# Patient Record
Sex: MALE | Race: WHITE | NOT HISPANIC OR LATINO | URBAN - METROPOLITAN AREA
[De-identification: names, ages, dates, MRNs, and addresses within clinical notes are randomized per-mention and may not be internally consistent; named-entity substitution may affect disease eponyms.]

---

## 2017-07-05 ENCOUNTER — INPATIENT (INPATIENT)
Facility: HOSPITAL | Age: 82
LOS: 3 days | Discharge: HOME | End: 2017-07-09
Attending: INTERNAL MEDICINE

## 2017-07-05 DIAGNOSIS — R42 DIZZINESS AND GIDDINESS: ICD-10-CM

## 2017-07-11 DIAGNOSIS — E87.6 HYPOKALEMIA: ICD-10-CM

## 2017-07-11 DIAGNOSIS — I10 ESSENTIAL (PRIMARY) HYPERTENSION: ICD-10-CM

## 2017-07-11 DIAGNOSIS — Z95.5 PRESENCE OF CORONARY ANGIOPLASTY IMPLANT AND GRAFT: ICD-10-CM

## 2017-07-11 DIAGNOSIS — R42 DIZZINESS AND GIDDINESS: ICD-10-CM

## 2017-07-11 DIAGNOSIS — I48.91 UNSPECIFIED ATRIAL FIBRILLATION: ICD-10-CM

## 2017-07-11 DIAGNOSIS — I25.10 ATHEROSCLEROTIC HEART DISEASE OF NATIVE CORONARY ARTERY WITHOUT ANGINA PECTORIS: ICD-10-CM

## 2017-07-11 DIAGNOSIS — E78.00 PURE HYPERCHOLESTEROLEMIA, UNSPECIFIED: ICD-10-CM

## 2017-07-11 DIAGNOSIS — I63.9 CEREBRAL INFARCTION, UNSPECIFIED: ICD-10-CM

## 2017-07-11 DIAGNOSIS — Z95.1 PRESENCE OF AORTOCORONARY BYPASS GRAFT: ICD-10-CM

## 2017-07-11 DIAGNOSIS — I95.9 HYPOTENSION, UNSPECIFIED: ICD-10-CM

## 2017-07-19 DIAGNOSIS — E87.1 HYPO-OSMOLALITY AND HYPONATREMIA: ICD-10-CM

## 2017-07-25 ENCOUNTER — OUTPATIENT (OUTPATIENT)
Dept: OUTPATIENT SERVICES | Facility: HOSPITAL | Age: 82
LOS: 1 days | Discharge: HOME | End: 2017-07-25

## 2017-07-25 DIAGNOSIS — I67.89 OTHER CEREBROVASCULAR DISEASE: ICD-10-CM

## 2017-07-25 DIAGNOSIS — Z79.01 LONG TERM (CURRENT) USE OF ANTICOAGULANTS: ICD-10-CM

## 2017-07-25 DIAGNOSIS — R42 DIZZINESS AND GIDDINESS: ICD-10-CM

## 2017-08-01 ENCOUNTER — OUTPATIENT (OUTPATIENT)
Dept: OUTPATIENT SERVICES | Facility: HOSPITAL | Age: 82
LOS: 1 days | Discharge: HOME | End: 2017-08-01

## 2017-08-01 DIAGNOSIS — R42 DIZZINESS AND GIDDINESS: ICD-10-CM

## 2017-08-01 DIAGNOSIS — I67.89 OTHER CEREBROVASCULAR DISEASE: ICD-10-CM

## 2017-08-01 DIAGNOSIS — Z79.01 LONG TERM (CURRENT) USE OF ANTICOAGULANTS: ICD-10-CM

## 2017-08-15 ENCOUNTER — OUTPATIENT (OUTPATIENT)
Dept: OUTPATIENT SERVICES | Facility: HOSPITAL | Age: 82
LOS: 1 days | Discharge: HOME | End: 2017-08-15

## 2017-08-15 DIAGNOSIS — Z79.01 LONG TERM (CURRENT) USE OF ANTICOAGULANTS: ICD-10-CM

## 2017-08-15 DIAGNOSIS — R42 DIZZINESS AND GIDDINESS: ICD-10-CM

## 2017-08-15 DIAGNOSIS — I67.89 OTHER CEREBROVASCULAR DISEASE: ICD-10-CM

## 2017-09-05 ENCOUNTER — OUTPATIENT (OUTPATIENT)
Dept: OUTPATIENT SERVICES | Facility: HOSPITAL | Age: 82
LOS: 1 days | Discharge: HOME | End: 2017-09-05

## 2017-09-05 DIAGNOSIS — R42 DIZZINESS AND GIDDINESS: ICD-10-CM

## 2017-09-05 DIAGNOSIS — Z79.01 LONG TERM (CURRENT) USE OF ANTICOAGULANTS: ICD-10-CM

## 2017-09-05 DIAGNOSIS — I67.89 OTHER CEREBROVASCULAR DISEASE: ICD-10-CM

## 2017-09-19 ENCOUNTER — OUTPATIENT (OUTPATIENT)
Dept: OUTPATIENT SERVICES | Facility: HOSPITAL | Age: 82
LOS: 1 days | Discharge: HOME | End: 2017-09-19

## 2017-09-19 DIAGNOSIS — Z79.01 LONG TERM (CURRENT) USE OF ANTICOAGULANTS: ICD-10-CM

## 2017-09-19 DIAGNOSIS — R42 DIZZINESS AND GIDDINESS: ICD-10-CM

## 2017-09-19 DIAGNOSIS — I67.89 OTHER CEREBROVASCULAR DISEASE: ICD-10-CM

## 2017-10-17 ENCOUNTER — OUTPATIENT (OUTPATIENT)
Dept: OUTPATIENT SERVICES | Facility: HOSPITAL | Age: 82
LOS: 1 days | Discharge: HOME | End: 2017-10-17

## 2017-10-17 DIAGNOSIS — R42 DIZZINESS AND GIDDINESS: ICD-10-CM

## 2017-10-17 DIAGNOSIS — Z79.01 LONG TERM (CURRENT) USE OF ANTICOAGULANTS: ICD-10-CM

## 2017-10-17 DIAGNOSIS — I67.89 OTHER CEREBROVASCULAR DISEASE: ICD-10-CM

## 2017-11-21 ENCOUNTER — OUTPATIENT (OUTPATIENT)
Dept: OUTPATIENT SERVICES | Facility: HOSPITAL | Age: 82
LOS: 1 days | Discharge: HOME | End: 2017-11-21

## 2017-11-21 DIAGNOSIS — R42 DIZZINESS AND GIDDINESS: ICD-10-CM

## 2017-11-21 DIAGNOSIS — I67.89 OTHER CEREBROVASCULAR DISEASE: ICD-10-CM

## 2017-11-21 DIAGNOSIS — Z79.01 LONG TERM (CURRENT) USE OF ANTICOAGULANTS: ICD-10-CM

## 2017-12-04 ENCOUNTER — OUTPATIENT (OUTPATIENT)
Dept: OUTPATIENT SERVICES | Facility: HOSPITAL | Age: 82
LOS: 1 days | Discharge: HOME | End: 2017-12-04

## 2017-12-04 DIAGNOSIS — I67.89 OTHER CEREBROVASCULAR DISEASE: ICD-10-CM

## 2017-12-04 DIAGNOSIS — R42 DIZZINESS AND GIDDINESS: ICD-10-CM

## 2017-12-04 DIAGNOSIS — Z79.01 LONG TERM (CURRENT) USE OF ANTICOAGULANTS: ICD-10-CM

## 2017-12-08 ENCOUNTER — OUTPATIENT (OUTPATIENT)
Dept: OUTPATIENT SERVICES | Facility: HOSPITAL | Age: 82
LOS: 1 days | Discharge: HOME | End: 2017-12-08

## 2017-12-08 DIAGNOSIS — R42 DIZZINESS AND GIDDINESS: ICD-10-CM

## 2017-12-08 DIAGNOSIS — Z79.01 LONG TERM (CURRENT) USE OF ANTICOAGULANTS: ICD-10-CM

## 2017-12-08 DIAGNOSIS — I67.89 OTHER CEREBROVASCULAR DISEASE: ICD-10-CM

## 2017-12-15 ENCOUNTER — OUTPATIENT (OUTPATIENT)
Dept: OUTPATIENT SERVICES | Facility: HOSPITAL | Age: 82
LOS: 1 days | Discharge: HOME | End: 2017-12-15

## 2017-12-15 DIAGNOSIS — R42 DIZZINESS AND GIDDINESS: ICD-10-CM

## 2017-12-15 DIAGNOSIS — I67.89 OTHER CEREBROVASCULAR DISEASE: ICD-10-CM

## 2017-12-15 DIAGNOSIS — Z79.01 LONG TERM (CURRENT) USE OF ANTICOAGULANTS: ICD-10-CM

## 2017-12-18 ENCOUNTER — OUTPATIENT (OUTPATIENT)
Dept: OUTPATIENT SERVICES | Facility: HOSPITAL | Age: 82
LOS: 1 days | Discharge: HOME | End: 2017-12-18

## 2017-12-18 DIAGNOSIS — Z79.01 LONG TERM (CURRENT) USE OF ANTICOAGULANTS: ICD-10-CM

## 2017-12-18 DIAGNOSIS — R42 DIZZINESS AND GIDDINESS: ICD-10-CM

## 2017-12-18 DIAGNOSIS — I67.89 OTHER CEREBROVASCULAR DISEASE: ICD-10-CM

## 2017-12-22 ENCOUNTER — OUTPATIENT (OUTPATIENT)
Dept: OUTPATIENT SERVICES | Facility: HOSPITAL | Age: 82
LOS: 1 days | Discharge: HOME | End: 2017-12-22

## 2017-12-22 DIAGNOSIS — I67.89 OTHER CEREBROVASCULAR DISEASE: ICD-10-CM

## 2017-12-22 DIAGNOSIS — R42 DIZZINESS AND GIDDINESS: ICD-10-CM

## 2017-12-22 DIAGNOSIS — Z79.01 LONG TERM (CURRENT) USE OF ANTICOAGULANTS: ICD-10-CM

## 2017-12-29 ENCOUNTER — OUTPATIENT (OUTPATIENT)
Dept: OUTPATIENT SERVICES | Facility: HOSPITAL | Age: 82
LOS: 1 days | Discharge: HOME | End: 2017-12-29

## 2017-12-29 DIAGNOSIS — Z79.01 LONG TERM (CURRENT) USE OF ANTICOAGULANTS: ICD-10-CM

## 2017-12-29 DIAGNOSIS — R42 DIZZINESS AND GIDDINESS: ICD-10-CM

## 2017-12-29 DIAGNOSIS — I67.89 OTHER CEREBROVASCULAR DISEASE: ICD-10-CM

## 2018-01-03 ENCOUNTER — OUTPATIENT (OUTPATIENT)
Dept: OUTPATIENT SERVICES | Facility: HOSPITAL | Age: 83
LOS: 1 days | Discharge: HOME | End: 2018-01-03

## 2018-01-03 DIAGNOSIS — R42 DIZZINESS AND GIDDINESS: ICD-10-CM

## 2018-01-03 DIAGNOSIS — I67.89 OTHER CEREBROVASCULAR DISEASE: ICD-10-CM

## 2018-01-03 DIAGNOSIS — Z79.01 LONG TERM (CURRENT) USE OF ANTICOAGULANTS: ICD-10-CM

## 2018-01-12 ENCOUNTER — OUTPATIENT (OUTPATIENT)
Dept: OUTPATIENT SERVICES | Facility: HOSPITAL | Age: 83
LOS: 1 days | Discharge: HOME | End: 2018-01-12

## 2018-01-12 DIAGNOSIS — I67.89 OTHER CEREBROVASCULAR DISEASE: ICD-10-CM

## 2018-01-12 DIAGNOSIS — Z79.01 LONG TERM (CURRENT) USE OF ANTICOAGULANTS: ICD-10-CM

## 2018-01-12 DIAGNOSIS — R42 DIZZINESS AND GIDDINESS: ICD-10-CM

## 2018-01-24 ENCOUNTER — OUTPATIENT (OUTPATIENT)
Dept: OUTPATIENT SERVICES | Facility: HOSPITAL | Age: 83
LOS: 1 days | Discharge: HOME | End: 2018-01-24

## 2018-01-24 DIAGNOSIS — Z79.01 LONG TERM (CURRENT) USE OF ANTICOAGULANTS: ICD-10-CM

## 2018-01-24 DIAGNOSIS — I67.89 OTHER CEREBROVASCULAR DISEASE: ICD-10-CM

## 2018-02-02 ENCOUNTER — OUTPATIENT (OUTPATIENT)
Dept: OUTPATIENT SERVICES | Facility: HOSPITAL | Age: 83
LOS: 1 days | Discharge: HOME | End: 2018-02-02

## 2018-02-02 DIAGNOSIS — I67.89 OTHER CEREBROVASCULAR DISEASE: ICD-10-CM

## 2018-02-02 DIAGNOSIS — Z79.01 LONG TERM (CURRENT) USE OF ANTICOAGULANTS: ICD-10-CM

## 2018-02-04 DIAGNOSIS — R42 DIZZINESS AND GIDDINESS: ICD-10-CM

## 2018-02-14 ENCOUNTER — OUTPATIENT (OUTPATIENT)
Dept: OUTPATIENT SERVICES | Facility: HOSPITAL | Age: 83
LOS: 1 days | Discharge: HOME | End: 2018-02-14

## 2018-02-14 DIAGNOSIS — Z79.01 LONG TERM (CURRENT) USE OF ANTICOAGULANTS: ICD-10-CM

## 2018-02-14 DIAGNOSIS — I67.89 OTHER CEREBROVASCULAR DISEASE: ICD-10-CM

## 2018-03-02 ENCOUNTER — OUTPATIENT (OUTPATIENT)
Dept: OUTPATIENT SERVICES | Facility: HOSPITAL | Age: 83
LOS: 1 days | Discharge: HOME | End: 2018-03-02

## 2018-03-02 DIAGNOSIS — Z79.01 LONG TERM (CURRENT) USE OF ANTICOAGULANTS: ICD-10-CM

## 2018-03-02 DIAGNOSIS — I67.89 OTHER CEREBROVASCULAR DISEASE: ICD-10-CM

## 2018-03-26 ENCOUNTER — OUTPATIENT (OUTPATIENT)
Dept: OUTPATIENT SERVICES | Facility: HOSPITAL | Age: 83
LOS: 1 days | Discharge: HOME | End: 2018-03-26

## 2018-03-26 DIAGNOSIS — Z79.01 LONG TERM (CURRENT) USE OF ANTICOAGULANTS: ICD-10-CM

## 2018-03-26 DIAGNOSIS — I67.89 OTHER CEREBROVASCULAR DISEASE: ICD-10-CM

## 2018-04-23 ENCOUNTER — OUTPATIENT (OUTPATIENT)
Dept: OUTPATIENT SERVICES | Facility: HOSPITAL | Age: 83
LOS: 1 days | Discharge: HOME | End: 2018-04-23

## 2018-04-23 DIAGNOSIS — Z79.01 LONG TERM (CURRENT) USE OF ANTICOAGULANTS: ICD-10-CM

## 2018-04-23 DIAGNOSIS — I67.89 OTHER CEREBROVASCULAR DISEASE: ICD-10-CM

## 2018-05-11 ENCOUNTER — OUTPATIENT (OUTPATIENT)
Dept: OUTPATIENT SERVICES | Facility: HOSPITAL | Age: 83
LOS: 1 days | Discharge: HOME | End: 2018-05-11

## 2018-05-11 DIAGNOSIS — Z79.01 LONG TERM (CURRENT) USE OF ANTICOAGULANTS: ICD-10-CM

## 2018-05-11 DIAGNOSIS — I67.89 OTHER CEREBROVASCULAR DISEASE: ICD-10-CM

## 2018-06-08 ENCOUNTER — OUTPATIENT (OUTPATIENT)
Dept: OUTPATIENT SERVICES | Facility: HOSPITAL | Age: 83
LOS: 1 days | Discharge: HOME | End: 2018-06-08

## 2018-06-08 DIAGNOSIS — I67.89 OTHER CEREBROVASCULAR DISEASE: ICD-10-CM

## 2018-06-08 DIAGNOSIS — Z79.01 LONG TERM (CURRENT) USE OF ANTICOAGULANTS: ICD-10-CM

## 2018-07-09 ENCOUNTER — OUTPATIENT (OUTPATIENT)
Dept: OUTPATIENT SERVICES | Facility: HOSPITAL | Age: 83
LOS: 1 days | Discharge: HOME | End: 2018-07-09

## 2018-07-09 DIAGNOSIS — I67.89 OTHER CEREBROVASCULAR DISEASE: ICD-10-CM

## 2018-07-09 DIAGNOSIS — Z79.01 LONG TERM (CURRENT) USE OF ANTICOAGULANTS: ICD-10-CM

## 2018-07-18 ENCOUNTER — OUTPATIENT (OUTPATIENT)
Dept: OUTPATIENT SERVICES | Facility: HOSPITAL | Age: 83
LOS: 1 days | Discharge: HOME | End: 2018-07-18

## 2018-07-18 DIAGNOSIS — I67.89 OTHER CEREBROVASCULAR DISEASE: ICD-10-CM

## 2018-07-18 DIAGNOSIS — Z79.01 LONG TERM (CURRENT) USE OF ANTICOAGULANTS: ICD-10-CM

## 2018-07-26 ENCOUNTER — OUTPATIENT (OUTPATIENT)
Dept: OUTPATIENT SERVICES | Facility: HOSPITAL | Age: 83
LOS: 1 days | Discharge: HOME | End: 2018-07-26

## 2018-07-26 DIAGNOSIS — Z79.01 LONG TERM (CURRENT) USE OF ANTICOAGULANTS: ICD-10-CM

## 2018-07-26 DIAGNOSIS — I67.89 OTHER CEREBROVASCULAR DISEASE: ICD-10-CM

## 2018-08-09 ENCOUNTER — OUTPATIENT (OUTPATIENT)
Dept: OUTPATIENT SERVICES | Facility: HOSPITAL | Age: 83
LOS: 1 days | Discharge: HOME | End: 2018-08-09

## 2018-08-09 DIAGNOSIS — Z79.01 LONG TERM (CURRENT) USE OF ANTICOAGULANTS: ICD-10-CM

## 2018-08-09 DIAGNOSIS — I67.89 OTHER CEREBROVASCULAR DISEASE: ICD-10-CM

## 2018-08-28 ENCOUNTER — OUTPATIENT (OUTPATIENT)
Dept: OUTPATIENT SERVICES | Facility: HOSPITAL | Age: 83
LOS: 1 days | Discharge: HOME | End: 2018-08-28

## 2018-08-28 DIAGNOSIS — I67.89 OTHER CEREBROVASCULAR DISEASE: ICD-10-CM

## 2018-08-28 DIAGNOSIS — Z79.01 LONG TERM (CURRENT) USE OF ANTICOAGULANTS: ICD-10-CM

## 2018-08-28 LAB
POCT INR: 2.3 RATIO — HIGH (ref 0.9–1.2)
POCT PT: 27.3 SEC — HIGH (ref 10–13.4)

## 2018-09-19 ENCOUNTER — OUTPATIENT (OUTPATIENT)
Dept: OUTPATIENT SERVICES | Facility: HOSPITAL | Age: 83
LOS: 1 days | Discharge: HOME | End: 2018-09-19

## 2018-09-19 DIAGNOSIS — I67.89 OTHER CEREBROVASCULAR DISEASE: ICD-10-CM

## 2018-09-19 DIAGNOSIS — Z79.01 LONG TERM (CURRENT) USE OF ANTICOAGULANTS: ICD-10-CM

## 2018-09-19 LAB
POCT INR: 2.5 RATIO — HIGH (ref 0.9–1.2)
POCT PT: 30 SEC — HIGH (ref 10–13.4)

## 2018-10-10 ENCOUNTER — OUTPATIENT (OUTPATIENT)
Dept: OUTPATIENT SERVICES | Facility: HOSPITAL | Age: 83
LOS: 1 days | Discharge: HOME | End: 2018-10-10

## 2018-10-10 DIAGNOSIS — Z79.01 LONG TERM (CURRENT) USE OF ANTICOAGULANTS: ICD-10-CM

## 2018-10-10 DIAGNOSIS — I67.89 OTHER CEREBROVASCULAR DISEASE: ICD-10-CM

## 2018-10-10 LAB
POCT INR: 2.9 RATIO — HIGH (ref 0.9–1.2)
POCT PT: 34.7 SEC — HIGH (ref 10–13.4)

## 2018-11-09 ENCOUNTER — OUTPATIENT (OUTPATIENT)
Dept: OUTPATIENT SERVICES | Facility: HOSPITAL | Age: 83
LOS: 1 days | Discharge: HOME | End: 2018-11-09

## 2018-11-09 DIAGNOSIS — Z79.01 LONG TERM (CURRENT) USE OF ANTICOAGULANTS: ICD-10-CM

## 2018-11-09 DIAGNOSIS — I67.89 OTHER CEREBROVASCULAR DISEASE: ICD-10-CM

## 2018-11-09 LAB
POCT INR: 2.2 RATIO — HIGH (ref 0.9–1.2)
POCT PT: 26.4 SEC — HIGH (ref 10–13.4)

## 2018-12-06 ENCOUNTER — OUTPATIENT (OUTPATIENT)
Dept: OUTPATIENT SERVICES | Facility: HOSPITAL | Age: 83
LOS: 1 days | Discharge: HOME | End: 2018-12-06

## 2018-12-06 DIAGNOSIS — Z79.01 LONG TERM (CURRENT) USE OF ANTICOAGULANTS: ICD-10-CM

## 2018-12-06 DIAGNOSIS — I67.89 OTHER CEREBROVASCULAR DISEASE: ICD-10-CM

## 2018-12-06 LAB
POCT INR: 2.7 RATIO — HIGH (ref 0.9–1.2)
POCT PT: 32.2 SEC — HIGH (ref 10–13.4)

## 2019-01-03 ENCOUNTER — OUTPATIENT (OUTPATIENT)
Dept: OUTPATIENT SERVICES | Facility: HOSPITAL | Age: 84
LOS: 1 days | Discharge: HOME | End: 2019-01-03

## 2019-01-03 DIAGNOSIS — I67.89 OTHER CEREBROVASCULAR DISEASE: ICD-10-CM

## 2019-01-03 DIAGNOSIS — Z79.01 LONG TERM (CURRENT) USE OF ANTICOAGULANTS: ICD-10-CM

## 2019-01-03 LAB
POCT INR: 2.5 RATIO — HIGH (ref 0.9–1.2)
POCT PT: 30 SEC — HIGH (ref 10–13.4)

## 2019-02-07 ENCOUNTER — OUTPATIENT (OUTPATIENT)
Dept: OUTPATIENT SERVICES | Facility: HOSPITAL | Age: 84
LOS: 1 days | Discharge: HOME | End: 2019-02-07

## 2019-02-07 DIAGNOSIS — I67.89 OTHER CEREBROVASCULAR DISEASE: ICD-10-CM

## 2019-02-07 DIAGNOSIS — Z79.01 LONG TERM (CURRENT) USE OF ANTICOAGULANTS: ICD-10-CM

## 2019-02-07 LAB
POCT INR: 1.3 RATIO — HIGH (ref 0.9–1.2)
POCT PT: 15.7 SEC — HIGH (ref 10–13.4)

## 2019-02-14 ENCOUNTER — OUTPATIENT (OUTPATIENT)
Dept: OUTPATIENT SERVICES | Facility: HOSPITAL | Age: 84
LOS: 1 days | Discharge: HOME | End: 2019-02-14

## 2019-02-14 DIAGNOSIS — I67.89 OTHER CEREBROVASCULAR DISEASE: ICD-10-CM

## 2019-02-14 DIAGNOSIS — Z79.01 LONG TERM (CURRENT) USE OF ANTICOAGULANTS: ICD-10-CM

## 2019-02-14 LAB
POCT INR: 1.9 RATIO — HIGH (ref 0.9–1.2)
POCT PT: 23 SEC — HIGH (ref 10–13.4)

## 2019-02-22 ENCOUNTER — OUTPATIENT (OUTPATIENT)
Dept: OUTPATIENT SERVICES | Facility: HOSPITAL | Age: 84
LOS: 1 days | Discharge: HOME | End: 2019-02-22

## 2019-02-22 DIAGNOSIS — I67.89 OTHER CEREBROVASCULAR DISEASE: ICD-10-CM

## 2019-02-22 DIAGNOSIS — Z79.01 LONG TERM (CURRENT) USE OF ANTICOAGULANTS: ICD-10-CM

## 2019-02-22 LAB
POCT INR: 2.5 RATIO — HIGH (ref 0.9–1.2)
POCT PT: 30.1 SEC — HIGH (ref 10–13.4)

## 2019-03-18 ENCOUNTER — OUTPATIENT (OUTPATIENT)
Dept: OUTPATIENT SERVICES | Facility: HOSPITAL | Age: 84
LOS: 1 days | Discharge: HOME | End: 2019-03-18

## 2019-03-18 DIAGNOSIS — Z79.01 LONG TERM (CURRENT) USE OF ANTICOAGULANTS: ICD-10-CM

## 2019-03-18 DIAGNOSIS — I67.89 OTHER CEREBROVASCULAR DISEASE: ICD-10-CM

## 2019-03-18 LAB
POCT INR: 3.1 RATIO — HIGH (ref 0.9–1.2)
POCT PT: 36.8 SEC — HIGH (ref 10–13.4)

## 2019-03-23 ENCOUNTER — OUTPATIENT (OUTPATIENT)
Dept: OUTPATIENT SERVICES | Facility: HOSPITAL | Age: 84
LOS: 1 days | Discharge: HOME | End: 2019-03-23

## 2019-03-23 DIAGNOSIS — J32.9 CHRONIC SINUSITIS, UNSPECIFIED: ICD-10-CM

## 2019-03-23 DIAGNOSIS — M54.2 CERVICALGIA: ICD-10-CM

## 2019-03-23 DIAGNOSIS — R42 DIZZINESS AND GIDDINESS: ICD-10-CM

## 2019-04-02 ENCOUNTER — OUTPATIENT (OUTPATIENT)
Dept: OUTPATIENT SERVICES | Facility: HOSPITAL | Age: 84
LOS: 1 days | Discharge: HOME | End: 2019-04-02

## 2019-04-02 DIAGNOSIS — Z79.01 LONG TERM (CURRENT) USE OF ANTICOAGULANTS: ICD-10-CM

## 2019-04-02 DIAGNOSIS — I67.89 OTHER CEREBROVASCULAR DISEASE: ICD-10-CM

## 2019-04-02 LAB
POCT INR: 3.4 RATIO — HIGH (ref 0.9–1.2)
POCT PT: 41.2 SEC — HIGH (ref 10–13.4)

## 2019-04-08 ENCOUNTER — OUTPATIENT (OUTPATIENT)
Dept: OUTPATIENT SERVICES | Facility: HOSPITAL | Age: 84
LOS: 1 days | Discharge: HOME | End: 2019-04-08

## 2019-04-08 DIAGNOSIS — I67.89 OTHER CEREBROVASCULAR DISEASE: ICD-10-CM

## 2019-04-08 DIAGNOSIS — Z79.01 LONG TERM (CURRENT) USE OF ANTICOAGULANTS: ICD-10-CM

## 2019-04-08 LAB
POCT INR: 2.3 RATIO — HIGH (ref 0.9–1.2)
POCT PT: 27.8 SEC — HIGH (ref 10–13.4)

## 2019-04-18 ENCOUNTER — OUTPATIENT (OUTPATIENT)
Dept: OUTPATIENT SERVICES | Facility: HOSPITAL | Age: 84
LOS: 1 days | Discharge: HOME | End: 2019-04-18

## 2019-04-18 DIAGNOSIS — I67.89 OTHER CEREBROVASCULAR DISEASE: ICD-10-CM

## 2019-04-18 DIAGNOSIS — Z79.01 LONG TERM (CURRENT) USE OF ANTICOAGULANTS: ICD-10-CM

## 2019-04-18 LAB
POCT INR: 2.3 RATIO — HIGH (ref 0.9–1.2)
POCT PT: 27.5 SEC — HIGH (ref 10–13.4)

## 2019-05-09 ENCOUNTER — OUTPATIENT (OUTPATIENT)
Dept: OUTPATIENT SERVICES | Facility: HOSPITAL | Age: 84
LOS: 1 days | Discharge: HOME | End: 2019-05-09

## 2019-05-09 DIAGNOSIS — I67.89 OTHER CEREBROVASCULAR DISEASE: ICD-10-CM

## 2019-05-09 DIAGNOSIS — Z79.01 LONG TERM (CURRENT) USE OF ANTICOAGULANTS: ICD-10-CM

## 2019-05-09 LAB
POCT INR: 2.4 RATIO — HIGH (ref 0.9–1.2)
POCT PT: 29.1 SEC — HIGH (ref 10–13.4)

## 2019-06-06 ENCOUNTER — OUTPATIENT (OUTPATIENT)
Dept: OUTPATIENT SERVICES | Facility: HOSPITAL | Age: 84
LOS: 1 days | Discharge: HOME | End: 2019-06-06

## 2019-06-06 DIAGNOSIS — Z79.01 LONG TERM (CURRENT) USE OF ANTICOAGULANTS: ICD-10-CM

## 2019-06-06 DIAGNOSIS — I67.89 OTHER CEREBROVASCULAR DISEASE: ICD-10-CM

## 2019-06-06 LAB
POCT INR: 3.2 RATIO — HIGH (ref 0.9–1.2)
POCT PT: 38.2 SEC — HIGH (ref 10–13.4)

## 2019-06-17 ENCOUNTER — OUTPATIENT (OUTPATIENT)
Dept: OUTPATIENT SERVICES | Facility: HOSPITAL | Age: 84
LOS: 1 days | Discharge: HOME | End: 2019-06-17

## 2019-06-17 DIAGNOSIS — Z79.01 LONG TERM (CURRENT) USE OF ANTICOAGULANTS: ICD-10-CM

## 2019-06-17 DIAGNOSIS — I67.89 OTHER CEREBROVASCULAR DISEASE: ICD-10-CM

## 2019-06-17 LAB
POCT INR: 4.4 RATIO — HIGH (ref 0.9–1.2)
POCT PT: 53.1 SEC — HIGH (ref 10–13.4)

## 2019-06-24 ENCOUNTER — OUTPATIENT (OUTPATIENT)
Dept: OUTPATIENT SERVICES | Facility: HOSPITAL | Age: 84
LOS: 1 days | Discharge: HOME | End: 2019-06-24

## 2019-06-24 DIAGNOSIS — I67.89 OTHER CEREBROVASCULAR DISEASE: ICD-10-CM

## 2019-06-24 DIAGNOSIS — Z79.01 LONG TERM (CURRENT) USE OF ANTICOAGULANTS: ICD-10-CM

## 2019-06-24 LAB
POCT INR: 2.6 RATIO — HIGH (ref 0.9–1.2)
POCT PT: 31 SEC — HIGH (ref 10–13.4)

## 2019-07-05 ENCOUNTER — OUTPATIENT (OUTPATIENT)
Dept: OUTPATIENT SERVICES | Facility: HOSPITAL | Age: 84
LOS: 1 days | Discharge: HOME | End: 2019-07-05

## 2019-07-05 DIAGNOSIS — I67.89 OTHER CEREBROVASCULAR DISEASE: ICD-10-CM

## 2019-07-05 DIAGNOSIS — Z79.01 LONG TERM (CURRENT) USE OF ANTICOAGULANTS: ICD-10-CM

## 2019-07-05 LAB
POCT INR: 2.5 RATIO — HIGH (ref 0.9–1.2)
POCT PT: 29.8 SEC — HIGH (ref 10–13.4)

## 2019-07-24 ENCOUNTER — OUTPATIENT (OUTPATIENT)
Dept: OUTPATIENT SERVICES | Facility: HOSPITAL | Age: 84
LOS: 1 days | Discharge: HOME | End: 2019-07-24

## 2019-07-24 DIAGNOSIS — I67.89 OTHER CEREBROVASCULAR DISEASE: ICD-10-CM

## 2019-07-24 DIAGNOSIS — Z79.01 LONG TERM (CURRENT) USE OF ANTICOAGULANTS: ICD-10-CM

## 2019-07-24 LAB
POCT INR: 2.9 RATIO — HIGH (ref 0.9–1.2)
POCT PT: 35.4 SEC — HIGH (ref 10–13.4)

## 2019-08-14 ENCOUNTER — OUTPATIENT (OUTPATIENT)
Dept: OUTPATIENT SERVICES | Facility: HOSPITAL | Age: 84
LOS: 1 days | Discharge: HOME | End: 2019-08-14

## 2019-08-14 DIAGNOSIS — Z79.01 LONG TERM (CURRENT) USE OF ANTICOAGULANTS: ICD-10-CM

## 2019-08-14 DIAGNOSIS — I67.89 OTHER CEREBROVASCULAR DISEASE: ICD-10-CM

## 2019-08-14 LAB
POCT INR: 2.9 RATIO — HIGH (ref 0.9–1.2)
POCT PT: 35 SEC — HIGH (ref 10–13.4)

## 2019-09-11 ENCOUNTER — OUTPATIENT (OUTPATIENT)
Dept: OUTPATIENT SERVICES | Facility: HOSPITAL | Age: 84
LOS: 1 days | Discharge: HOME | End: 2019-09-11

## 2019-09-11 DIAGNOSIS — Z79.01 LONG TERM (CURRENT) USE OF ANTICOAGULANTS: ICD-10-CM

## 2019-09-11 DIAGNOSIS — I67.89 OTHER CEREBROVASCULAR DISEASE: ICD-10-CM

## 2019-09-11 LAB
POCT INR: 2.8 RATIO — HIGH (ref 0.9–1.2)
POCT PT: 33.3 SEC — HIGH (ref 10–13.4)

## 2019-10-09 ENCOUNTER — OUTPATIENT (OUTPATIENT)
Dept: OUTPATIENT SERVICES | Facility: HOSPITAL | Age: 84
LOS: 1 days | Discharge: HOME | End: 2019-10-09

## 2019-10-09 DIAGNOSIS — I67.89 OTHER CEREBROVASCULAR DISEASE: ICD-10-CM

## 2019-10-09 DIAGNOSIS — Z79.01 LONG TERM (CURRENT) USE OF ANTICOAGULANTS: ICD-10-CM

## 2019-10-09 LAB
POCT INR: 3.4 RATIO — HIGH (ref 0.9–1.2)
POCT PT: 40.3 SEC — HIGH (ref 10–13.4)

## 2019-10-16 ENCOUNTER — OUTPATIENT (OUTPATIENT)
Dept: OUTPATIENT SERVICES | Facility: HOSPITAL | Age: 84
LOS: 1 days | Discharge: HOME | End: 2019-10-16

## 2019-10-16 DIAGNOSIS — Z79.01 LONG TERM (CURRENT) USE OF ANTICOAGULANTS: ICD-10-CM

## 2019-10-16 DIAGNOSIS — I67.89 OTHER CEREBROVASCULAR DISEASE: ICD-10-CM

## 2019-10-16 LAB
POCT INR: 3.1 RATIO — HIGH (ref 0.9–1.2)
POCT PT: 37.2 SEC — HIGH (ref 10–13.4)

## 2019-10-22 PROBLEM — Z00.00 ENCOUNTER FOR PREVENTIVE HEALTH EXAMINATION: Status: ACTIVE | Noted: 2019-10-22

## 2019-10-30 ENCOUNTER — OUTPATIENT (OUTPATIENT)
Dept: OUTPATIENT SERVICES | Facility: HOSPITAL | Age: 84
LOS: 1 days | Discharge: HOME | End: 2019-10-30

## 2019-10-30 DIAGNOSIS — Z79.01 LONG TERM (CURRENT) USE OF ANTICOAGULANTS: ICD-10-CM

## 2019-10-30 DIAGNOSIS — I67.89 OTHER CEREBROVASCULAR DISEASE: ICD-10-CM

## 2019-10-30 LAB
POCT INR: 2.9 RATIO — HIGH (ref 0.9–1.2)
POCT PT: 34.6 SEC — HIGH (ref 10–13.4)

## 2019-11-13 ENCOUNTER — OUTPATIENT (OUTPATIENT)
Dept: OUTPATIENT SERVICES | Facility: HOSPITAL | Age: 84
LOS: 1 days | Discharge: HOME | End: 2019-11-13

## 2019-11-13 DIAGNOSIS — I67.89 OTHER CEREBROVASCULAR DISEASE: ICD-10-CM

## 2019-11-13 DIAGNOSIS — Z79.01 LONG TERM (CURRENT) USE OF ANTICOAGULANTS: ICD-10-CM

## 2019-11-13 LAB
POCT INR: 2.9 RATIO — HIGH (ref 0.9–1.2)
POCT PT: 35.2 SEC — HIGH (ref 10–13.4)

## 2019-12-02 ENCOUNTER — OUTPATIENT (OUTPATIENT)
Dept: OUTPATIENT SERVICES | Facility: HOSPITAL | Age: 84
LOS: 1 days | Discharge: HOME | End: 2019-12-02

## 2019-12-02 DIAGNOSIS — Z79.01 LONG TERM (CURRENT) USE OF ANTICOAGULANTS: ICD-10-CM

## 2019-12-02 DIAGNOSIS — I67.89 OTHER CEREBROVASCULAR DISEASE: ICD-10-CM

## 2019-12-02 LAB
POCT INR: 2.1 RATIO — HIGH (ref 0.9–1.2)
POCT PT: 25.2 SEC — HIGH (ref 10–13.4)

## 2019-12-30 ENCOUNTER — OUTPATIENT (OUTPATIENT)
Dept: OUTPATIENT SERVICES | Facility: HOSPITAL | Age: 84
LOS: 1 days | Discharge: HOME | End: 2019-12-30

## 2019-12-30 DIAGNOSIS — Z79.01 LONG TERM (CURRENT) USE OF ANTICOAGULANTS: ICD-10-CM

## 2019-12-30 DIAGNOSIS — I67.89 OTHER CEREBROVASCULAR DISEASE: ICD-10-CM

## 2019-12-30 LAB
POCT INR: 1.8 RATIO — HIGH (ref 0.9–1.2)
POCT PT: 21.8 SEC — HIGH (ref 10–13.4)

## 2020-01-07 ENCOUNTER — OUTPATIENT (OUTPATIENT)
Dept: OUTPATIENT SERVICES | Facility: HOSPITAL | Age: 85
LOS: 1 days | Discharge: HOME | End: 2020-01-07

## 2020-01-07 DIAGNOSIS — I67.89 OTHER CEREBROVASCULAR DISEASE: ICD-10-CM

## 2020-01-07 DIAGNOSIS — Z79.01 LONG TERM (CURRENT) USE OF ANTICOAGULANTS: ICD-10-CM

## 2020-01-07 LAB
INR PPP: 2.5 RATIO
POCT INR: 2.5 RATIO — HIGH (ref 0.9–1.2)
POCT PT: 29.7 SEC — HIGH (ref 10–13.4)
POCT-PROTHROMBIN TIME: 29.7 SECS

## 2020-01-21 ENCOUNTER — OUTPATIENT (OUTPATIENT)
Dept: OUTPATIENT SERVICES | Facility: HOSPITAL | Age: 85
LOS: 1 days | Discharge: HOME | End: 2020-01-21

## 2020-01-21 DIAGNOSIS — Z79.01 LONG TERM (CURRENT) USE OF ANTICOAGULANTS: ICD-10-CM

## 2020-01-21 DIAGNOSIS — I67.89 OTHER CEREBROVASCULAR DISEASE: ICD-10-CM

## 2020-01-21 LAB
INR PPP: 2.7 RATIO
POCT INR: 2.7 RATIO — HIGH (ref 0.9–1.2)
POCT PT: 32.1 SEC — HIGH (ref 10–13.4)
POCT-PROTHROMBIN TIME: 32.1 SECS

## 2020-02-11 ENCOUNTER — OUTPATIENT (OUTPATIENT)
Dept: OUTPATIENT SERVICES | Facility: HOSPITAL | Age: 85
LOS: 1 days | Discharge: HOME | End: 2020-02-11

## 2020-02-11 DIAGNOSIS — I67.89 OTHER CEREBROVASCULAR DISEASE: ICD-10-CM

## 2020-02-11 DIAGNOSIS — Z79.01 LONG TERM (CURRENT) USE OF ANTICOAGULANTS: ICD-10-CM

## 2020-02-11 LAB
INR PPP: 2.3 RATIO
POCT INR: 2.3 RATIO — HIGH (ref 0.9–1.2)
POCT PT: 27.6 SEC — HIGH (ref 10–13.4)
POCT-PROTHROMBIN TIME: 27.6 SECS

## 2020-03-10 ENCOUNTER — OUTPATIENT (OUTPATIENT)
Dept: OUTPATIENT SERVICES | Facility: HOSPITAL | Age: 85
LOS: 1 days | Discharge: HOME | End: 2020-03-10

## 2020-03-10 DIAGNOSIS — I67.89 OTHER CEREBROVASCULAR DISEASE: ICD-10-CM

## 2020-03-10 DIAGNOSIS — Z79.01 LONG TERM (CURRENT) USE OF ANTICOAGULANTS: ICD-10-CM

## 2020-03-10 LAB
INR PPP: 2.3 RATIO
POCT INR: 2.3 RATIO — HIGH (ref 0.9–1.2)
POCT PT: 27.5 SEC — HIGH (ref 10–13.4)
POCT-PROTHROMBIN TIME: 27.5 SECS

## 2020-04-07 ENCOUNTER — OUTPATIENT (OUTPATIENT)
Dept: OUTPATIENT SERVICES | Facility: HOSPITAL | Age: 85
LOS: 1 days | Discharge: HOME | End: 2020-04-07

## 2020-04-07 DIAGNOSIS — Z79.01 LONG TERM (CURRENT) USE OF ANTICOAGULANTS: ICD-10-CM

## 2020-04-07 DIAGNOSIS — I67.89 OTHER CEREBROVASCULAR DISEASE: ICD-10-CM

## 2020-04-07 LAB
INR PPP: 2.6 RATIO
POCT INR: 2.6 RATIO — HIGH (ref 0.9–1.2)
POCT PT: 30.7 SEC — HIGH (ref 10–13.4)
POCT-PROTHROMBIN TIME: 30.7 SECS
QUALITY CONTROL: YES

## 2020-05-12 ENCOUNTER — OUTPATIENT (OUTPATIENT)
Dept: OUTPATIENT SERVICES | Facility: HOSPITAL | Age: 85
LOS: 1 days | Discharge: HOME | End: 2020-05-12

## 2020-05-12 DIAGNOSIS — Z79.01 LONG TERM (CURRENT) USE OF ANTICOAGULANTS: ICD-10-CM

## 2020-05-12 DIAGNOSIS — I67.89 OTHER CEREBROVASCULAR DISEASE: ICD-10-CM

## 2020-05-12 LAB
INR PPP: 2.6 RATIO
POCT INR: 2.6 RATIO — HIGH (ref 0.9–1.2)
POCT PT: 31.1 SEC — HIGH (ref 10–13.4)
POCT-PROTHROMBIN TIME: 31.1 SECS
QUALITY CONTROL: YES

## 2020-07-07 ENCOUNTER — OUTPATIENT (OUTPATIENT)
Dept: OUTPATIENT SERVICES | Facility: HOSPITAL | Age: 85
LOS: 1 days | Discharge: HOME | End: 2020-07-07

## 2020-07-07 DIAGNOSIS — I67.89 OTHER CEREBROVASCULAR DISEASE: ICD-10-CM

## 2020-07-07 DIAGNOSIS — Z79.01 LONG TERM (CURRENT) USE OF ANTICOAGULANTS: ICD-10-CM

## 2020-07-07 LAB
INR PPP: 3.5 RATIO
POCT INR: 3.5 RATIO — HIGH (ref 0.9–1.2)
POCT PT: 41.8 SEC — HIGH (ref 10–13.4)
POCT-PROTHROMBIN TIME: 41.8 SECS
QUALITY CONTROL: YES

## 2020-07-17 ENCOUNTER — OUTPATIENT (OUTPATIENT)
Dept: OUTPATIENT SERVICES | Facility: HOSPITAL | Age: 85
LOS: 1 days | Discharge: HOME | End: 2020-07-17

## 2020-07-17 DIAGNOSIS — I67.89 OTHER CEREBROVASCULAR DISEASE: ICD-10-CM

## 2020-07-17 DIAGNOSIS — Z79.01 LONG TERM (CURRENT) USE OF ANTICOAGULANTS: ICD-10-CM

## 2020-07-17 LAB
INR PPP: 2.6 RATIO
POCT INR: 2.6 RATIO — HIGH (ref 0.9–1.2)
POCT PT: 31.2 SEC — HIGH (ref 10–13.4)
POCT-PROTHROMBIN TIME: 31.2 SECS
QUALITY CONTROL: YES

## 2020-08-08 ENCOUNTER — RECORD ABSTRACTING (OUTPATIENT)
Age: 85
End: 2020-08-08

## 2020-08-12 ENCOUNTER — OUTPATIENT (OUTPATIENT)
Dept: OUTPATIENT SERVICES | Facility: HOSPITAL | Age: 85
LOS: 1 days | Discharge: HOME | End: 2020-08-12

## 2020-08-12 ENCOUNTER — APPOINTMENT (OUTPATIENT)
Dept: MEDICATION MANAGEMENT | Facility: CLINIC | Age: 85
End: 2020-08-12

## 2020-08-12 VITALS — RESPIRATION RATE: 16 BRPM | HEART RATE: 72 BPM | OXYGEN SATURATION: 100 %

## 2020-08-12 DIAGNOSIS — I67.89 OTHER CEREBROVASCULAR DISEASE: ICD-10-CM

## 2020-08-12 DIAGNOSIS — Z79.01 LONG TERM (CURRENT) USE OF ANTICOAGULANTS: ICD-10-CM

## 2020-08-12 LAB
INR PPP: 2.2 RATIO
POCT-PROTHROMBIN TIME: 26.7 SECS
QUALITY CONTROL: YES

## 2020-09-09 ENCOUNTER — APPOINTMENT (OUTPATIENT)
Dept: MEDICATION MANAGEMENT | Facility: CLINIC | Age: 85
End: 2020-09-09

## 2020-09-09 ENCOUNTER — OUTPATIENT (OUTPATIENT)
Dept: OUTPATIENT SERVICES | Facility: HOSPITAL | Age: 85
LOS: 1 days | Discharge: HOME | End: 2020-09-09

## 2020-09-09 ENCOUNTER — OUTPATIENT (OUTPATIENT)
Dept: OUTPATIENT SERVICES | Facility: HOSPITAL | Age: 85
LOS: 1 days | Discharge: HOME | End: 2020-09-09
Payer: MEDICARE

## 2020-09-09 VITALS — RESPIRATION RATE: 16 BRPM | HEART RATE: 84 BPM | OXYGEN SATURATION: 96 %

## 2020-09-09 DIAGNOSIS — I67.89 OTHER CEREBROVASCULAR DISEASE: ICD-10-CM

## 2020-09-09 DIAGNOSIS — R07.9 CHEST PAIN, UNSPECIFIED: ICD-10-CM

## 2020-09-09 DIAGNOSIS — Z79.01 LONG TERM (CURRENT) USE OF ANTICOAGULANTS: ICD-10-CM

## 2020-09-09 PROCEDURE — 71100 X-RAY EXAM RIBS UNI 2 VIEWS: CPT | Mod: 26,RT

## 2020-09-09 PROCEDURE — 71046 X-RAY EXAM CHEST 2 VIEWS: CPT | Mod: 26

## 2020-09-10 LAB
INR PPP: 3.2 RATIO
POCT-PROTHROMBIN TIME: 38.4 SECS
QUALITY CONTROL: YES

## 2020-09-11 DIAGNOSIS — Z02.9 ENCOUNTER FOR ADMINISTRATIVE EXAMINATIONS, UNSPECIFIED: ICD-10-CM

## 2020-09-11 DIAGNOSIS — R07.9 CHEST PAIN, UNSPECIFIED: ICD-10-CM

## 2020-09-15 ENCOUNTER — APPOINTMENT (OUTPATIENT)
Dept: MEDICATION MANAGEMENT | Facility: CLINIC | Age: 85
End: 2020-09-15

## 2020-09-15 ENCOUNTER — OUTPATIENT (OUTPATIENT)
Dept: OUTPATIENT SERVICES | Facility: HOSPITAL | Age: 85
LOS: 1 days | Discharge: HOME | End: 2020-09-15

## 2020-09-15 VITALS — RESPIRATION RATE: 16 BRPM | HEART RATE: 71 BPM | OXYGEN SATURATION: 100 %

## 2020-09-15 DIAGNOSIS — Z79.01 LONG TERM (CURRENT) USE OF ANTICOAGULANTS: ICD-10-CM

## 2020-09-15 DIAGNOSIS — I67.89 OTHER CEREBROVASCULAR DISEASE: ICD-10-CM

## 2020-09-15 LAB
INR PPP: 3 RATIO
POCT-PROTHROMBIN TIME: 35.4 SECS
QUALITY CONTROL: YES

## 2020-09-30 ENCOUNTER — APPOINTMENT (OUTPATIENT)
Dept: MEDICATION MANAGEMENT | Facility: CLINIC | Age: 85
End: 2020-09-30

## 2021-04-07 ENCOUNTER — INPATIENT (INPATIENT)
Facility: HOSPITAL | Age: 86
LOS: 1 days | Discharge: HOME | End: 2021-04-09
Attending: INTERNAL MEDICINE | Admitting: INTERNAL MEDICINE
Payer: COMMERCIAL

## 2021-04-07 VITALS
OXYGEN SATURATION: 99 % | SYSTOLIC BLOOD PRESSURE: 137 MMHG | WEIGHT: 184.97 LBS | HEART RATE: 99 BPM | TEMPERATURE: 99 F | DIASTOLIC BLOOD PRESSURE: 87 MMHG | RESPIRATION RATE: 20 BRPM

## 2021-04-07 DIAGNOSIS — Z95.1 PRESENCE OF AORTOCORONARY BYPASS GRAFT: Chronic | ICD-10-CM

## 2021-04-07 LAB
ALBUMIN SERPL ELPH-MCNC: 4.2 G/DL — SIGNIFICANT CHANGE UP (ref 3.5–5.2)
ALP SERPL-CCNC: 49 U/L — SIGNIFICANT CHANGE UP (ref 30–115)
ALT FLD-CCNC: 27 U/L — SIGNIFICANT CHANGE UP (ref 0–41)
ANION GAP SERPL CALC-SCNC: 11 MMOL/L — SIGNIFICANT CHANGE UP (ref 7–14)
AST SERPL-CCNC: 46 U/L — HIGH (ref 0–41)
BASOPHILS # BLD AUTO: 0.01 K/UL — SIGNIFICANT CHANGE UP (ref 0–0.2)
BASOPHILS NFR BLD AUTO: 0.2 % — SIGNIFICANT CHANGE UP (ref 0–1)
BILIRUB SERPL-MCNC: 0.5 MG/DL — SIGNIFICANT CHANGE UP (ref 0.2–1.2)
BUN SERPL-MCNC: 18 MG/DL — SIGNIFICANT CHANGE UP (ref 10–20)
CALCIUM SERPL-MCNC: 9.4 MG/DL — SIGNIFICANT CHANGE UP (ref 8.5–10.1)
CHLORIDE SERPL-SCNC: 102 MMOL/L — SIGNIFICANT CHANGE UP (ref 98–110)
CO2 SERPL-SCNC: 26 MMOL/L — SIGNIFICANT CHANGE UP (ref 17–32)
CREAT SERPL-MCNC: 0.9 MG/DL — SIGNIFICANT CHANGE UP (ref 0.7–1.5)
EOSINOPHIL # BLD AUTO: 0.03 K/UL — SIGNIFICANT CHANGE UP (ref 0–0.7)
EOSINOPHIL NFR BLD AUTO: 0.5 % — SIGNIFICANT CHANGE UP (ref 0–8)
ERYTHROCYTE [SEDIMENTATION RATE] IN BLOOD: 50 MM/HR — HIGH (ref 0–10)
GLUCOSE SERPL-MCNC: 83 MG/DL — SIGNIFICANT CHANGE UP (ref 70–99)
HCT VFR BLD CALC: 35.2 % — LOW (ref 42–52)
HGB BLD-MCNC: 11.2 G/DL — LOW (ref 14–18)
IMM GRANULOCYTES NFR BLD AUTO: 0.5 % — HIGH (ref 0.1–0.3)
LYMPHOCYTES # BLD AUTO: 1.59 K/UL — SIGNIFICANT CHANGE UP (ref 1.2–3.4)
LYMPHOCYTES # BLD AUTO: 29 % — SIGNIFICANT CHANGE UP (ref 20.5–51.1)
MAGNESIUM SERPL-MCNC: 1.6 MG/DL — LOW (ref 1.8–2.4)
MCHC RBC-ENTMCNC: 26.9 PG — LOW (ref 27–31)
MCHC RBC-ENTMCNC: 31.8 G/DL — LOW (ref 32–37)
MCV RBC AUTO: 84.4 FL — SIGNIFICANT CHANGE UP (ref 80–94)
MONOCYTES # BLD AUTO: 0.58 K/UL — SIGNIFICANT CHANGE UP (ref 0.1–0.6)
MONOCYTES NFR BLD AUTO: 10.6 % — HIGH (ref 1.7–9.3)
NEUTROPHILS # BLD AUTO: 3.24 K/UL — SIGNIFICANT CHANGE UP (ref 1.4–6.5)
NEUTROPHILS NFR BLD AUTO: 59.2 % — SIGNIFICANT CHANGE UP (ref 42.2–75.2)
NRBC # BLD: 0 /100 WBCS — SIGNIFICANT CHANGE UP (ref 0–0)
NT-PROBNP SERPL-SCNC: 808 PG/ML — HIGH (ref 0–300)
PLATELET # BLD AUTO: 145 K/UL — SIGNIFICANT CHANGE UP (ref 130–400)
POTASSIUM SERPL-MCNC: 3.7 MMOL/L — SIGNIFICANT CHANGE UP (ref 3.5–5)
POTASSIUM SERPL-SCNC: 3.7 MMOL/L — SIGNIFICANT CHANGE UP (ref 3.5–5)
PROT SERPL-MCNC: 6.8 G/DL — SIGNIFICANT CHANGE UP (ref 6–8)
RAPID RVP RESULT: DETECTED
RBC # BLD: 4.17 M/UL — LOW (ref 4.7–6.1)
RBC # FLD: 16.7 % — HIGH (ref 11.5–14.5)
SARS-COV-2 RNA SPEC QL NAA+PROBE: DETECTED
SODIUM SERPL-SCNC: 139 MMOL/L — SIGNIFICANT CHANGE UP (ref 135–146)
TROPONIN T SERPL-MCNC: <0.01 NG/ML — SIGNIFICANT CHANGE UP
WBC # BLD: 5.48 K/UL — SIGNIFICANT CHANGE UP (ref 4.8–10.8)
WBC # FLD AUTO: 5.48 K/UL — SIGNIFICANT CHANGE UP (ref 4.8–10.8)

## 2021-04-07 PROCEDURE — 71045 X-RAY EXAM CHEST 1 VIEW: CPT | Mod: 26

## 2021-04-07 PROCEDURE — 70498 CT ANGIOGRAPHY NECK: CPT | Mod: 26

## 2021-04-07 PROCEDURE — 70496 CT ANGIOGRAPHY HEAD: CPT | Mod: 26

## 2021-04-07 PROCEDURE — 99223 1ST HOSP IP/OBS HIGH 75: CPT

## 2021-04-07 PROCEDURE — 99285 EMERGENCY DEPT VISIT HI MDM: CPT

## 2021-04-07 RX ORDER — ATORVASTATIN CALCIUM 80 MG/1
80 TABLET, FILM COATED ORAL AT BEDTIME
Refills: 0 | Status: DISCONTINUED | OUTPATIENT
Start: 2021-04-07 | End: 2021-04-09

## 2021-04-07 RX ORDER — PANTOPRAZOLE SODIUM 20 MG/1
40 TABLET, DELAYED RELEASE ORAL
Refills: 0 | Status: DISCONTINUED | OUTPATIENT
Start: 2021-04-07 | End: 2021-04-09

## 2021-04-07 RX ORDER — LISINOPRIL 2.5 MG/1
20 TABLET ORAL DAILY
Refills: 0 | Status: DISCONTINUED | OUTPATIENT
Start: 2021-04-07 | End: 2021-04-09

## 2021-04-07 RX ORDER — BRIMONIDINE TARTRATE 2 MG/MG
1 SOLUTION/ DROPS OPHTHALMIC EVERY 8 HOURS
Refills: 0 | Status: DISCONTINUED | OUTPATIENT
Start: 2021-04-07 | End: 2021-04-09

## 2021-04-07 RX ORDER — ACETAMINOPHEN 500 MG
650 TABLET ORAL ONCE
Refills: 0 | Status: COMPLETED | OUTPATIENT
Start: 2021-04-07 | End: 2021-04-07

## 2021-04-07 RX ORDER — TRIAMTERENE/HYDROCHLOROTHIAZID 75 MG-50MG
1 TABLET ORAL DAILY
Refills: 0 | Status: DISCONTINUED | OUTPATIENT
Start: 2021-04-07 | End: 2021-04-09

## 2021-04-07 RX ORDER — ASPIRIN/CALCIUM CARB/MAGNESIUM 324 MG
81 TABLET ORAL DAILY
Refills: 0 | Status: DISCONTINUED | OUTPATIENT
Start: 2021-04-07 | End: 2021-04-09

## 2021-04-07 RX ORDER — WARFARIN SODIUM 2.5 MG/1
5 TABLET ORAL DAILY
Refills: 0 | Status: COMPLETED | OUTPATIENT
Start: 2021-04-07 | End: 2021-04-08

## 2021-04-07 RX ORDER — DORZOLAMIDE HYDROCHLORIDE TIMOLOL MALEATE 20; 5 MG/ML; MG/ML
1 SOLUTION/ DROPS OPHTHALMIC
Refills: 0 | Status: DISCONTINUED | OUTPATIENT
Start: 2021-04-07 | End: 2021-04-08

## 2021-04-07 RX ORDER — METOPROLOL TARTRATE 50 MG
50 TABLET ORAL
Refills: 0 | Status: DISCONTINUED | OUTPATIENT
Start: 2021-04-07 | End: 2021-04-09

## 2021-04-07 RX ORDER — FOSINOPRIL SODIUM 10 MG/1
1 TABLET ORAL
Qty: 0 | Refills: 0 | DISCHARGE

## 2021-04-07 RX ORDER — MAGNESIUM SULFATE 500 MG/ML
2 VIAL (ML) INJECTION ONCE
Refills: 0 | Status: COMPLETED | OUTPATIENT
Start: 2021-04-07 | End: 2021-04-07

## 2021-04-07 RX ADMIN — Medication 50 GRAM(S): at 19:31

## 2021-04-07 RX ADMIN — Medication 650 MILLIGRAM(S): at 20:59

## 2021-04-07 NOTE — H&P ADULT - ASSESSMENT
Pt is an 84 YO M who will be admitted for the workup and treatment of blurry vision, r/o stroke, Pt's ESR is elevated, however likely reactive to COVID, unlikely giant cell arteritis, awaiting recommendations from neurology at this time, due to hx of glaucoma will consult ophthalmology as well, f/u recommendations, add asprin, continue statin. Pt is COVID positive with fever, pt is hemodynamically stable, oxygen sat on RA is 96%, ID consult pending. Afib on coumadin: f/u INR, dose coumadin, continue metoprolol home dosage, HLD: cont statin. GERD: asymptomatic, cont PPI.         DVT ppx: on coumadin  GI ppx: PPI Pt is an 84 YO M who will be admitted for the workup and treatment of blurry vision, r/o stroke, Pt's ESR is elevated, however likely reactive to COVID, unlikely giant cell arteritis, per neuro, solumedrol 60mg x1 ordered stat to be given. Due to hx of glaucoma will consult ophthalmology as well, f/u recommendations for ophthal eval, add asprin, continue statin. Pt is COVID positive with fever, pt is hemodynamically stable, oxygen sat on RA is 96%, ID consult pending. Afib on coumadin: f/u INR, dose coumadin accordingly, continue metoprolol home dosage, HLD: cont statin. GERD: asymptomatic, cont PPI.     Pt is uncertain of certain home medications, please update and verify med rec in the AM.       DVT ppx: on coumadin  GI ppx: PPI Pt is an 86 YO M who will be admitted for the workup and treatment of blurry vision, r/o stroke, Pt's ESR is elevated, however likely reactive to COVID, unlikely giant cell arteritis, per neuro, solumedrol 60mg x1 ordered stat to be given. Due to hx of glaucoma will consult ophthalmology as well, f/u recommendations for ophthal eval, add asprin, continue statin. Pt is COVID positive with fever, pt is hemodynamically stable, oxygen sat on RA is 96%, ID consult pending. Afib on coumadin: f/u INR, dose coumadin accordingly, continue metoprolol home dosage, HLD: cont statin. GERD: asymptomatic, cont PPI.     Pt is uncertain of certain home medications, please update and verify med rec in the AM.     Pt is to be transferred to [facility] due to covid positive status. Signed out to MAR who will continue following.     DVT ppx: on coumadin  GI ppx: PPI Pt is an 86 YO M who will be admitted for the workup and treatment of blurry vision, r/o stroke, Pt's ESR is elevated, however likely reactive to COVID, unlikely giant cell arteritis, per neuro, solumedrol 60mg x1 ordered stat to be given. Due to hx of glaucoma will consult ophthalmology as well for ophthal eval, Neuro checks, add asprin, continue statin. Pt is COVID positive with fever, pt is hemodynamically stable, oxygen sat on RA is 96%, ID consult pending. Afib on coumadin: f/u INR, dose coumadin accordingly, continue metoprolol home dosage, HLD: cont statin. GERD: asymptomatic, cont PPI.     Pt is uncertain of certain home medications, please update and verify med rec in the AM.     Pt is to be transferred to [facility] due to covid positive status. Signed out to MAR who will continue following.     DVT ppx: on coumadin  GI ppx: PPI

## 2021-04-07 NOTE — H&P ADULT - HISTORY OF PRESENT ILLNESS
Pt is an 86 YO M with a pmh of CAD s/p CABG in 2020, Afib on coumadin, HTN, HLD, glaucoma. 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 Pt was unable to recognize the numbers on the computer screen for 2 hours until he called his PCP who referred him to the ER. In the ER, CT Head, CTA head and neck with contrast showed:    MPRESSION:    CT HEAD:  No acute intracranial pathology. No evidence of midline shift, mass effect or intracranial hemorrhage.    Right orbital glaucoma drainage device is noted in place, unchanged.    CTA HEAD:  Mild stenosis of the proximal V4 segment of the left vertebral artery due to atherosclerotic calcification.    No evidence of large vessel occlusion or aneurysm. Bilateral ophthalmic arteries are patent.    CTA NECK:  Moderate stenosis of the proximal left internal carotid artery due to atherosclerotic calcification.    Mild stenosis of the right proximal internal carotid artery due to atherosclerotic calcification.    Neurology was consulted from the ED and recommended ESR and possible admission for MRI head. Discussed case with Neurology NP Danny and updated on ESR result which elevated at 50, awaiting call back. Pt is covid Positive, however he had his 2nd COVID vaccine booster administered 4/1/21, Pt was seen and examined at bedside, pt denies any CP, SOB, palpitations, NVD, abdominal pain and cough. Pt does endorse fever and chills. Pt does c/o bilateral blurry vision, however he denies any eye pain or pressure.      Pt is an 84 YO M with a pmh of CAD s/p CABG in 2020, Afib on coumadin, HTN, HLD, glaucoma. 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 Pt was unable to recognize the numbers on the computer screen for 2 hours until he called his PCP who referred him to the ER. In the ER, CT Head, CTA head and neck with contrast showed:    MPRESSION:    CT HEAD:  No acute intracranial pathology. No evidence of midline shift, mass effect or intracranial hemorrhage.    Right orbital glaucoma drainage device is noted in place, unchanged.    CTA HEAD:  Mild stenosis of the proximal V4 segment of the left vertebral artery due to atherosclerotic calcification.    No evidence of large vessel occlusion or aneurysm. Bilateral ophthalmic arteries are patent.    CTA NECK:  Moderate stenosis of the proximal left internal carotid artery due to atherosclerotic calcification.    Mild stenosis of the right proximal internal carotid artery due to atherosclerotic calcification.    Neurology was consulted from the ED and recommended ESR and possible admission for MRI head. Discussed case with Neurology NP Danny and updated on ESR result which elevated at 50, neuro recommended to start steroids. Pt is covid Positive, however he had his 2nd COVID vaccine booster administered 4/1/21, Pt was seen and examined at bedside, pt denies any CP, SOB, palpitations, NVD, abdominal pain and cough. Pt does endorse fever and chills. Pt does c/o bilateral blurry vision, however he denies any eye pain or pressure.

## 2021-04-07 NOTE — ED PROVIDER NOTE - OBJECTIVE STATEMENT
Pt is a 84y/o male with a pmhx of CAD/CABG 2000 followed by 1 stent 2001 on aspirin, Afib on coumadin presents today for eval of difficulty concentrating on computer screen that began around 7am this morning and has been constant since. Pt sts symptoms persisted which is what prompted visit. Pt denies fever, chills, weakeness, numbness, CP, SOB.

## 2021-04-07 NOTE — ED PROVIDER NOTE - CLINICAL SUMMARY MEDICAL DECISION MAKING FREE TEXT BOX
patient presents for evaluation of blurry vision onset 9 hours prior to arrival we have obtained ct head, cta head and neck and have discussed case with neurology who advises admissino for further workup in addition patient has test + for covid I will admit and transfer patient to [facility] for further evaluation

## 2021-04-07 NOTE — ED PROVIDER NOTE - FAMILY HISTORY
Father  Still living? Unknown  FHx: hypertension, Age at diagnosis: Age Unknown     Mother  Still living? Unknown  FHx: hypertension, Age at diagnosis: Age Unknown

## 2021-04-07 NOTE — H&P ADULT - NSICDXPASTMEDICALHX_GEN_ALL_CORE_FT
PAST MEDICAL HISTORY:  Afib     Hyperlipemia     Hypertension     TIA (transient ischemic attack)

## 2021-04-07 NOTE — ED PROVIDER NOTE - PHYSICAL EXAMINATION
VITAL SIGNS: I have reviewed nursing notes and confirm.  CONSTITUTIONAL: Well-developed; well-nourished; in no acute distress.   SKIN: skin exam is warm and dry, no acute rash.    HEAD: Normocephalic; atraumatic.  EYES: PERRL, EOM intact; conjunctiva and sclera clear.  ENT: No nasal discharge; airway clear.  NECK: Supple; non tender.  CARD: S1, S2 normal; no murmurs, gallops, or rubs. Regular rate and rhythm.   RESP: No wheezes, rales or rhonchi.  ABD: Normal bowel sounds; soft; non-distended; non-tender;  EXT: Normal ROM.  No clubbing, cyanosis or edema.   LYMPH: No acute cervical adenopathy.  NEURO: muscle strength 5/5 throughout both flexor/extensor mechanism, sensation intact Alert, oriented, grossly unremarkable  PSYCH: Cooperative, appropriate.

## 2021-04-07 NOTE — ED PROVIDER NOTE - ATTENDING CONTRIBUTION TO CARE
I was present for and supervised the key and critical aspects of the procedures performed during the care of the patient. ATTENDING NOTE: I personally evaluated the patient. I reviewed the Physician Assistant’s note (as assigned above), and agree with the findings and plan except as documented in my note. 84 y/o M PMH CAD CABG in 2000 and Afib on Coumadin, presents to the ED c/o blurry vision. Pt states that this morning at 0700 (disagreeing with the triage note) he was on his computer and was suddenly unable to focus on the screen. Notes that he has had no similar episodes in the past and has no other symptoms. Pt has no lightheadedness, dizziness, photophobia, n/v/d, fevers, chills, cough, CP or SOB. Pt has no numbness, tingling or weakness in the extremities. On exam: NCAT. PERRLA, EOMI, Fundoscopic exam WNL, (-) hemorrhage, (-) papilledema. OP clear. Lungs CTAB. RRR, S1S2 noted. Radial pulses 2+ and equal, pedal pulses 2+ and equal. Abdomen soft, NT/ND, no rebound or guarding. FROM x4 extremities. A&Ox3, CN 2-11 intact, moving all extremities, 5/5 strength, equal sensation bilaterally, normal finger to nose exam. normal steady gait. Plan: Labs including troponin, CTA head and neck and reassess.

## 2021-04-07 NOTE — ED PROVIDER NOTE - NS ED ROS FT
Eyes:  + visual changes, No eye pain or discharge.  ENMT:  No hearing changes, pain, discharge or infections. No neck pain or stiffness.  Cardiac:  No chest pain, SOB or edema. No chest pain with exertion.  Respiratory:  No cough or respiratory distress. No hemoptysis. No history of asthma or RAD.  GI:  No nausea, vomiting, diarrhea or abdominal pain.  :  No dysuria, frequency or burning.  MS:  No myalgia, muscle weakness, joint pain or back pain.  Neuro:  No headache or weakness.  No LOC.  Skin:  No skin rash.   Endocrine: No history of thyroid disease or diabetes.  Except as documented in the HPI,  all other systems are negative.

## 2021-04-07 NOTE — H&P ADULT - NSHPLABSRESULTS_GEN_ALL_CORE
11.2   5.48  )-----------( 145      ( 07 Apr 2021 18:16 )             35.2       04-07    139  |  102  |  18  ----------------------------<  83  3.7   |  26  |  0.9    Ca    9.4      07 Apr 2021 18:16  Mg     1.6     04-07    TPro  6.8  /  Alb  4.2  /  TBili  0.5  /  DBili  x   /  AST  46<H>  /  ALT  27  /  AlkPhos  49  04-07        Lactate Trend    CARDIAC MARKERS ( 07 Apr 2021 18:16 )  x     / <0.01 ng/mL / x     / x     / x            CAPILLARY BLOOD GLUCOSE 11.2   5.48  )-----------( 145      ( 07 Apr 2021 18:16 )             35.2       04-07    139  |  102  |  18  ----------------------------<  83  3.7   |  26  |  0.9    Ca    9.4      07 Apr 2021 18:16  Mg     1.6     04-07    TPro  6.8  /  Alb  4.2  /  TBili  0.5  /  DBili  x   /  AST  46<H>  /  ALT  27  /  AlkPhos  49  04-07        Lactate Trend    CARDIAC MARKERS ( 07 Apr 2021 18:16 )  x     / <0.01 ng/mL / x     / x     / x            CAPILLARY BLOOD GLUCOSE    EKG: Afib @105BPM, non specific ST & T wave changes, no acute changes.

## 2021-04-07 NOTE — ED ADULT NURSE NOTE - NSIMPLEMENTINTERV_GEN_ALL_ED
Implemented All Fall Risk Interventions:  [place] to call system. Call bell, personal items and telephone within reach. Instruct patient to call for assistance. Room bathroom lighting operational. Non-slip footwear when patient is off stretcher. Physically safe environment: no spills, clutter or unnecessary equipment. Stretcher in lowest position, wheels locked, appropriate side rails in place. Provide visual cue, wrist band, yellow gown, etc. Monitor gait and stability. Monitor for mental status changes and reorient to person, place, and time. Review medications for side effects contributing to fall risk. Reinforce activity limits and safety measures with patient and family.

## 2021-04-07 NOTE — ED ADULT TRIAGE NOTE - CHIEF COMPLAINT QUOTE
Patient states "I was at computer 10am at work and could not focus both of my eyes on the screen to read"

## 2021-04-07 NOTE — ED PROVIDER NOTE - PROGRESS NOTE DETAILS
ATTENDING NOTE: I personally evaluated the patient. I reviewed the Physician Assistant’s note (as assigned above), and agree with the findings and plan except as documented in my note. 84 y/o M PMH CAD CABG in 2000 and Afib on Coumadin, presents to the ED c/o blurry vision. Pt states that this morning at 0700 (disagreeing with the triage note) he was on his computer and was suddenly unable to focus on the screen. Notes that he has had no similar episodes in the past and has no other symptoms. Pt has no lightheadedness, dizziness, photophobia, n/v/d, fevers, chills, cough, CP or SOB. Pt has no numbness, tingling or weakness in the extremities. On exam: NCAT. PERRLA, EOMI, Fundoscopic exam WNL, (-) hemorrhage, (-) papilledema. OP clear. Lungs CTAB. RRR, S1S2 noted. Radial pulses 2+ and equal, pedal pulses 2+ and equal. Abdomen soft, NT/ND, no rebound or guarding. FROM x4 extremities. A&Ox3, CN 2-11 intact, moving all extremities, 5/5 strength, equal sensation bilaterally, normal finger to nose exam. normal steady gait. Plan: Labs including troponin, CTA head and neck and reassess. discussed case with Neuro Dr. Marsh who recommends CRP/ESR with plan for admission for possible MRI

## 2021-04-08 LAB
ALBUMIN SERPL ELPH-MCNC: 4 G/DL — SIGNIFICANT CHANGE UP (ref 3.5–5.2)
ALP SERPL-CCNC: 43 U/L — SIGNIFICANT CHANGE UP (ref 30–115)
ALT FLD-CCNC: 27 U/L — SIGNIFICANT CHANGE UP (ref 0–41)
ANION GAP SERPL CALC-SCNC: 10 MMOL/L — SIGNIFICANT CHANGE UP (ref 7–14)
ANION GAP SERPL CALC-SCNC: 15 MMOL/L — HIGH (ref 7–14)
AST SERPL-CCNC: 52 U/L — HIGH (ref 0–41)
BASOPHILS # BLD AUTO: 0 K/UL — SIGNIFICANT CHANGE UP (ref 0–0.2)
BASOPHILS NFR BLD AUTO: 0 % — SIGNIFICANT CHANGE UP (ref 0–1)
BILIRUB SERPL-MCNC: 0.6 MG/DL — SIGNIFICANT CHANGE UP (ref 0.2–1.2)
BUN SERPL-MCNC: 14 MG/DL — SIGNIFICANT CHANGE UP (ref 10–20)
BUN SERPL-MCNC: 17 MG/DL — SIGNIFICANT CHANGE UP (ref 10–20)
CALCIUM SERPL-MCNC: 8.8 MG/DL — SIGNIFICANT CHANGE UP (ref 8.5–10.1)
CALCIUM SERPL-MCNC: 9.3 MG/DL — SIGNIFICANT CHANGE UP (ref 8.5–10.1)
CHLORIDE SERPL-SCNC: 101 MMOL/L — SIGNIFICANT CHANGE UP (ref 98–110)
CHLORIDE SERPL-SCNC: 105 MMOL/L — SIGNIFICANT CHANGE UP (ref 98–110)
CO2 SERPL-SCNC: 22 MMOL/L — SIGNIFICANT CHANGE UP (ref 17–32)
CO2 SERPL-SCNC: 25 MMOL/L — SIGNIFICANT CHANGE UP (ref 17–32)
CREAT SERPL-MCNC: 0.9 MG/DL — SIGNIFICANT CHANGE UP (ref 0.7–1.5)
CREAT SERPL-MCNC: 0.9 MG/DL — SIGNIFICANT CHANGE UP (ref 0.7–1.5)
CRP SERPL-MCNC: <3 MG/L — SIGNIFICANT CHANGE UP
D DIMER BLD IA.RAPID-MCNC: 94 NG/ML DDU — SIGNIFICANT CHANGE UP (ref 0–230)
EOSINOPHIL # BLD AUTO: 0 K/UL — SIGNIFICANT CHANGE UP (ref 0–0.7)
EOSINOPHIL NFR BLD AUTO: 0 % — SIGNIFICANT CHANGE UP (ref 0–8)
GLUCOSE SERPL-MCNC: 111 MG/DL — HIGH (ref 70–99)
GLUCOSE SERPL-MCNC: 177 MG/DL — HIGH (ref 70–99)
HCT VFR BLD CALC: 35.4 % — LOW (ref 42–52)
HGB BLD-MCNC: 11.4 G/DL — LOW (ref 14–18)
IMM GRANULOCYTES NFR BLD AUTO: 0.2 % — SIGNIFICANT CHANGE UP (ref 0.1–0.3)
INR BLD: 1.67 RATIO — HIGH (ref 0.65–1.3)
LYMPHOCYTES # BLD AUTO: 1.07 K/UL — LOW (ref 1.2–3.4)
LYMPHOCYTES # BLD AUTO: 25.2 % — SIGNIFICANT CHANGE UP (ref 20.5–51.1)
MAGNESIUM SERPL-MCNC: 2 MG/DL — SIGNIFICANT CHANGE UP (ref 1.8–2.4)
MCHC RBC-ENTMCNC: 26.6 PG — LOW (ref 27–31)
MCHC RBC-ENTMCNC: 32.2 G/DL — SIGNIFICANT CHANGE UP (ref 32–37)
MCV RBC AUTO: 82.7 FL — SIGNIFICANT CHANGE UP (ref 80–94)
MONOCYTES # BLD AUTO: 0.17 K/UL — SIGNIFICANT CHANGE UP (ref 0.1–0.6)
MONOCYTES NFR BLD AUTO: 4 % — SIGNIFICANT CHANGE UP (ref 1.7–9.3)
NEUTROPHILS # BLD AUTO: 2.99 K/UL — SIGNIFICANT CHANGE UP (ref 1.4–6.5)
NEUTROPHILS NFR BLD AUTO: 70.6 % — SIGNIFICANT CHANGE UP (ref 42.2–75.2)
NRBC # BLD: 0 /100 WBCS — SIGNIFICANT CHANGE UP (ref 0–0)
PLATELET # BLD AUTO: 137 K/UL — SIGNIFICANT CHANGE UP (ref 130–400)
POTASSIUM SERPL-MCNC: 3.8 MMOL/L — SIGNIFICANT CHANGE UP (ref 3.5–5)
POTASSIUM SERPL-MCNC: 3.8 MMOL/L — SIGNIFICANT CHANGE UP (ref 3.5–5)
POTASSIUM SERPL-SCNC: 3.8 MMOL/L — SIGNIFICANT CHANGE UP (ref 3.5–5)
POTASSIUM SERPL-SCNC: 3.8 MMOL/L — SIGNIFICANT CHANGE UP (ref 3.5–5)
PROT SERPL-MCNC: 6.6 G/DL — SIGNIFICANT CHANGE UP (ref 6–8)
PROTHROM AB SERPL-ACNC: 19.2 SEC — HIGH (ref 9.95–12.87)
RBC # BLD: 4.28 M/UL — LOW (ref 4.7–6.1)
RBC # FLD: 16.7 % — HIGH (ref 11.5–14.5)
SODIUM SERPL-SCNC: 138 MMOL/L — SIGNIFICANT CHANGE UP (ref 135–146)
SODIUM SERPL-SCNC: 140 MMOL/L — SIGNIFICANT CHANGE UP (ref 135–146)
WBC # BLD: 4.24 K/UL — LOW (ref 4.8–10.8)
WBC # FLD AUTO: 4.24 K/UL — LOW (ref 4.8–10.8)

## 2021-04-08 PROCEDURE — 99222 1ST HOSP IP/OBS MODERATE 55: CPT

## 2021-04-08 PROCEDURE — 99233 SBSQ HOSP IP/OBS HIGH 50: CPT

## 2021-04-08 RX ORDER — CHLORHEXIDINE GLUCONATE 213 G/1000ML
1 SOLUTION TOPICAL
Refills: 0 | Status: DISCONTINUED | OUTPATIENT
Start: 2021-04-08 | End: 2021-04-09

## 2021-04-08 RX ORDER — DORZOLAMIDE HYDROCHLORIDE 20 MG/ML
1 SOLUTION/ DROPS OPHTHALMIC
Refills: 0 | Status: DISCONTINUED | OUTPATIENT
Start: 2021-04-08 | End: 2021-04-09

## 2021-04-08 RX ORDER — TIMOLOL 0.5 %
1 DROPS OPHTHALMIC (EYE) EVERY 12 HOURS
Refills: 0 | Status: DISCONTINUED | OUTPATIENT
Start: 2021-04-08 | End: 2021-04-09

## 2021-04-08 RX ADMIN — Medication 1 DROP(S): at 17:06

## 2021-04-08 RX ADMIN — Medication 1 TABLET(S): at 06:29

## 2021-04-08 RX ADMIN — Medication 50 MILLIGRAM(S): at 17:05

## 2021-04-08 RX ADMIN — ATORVASTATIN CALCIUM 80 MILLIGRAM(S): 80 TABLET, FILM COATED ORAL at 21:23

## 2021-04-08 RX ADMIN — Medication 50 MILLIGRAM(S): at 06:29

## 2021-04-08 RX ADMIN — WARFARIN SODIUM 5 MILLIGRAM(S): 2.5 TABLET ORAL at 21:23

## 2021-04-08 RX ADMIN — DORZOLAMIDE HYDROCHLORIDE 1 DROP(S): 20 SOLUTION/ DROPS OPHTHALMIC at 17:05

## 2021-04-08 RX ADMIN — LISINOPRIL 20 MILLIGRAM(S): 2.5 TABLET ORAL at 06:29

## 2021-04-08 RX ADMIN — Medication 81 MILLIGRAM(S): at 12:05

## 2021-04-08 RX ADMIN — Medication 60 MILLIGRAM(S): at 20:51

## 2021-04-08 RX ADMIN — BRIMONIDINE TARTRATE 1 DROP(S): 2 SOLUTION/ DROPS OPHTHALMIC at 16:55

## 2021-04-08 RX ADMIN — PANTOPRAZOLE SODIUM 40 MILLIGRAM(S): 20 TABLET, DELAYED RELEASE ORAL at 06:29

## 2021-04-08 RX ADMIN — DORZOLAMIDE HYDROCHLORIDE 1 DROP(S): 20 SOLUTION/ DROPS OPHTHALMIC at 06:28

## 2021-04-08 RX ADMIN — Medication 60 MILLIGRAM(S): at 01:28

## 2021-04-08 RX ADMIN — BRIMONIDINE TARTRATE 1 DROP(S): 2 SOLUTION/ DROPS OPHTHALMIC at 06:28

## 2021-04-08 RX ADMIN — WARFARIN SODIUM 5 MILLIGRAM(S): 2.5 TABLET ORAL at 01:28

## 2021-04-08 RX ADMIN — BRIMONIDINE TARTRATE 1 DROP(S): 2 SOLUTION/ DROPS OPHTHALMIC at 21:23

## 2021-04-08 RX ADMIN — Medication 1 DROP(S): at 06:28

## 2021-04-08 NOTE — CHART NOTE - NSCHARTNOTEFT_GEN_A_CORE
I made rounds today with the treatment team including the hospitalist, residents,  nurses, and discussed the patient's current medical status and discharge  planning needs, and reviewed the chart.    T(C): 36 (04-08-21 @ 05:00), Max: 37.8 (04-07-21 @ 19:15)  HR: 85 (04-08-21 @ 05:00) (81 - 106)  BP: 116/60 (04-08-21 @ 05:00) (106/67 - 138/81)  RR: 18 (04-08-21 @ 05:00) (18 - 20)  SpO2: 98% (04-08-21 @ 05:00) (98% - 99%)          I reached out to the patient's health care proxy/ responsible family member-           [   x  ]  I reached    his son, Leonel                                 and discussed the patient's medical condition,                   family concerns, and discharge planning           [     ]  I left a message with family               [     ]  I personally participated in rounds with the medical team and my resident and discussed the case. My resident reached                   family member/ HCP                                under my direction and supervision  and we reviewed the conversation.          [     ]  My resident left a message with family under my direction and supervision           [     ]   My resident attended medical rounds and called the family                [  x    ]    The following was discussed:  The patient's medical status over the past 24 hrs was reviewed, as well as oxygen needs and medication changes and labs. Stroke ruled out. 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 Working up possible Temporal Arteritis, started on steroids.. Asymptomatic Covid.          [   x   ]   The following concerns were raised: none. Patient's wife tested positive.           [     ] I spent 5-10 minutes on the above discussing medical issues with team members and family and/ or my resident    [   x  ] I spent 11-20 minutes on the above discussing medical issues with team members and family and/ or my resident    [     ] I spent 21-30 minutes on the above discussing medical issues with team members and family and/ or my resident

## 2021-04-08 NOTE — CONSULT NOTE ADULT - ATTENDING COMMENTS
Patient testing positive for Covid c/o worsening left eye vision for reading small print.  He has very poor vision in right eye - made erros on number of fingers held up for confrontational testing on right but not left.  [facility] right eye near card 20/400. Left eye 20/50.    No temporal or scalp tenderness.  No jaw claudication, no malaise, no muscle aches.  Doubt GCA.  Elevated ESR may be combination of age and Covid positivity.  Can give an extra dose of prednisone today but would continue only if high suspicion for TA by ophthalmology.

## 2021-04-08 NOTE — CONSULT NOTE ADULT - SUBJECTIVE AND OBJECTIVE BOX
Neurology Consult    Patient is a 85y old  Male who presents with a chief complaint of Blurry vision (08 Apr 2021 09:16)      HPI:  Pt is an 86 YO M with a pmh of CAD s/p CABG in 2020, Afib on coumadin, HTN, HLD, glaucoma. 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 Pt was unable to recognize the numbers on the computer screen for 2 hours until he called his PCP who referred him to the ER. In the ER, CT Head, CTA head and neck with contrast showed:    MPRESSION:    CT HEAD:  No acute intracranial pathology. No evidence of midline shift, mass effect or intracranial hemorrhage.    Right orbital glaucoma drainage device is noted in place, unchanged.    CTA HEAD:  Mild stenosis of the proximal V4 segment of the left vertebral artery due to atherosclerotic calcification.    No evidence of large vessel occlusion or aneurysm. Bilateral ophthalmic arteries are patent.    CTA NECK:  Moderate stenosis of the proximal left internal carotid artery due to atherosclerotic calcification.    Mild stenosis of the right proximal internal carotid artery due to atherosclerotic calcification.    Neurology was consulted from the ED and recommended ESR and possible admission for MRI head. Discussed case with Neurology NP Danny and updated on ESR result which elevated at 50, neuro recommended to start steroids. Pt is covid Positive, however he had his 2nd COVID vaccine booster administered 4/1/21, Pt was seen and examined at bedside, pt denies any CP, SOB, palpitations, NVD, abdominal pain and cough. Pt does endorse fever and chills. Pt does c/o bilateral blurry vision, however he denies any eye pain or pressure.      (07 Apr 2021 22:34)      PAST MEDICAL & SURGICAL HISTORY:  Afib  Hyperlipemia  Hypertension  TIA (transient ischemic attack)  S/P CABG (coronary artery bypass graft)        FAMILY HISTORY:  FHx: hypertension (Father, Mother)        Social History: (-) x 3    Allergies    No Known Allergies    Intolerances        MEDICATIONS  (STANDING):  aspirin  chewable 81 milliGRAM(s) Oral daily  atorvastatin 80 milliGRAM(s) Oral at bedtime  brimonidine 0.2% Ophthalmic Solution 1 Drop(s) Both EYES every 8 hours  chlorhexidine 4% Liquid 1 Application(s) Topical <User Schedule>  dorzolamide 2% Ophthalmic Solution 1 Drop(s) Both EYES <User Schedule>  lisinopril 20 milliGRAM(s) Oral daily  metoprolol tartrate 50 milliGRAM(s) Oral two times a day  pantoprazole    Tablet 40 milliGRAM(s) Oral before breakfast  timolol 0.25% Solution 1 Drop(s) Both EYES every 12 hours  triamterene 37.5 mG/hydrochlorothiazide 25 mG Tablet 1 Tablet(s) Oral daily  warfarin 5 milliGRAM(s) Oral daily    MEDICATIONS  (PRN):      Review of systems:    Constitutional: as per HPI  Eyes: No eye pain or discharge  ENMT:  No difficulty hearing; No sinus or throat pain  Neck: No pain or stiffness  Respiratory: No cough, wheezing, chills or hemoptysis  Cardiovascular: No chest pain, palpitations, shortness of breath, dyspnea on exertion  Gastrointestinal: No abdominal pain, nausea, vomiting or hematemesis; No diarrhea or constipation.   Genitourinary: No dysuria, frequency, hematuria or incontinence  Neurological: As per HPI  Skin: No rashes or lesions   Endocrine: No heat or cold intolerance; No hair loss  Musculoskeletal: No joint pain or swelling  Psychiatric: No depression, anxiety, mood swings  Heme/Lymph: No easy bruising or bleeding gums    Vital Signs Last 24 Hrs  T(C): 36 (08 Apr 2021 05:00), Max: 37.8 (07 Apr 2021 19:15)  T(F): 96.8 (08 Apr 2021 05:00), Max: 100.1 (07 Apr 2021 19:15)  HR: 85 (08 Apr 2021 05:00) (81 - 106)  BP: 116/60 (08 Apr 2021 05:00) (106/67 - 138/81)  BP(mean): --  RR: 18 (08 Apr 2021 05:00) (18 - 20)  SpO2: 98% (08 Apr 2021 05:00) (98% - 99%)    Examination:  General:  Appearance is consistent with chronologic age.  No abnormal facies.  Gross skin survey within normal limits.    Cognitive/Language:  The patient is oriented to person, place, time and date.  Recent and remote memory intact.  Fund of knowledge is intact and normal.  Language with normal repetition, comprehension and naming.  Nondysarthric.    Eyes: intact VA, VFF.  EOMI w/o nystagmus, skew or reported double vision.  PERRL.  No ptosis/weakness of eyelid closure.    Face:  Facial sensation normal V1 - 3, no facial asymmetry.    Ears/Nose/Throat:  Hearing grossly intact b/l.  Palate elevates midline.  Tongue and uvula midline.   Motor examination:   Normal tone, bulk and range of motion.  No tenderness, twitching, tremors or involuntary movements.  Formal Muscle Strength Testing: (MRC grade R/L) 5/5 UE; 5/5 LE.  No observable drift.  Reflexes:   2+ b/l pectoralis, biceps, triceps, brachioradialis, patella and Achilles.  Plantar response downgoing b/l.  Jaw jerk, Jean, clonus absent.  Sensory examination:   Intact to light touch and pinprick, pain, temperature and proprioception and vibration in all extremities.  Cerebellum:   FTN/HKS intact with normal PRIYA in all limbs.  No dysmetria or dysdiadokinesia.  Gait narrow based and normal.    Respiratory:  no audible wheezing or inspiratory stridor.  no use of accessory muscles.   Cardiac: pulse palpable, no audible bruits  Abdomen: supple, no guarding, no TTP    Labs:   CBC Full  -  ( 08 Apr 2021 05:27 )  WBC Count : 4.24 K/uL  RBC Count : 4.28 M/uL  Hemoglobin : 11.4 g/dL  Hematocrit : 35.4 %  Platelet Count - Automated : 137 K/uL  Mean Cell Volume : 82.7 fL  Mean Cell Hemoglobin : 26.6 pg  Mean Cell Hemoglobin Concentration : 32.2 g/dL  Auto Neutrophil # : 2.99 K/uL  Auto Lymphocyte # : 1.07 K/uL  Auto Monocyte # : 0.17 K/uL  Auto Eosinophil # : 0.00 K/uL  Auto Basophil # : 0.00 K/uL  Auto Neutrophil % : 70.6 %  Auto Lymphocyte % : 25.2 %  Auto Monocyte % : 4.0 %  Auto Eosinophil % : 0.0 %  Auto Basophil % : 0.0 %    04-08    140  |  105  |  14  ----------------------------<  111<H>  3.8   |  25  |  0.9    Ca    9.3      08 Apr 2021 05:27  Mg     1.6     04-07    TPro  6.6  /  Alb  4.0  /  TBili  0.6  /  DBili  x   /  AST  52<H>  /  ALT  27  /  AlkPhos  43  04-08    LIVER FUNCTIONS - ( 08 Apr 2021 05:27 )  Alb: 4.0 g/dL / Pro: 6.6 g/dL / ALK PHOS: 43 U/L / ALT: 27 U/L / AST: 52 U/L / GGT: x           PT/INR - ( 08 Apr 2021 05:27 )   PT: 19.20 sec;   INR: 1.67 ratio          Neuroimaging:  NCHCT: CT Head No Cont:   EXAM:  CT ANGIO BRAIN (W)AW IC        EXAM:  CT ANGIO NECK (W)AW IC        EXAM:  CT BRAIN            PROCEDURE DATE:  04/07/2021        INTERPRETATION:  CLINICAL INDICATION: Visual disturbance. .    TECHNIQUE: Noncontrast head CT was performed. Sagittal and coronal reformatted images were obtained and reviewed.    CT angiography of the intracranial (head) and extracranial (neck) circulation was performed after contrast administration    Maximal intensity projection images were additionallyincluded and reviewed.    95 mls of Optiray 320 was administered intravenously without complication and 5 mls were discarded.    Using a separate workstation, 3-D shaded surface reformations were made of vasculature. 3-D reformations were reviewed and included in interpretation of the official report.    CAROTID STENOSIS REFERENCE: (NASCET = 100x1-(N/D)). N=greatest narrowing. D=normal distal diameter - MILD = <50% stenosis. - MODERATE = 50-69% stenosis. - SEVERE = 70-89% stenosis. - HAIRLINE/CRITICAL = 90-99% stenosis. - OCCLUDED = 100% stenosis.    COMPARISON: CT head dated 3/23/2019. CTA head and neck dated 7/7/2017.    FINDINGS:    CT BRAIN:  There is no evidence for acute intracranial hemorrhage, mass effect or midline shift. Similar punctate calcifications in the bilateral cerebellar hemispheres.  There is generalized cortical volume loss with commensurate ventricular dilation. There is no hydrocephalus.  There is no extra-axial collection.  A right orbital glaucoma drainagedevice is noted in place, unchanged. Left orbit is unremarkable.  The calvarium is intact, without depressed fracture.  The visualized paranasal sinuses and mastoid air cells are clear.      HEAD CTA:    The internal carotid arteries demonstrate normal enhancement to the intracranial circulation and [place]. There is atherosclerotic calcification of the bilateral cavernous, clinoid and supraclinoid internal carotid arteries without significant stenosis. The bilateral ophthalmic arteries are patent    Anterior and middle cerebral arteries demonstrate normal enhancement and symmetric arborization without intraluminal filling defect, stenosis, occlusion, aneurysm or vascular malformation.    There is atherosclerotic calcification ofthe proximal V4 segment of the left vertebral artery resulting in mild stenosis. Right vertebral artery is unremarkable. The basilar artery is unremarkable. Branch vasculature of the posterior circulation are within normal limits. The posterior cerebral arteries demonstrate symmetric enhancement and arborization without evidence for aneurysm, stenosis, occlusion or vascular malformation.    Visualized dural venous sinuses and deep cerebral venous structures demonstrate normal enhancement without evidence for filling defect.    NECK CTA:  The aortic arch and origins of the great vessels are within normal limits.    Right:  The origin of the right common carotid artery is normal. The right common carotid artery is normal in course and caliberto the carotid bifurcation. There is atherosclerotic calcification of the right carotid bulb as well as proximal internal and external carotid arteries resulting in approximately 30% short segment stenosis. The right internal carotid artery has normal course and caliber to the intracranial circulation.  The origin of the right vertebral artery is normal. The right vertebral artery is normal in course and caliber to the intracranial circulation.  Left: The origin of the left common carotid artery is normal. The left common carotid artery is normal in course and caliber to the carotid bifurcation. 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 The left internal carotid artery has normal course and caliber to the intracranial circulation.  The origin of the left vertebral artery is normal. The left vertebral artery is normal in course and caliber to the intracranial circulation.  Partially visualized sternotomy wires.  Calcified pleural-based nodules are noted of the right upper lung, unchanged.  CT HEAD:  No acute intracranial pathology. No evidence of midline shift, mass effect or intracranial hemorrhage.  Right orbital glaucoma drainage device is noted in place, unchanged.  CTA HEAD:  Mild stenosis of the proximal V4 segment of the left vertebral artery due to atherosclerotic calcification.  No evidence of large vessel occlusion or aneurysm. Bilateral ophthalmic arteries are patent.    CTA NECK:  Moderate stenosis of the proximal left internal carotid artery due to atherosclerotic calcification.  Mild stenosis of the right proximal internal carotid artery due to atherosclerotic calcification.    NICO GÓMEZ M.D., ATTENDING RADIOLOGIST  This document has been electronically signed. Apr 7 2021  9:26PM (04-07-21 @ 20:52) Neurology Consult    Patient is a 85y old  Male who presents with a chief complaint of Blurry vision (08 Apr 2021 09:16)      HPI:  Pt is an 86 YO M with a pmh of CAD s/p CABG in 2020, Afib on coumadin, HTN, HLD, glaucoma. 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 Pt was unable to recognize the numbers on the computer screen for 2 hours until he called his PCP who referred him to the ER. In the ER, CT Head, CTA head and neck with contrast showed:    IMPRESSION:  CT HEAD:  No acute intracranial pathology. No evidence of midline shift, mass effect or intracranial hemorrhage.  Right orbital glaucoma drainage device is noted in place, unchanged.  CTA HEAD:  Mild stenosis of the proximal V4 segment of the left vertebral artery due to atherosclerotic calcification.  No evidence of large vessel occlusion or aneurysm. Bilateral ophthalmic arteries are patent.  CTA NECK:  Moderate stenosis of the proximal left internal carotid artery due to atherosclerotic calcification.  Mild stenosis of the right proximal internal carotid artery due to atherosclerotic calcification.    Neurology was consulted from the ED and recommended ESR and possible admission for MRI head. Discussed case with Neurology NP Danny and updated on ESR result which elevated at 50, neuro recommended to start steroids. Pt is covid Positive, however he had his 2nd COVID vaccine booster administered 4/1/21, Pt was seen and examined at bedside, pt denies any CP, SOB, palpitations, NVD, abdominal pain and cough. Pt does endorse fever and chills. Pt does c/o bilateral blurry vision, however he denies any eye pain or pressure.      (07 Apr 2021 22:34)    Patient was seen. He reports vision is still blurry in that he cannot read fine print at times. He reports that his vision was better this morning when he read off the fine print on his insurance card. He denies recent vision changes outside of his known glaucoma. Denies headache, jaw pain, fever, chills.       PAST MEDICAL & SURGICAL HISTORY:  Afib  Hyperlipemia  Hypertension  TIA (transient ischemic attack)  S/P CABG (coronary artery bypass graft)    FAMILY HISTORY:  FHx: hypertension (Father, Mother)    Social History: (-) x 3  Allergies  No Known Allergies  Intolerances        MEDICATIONS  (STANDING):  aspirin  chewable 81 milliGRAM(s) Oral daily  atorvastatin 80 milliGRAM(s) Oral at bedtime  brimonidine 0.2% Ophthalmic Solution 1 Drop(s) Both EYES every 8 hours  chlorhexidine 4% Liquid 1 Application(s) Topical <User Schedule>  dorzolamide 2% Ophthalmic Solution 1 Drop(s) Both EYES <User Schedule>  lisinopril 20 milliGRAM(s) Oral daily  metoprolol tartrate 50 milliGRAM(s) Oral two times a day  pantoprazole    Tablet 40 milliGRAM(s) Oral before breakfast  timolol 0.25% Solution 1 Drop(s) Both EYES every 12 hours  triamterene 37.5 mG/hydrochlorothiazide 25 mG Tablet 1 Tablet(s) Oral daily  warfarin 5 milliGRAM(s) Oral daily    MEDICATIONS  (PRN):      Review of systems:    Constitutional: as per HPI  Eyes: No eye pain or discharge  ENMT:  No difficulty hearing; No sinus or throat pain  Neck: No pain or stiffness  Respiratory: No cough, wheezing, chills or hemoptysis  Cardiovascular: No chest pain, palpitations, shortness of breath, dyspnea on exertion  Gastrointestinal: No abdominal pain, nausea, vomiting or hematemesis; No diarrhea or constipation.   Genitourinary: No dysuria, frequency, hematuria or incontinence  Neurological: As per HPI  Skin: No rashes or lesions   Endocrine: No heat or cold intolerance; No hair loss  Musculoskeletal: No joint pain or swelling  Psychiatric: No depression, anxiety, mood swings  Heme/Lymph: No easy bruising or bleeding gums    Vital Signs Last 24 Hrs  T(C): 36 (08 Apr 2021 05:00), Max: 37.8 (07 Apr 2021 19:15)  T(F): 96.8 (08 Apr 2021 05:00), Max: 100.1 (07 Apr 2021 19:15)  HR: 85 (08 Apr 2021 05:00) (81 - 106)  BP: 116/60 (08 Apr 2021 05:00) (106/67 - 138/81)  BP(mean): --  RR: 18 (08 Apr 2021 05:00) (18 - 20)  SpO2: 98% (08 Apr 2021 05:00) (98% - 99%)    Examination:  General:  Appearance is consistent with chronologic age.  No abnormal facies.  Gross skin survey within normal limits.    Cognitive/Language:  The patient is oriented to person, place, time and date.  Recent and remote memory intact.  Fund of knowledge is intact and normal.  Language with normal repetition, comprehension and naming.  Nondysarthric.    Eyes: intact VA, VFF.  EOMI w/o nystagmus, skew or reported double vision.  PERRL.  No ptosis/weakness of eyelid closure.  Card test visual acuity: R 20/50, L 20/400  Face:  Facial sensation normal V1 - 3, no facial asymmetry.    Ears/Nose/Throat:  Hearing grossly intact b/l.  Palate elevates midline.  Tongue and uvula midline.   Motor examination:   Normal tone, bulk and range of motion.  No tenderness, twitching, tremors or involuntary movements.  Formal Muscle Strength Testing: (MRC grade R/L) 5/5 UE; 5/5 LE.  No observable drift.  Reflexes:   2+ b/l pectoralis, biceps, triceps, brachioradialis, patella and Achilles.  Plantar response downgoing b/l.  Jaw jerk, Jean, clonus absent.  Sensory examination:   Intact to light touch and pinprick, pain, temperature and proprioception and vibration in all extremities.  Cerebellum:   FTN/HKS intact with normal PRIYA in all limbs.  No dysmetria or dysdiadokinesia.  Gait narrow based and normal.    Respiratory:  no audible wheezing or inspiratory stridor.  no use of accessory muscles.   Cardiac: pulse palpable, no audible bruits  Abdomen: supple, no guarding, no TTP    Labs:   CBC Full  -  ( 08 Apr 2021 05:27 )  WBC Count : 4.24 K/uL  RBC Count : 4.28 M/uL  Hemoglobin : 11.4 g/dL  Hematocrit : 35.4 %  Platelet Count - Automated : 137 K/uL  Mean Cell Volume : 82.7 fL  Mean Cell Hemoglobin : 26.6 pg  Mean Cell Hemoglobin Concentration : 32.2 g/dL  Auto Neutrophil # : 2.99 K/uL  Auto Lymphocyte # : 1.07 K/uL  Auto Monocyte # : 0.17 K/uL  Auto Eosinophil # : 0.00 K/uL  Auto Basophil # : 0.00 K/uL  Auto Neutrophil % : 70.6 %  Auto Lymphocyte % : 25.2 %  Auto Monocyte % : 4.0 %  Auto Eosinophil % : 0.0 %  Auto Basophil % : 0.0 %    04-08    140  |  105  |  14  ----------------------------<  111<H>  3.8   |  25  |  0.9    Ca    9.3      08 Apr 2021 05:27  Mg     1.6     04-07    TPro  6.6  /  Alb  4.0  /  TBili  0.6  /  DBili  x   /  AST  52<H>  /  ALT  27  /  AlkPhos  43  04-08    LIVER FUNCTIONS - ( 08 Apr 2021 05:27 )  Alb: 4.0 g/dL / Pro: 6.6 g/dL / ALK PHOS: 43 U/L / ALT: 27 U/L / AST: 52 U/L / GGT: x           PT/INR - ( 08 Apr 2021 05:27 )   PT: 19.20 sec;   INR: 1.67 ratio      Neuroimaging:  NCHCT: CT Head No Cont:   EXAM:  CT ANGIO BRAIN (W)AW IC        EXAM:  CT ANGIO NECK (W)AW IC        EXAM:  CT BRAIN            PROCEDURE DATE:  04/07/2021        INTERPRETATION:  CLINICAL INDICATION: Visual disturbance. .    TECHNIQUE: Noncontrast head CT was performed. Sagittal and coronal reformatted images were obtained and reviewed.  CT angiography of the intracranial (head) and extracranial (neck) circulation was performed after contrast administration  Maximal intensity projection images were additionallyincluded and reviewed.  95 mls of Optiray 320 was administered intravenously without complication and 5 mls were discarded.  Using a separate workstation, 3-D shaded surface reformations were made of vasculature. 3-D reformations were reviewed and included in interpretation of the official report.  CAROTID STENOSIS REFERENCE: (NASCET = 100x1-(N/D)). N=greatest narrowing. D=normal distal diameter - MILD = <50% stenosis. - MODERATE = 50-69% stenosis. - SEVERE = 70-89% stenosis. - HAIRLINE/CRITICAL = 90-99% stenosis. - OCCLUDED = 100% stenosis.  COMPARISON: CT head dated 3/23/2019. CTA head and neck dated 7/7/2017.    FINDINGS:    CT BRAIN:  There is no evidence for acute intracranial hemorrhage, mass effect or midline shift. Similar punctate calcifications in the bilateral cerebellar hemispheres.  There is generalized cortical volume loss with commensurate ventricular dilation. There is no hydrocephalus.  There is no extra-axial collection.  A right orbital glaucoma drainagedevice is noted in place, unchanged. Left orbit is unremarkable.  The calvarium is intact, without depressed fracture.  The visualized paranasal sinuses and mastoid air cells are clear.  HEAD CTA:  The internal carotid arteries demonstrate normal enhancement to the intracranial circulation and [place]. There is atherosclerotic calcification of the bilateral cavernous, clinoid and supraclinoid internal carotid arteries without significant stenosis. The bilateral ophthalmic arteries are patent  Anterior and middle cerebral arteries demonstrate normal enhancement and symmetric arborization without intraluminal filling defect, stenosis, occlusion, aneurysm or vascular malformation.  There is atherosclerotic calcification ofthe proximal V4 segment of the left vertebral artery resulting in mild stenosis. Right vertebral artery is unremarkable. The basilar artery is unremarkable. Branch vasculature of the posterior circulation are within normal limits. The posterior cerebral arteries demonstrate symmetric enhancement and arborization without evidence for aneurysm, stenosis, occlusion or vascular malformation.  Visualized dural venous sinuses and deep cerebral venous structures demonstrate normal enhancement without evidence for filling defect.  NECK CTA:  The aortic arch and origins of the great vessels are within normal limits.  Right:  The origin of the right common carotid artery is normal. The right common carotid artery is normal in course and caliberto the carotid bifurcation. There is atherosclerotic calcification of the right carotid bulb as well as proximal internal and external carotid arteries resulting in approximately 30% short segment stenosis. The right internal carotid artery has normal course and caliber to the intracranial circulation.  The origin of the right vertebral artery is normal. The right vertebral artery is normal in course and caliber to the intracranial circulation.  Left: The origin of the left common carotid artery is normal. The left common carotid artery is normal in course and caliber to the carotid bifurcation. 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 The left internal carotid artery has normal course and caliber to the intracranial circulation.  The origin of the left vertebral artery is normal. The left vertebral artery is normal in course and caliber to the intracranial circulation.  Partially visualized sternotomy wires.  Calcified pleural-based nodules are noted of the right upper lung, unchanged.  CT HEAD:  No acute intracranial pathology. No evidence of midline shift, mass effect or intracranial hemorrhage.  Right orbital glaucoma drainage device is noted in place, unchanged.  CTA HEAD:  Mild stenosis of the proximal V4 segment of the left vertebral artery due to atherosclerotic calcification.  No evidence of large vessel occlusion or aneurysm. Bilateral ophthalmic arteries are patent.  CTA NECK:  Moderate stenosis of the proximal left internal carotid artery due to atherosclerotic calcification.  Mild stenosis of the right proximal internal carotid artery due to atherosclerotic calcification.    NICO GÓMEZ M.D., ATTENDING RADIOLOGIST  This document has been electronically signed. Apr 7 2021  9:26PM (04-07-21 @ 20:52) Neurology Consult    Patient is a 85y old  Male who presents with a chief complaint of Blurry vision (08 Apr 2021 09:16)      HPI:  Pt is an 86 YO M with a pmh of CAD s/p CABG in 2020, Afib on coumadin, HTN, HLD, glaucoma. 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 Pt was unable to recognize the numbers on the computer screen for 2 hours until he called his PCP who referred him to the ER. In the ER, CT Head, CTA head and neck with contrast showed:    IMPRESSION:  CT HEAD:  No acute intracranial pathology. No evidence of midline shift, mass effect or intracranial hemorrhage.  Right orbital glaucoma drainage device is noted in place, unchanged.  CTA HEAD:  Mild stenosis of the proximal V4 segment of the left vertebral artery due to atherosclerotic calcification.  No evidence of large vessel occlusion or aneurysm. Bilateral ophthalmic arteries are patent.  CTA NECK:  Moderate stenosis of the proximal left internal carotid artery due to atherosclerotic calcification.  Mild stenosis of the right proximal internal carotid artery due to atherosclerotic calcification.    Neurology was consulted from the ED and recommended ESR and possible admission for MRI head. Discussed case with Neurology NP Danny and updated on ESR result which elevated at 50, neuro recommended to start steroids. Pt is covid Positive, however he had his 2nd COVID vaccine booster administered 4/1/21, Pt was seen and examined at bedside, pt denies any CP, SOB, palpitations, NVD, abdominal pain and cough. Pt does endorse fever and chills. Pt does c/o bilateral blurry vision, however he denies any eye pain or pressure.      (07 Apr 2021 22:34)    Patient was seen. He reports vision is still blurry in that he cannot read fine print at times. He reports that his vision was better this morning when he read off the fine print on his insurance card. He denies recent vision changes outside of his known glaucoma. Denies headache, jaw pain, fever, chills.       PAST MEDICAL & SURGICAL HISTORY:  Afib  Hyperlipemia  Hypertension  TIA (transient ischemic attack)  S/P CABG (coronary artery bypass graft)    FAMILY HISTORY:  FHx: hypertension (Father, Mother)    Social History: (-) x 3  Allergies  No Known Allergies  Intolerances        MEDICATIONS  (STANDING):  aspirin  chewable 81 milliGRAM(s) Oral daily  atorvastatin 80 milliGRAM(s) Oral at bedtime  brimonidine 0.2% Ophthalmic Solution 1 Drop(s) Both EYES every 8 hours  chlorhexidine 4% Liquid 1 Application(s) Topical <User Schedule>  dorzolamide 2% Ophthalmic Solution 1 Drop(s) Both EYES <User Schedule>  lisinopril 20 milliGRAM(s) Oral daily  metoprolol tartrate 50 milliGRAM(s) Oral two times a day  pantoprazole    Tablet 40 milliGRAM(s) Oral before breakfast  timolol 0.25% Solution 1 Drop(s) Both EYES every 12 hours  triamterene 37.5 mG/hydrochlorothiazide 25 mG Tablet 1 Tablet(s) Oral daily  warfarin 5 milliGRAM(s) Oral daily    MEDICATIONS  (PRN):      Review of systems:    Constitutional: as per HPI  Eyes: No eye pain or discharge  ENMT:  No difficulty hearing; No sinus or throat pain  Neck: No pain or stiffness  Respiratory: No cough, wheezing, chills or hemoptysis  Cardiovascular: No chest pain, palpitations, shortness of breath, dyspnea on exertion  Gastrointestinal: No abdominal pain, nausea, vomiting or hematemesis; No diarrhea or constipation.   Genitourinary: No dysuria, frequency, hematuria or incontinence  Neurological: As per HPI  Skin: No rashes or lesions   Endocrine: No heat or cold intolerance; No hair loss  Musculoskeletal: No joint pain or swelling  Psychiatric: No depression, anxiety, mood swings  Heme/Lymph: No easy bruising or bleeding gums    Vital Signs Last 24 Hrs  T(C): 36 (08 Apr 2021 05:00), Max: 37.8 (07 Apr 2021 19:15)  T(F): 96.8 (08 Apr 2021 05:00), Max: 100.1 (07 Apr 2021 19:15)  HR: 85 (08 Apr 2021 05:00) (81 - 106)  BP: 116/60 (08 Apr 2021 05:00) (106/67 - 138/81)  BP(mean): --  RR: 18 (08 Apr 2021 05:00) (18 - 20)  SpO2: 98% (08 Apr 2021 05:00) (98% - 99%)    Examination:  General:  Appearance is consistent with chronologic age.  No abnormal facies.  Gross skin survey within normal limits.    Cognitive/Language:  The patient is oriented to person, place, time and date.  Recent and remote memory intact.  Fund of knowledge is intact and normal.  Language with normal repetition, comprehension and naming.  Nondysarthric.    Eyes: intact VA, VFF.  EOMI w/o nystagmus, skew or reported double vision.  PERRL.  No ptosis/weakness of eyelid closure.  Card test visual acuity: L 20/50, R 20/400  Face:  Facial sensation normal V1 - 3, no facial asymmetry.    Ears/Nose/Throat:  Hearing grossly intact b/l.  Palate elevates midline.  Tongue and uvula midline.   Motor examination:   Normal tone, bulk and range of motion.  No tenderness, twitching, tremors or involuntary movements.  Formal Muscle Strength Testing: (MRC grade R/L) 5/5 UE; 5/5 LE.  No observable drift.  Reflexes:   2+ b/l pectoralis, biceps, triceps, brachioradialis, patella and Achilles.  Plantar response downgoing b/l.  Jaw jerk, Jean, clonus absent.  Sensory examination:   Intact to light touch and pinprick, pain, temperature and proprioception and vibration in all extremities.  Cerebellum:   FTN/HKS intact with normal PRIYA in all limbs.  No dysmetria or dysdiadokinesia.  Gait narrow based and normal.    Respiratory:  no audible wheezing or inspiratory stridor.  no use of accessory muscles.   Cardiac: pulse palpable, no audible bruits  Abdomen: supple, no guarding, no TTP    Labs:   CBC Full  -  ( 08 Apr 2021 05:27 )  WBC Count : 4.24 K/uL  RBC Count : 4.28 M/uL  Hemoglobin : 11.4 g/dL  Hematocrit : 35.4 %  Platelet Count - Automated : 137 K/uL  Mean Cell Volume : 82.7 fL  Mean Cell Hemoglobin : 26.6 pg  Mean Cell Hemoglobin Concentration : 32.2 g/dL  Auto Neutrophil # : 2.99 K/uL  Auto Lymphocyte # : 1.07 K/uL  Auto Monocyte # : 0.17 K/uL  Auto Eosinophil # : 0.00 K/uL  Auto Basophil # : 0.00 K/uL  Auto Neutrophil % : 70.6 %  Auto Lymphocyte % : 25.2 %  Auto Monocyte % : 4.0 %  Auto Eosinophil % : 0.0 %  Auto Basophil % : 0.0 %    04-08    140  |  105  |  14  ----------------------------<  111<H>  3.8   |  25  |  0.9    Ca    9.3      08 Apr 2021 05:27  Mg     1.6     04-07    TPro  6.6  /  Alb  4.0  /  TBili  0.6  /  DBili  x   /  AST  52<H>  /  ALT  27  /  AlkPhos  43  04-08    LIVER FUNCTIONS - ( 08 Apr 2021 05:27 )  Alb: 4.0 g/dL / Pro: 6.6 g/dL / ALK PHOS: 43 U/L / ALT: 27 U/L / AST: 52 U/L / GGT: x           PT/INR - ( 08 Apr 2021 05:27 )   PT: 19.20 sec;   INR: 1.67 ratio      Neuroimaging:  NCHCT: CT Head No Cont:   EXAM:  CT ANGIO BRAIN (W)AW IC        EXAM:  CT ANGIO NECK (W)AW IC        EXAM:  CT BRAIN            PROCEDURE DATE:  04/07/2021        INTERPRETATION:  CLINICAL INDICATION: Visual disturbance. .    TECHNIQUE: Noncontrast head CT was performed. Sagittal and coronal reformatted images were obtained and reviewed.  CT angiography of the intracranial (head) and extracranial (neck) circulation was performed after contrast administration  Maximal intensity projection images were additionallyincluded and reviewed.  95 mls of Optiray 320 was administered intravenously without complication and 5 mls were discarded.  Using a separate workstation, 3-D shaded surface reformations were made of vasculature. 3-D reformations were reviewed and included in interpretation of the official report.  CAROTID STENOSIS REFERENCE: (NASCET = 100x1-(N/D)). N=greatest narrowing. D=normal distal diameter - MILD = <50% stenosis. - MODERATE = 50-69% stenosis. - SEVERE = 70-89% stenosis. - HAIRLINE/CRITICAL = 90-99% stenosis. - OCCLUDED = 100% stenosis.  COMPARISON: CT head dated 3/23/2019. CTA head and neck dated 7/7/2017.    FINDINGS:    CT BRAIN:  There is no evidence for acute intracranial hemorrhage, mass effect or midline shift. Similar punctate calcifications in the bilateral cerebellar hemispheres.  There is generalized cortical volume loss with commensurate ventricular dilation. There is no hydrocephalus.  There is no extra-axial collection.  A right orbital glaucoma drainagedevice is noted in place, unchanged. Left orbit is unremarkable.  The calvarium is intact, without depressed fracture.  The visualized paranasal sinuses and mastoid air cells are clear.  HEAD CTA:  The internal carotid arteries demonstrate normal enhancement to the intracranial circulation and Hughes of [place]. There is atherosclerotic calcification of the bilateral cavernous, clinoid and supraclinoid internal carotid arteries without significant stenosis. The bilateral ophthalmic arteries are patent  Anterior and middle cerebral arteries demonstrate normal enhancement and symmetric arborization without intraluminal filling defect, stenosis, occlusion, aneurysm or vascular malformation.  There is atherosclerotic calcification ofthe proximal V4 segment of the left vertebral artery resulting in mild stenosis. Right vertebral artery is unremarkable. The basilar artery is unremarkable. Branch vasculature of the posterior circulation are within normal limits. The posterior cerebral arteries demonstrate symmetric enhancement and arborization without evidence for aneurysm, stenosis, occlusion or vascular malformation.  Visualized dural venous sinuses and deep cerebral venous structures demonstrate normal enhancement without evidence for filling defect.  NECK CTA:  The aortic arch and origins of the great vessels are within normal limits.  Right:  The origin of the right common carotid artery is normal. The right common carotid artery is normal in course and caliberto the carotid bifurcation. There is atherosclerotic calcification of the right carotid bulb as well as proximal internal and external carotid arteries resulting in approximately 30% short segment stenosis. The right internal carotid artery has normal course and caliber to the intracranial circulation.  The origin of the right vertebral artery is normal. The right vertebral artery is normal in course and caliber to the intracranial circulation.  Left: The origin of the left common carotid artery is normal. The left common carotid artery is normal in course and caliber to the carotid bifurcation. 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 The left internal carotid artery has normal course and caliber to the intracranial circulation.  The origin of the left vertebral artery is normal. The left vertebral artery is normal in course and caliber to the intracranial circulation.  Partially visualized sternotomy wires.  Calcified pleural-based nodules are noted of the right upper lung, unchanged.  CT HEAD:  No acute intracranial pathology. No evidence of midline shift, mass effect or intracranial hemorrhage.  Right orbital glaucoma drainage device is noted in place, unchanged.  CTA HEAD:  Mild stenosis of the proximal V4 segment of the left vertebral artery due to atherosclerotic calcification.  No evidence of large vessel occlusion or aneurysm. Bilateral ophthalmic arteries are patent.  CTA NECK:  Moderate stenosis of the proximal left internal carotid artery due to atherosclerotic calcification.  Mild stenosis of the right proximal internal carotid artery due to atherosclerotic calcification.    NICO GÓMEZ M.D., ATTENDING RADIOLOGIST  This document has been electronically signed. Apr 7 2021  9:26PM (04-07-21 @ 20:52)

## 2021-04-08 NOTE — PROGRESS NOTE ADULT - ATTENDING COMMENTS
84 YO M with PMHx of glaucoma, CAD s/p CABG in 2020, Afib on coumadin, HTN, HLD who will be admitted for the workup and treatment of blurry vision. Found to be COVID+.     #blurry vision  #glaucoma  Blurry vision on admission with elevated ESR to 50. Given solumedrol with concern for GCA. Blurry vision likely 2/2 glaucoma exacerbated by COVID.   -CTA H/N showed mild stenosis proximal V4 segment of L vertebral artery due to atherosclerosis, moderated stenosis proximal L ICA, mild stenosis R proximal ICA. No evidence of LVO or aneurysm  -f/u neuro recommendations  -f/u ophtho recommendations  -c/w brimonidine 0.2% b/l q8h, dorzolamide 2% b/l BID, timolol 0.25% b/l BID  Per Neuro may give another Prednisone 60mg today, but GCA suspicion low. f/u ophthlam tomorrow and d/c home tomorrow.     #coronavirus infection  Received 2nd dose vaccine 4/1. COVID positive on admission.  -currently on room air  -CXR 4/7 negative  -no treatment at this time, asymptomatic    #CAD (s/p CABG 2020  -c/w home metoprolol 50mg BID, aspirin 81mg qD    #AFib  -c/w coumadin qHS  -f/u daily INR    #HTN  #HLD  -c/w home metoprolol 50mg BID, lisinopril 20mg qD, atorvastatin 80mg qHS    DVT ppx: on coumadin  GI ppx: pantoprazole 40mg qD  Labs: CMP+Mg, CBC  Diet:  DASH/TLC  PT/Rehab: Evaluation pending  Activity: Ambulate as tolerated  Dispo: Home pending clinical improvement  FULL CODE  team Spoke to marcelo Castaneda at [phone] to update on clinical status/answered questions  f/u ophthlam tomorrow and d/c home tomorrow.

## 2021-04-08 NOTE — CONSULT NOTE ADULT - SUBJECTIVE AND OBJECTIVE BOX
MADELYNNICKY  85y, Male  Allergy: No Known Allergies      CHIEF COMPLAINT:   Blurry vision (07 Apr 2021 22:34)      LOS  1d    HPI  HPI:  Pt is an 86 yo M with a pmh of CAD s/p CABG in 2020, Afib on coumadin, HTN, HLD, glaucoma. 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 Pt was unable to recognize the numbers on the computer screen for 2 hours until he called his PCP who referred him to the ER. In the ER, CT Head, CTA head and neck with contrast showed:    MPRESSION:    CT HEAD:  No acute intracranial pathology. No evidence of midline shift, mass effect or intracranial hemorrhage.    Right orbital glaucoma drainage device is noted in place, unchanged.    CTA HEAD:  Mild stenosis of the proximal V4 segment of the left vertebral artery due to atherosclerotic calcification.    No evidence of large vessel occlusion or aneurysm. Bilateral ophthalmic arteries are patent.    CTA NECK:  Moderate stenosis of the proximal left internal carotid artery due to atherosclerotic calcification.    Mild stenosis of the right proximal internal carotid artery due to atherosclerotic calcification.    Neurology was consulted from the ED and recommended ESR and possible admission for MRI head. Discussed case with Neurology NP Danny and updated on ESR result which elevated at 50, neuro recommended to start steroids. Pt is covid Positive, however he had his 2nd COVID vaccine booster administered 4/1/21, Pt was seen and examined at bedside, pt denies any CP, SOB, palpitations, NVD, abdominal pain and cough. Pt does endorse fever and chills. Pt does c/o bilateral blurry vision, however he denies any eye pain or pressure.      (07 Apr 2021 22:34)      INFECTIOUS DISEASE HISTORY:  Satting well on RA  s/p vaccine x2  CXR cardiomegaly  C-Reactive Protein, Serum: <3 mg/L (04-07-21 @ 18:16)      PMH  PAST MEDICAL & SURGICAL HISTORY:  Afib    Hyperlipemia    Hypertension    TIA (transient ischemic attack)    S/P CABG (coronary artery bypass graft)        FAMILY HISTORY  FHx: hypertension (Father, Mother)        SOCIAL HISTORY  Social History:  Pt denies smoking, Denies ETOH and drug use. (07 Apr 2021 22:34)        ROS  General: Denies rigors, nightsweats  HEENT: Denies headache, rhinorrhea, sore throat, eye pain  CV: Denies CP, palpitations  PULM: Denies wheezing, hemoptysis  GI: Denies hematemesis, hematochezia, melena  : Denies discharge, hematuria  MSK: Denies arthralgias, myalgias  SKIN: Denies rash, lesions  NEURO: Denies paresthesias, weakness  PSYCH: Denies depression, anxiety    VITALS:  T(F): 96.8, Max: 100.1 (04-07-21 @ 19:15)  HR: 85  BP: 116/60  RR: 18Vital Signs Last 24 Hrs  T(C): 36 (08 Apr 2021 05:00), Max: 37.8 (07 Apr 2021 19:15)  T(F): 96.8 (08 Apr 2021 05:00), Max: 100.1 (07 Apr 2021 19:15)  HR: 85 (08 Apr 2021 05:00) (81 - 106)  BP: 116/60 (08 Apr 2021 05:00) (106/67 - 138/81)  BP(mean): --  RR: 18 (08 Apr 2021 05:00) (18 - 20)  SpO2: 98% (08 Apr 2021 05:00) (98% - 99%)      TESTS & MEASUREMENTS:                        11.4   4.24  )-----------( 137      ( 08 Apr 2021 05:27 )             35.4     04-08    140  |  105  |  14  ----------------------------<  111<H>  3.8   |  25  |  0.9    Ca    9.3      08 Apr 2021 05:27  Mg     1.6     04-07    TPro  6.6  /  Alb  4.0  /  TBili  0.6  /  DBili  x   /  AST  52<H>  /  ALT  27  /  AlkPhos  43  04-08    eGFR if Non African American: 78 mL/min/1.73M2 (04-08-21 @ 05:27)  eGFR if African American: 91 mL/min/1.73M2 (04-08-21 @ 05:27)  eGFR if Non African American: 78 mL/min/1.73M2 (04-07-21 @ 18:16)  eGFR if : 91 mL/min/1.73M2 (04-07-21 @ 18:16)    LIVER FUNCTIONS - ( 08 Apr 2021 05:27 )  Alb: 4.0 g/dL / Pro: 6.6 g/dL / ALK PHOS: 43 U/L / ALT: 27 U/L / AST: 52 U/L / GGT: x                     INFECTIOUS DISEASES TESTING  Rapid RVP Result: Detected (04-07-21 @ 17:05)      INFLAMMATORY MARKERS  Sedimentation Rate, Erythrocyte: 50 mm/Hr (04-07-21 @ 18:16)      RADIOLOGY & ADDITIONAL TESTS:  I have personally reviewed the last Chest xray  CXR  Xray Chest 1 View- PORTABLE-Urgent:   EXAM:  XR CHEST PORTABLE URGENT 1V            PROCEDURE DATE:  04/07/2021            INTERPRETATION:  Clinical History / Reason for exam: Visual disturbance    Comparison : Chest radiograph September 9, 2020.    Technique/Positioning: Frontal.    Findings:    Support devices: None.    Cardiac/mediastinum/hilum: Cardiomegaly, thoracic aorta calcification, status post median sternotomy and CABG..    Lung parenchyma/Pleura: Within normal limits.    Skeleton/soft tissues: Stable bony structures. Elevation of the left hemidiaphragm..    Impression:    Stable cardiomegaly              KAMAR SIMONS MD; Attending Radiologist  This document has been electronically signed. Apr 8 2021  6:51AM (04-07-21 @ 17:18)      CT      CARDIOLOGY TESTING      MEDICATIONS  aspirin  chewable 81 Oral daily  atorvastatin 80 Oral at bedtime  brimonidine 0.2% Ophthalmic Solution 1 Both EYES every 8 hours  chlorhexidine 4% Liquid 1 Topical <User Schedule>  dorzolamide 2% Ophthalmic Solution 1 Both EYES <User Schedule>  lisinopril 20 Oral daily  metoprolol tartrate 50 Oral two times a day  pantoprazole    Tablet 40 Oral before breakfast  timolol 0.25% Solution 1 Both EYES every 12 hours  triamterene 37.5 mG/hydrochlorothiazide 25 mG Tablet 1 Oral daily  warfarin 5 Oral daily      Weight  Weight (kg): 87.9 (04-08-21 @ 00:06)    ANTIBIOTICS:      ALLERGIES:  No Known Allergies

## 2021-04-08 NOTE — CONSULT NOTE ADULT - ASSESSMENT
Pt is an 86 YO M with a pmh of CAD s/p CABG in 2020, Afib on coumadin, HTN, HLD, glaucoma. Pt presents to the ED with a chief complaint of blurry vision. Thought initial concern for GCA, less likely given no other symptoms consistent with GCA. Neurology recommends     #blurry vision  #glaucoma   - Recommend close follow up with opthalmology   - Blurry vision on admission with elevated ESR to 50  - Blurry vision likely 2/2 glaucoma exacerbated by COVID or change in rx  - CTA H/N showed mild stenosis proximal V4 segment of L vertebral artery due to atherosclerosis, moderated stenosis proximal L ICA, mild stenosis R proximal ICA. No evidence of LVO or aneurysm    - Recommend one more dose of Solumedrol 60 mg today    Final recommendations pending attending attestation  Pt is an 84 YO M with a pmh of CAD s/p CABG in 2020, Afib on coumadin, HTN, HLD, glaucoma. Pt presents to the ED with a chief complaint of blurry vision. Thought initial concern for GCA, less likely given no other symptoms consistent with GCA. Neurology recommends     #blurry vision  #glaucoma   - Recommend close follow up with opthalmology   - Blurry vision on admission with elevated ESR to 50  - Blurry vision likely 2/2 glaucoma exacerbated by COVID or change in rx  - CTA H/N showed mild stenosis proximal V4 segment of L vertebral artery due to atherosclerosis, moderate stenosis proximal L ICA, mild stenosis R proximal ICA. No evidence of LVO or aneurysm    - Recommend one more dose of prednisone 60 mg today    Final recommendations pending attending attestation

## 2021-04-08 NOTE — CONSULT NOTE ADULT - ASSESSMENT
ASSESSMENT  84 yo M with a pmh of CAD s/p CABG in 2020, Afib on coumadin, HTN, HLD, glaucoma. Pt presents to the ED with a chief complaint of blurry vision since this AM     IMPRESSION  #COVID19 asymptomatic    s/p vaccine x2    CXR no PNA    Admission WBC 5     C-Reactive Protein, Serum: <3 mg/L (04-07-21 @ 18:16)  #Blurry vision    ESR 50, started on steroids    CT stenosis L ICA  #Transaminitis     Creatinine, Serum: 0.9 (04-08-21 @ 05:27)      RECOMMENDATIONS  - Patient is s/p 2 COVID Vaccinations, last 4/1- not yet past 14 days window  - Patient does not meet criteria for COVID-19 therapeutics (SpO2 < or =94% on room air, and requiring supplemental oxygen)  - Please monitor oxygenation   - Blurry vision workup per primary team , Neurology    Please recall PRN    If any questions, please call or send a message on [employer] Teams  Spectra [phone]

## 2021-04-08 NOTE — PROGRESS NOTE ADULT - ASSESSMENT
Pt is an 86 YO M with PMHx of glaucoma, CAD s/p CABG in 2020, Afib on coumadin, HTN, HLD who will be admitted for the workup and treatment of blurry vision. Found to be COVID+.   #blurry vision  #glaucoma  Blurry vision on admission with elevated ESR to 50. Given solumedrol with concern for GCA. Blurry vision likely 2/2 glaucoma exacerbated by COVID.   -CTA H/N showed mild stenosis proximal V4 segment of L vertebral artery due to atherosclerosis, moderated stenosis proximal L ICA, mild stenosis R proximal ICA. No evidence of LVO or aneurysm  -f/u neuro recommendations  -f/u ophtho recommendations  -c/w brimonidine 0.2% b/l q8h, dorzolamide 2% b/l BID, timolol 0.25% b/l BID    #coronavirus infection  Received 2nd dose vaccine 4/1. COVID positive on admission.  -currently on room air  -CXR 4/7 negative  -no treatment at this time, asymptomatic    #CAD (s/p CABG 2020  -c/w home metoprolol 50mg BID, aspirin 81mg qD    #AFib  -c/w coumadin qHS  -f/u daily INR    #HTN  #HLD  -c/w home metoprolol 50mg BID, lisinopril 20mg qD, atorvastatin 80mg qHS    DVT ppx: on coumadin  GI ppx: pantoprazole 40mg qD  Labs: CMP+Mg, CBC  Diet:  DASH/TLC  PT/Rehab: Evaluation pending  Activity: Ambulate as tolerated  Dispo: Home pending clinical improvement  FULL CODE Pt is an 84 YO M with PMHx of glaucoma, CAD s/p CABG in 2020, Afib on coumadin, HTN, HLD who will be admitted for the workup and treatment of blurry vision. Found to be COVID+.   #blurry vision  #glaucoma  Blurry vision on admission with elevated ESR to 50. Given solumedrol with concern for GCA. Blurry vision likely 2/2 glaucoma exacerbated by COVID.   -CTA H/N showed mild stenosis proximal V4 segment of L vertebral artery due to atherosclerosis, moderated stenosis proximal L ICA, mild stenosis R proximal ICA. No evidence of LVO or aneurysm  -f/u neuro recommendations  -f/u ophtho recommendations  -c/w brimonidine 0.2% b/l q8h, dorzolamide 2% b/l BID, timolol 0.25% b/l BID    #coronavirus infection  Received 2nd dose vaccine 4/1. COVID positive on admission.  -currently on room air  -CXR 4/7 negative  -no treatment at this time, asymptomatic    #CAD (s/p CABG 2020  -c/w home metoprolol 50mg BID, aspirin 81mg qD    #AFib  -c/w coumadin qHS  -f/u daily INR    #HTN  #HLD  -c/w home metoprolol 50mg BID, lisinopril 20mg qD, atorvastatin 80mg qHS    DVT ppx: on coumadin  GI ppx: pantoprazole 40mg qD  Labs: CMP+Mg, CBC  Diet:  DASH/TLC  PT/Rehab: Evaluation pending  Activity: Ambulate as tolerated  Dispo: Home pending clinical improvement  FULL CODE  Spoke to marcelo Castaneda at [phone] to update on clinical status/answered questions

## 2021-04-09 ENCOUNTER — TRANSCRIPTION ENCOUNTER (OUTPATIENT)
Age: 86
End: 2021-04-09

## 2021-04-09 VITALS — HEART RATE: 88 BPM | DIASTOLIC BLOOD PRESSURE: 58 MMHG | SYSTOLIC BLOOD PRESSURE: 124 MMHG

## 2021-04-09 LAB
ALBUMIN SERPL ELPH-MCNC: 3.7 G/DL — SIGNIFICANT CHANGE UP (ref 3.5–5.2)
ALP SERPL-CCNC: 41 U/L — SIGNIFICANT CHANGE UP (ref 30–115)
ALT FLD-CCNC: 27 U/L — SIGNIFICANT CHANGE UP (ref 0–41)
ANION GAP SERPL CALC-SCNC: 12 MMOL/L — SIGNIFICANT CHANGE UP (ref 7–14)
AST SERPL-CCNC: 50 U/L — HIGH (ref 0–41)
BASOPHILS # BLD AUTO: 0.01 K/UL — SIGNIFICANT CHANGE UP (ref 0–0.2)
BASOPHILS NFR BLD AUTO: 0.1 % — SIGNIFICANT CHANGE UP (ref 0–1)
BILIRUB SERPL-MCNC: 0.4 MG/DL — SIGNIFICANT CHANGE UP (ref 0.2–1.2)
BUN SERPL-MCNC: 22 MG/DL — HIGH (ref 10–20)
CALCIUM SERPL-MCNC: 9.2 MG/DL — SIGNIFICANT CHANGE UP (ref 8.5–10.1)
CHLORIDE SERPL-SCNC: 103 MMOL/L — SIGNIFICANT CHANGE UP (ref 98–110)
CO2 SERPL-SCNC: 23 MMOL/L — SIGNIFICANT CHANGE UP (ref 17–32)
COVID-19 SPIKE DOMAIN AB INTERP: POSITIVE
COVID-19 SPIKE DOMAIN ANTIBODY RESULT: 10.8 U/ML — HIGH
CREAT SERPL-MCNC: 0.8 MG/DL — SIGNIFICANT CHANGE UP (ref 0.7–1.5)
EOSINOPHIL # BLD AUTO: 0 K/UL — SIGNIFICANT CHANGE UP (ref 0–0.7)
EOSINOPHIL NFR BLD AUTO: 0 % — SIGNIFICANT CHANGE UP (ref 0–8)
FERRITIN SERPL-MCNC: 51 NG/ML — SIGNIFICANT CHANGE UP (ref 30–400)
GLUCOSE SERPL-MCNC: 138 MG/DL — HIGH (ref 70–99)
HCT VFR BLD CALC: 34.9 % — LOW (ref 42–52)
HGB BLD-MCNC: 11.2 G/DL — LOW (ref 14–18)
IMM GRANULOCYTES NFR BLD AUTO: 0.4 % — HIGH (ref 0.1–0.3)
INR BLD: 2.07 RATIO — HIGH (ref 0.65–1.3)
LYMPHOCYTES # BLD AUTO: 1.07 K/UL — LOW (ref 1.2–3.4)
LYMPHOCYTES # BLD AUTO: 10.3 % — LOW (ref 20.5–51.1)
MAGNESIUM SERPL-MCNC: 1.9 MG/DL — SIGNIFICANT CHANGE UP (ref 1.8–2.4)
MCHC RBC-ENTMCNC: 26.5 PG — LOW (ref 27–31)
MCHC RBC-ENTMCNC: 32.1 G/DL — SIGNIFICANT CHANGE UP (ref 32–37)
MCV RBC AUTO: 82.7 FL — SIGNIFICANT CHANGE UP (ref 80–94)
MONOCYTES # BLD AUTO: 0.29 K/UL — SIGNIFICANT CHANGE UP (ref 0.1–0.6)
MONOCYTES NFR BLD AUTO: 2.8 % — SIGNIFICANT CHANGE UP (ref 1.7–9.3)
NEUTROPHILS # BLD AUTO: 9 K/UL — HIGH (ref 1.4–6.5)
NEUTROPHILS NFR BLD AUTO: 86.4 % — HIGH (ref 42.2–75.2)
NRBC # BLD: 0 /100 WBCS — SIGNIFICANT CHANGE UP (ref 0–0)
PLATELET # BLD AUTO: 158 K/UL — SIGNIFICANT CHANGE UP (ref 130–400)
POTASSIUM SERPL-MCNC: 3.8 MMOL/L — SIGNIFICANT CHANGE UP (ref 3.5–5)
POTASSIUM SERPL-SCNC: 3.8 MMOL/L — SIGNIFICANT CHANGE UP (ref 3.5–5)
PROCALCITONIN SERPL-MCNC: 0.05 NG/ML — SIGNIFICANT CHANGE UP (ref 0.02–0.1)
PROT SERPL-MCNC: 6.5 G/DL — SIGNIFICANT CHANGE UP (ref 6–8)
PROTHROM AB SERPL-ACNC: 23.8 SEC — HIGH (ref 9.95–12.87)
RBC # BLD: 4.22 M/UL — LOW (ref 4.7–6.1)
RBC # FLD: 16.8 % — HIGH (ref 11.5–14.5)
SARS-COV-2 IGG+IGM SERPL QL IA: 10.8 U/ML — HIGH
SARS-COV-2 IGG+IGM SERPL QL IA: POSITIVE
SODIUM SERPL-SCNC: 138 MMOL/L — SIGNIFICANT CHANGE UP (ref 135–146)
WBC # BLD: 10.41 K/UL — SIGNIFICANT CHANGE UP (ref 4.8–10.8)
WBC # FLD AUTO: 10.41 K/UL — SIGNIFICANT CHANGE UP (ref 4.8–10.8)

## 2021-04-09 PROCEDURE — 99239 HOSP IP/OBS DSCHRG MGMT >30: CPT

## 2021-04-09 RX ORDER — COLESEVELAM HYDROCHLORIDE 625 MG/1
2 TABLET, FILM COATED ORAL
Qty: 0 | Refills: 0 | DISCHARGE

## 2021-04-09 RX ADMIN — Medication 81 MILLIGRAM(S): at 11:03

## 2021-04-09 RX ADMIN — Medication 1 TABLET(S): at 05:51

## 2021-04-09 RX ADMIN — Medication 1 DROP(S): at 05:50

## 2021-04-09 RX ADMIN — BRIMONIDINE TARTRATE 1 DROP(S): 2 SOLUTION/ DROPS OPHTHALMIC at 05:50

## 2021-04-09 RX ADMIN — Medication 50 MILLIGRAM(S): at 05:49

## 2021-04-09 RX ADMIN — DORZOLAMIDE HYDROCHLORIDE 1 DROP(S): 20 SOLUTION/ DROPS OPHTHALMIC at 05:50

## 2021-04-09 RX ADMIN — PANTOPRAZOLE SODIUM 40 MILLIGRAM(S): 20 TABLET, DELAYED RELEASE ORAL at 05:49

## 2021-04-09 RX ADMIN — LISINOPRIL 20 MILLIGRAM(S): 2.5 TABLET ORAL at 05:49

## 2021-04-09 NOTE — DISCHARGE NOTE PROVIDER - NSDCFUADDAPPT_GEN_ALL_CORE_FT
Ophthalmology in 1-2 weeks    PCP in 1-2 weeks for post-COVID care Ophthalmology in 1-2 weeks    PCP in 1-2 weeks for post-COVID care    Coumadin clinic for INR checks per home routine

## 2021-04-09 NOTE — PROGRESS NOTE ADULT - SUBJECTIVE AND OBJECTIVE BOX
SUBJECTIVE:    Patient is a 85y old Male who presents with a chief complaint of Blurry vision (08 Apr 2021 07:53)    Currently admitted to medicine with the primary diagnosis of COVID-19     Today is hospital day 1d. This morning he is resting comfortably in bed and reports no new issues or overnight events. No fevers, chills. Denies CP or SOB. States blurry vision is improving. On room air.     PAST MEDICAL & SURGICAL HISTORY  Afib    Hyperlipemia    Hypertension    TIA (transient ischemic attack)    S/P CABG (coronary artery bypass graft)      SOCIAL HISTORY:  Negative for smoking/alcohol/drug use.     ALLERGIES:  No Known Allergies    MEDICATIONS:  STANDING MEDICATIONS  aspirin  chewable 81 milliGRAM(s) Oral daily  atorvastatin 80 milliGRAM(s) Oral at bedtime  brimonidine 0.2% Ophthalmic Solution 1 Drop(s) Both EYES every 8 hours  chlorhexidine 4% Liquid 1 Application(s) Topical <User Schedule>  dorzolamide 2% Ophthalmic Solution 1 Drop(s) Both EYES <User Schedule>  lisinopril 20 milliGRAM(s) Oral daily  metoprolol tartrate 50 milliGRAM(s) Oral two times a day  pantoprazole    Tablet 40 milliGRAM(s) Oral before breakfast  timolol 0.25% Solution 1 Drop(s) Both EYES every 12 hours  triamterene 37.5 mG/hydrochlorothiazide 25 mG Tablet 1 Tablet(s) Oral daily  warfarin 5 milliGRAM(s) Oral daily    PRN MEDICATIONS    VITALS:   T(F): 96.8  HR: 85  BP: 116/60  RR: 18  SpO2: 98%    LABS:                        11.4   4.24  )-----------( 137      ( 08 Apr 2021 05:27 )             35.4     04-08    140  |  105  |  14  ----------------------------<  111<H>  3.8   |  25  |  0.9    Ca    9.3      08 Apr 2021 05:27  Mg     1.6     04-07    TPro  6.6  /  Alb  4.0  /  TBili  0.6  /  DBili  x   /  AST  52<H>  /  ALT  27  /  AlkPhos  43  04-08    PT/INR - ( 08 Apr 2021 05:27 )   PT: 19.20 sec;   INR: 1.67 ratio               Troponin T, Serum: <0.01 ng/mL (04-07-21 @ 18:16)  Sedimentation Rate, Erythrocyte: 50 mm/Hr *H* (04-07-21 @ 18:16)      CARDIAC MARKERS ( 07 Apr 2021 18:16 )  x     / <0.01 ng/mL / x     / x     / x        RADIOLOGY:  < from: Xray Chest 1 View- PORTABLE-Urgent (Xray Chest 1 View- PORTABLE-Urgent .) (04.07.21 @ 17:18) >  Impression:  Stable cardiomegaly  < end of copied text >    PHYSICAL EXAM:  GEN: NAD, lying in bed comfortably  HEENT: EOMI, PERRLA, conjunctiva and sclera clear. MMM.  LUNGS: Clear to auscultation bilaterally. No rales, rhonchi, or wheezing appreciated. On room air.   HEART: S1/S2 present. RRR.   ABD: Soft, non-tender, non-distended. Bowel sounds present.  EXT: 2+ peripheral pulses. No clubbing, cyanosis, or edema.   NEURO: AAOX3. CN II-XII intact. Speech clear. No focal neurological deficits. Sensation grossly intact.   Skin: No rashes or lesions.   
SUBJECTIVE:  Patient is a 85y old Male who presents with a chief complaint of Blurry vision (09 Apr 2021 08:00)    Currently admitted to medicine with the primary diagnosis of Blurry vision     Today is hospital day 2d. This morning he is resting comfortably in bed and reports no new issues or overnight events. No fevers, chills. Denies CP or SOB. States blurry vision is improving. On room air.       PAST MEDICAL & SURGICAL HISTORY  Afib    Hyperlipemia    Hypertension    TIA (transient ischemic attack)    S/P CABG (coronary artery bypass graft)      SOCIAL HISTORY:  Negative for smoking/alcohol/drug use.     ALLERGIES:  No Known Allergies    MEDICATIONS:  STANDING MEDICATIONS  aspirin  chewable 81 milliGRAM(s) Oral daily  atorvastatin 80 milliGRAM(s) Oral at bedtime  brimonidine 0.2% Ophthalmic Solution 1 Drop(s) Both EYES every 8 hours  chlorhexidine 4% Liquid 1 Application(s) Topical <User Schedule>  dorzolamide 2% Ophthalmic Solution 1 Drop(s) Both EYES <User Schedule>  lisinopril 20 milliGRAM(s) Oral daily  metoprolol tartrate 50 milliGRAM(s) Oral two times a day  pantoprazole    Tablet 40 milliGRAM(s) Oral before breakfast  timolol 0.25% Solution 1 Drop(s) Both EYES every 12 hours  triamterene 37.5 mG/hydrochlorothiazide 25 mG Tablet 1 Tablet(s) Oral daily    PRN MEDICATIONS    VITALS:   T(F): 96.4  HR: 88  BP: 124/58  RR: 18  SpO2: 97%    LABS:                        11.4   4.24  )-----------( 137      ( 08 Apr 2021 05:27 )             35.4     04-08    138  |  101  |  17  ----------------------------<  177<H>  3.8   |  22  |  0.9    Ca    8.8      08 Apr 2021 11:00  Mg     2.0     04-08    TPro  6.6  /  Alb  4.0  /  TBili  0.6  /  DBili  x   /  AST  52<H>  /  ALT  27  /  AlkPhos  43  04-08    PT/INR - ( 08 Apr 2021 05:27 )   PT: 19.20 sec;   INR: 1.67 ratio                   CARDIAC MARKERS ( 07 Apr 2021 18:16 )  x     / <0.01 ng/mL / x     / x     / x        RADIOLOGY:  < from: Xray Chest 1 View- PORTABLE-Urgent (Xray Chest 1 View- PORTABLE-Urgent .) (04.07.21 @ 17:18) >  Impression:    Stable cardiomegaly    < end of copied text >      PHYSICAL EXAM:  GEN: NAD, lying in bed comfortably  HEENT: EOMI, PERRLA, conjunctiva and sclera clear. MMM.  LUNGS: Clear to auscultation bilaterally. No rales, rhonchi, or wheezing appreciated. On room air.   HEART: S1/S2 present. RRR.   ABD: Soft, non-tender, non-distended. Bowel sounds present.  EXT: 2+ peripheral pulses. No clubbing, cyanosis, or edema.   NEURO: AAOX3. CN II-XII intact. Speech clear. No focal neurological deficits. Sensation grossly intact.   Skin: No rashes or lesions.

## 2021-04-09 NOTE — PROGRESS NOTE ADULT - ASSESSMENT
Pt is an 84 YO M with PMHx of glaucoma, CAD s/p CABG in 2020, Afib on coumadin, HTN, HLD who will be admitted for the workup and treatment of blurry vision. Found to be COVID+.   #blurry vision  #glaucoma  Blurry vision on admission with elevated ESR to 50. Given solumedrol with concern for GCA. Blurry vision likely 2/2 glaucoma exacerbated by COVID.   -CTA H/N showed mild stenosis proximal V4 segment of L vertebral artery due to atherosclerosis, moderated stenosis proximal L ICA, mild stenosis R proximal ICA. No evidence of LVO or aneurysm  -f/u neuro recommendations: doubt GCA, one additional steroids given  -f/u ophtho outpt  -c/w brimonidine 0.2% b/l q8h, dorzolamide 2% b/l BID, timolol 0.25% b/l BID    #coronavirus infection  Received 2nd dose vaccine 4/1. COVID positive on admission.  -currently on room air  -CXR 4/7 negative  -no treatment at this time, asymptomatic    #CAD (s/p CABG 2020  -c/w home metoprolol 50mg BID, aspirin 81mg qD    #AFib  -c/w coumadin qHS  -f/u daily INR    #HTN  #HLD  -c/w home metoprolol 50mg BID, lisinopril 20mg qD, atorvastatin 80mg qHS    DVT ppx: on coumadin  GI ppx: pantoprazole 40mg qD  Labs: CMP+Mg, CBC  Diet:  DASH/TLC  PT/Rehab: Evaluation pending  Activity: Ambulate as tolerated  Dispo: Home pending clinical improvement  FULL CODE  Discharge today with outpt ophtho follow-up Pt is an 86 YO M with PMHx of glaucoma, CAD s/p CABG in 2020, Afib on coumadin, HTN, HLD who will be admitted for the workup and treatment of blurry vision. Found to be COVID+.   #blurry vision  #glaucoma  Blurry vision on admission with elevated ESR to 50. Given solumedrol with concern for GCA. Blurry vision likely 2/2 glaucoma exacerbated by COVID.   -CTA H/N showed mild stenosis proximal V4 segment of L vertebral artery due to atherosclerosis, moderated stenosis proximal L ICA, mild stenosis R proximal ICA. No evidence of LVO or aneurysm  -f/u neuro recommendations: doubt GCA, one additional steroids given  -f/u ophtho outpt  -c/w brimonidine 0.2% b/l q8h, dorzolamide 2% b/l BID, timolol 0.25% b/l BID    #coronavirus infection  Received 2nd dose vaccine 4/1. COVID positive on admission.  -currently on room air  -CXR 4/7 negative  -no treatment at this time, asymptomatic    #CAD (s/p CABG 2020  -c/w home metoprolol 50mg BID, aspirin 81mg qD    #chronic AFib  -c/w coumadin qHS  -f/u daily INR    #HTN  #HLD  -c/w home metoprolol 50mg BID, lisinopril 20mg qD, atorvastatin 80mg qHS    DVT ppx: on coumadin  GI ppx: pantoprazole 40mg qD  Labs: CMP+Mg, CBC  Diet:  DASH/TLC  PT/Rehab: Evaluation pending  Activity: Ambulate as tolerated  Dispo: Home pending clinical improvement  FULL CODE  Discharge today with outpt ophtho follow-up

## 2021-04-09 NOTE — DISCHARGE NOTE PROVIDER - NSDCCPCAREPLAN_GEN_ALL_CORE_FT
PRINCIPAL DISCHARGE DIAGNOSIS  Diagnosis: Blurry vision  Assessment and Plan of Treatment: #blurry vision  #glaucoma  Blurry vision on admission with elevated ESR to 50. Given solumedrol with concern for GCA. Blurry vision likely 2/2 glaucoma exacerbated by COVID.   -CTA H/N showed mild stenosis proximal V4 segment of L vertebral artery due to atherosclerosis, moderated stenosis proximal L ICA, mild stenosis R proximal ICA. No evidence of LVO or aneurysm  -f/u neuro recommendations: doubt temporal arteritis, one additional dose of steroids given   -f/u ophtho outpatient  -was on brimonidine 0.2% b/l q8h, dorzolamide 2% b/l BID, timolol 0.25% b/l BID  #coronavirus infection  Received 2nd dose vaccine 4/1. COVID positive on admission. CXR 4/7 negative. No treatment at this time, asymptomatic. Quarantine for total of 10-14 days at home.      SECONDARY DISCHARGE DIAGNOSES  Diagnosis: Blurry vision  Assessment and Plan of Treatment: #blurry vision  #glaucoma  Blurry vision on admission with elevated ESR to 50. Given solumedrol with concern for GCA. Blurry vision likely 2/2 glaucoma exacerbated by COVID.   -CTA H/N showed mild stenosis proximal V4 segment of L vertebral artery due to atherosclerosis, moderated stenosis proximal L ICA, mild stenosis R proximal ICA. No evidence of LVO or aneurysm  -f/u neuro recommendations: doubt temporal arteritis, one additional dose of steroids given   -f/u ophtho outpatient  -was on brimonidine 0.2% b/l q8h, dorzolamide 2% b/l BID, timolol 0.25% b/l BID      Diagnosis: 2019 novel coronavirus disease (COVID-19)  Assessment and Plan of Treatment:   #coronavirus infection  Received 2nd dose vaccine 4/1. COVID positive on admission. CXR 4/7 negative. No treatment at this time, asymptomatic. Quarantine for total of 10-14 days at home.     PRINCIPAL DISCHARGE DIAGNOSIS  Diagnosis: Blurry vision  Assessment and Plan of Treatment: #blurry vision  #glaucoma  Blurry vision on admission with elevated ESR to 50. Given solumedrol with concern for GCA. Blurry vision likely 2/2 glaucoma exacerbated by COVID.   -CTA H/N showed mild stenosis proximal V4 segment of L vertebral artery due to atherosclerosis, moderated stenosis proximal L ICA, mild stenosis R proximal ICA. No evidence of LVO or aneurysm  -f/u neuro recommendations: doubt temporal arteritis, one additional dose of steroids given   -f/u ophtho outpatient  -was on brimonidine 0.2% b/l q8h, dorzolamide 2% b/l BID, timolol 0.25% b/l BID  #coronavirus infection  Received 2nd dose vaccine 4/1. COVID positive on admission. CXR 4/7 negative. No treatment at this time, asymptomatic. Quarantine for total of 10-14 days at home after discharge.   #AFib  -INR check inpatient  -c/w home warfarin dose, coumadin clinic INR checks      SECONDARY DISCHARGE DIAGNOSES  Diagnosis: Blurry vision  Assessment and Plan of Treatment: #blurry vision  #glaucoma  Blurry vision on admission with elevated ESR to 50. Given solumedrol with concern for GCA. Blurry vision likely 2/2 glaucoma exacerbated by COVID.   -CTA H/N showed mild stenosis proximal V4 segment of L vertebral artery due to atherosclerosis, moderated stenosis proximal L ICA, mild stenosis R proximal ICA. No evidence of LVO or aneurysm  -f/u neuro recommendations: doubt temporal arteritis, one additional dose of steroids given   -f/u ophtho outpatient  -was on brimonidine 0.2% b/l q8h, dorzolamide 2% b/l BID, timolol 0.25% b/l BID      Diagnosis: 2019 novel coronavirus disease (COVID-19)  Assessment and Plan of Treatment:   #coronavirus infection  Received 2nd dose vaccine 4/1. COVID positive on admission. CXR 4/7 negative. No treatment at this time, asymptomatic. Quarantine for total of 10-14 days at home.     PRINCIPAL DISCHARGE DIAGNOSIS  Diagnosis: Blurry vision  Assessment and Plan of Treatment: #blurry vision  #glaucoma  Blurry vision on admission with elevated ESR to 50. Given solumedrol with concern for GCA. Blurry vision likely 2/2 glaucoma exacerbated by COVID.   -CTA H/N showed mild stenosis proximal V4 segment of L vertebral artery due to atherosclerosis, moderated stenosis proximal L ICA, mild stenosis R proximal ICA. No evidence of LVO or aneurysm  -f/u neuro recommendations: doubt temporal arteritis, one additional dose of steroids given   -f/u ophtho outpatient  -was on brimonidine 0.2% b/l q8h, dorzolamide 2% b/l BID, timolol 0.25% b/l BID  #coronavirus infection  Received 2nd dose vaccine 4/1. COVID positive on admission. CXR 4/7 negative. No treatment at this time, asymptomatic. Quarantine for total of 10-14 days at home after discharge.   #chronic AFib  -INR check inpatient  -c/w home warfarin dose, coumadin clinic INR checks      SECONDARY DISCHARGE DIAGNOSES  Diagnosis: Blurry vision  Assessment and Plan of Treatment: #blurry vision  #glaucoma  Blurry vision on admission with elevated ESR to 50. Given solumedrol with concern for GCA. Blurry vision likely 2/2 glaucoma exacerbated by COVID.   -CTA H/N showed mild stenosis proximal V4 segment of L vertebral artery due to atherosclerosis, moderated stenosis proximal L ICA, mild stenosis R proximal ICA. No evidence of LVO or aneurysm  -f/u neuro recommendations: doubt temporal arteritis, one additional dose of steroids given   -f/u ophtho outpatient  -was on brimonidine 0.2% b/l q8h, dorzolamide 2% b/l BID, timolol 0.25% b/l BID      Diagnosis: 2019 novel coronavirus disease (COVID-19)  Assessment and Plan of Treatment:   #coronavirus infection  Received 2nd dose vaccine 4/1. COVID positive on admission. CXR 4/7 negative. No treatment at this time, asymptomatic. Quarantine for total of 10-14 days at home.

## 2021-04-09 NOTE — DISCHARGE NOTE PROVIDER - NSDCMRMEDTOKEN_GEN_ALL_CORE_FT
ALPHAGAN P 0.1% DROPS: INSTILL 1 DROP INTO BOTH EYES 3 TIMES A DAY  aspirin 81 mg oral tablet:   ATORVASTATIN 80 MG TABLET: TAKE 1 TABLET BY MOUTH EVERY DAY  COLESEVELAM 625 MG TABLET: TAKE 2 TABLETS BY MOUTH TWICE A DAY  DORZOLAMIDE-TIMOLOL EYE DROPS: INSTILL 1 DROP INTO BOTH EYES TWICE A DAY  EZETIMIBE 10 MG TABLET: TAKE 1 TABLET BY MOUTH EVERY DAY  FOSINOPRIL SODIUM 20 MG TAB: TAKE 1 TABLET BY MOUTH EVERY DAY  metoprolol tartrate 50 mg oral tablet: 1 tab(s) orally 2 times a day  omeprazole 40 mg oral delayed release capsule: 1 cap(s) orally once a day  triamterene-hydrochlorothiazide 37.5 mg-25 mg oral capsule: 1 cap(s) orally once a day  warfarin 5 mg oral tablet: 1 tab(s) orally once a day  Welchol 625 mg oral tablet: 2 tab(s) orally every 12 hours   ALPHAGAN P 0.1% DROPS: INSTILL 1 DROP INTO BOTH EYES 3 TIMES A DAY  aspirin 81 mg oral tablet:   ATORVASTATIN 80 MG TABLET: TAKE 1 TABLET BY MOUTH EVERY DAY  COLESEVELAM 625 MG TABLET: TAKE 2 TABLETS BY MOUTH TWICE A DAY  DORZOLAMIDE-TIMOLOL EYE DROPS: INSTILL 1 DROP INTO BOTH EYES TWICE A DAY  EZETIMIBE 10 MG TABLET: TAKE 1 TABLET BY MOUTH EVERY DAY  FOSINOPRIL SODIUM 20 MG TAB: TAKE 1 TABLET BY MOUTH EVERY DAY  metoprolol tartrate 50 mg oral tablet: 1 tab(s) orally 2 times a day  omeprazole 40 mg oral delayed release capsule: 1 cap(s) orally once a day  triamterene-hydrochlorothiazide 37.5 mg-25 mg oral capsule: 1 cap(s) orally once a day  warfarin 5 mg oral tablet: 1 tab(s) orally once a day

## 2021-04-09 NOTE — DISCHARGE NOTE NURSING/CASE MANAGEMENT/SOCIAL WORK - PATIENT PORTAL LINK FT
You can access the FollowMyHealth Patient Portal offered by [facility] by registering at the following website: [email or web address]. By joining [facility]s FollowMyHealth portal, you will also be able to view your health information using other applications (apps) compatible with our system.

## 2021-04-09 NOTE — DISCHARGE NOTE NURSING/CASE MANAGEMENT/SOCIAL WORK - NSDCFUADDAPPT_GEN_ALL_CORE_FT
Ophthalmology in 1-2 weeks    PCP in 1-2 weeks for post-COVID care    Coumadin clinic for INR checks per home routine

## 2021-04-09 NOTE — DISCHARGE NOTE PROVIDER - HOSPITAL COURSE
HPI:  Pt is an 84 YO M with a pmh of CAD s/p CABG in 2020, Afib on coumadin, HTN, HLD, glaucoma. 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 Pt was unable to recognize the numbers on the computer screen for 2 hours until he called his PCP who referred him to the ER. In the ER, CT Head, CTA head and neck with contrast showed: mild stenosis proximal V4 segment of L vertebral artery due to atherosclerosis, moderated stenosis proximal L ICA, mild stenosis R proximal ICA. No evidence of LVO or aneurysm    Neurology was consulted from the ED and recommended ESR and possible admission for MRI head. Discussed case with Neurology NP Danny and updated on ESR result which elevated at 50, neuro recommended to start steroids. Pt is covid Positive, however he had his 2nd COVID vaccine booster administered 4/1/21, Pt was seen and examined at bedside, pt denies any CP, SOB, palpitations, NVD, abdominal pain and cough. Pt does endorse fever and chills. Pt does c/o bilateral blurry vision, however he denies any eye pain or pressure.     Hospital Course:  Pt is an 84 YO M with PMHx of glaucoma, CAD s/p CABG in 2020, Afib on coumadin, HTN, HLD who will be admitted for the workup and treatment of blurry vision. Found to be COVID+.     #blurry vision  #glaucoma  Blurry vision on admission with elevated ESR to 50. Given solumedrol with concern for GCA. Blurry vision likely 2/2 glaucoma exacerbated by COVID.   -CTA H/N showed mild stenosis proximal V4 segment of L vertebral artery due to atherosclerosis, moderated stenosis proximal L ICA, mild stenosis R proximal ICA. No evidence of LVO or aneurysm  -f/u neuro recommendations: doubt temporal arteritis, one additional dose of steroids given   -f/u ophtho outpatient  -was on brimonidine 0.2% b/l q8h, dorzolamide 2% b/l BID, timolol 0.25% b/l BID    #coronavirus infection  Received 2nd dose vaccine 4/1. COVID positive on admission. CXR 4/7 negative. No treatment at this time, asymptomatic. Quarantine for total of 10-14 days at home.     Stable for discharge home with outpatient ophtho follow-up    Follow-up:  -isolated at home for total 10-14 days  -opthalmology follow-up outpatient for glaucoma care  -f/u PCP for post-COVID care HPI:  Pt is an 86 YO M with a pmh of CAD s/p CABG in 2020, Afib on coumadin, HTN, HLD, glaucoma. 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 Pt was unable to recognize the numbers on the computer screen for 2 hours until he called his PCP who referred him to the ER. In the ER, CT Head, CTA head and neck with contrast showed: mild stenosis proximal V4 segment of L vertebral artery due to atherosclerosis, moderated stenosis proximal L ICA, mild stenosis R proximal ICA. No evidence of LVO or aneurysm    Neurology was consulted from the ED and recommended ESR and possible admission for MRI head. Discussed case with Neurology NP Danny and updated on ESR result which elevated at 50, neuro recommended to start steroids. Pt is covid Positive, however he had his 2nd COVID vaccine booster administered 4/1/21, Pt was seen and examined at bedside, pt denies any CP, SOB, palpitations, NVD, abdominal pain and cough. Pt does endorse fever and chills. Pt does c/o bilateral blurry vision, however he denies any eye pain or pressure.     Hospital Course:  Pt is an 86 YO M with PMHx of glaucoma, CAD s/p CABG in 2020, Afib on coumadin, HTN, HLD who will be admitted for the workup and treatment of blurry vision. Found to be COVID+.     #blurry vision  #glaucoma  Blurry vision on admission with elevated ESR to 50. Given solumedrol with concern for GCA. Blurry vision likely 2/2 glaucoma exacerbated by COVID.   -CTA H/N showed mild stenosis proximal V4 segment of L vertebral artery due to atherosclerosis, moderated stenosis proximal L ICA, mild stenosis R proximal ICA. No evidence of LVO or aneurysm  -f/u neuro recommendations: doubt temporal arteritis, one additional dose of steroids given   -f/u ophtho outpatient  -was on brimonidine 0.2% b/l q8h, dorzolamide 2% b/l BID, timolol 0.25% b/l BID    #coronavirus infection  Received 2nd dose vaccine 4/1. COVID positive on admission. CXR 4/7 negative. No treatment at this time, asymptomatic. Quarantine for total of 10-14 days at home after discharge.     #AFib  -INR check inpatient  -c/w home warfarin dose, coumadin clinic INR checks    Stable for discharge home with outpatient ophtho follow-up    Follow-up:  -isolated at home for total 10-14 days  -opthalmology follow-up outpatient for glaucoma care  -f/u PCP for post-COVID care HPI:  Pt is an 84 YO M with a pmh of CAD s/p CABG in 2020, Afib on coumadin, HTN, HLD, glaucoma. 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 Pt was unable to recognize the numbers on the computer screen for 2 hours until he called his PCP who referred him to the ER. In the ER, CT Head, CTA head and neck with contrast showed: mild stenosis proximal V4 segment of L vertebral artery due to atherosclerosis, moderated stenosis proximal L ICA, mild stenosis R proximal ICA. No evidence of LVO or aneurysm    Neurology was consulted from the ED and recommended ESR and possible admission for MRI head. Discussed case with Neurology NP Danny and updated on ESR result which elevated at 50, neuro recommended to start steroids. Pt is covid Positive, however he had his 2nd COVID vaccine booster administered 4/1/21, Pt was seen and examined at bedside, pt denies any CP, SOB, palpitations, NVD, abdominal pain and cough. Pt does endorse fever and chills. Pt does c/o bilateral blurry vision, however he denies any eye pain or pressure.     Hospital Course:  Pt is an 84 YO M with PMHx of glaucoma, CAD s/p CABG in 2020, Afib on coumadin, HTN, HLD who will be admitted for the workup and treatment of blurry vision. Found to be COVID+.     #blurry vision  #glaucoma  Blurry vision on admission with elevated ESR to 50. Given solumedrol with concern for GCA. Blurry vision likely 2/2 glaucoma exacerbated by COVID.   -CTA H/N showed mild stenosis proximal V4 segment of L vertebral artery due to atherosclerosis, moderated stenosis proximal L ICA, mild stenosis R proximal ICA. No evidence of LVO or aneurysm  -f/u neuro recommendations: doubt temporal arteritis, one additional dose of steroids given   -f/u ophtho outpatient  -was on brimonidine 0.2% b/l q8h, dorzolamide 2% b/l BID, timolol 0.25% b/l BID    #coronavirus infection  Received 2nd dose vaccine 4/1. COVID positive on admission. CXR 4/7 negative. No treatment at this time, asymptomatic. Quarantine for total of 10-14 days at home after discharge.     #chronic AFib  -INR check inpatient  -c/w home warfarin dose, coumadin clinic INR checks    Stable for discharge home with outpatient ophtho follow-up    Follow-up:  -isolated at home for total 10-14 days  -opthalmology follow-up outpatient for glaucoma care  -f/u PCP for post-COVID care HPI:  Pt is an 84 YO M with a pmh of CAD s/p CABG in 2020, Afib on coumadin, HTN, HLD, glaucoma. 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 Pt was unable to recognize the numbers on the computer screen for 2 hours until he called his PCP who referred him to the ER. In the ER, CT Head, CTA head and neck with contrast showed: mild stenosis proximal V4 segment of L vertebral artery due to atherosclerosis, moderated stenosis proximal L ICA, mild stenosis R proximal ICA. No evidence of LVO or aneurysm    Neurology was consulted from the ED and recommended ESR and possible admission for MRI head. Discussed case with Neurology NP Danny and updated on ESR result which elevated at 50, neuro recommended to start steroids. Pt is covid Positive, however he had his 2nd COVID vaccine booster administered 4/1/21, Pt was seen and examined at bedside, pt denies any CP, SOB, palpitations, NVD, abdominal pain and cough. Pt does endorse fever and chills. Pt does c/o bilateral blurry vision, however he denies any eye pain or pressure.     Hospital Course:  Pt is an 84 YO M with PMHx of glaucoma, CAD s/p CABG in 2020, Afib on coumadin, HTN, HLD who will be admitted for the workup and treatment of blurry vision. Found to be COVID+.     #blurry vision  #glaucoma  Blurry vision on admission with elevated ESR to 50. Given solumedrol with concern for GCA. Blurry vision likely 2/2 glaucoma exacerbated by COVID.   -CTA H/N showed mild stenosis proximal V4 segment of L vertebral artery due to atherosclerosis, moderated stenosis proximal L ICA, mild stenosis R proximal ICA. No evidence of LVO or aneurysm  -f/u neuro recommendations: doubt temporal arteritis, one additional dose of steroids given   -f/u ophtho outpatient  -was on brimonidine 0.2% b/l q8h, dorzolamide 2% b/l BID, timolol 0.25% b/l BID    #coronavirus infection  Received 2nd dose vaccine 4/1. COVID positive on admission. CXR 4/7 negative. No treatment at this time, asymptomatic. Quarantine for total of 10-14 days at home after discharge.     #chronic AFib  -INR check inpatient  -c/w home warfarin dose, coumadin clinic INR checks    Stable for discharge home with outpatient ophtho follow-up    Follow-up:  -isolated at home for total 10-14 days  -opthalmology follow-up outpatient for glaucoma care  -f/u PCP for post-COVID care    Attending Note:  Patient seen and examined independently. I personally had a face-to-face encounter with the patient, examined the patient myself and reviewed the plan of care with the housestaff. Agree with resident's note but my note supersedes that of the resident in the matters hereby listed.   Blurry vision spontaneously improved.  GCA suspicion low (although s/p 2 steroid doses)   Never required O2.   d/c to home.   See ophthalmologist after quarantine ends

## 2021-04-10 ENCOUNTER — TRANSCRIPTION ENCOUNTER (OUTPATIENT)
Age: 86
End: 2021-04-10

## 2021-04-12 ENCOUNTER — TRANSCRIPTION ENCOUNTER (OUTPATIENT)
Age: 86
End: 2021-04-12

## 2021-04-19 DIAGNOSIS — K21.9 GASTRO-ESOPHAGEAL REFLUX DISEASE WITHOUT ESOPHAGITIS: ICD-10-CM

## 2021-04-19 DIAGNOSIS — I48.20 CHRONIC ATRIAL FIBRILLATION, UNSPECIFIED: ICD-10-CM

## 2021-04-19 DIAGNOSIS — R74.01 ELEVATION OF LEVELS OF LIVER TRANSAMINASE LEVELS: ICD-10-CM

## 2021-04-19 DIAGNOSIS — Z95.5 PRESENCE OF CORONARY ANGIOPLASTY IMPLANT AND GRAFT: ICD-10-CM

## 2021-04-19 DIAGNOSIS — I67.2 CEREBRAL ATHEROSCLEROSIS: ICD-10-CM

## 2021-04-19 DIAGNOSIS — H40.89 OTHER SPECIFIED GLAUCOMA: ICD-10-CM

## 2021-04-19 DIAGNOSIS — I25.10 ATHEROSCLEROTIC HEART DISEASE OF NATIVE CORONARY ARTERY WITHOUT ANGINA PECTORIS: ICD-10-CM

## 2021-04-19 DIAGNOSIS — U07.1 COVID-19: ICD-10-CM

## 2021-04-19 DIAGNOSIS — Z95.1 PRESENCE OF AORTOCORONARY BYPASS GRAFT: ICD-10-CM

## 2021-04-19 DIAGNOSIS — Z86.73 PERSONAL HISTORY OF TRANSIENT ISCHEMIC ATTACK (TIA), AND CEREBRAL INFARCTION WITHOUT RESIDUAL DEFICITS: ICD-10-CM

## 2021-04-19 DIAGNOSIS — I10 ESSENTIAL (PRIMARY) HYPERTENSION: ICD-10-CM

## 2021-04-19 DIAGNOSIS — Z79.01 LONG TERM (CURRENT) USE OF ANTICOAGULANTS: ICD-10-CM

## 2021-04-19 DIAGNOSIS — Z79.82 LONG TERM (CURRENT) USE OF ASPIRIN: ICD-10-CM

## 2021-04-19 DIAGNOSIS — E78.5 HYPERLIPIDEMIA, UNSPECIFIED: ICD-10-CM

## 2021-04-19 DIAGNOSIS — I65.23 OCCLUSION AND STENOSIS OF BILATERAL CAROTID ARTERIES: ICD-10-CM

## 2022-09-01 ENCOUNTER — EMERGENCY (EMERGENCY)
Facility: HOSPITAL | Age: 87
LOS: 0 days | Discharge: HOME | End: 2022-09-01
Attending: EMERGENCY MEDICINE | Admitting: EMERGENCY MEDICINE

## 2022-09-01 VITALS
DIASTOLIC BLOOD PRESSURE: 72 MMHG | WEIGHT: 179.9 LBS | OXYGEN SATURATION: 97 % | HEIGHT: 70 IN | TEMPERATURE: 97 F | SYSTOLIC BLOOD PRESSURE: 127 MMHG | HEART RATE: 101 BPM | RESPIRATION RATE: 20 BRPM

## 2022-09-01 VITALS
HEART RATE: 90 BPM | RESPIRATION RATE: 18 BRPM | OXYGEN SATURATION: 97 % | DIASTOLIC BLOOD PRESSURE: 85 MMHG | SYSTOLIC BLOOD PRESSURE: 145 MMHG

## 2022-09-01 DIAGNOSIS — Z95.1 PRESENCE OF AORTOCORONARY BYPASS GRAFT: Chronic | ICD-10-CM
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
## 2022-09-01 DIAGNOSIS — S22.089A UNSPECIFIED FRACTURE OF T11-T12 VERTEBRA, INITIAL ENCOUNTER FOR CLOSED FRACTURE: ICD-10-CM

## 2022-09-01 DIAGNOSIS — I10 ESSENTIAL (PRIMARY) HYPERTENSION: ICD-10-CM

## 2022-09-01 DIAGNOSIS — Z86.73 PERSONAL HISTORY OF TRANSIENT ISCHEMIC ATTACK (TIA), AND CEREBRAL INFARCTION WITHOUT RESIDUAL DEFICITS: ICD-10-CM

## 2022-09-01 DIAGNOSIS — Z86.69 PERSONAL HISTORY OF OTHER DISEASES OF THE NERVOUS SYSTEM AND SENSE ORGANS: ICD-10-CM

## 2022-09-01 DIAGNOSIS — K59.00 CONSTIPATION, UNSPECIFIED: ICD-10-CM

## 2022-09-01 DIAGNOSIS — Z20.822 CONTACT WITH AND (SUSPECTED) EXPOSURE TO COVID-19: ICD-10-CM
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
## 2022-09-01 DIAGNOSIS — Z95.1 PRESENCE OF AORTOCORONARY BYPASS GRAFT: ICD-10-CM

## 2022-09-01 DIAGNOSIS — E78.5 HYPERLIPIDEMIA, UNSPECIFIED: ICD-10-CM

## 2022-09-01 DIAGNOSIS — S22.43XA MULTIPLE FRACTURES OF RIBS, BILATERAL, INITIAL ENCOUNTER FOR CLOSED FRACTURE: ICD-10-CM

## 2022-09-01 DIAGNOSIS — R10.9 UNSPECIFIED ABDOMINAL PAIN: ICD-10-CM

## 2022-09-01 DIAGNOSIS — I48.91 UNSPECIFIED ATRIAL FIBRILLATION: ICD-10-CM

## 2022-09-01 DIAGNOSIS — Z79.01 LONG TERM (CURRENT) USE OF ANTICOAGULANTS: ICD-10-CM

## 2022-09-01 DIAGNOSIS — I25.10 ATHEROSCLEROTIC HEART DISEASE OF NATIVE CORONARY ARTERY WITHOUT ANGINA PECTORIS: ICD-10-CM

## 2022-09-01 LAB
ALBUMIN SERPL ELPH-MCNC: 4.3 G/DL — SIGNIFICANT CHANGE UP (ref 3.5–5.2)
ALP SERPL-CCNC: 54 U/L — SIGNIFICANT CHANGE UP (ref 30–115)
ALT FLD-CCNC: 19 U/L — SIGNIFICANT CHANGE UP (ref 0–41)
ANION GAP SERPL CALC-SCNC: 9 MMOL/L — SIGNIFICANT CHANGE UP (ref 7–14)
APPEARANCE UR: CLEAR — SIGNIFICANT CHANGE UP
APTT BLD: 38.7 SEC — SIGNIFICANT CHANGE UP (ref 27–39.2)
AST SERPL-CCNC: 31 U/L — SIGNIFICANT CHANGE UP (ref 0–41)
BASOPHILS # BLD AUTO: 0.03 K/UL — SIGNIFICANT CHANGE UP (ref 0–0.2)
BASOPHILS NFR BLD AUTO: 0.4 % — SIGNIFICANT CHANGE UP (ref 0–1)
BILIRUB SERPL-MCNC: 0.8 MG/DL — SIGNIFICANT CHANGE UP (ref 0.2–1.2)
BILIRUB UR-MCNC: NEGATIVE — SIGNIFICANT CHANGE UP
BUN SERPL-MCNC: 16 MG/DL — SIGNIFICANT CHANGE UP (ref 10–20)
CALCIUM SERPL-MCNC: 9.3 MG/DL — SIGNIFICANT CHANGE UP (ref 8.5–10.1)
CHLORIDE SERPL-SCNC: 100 MMOL/L — SIGNIFICANT CHANGE UP (ref 98–110)
CO2 SERPL-SCNC: 27 MMOL/L — SIGNIFICANT CHANGE UP (ref 17–32)
COLOR SPEC: YELLOW — SIGNIFICANT CHANGE UP
CREAT SERPL-MCNC: 0.8 MG/DL — SIGNIFICANT CHANGE UP (ref 0.7–1.5)
DIFF PNL FLD: NEGATIVE — SIGNIFICANT CHANGE UP
EGFR: 86 ML/MIN/1.73M2 — SIGNIFICANT CHANGE UP
EOSINOPHIL # BLD AUTO: 0.16 K/UL — SIGNIFICANT CHANGE UP (ref 0–0.7)
EOSINOPHIL NFR BLD AUTO: 2.1 % — SIGNIFICANT CHANGE UP (ref 0–8)
ETHANOL SERPL-MCNC: <10 MG/DL — SIGNIFICANT CHANGE UP
GLUCOSE SERPL-MCNC: 116 MG/DL — HIGH (ref 70–99)
GLUCOSE UR QL: NEGATIVE MG/DL — SIGNIFICANT CHANGE UP
HCT VFR BLD CALC: 37.8 % — LOW (ref 42–52)
HGB BLD-MCNC: 12.7 G/DL — LOW (ref 14–18)
IMM GRANULOCYTES NFR BLD AUTO: 0.1 % — SIGNIFICANT CHANGE UP (ref 0.1–0.3)
INR BLD: 3.03 RATIO — HIGH (ref 0.65–1.3)
KETONES UR-MCNC: NEGATIVE — SIGNIFICANT CHANGE UP
LACTATE SERPL-SCNC: 1 MMOL/L — SIGNIFICANT CHANGE UP (ref 0.7–2)
LEUKOCYTE ESTERASE UR-ACNC: NEGATIVE — SIGNIFICANT CHANGE UP
LIDOCAIN IGE QN: 40 U/L — SIGNIFICANT CHANGE UP (ref 7–60)
LYMPHOCYTES # BLD AUTO: 1.53 K/UL — SIGNIFICANT CHANGE UP (ref 1.2–3.4)
LYMPHOCYTES # BLD AUTO: 20.3 % — LOW (ref 20.5–51.1)
MCHC RBC-ENTMCNC: 31.4 PG — HIGH (ref 27–31)
MCHC RBC-ENTMCNC: 33.6 G/DL — SIGNIFICANT CHANGE UP (ref 32–37)
MCV RBC AUTO: 93.6 FL — SIGNIFICANT CHANGE UP (ref 80–94)
MONOCYTES # BLD AUTO: 0.73 K/UL — HIGH (ref 0.1–0.6)
MONOCYTES NFR BLD AUTO: 9.7 % — HIGH (ref 1.7–9.3)
NEUTROPHILS # BLD AUTO: 5.08 K/UL — SIGNIFICANT CHANGE UP (ref 1.4–6.5)
NEUTROPHILS NFR BLD AUTO: 67.4 % — SIGNIFICANT CHANGE UP (ref 42.2–75.2)
NITRITE UR-MCNC: NEGATIVE — SIGNIFICANT CHANGE UP
NRBC # BLD: 0 /100 WBCS — SIGNIFICANT CHANGE UP (ref 0–0)
PH UR: 7 — SIGNIFICANT CHANGE UP (ref 5–8)
PLATELET # BLD AUTO: 226 K/UL — SIGNIFICANT CHANGE UP (ref 130–400)
POTASSIUM SERPL-MCNC: 3.6 MMOL/L — SIGNIFICANT CHANGE UP (ref 3.5–5)
POTASSIUM SERPL-SCNC: 3.6 MMOL/L — SIGNIFICANT CHANGE UP (ref 3.5–5)
PROT SERPL-MCNC: 6.7 G/DL — SIGNIFICANT CHANGE UP (ref 6–8)
PROT UR-MCNC: NEGATIVE MG/DL — SIGNIFICANT CHANGE UP
PROTHROM AB SERPL-ACNC: 34.5 SEC — HIGH (ref 9.95–12.87)
RBC # BLD: 4.04 M/UL — LOW (ref 4.7–6.1)
RBC # FLD: 13.6 % — SIGNIFICANT CHANGE UP (ref 11.5–14.5)
SARS-COV-2 RNA SPEC QL NAA+PROBE: SIGNIFICANT CHANGE UP
SODIUM SERPL-SCNC: 136 MMOL/L — SIGNIFICANT CHANGE UP (ref 135–146)
SP GR SPEC: 1.01 — SIGNIFICANT CHANGE UP (ref 1.01–1.03)
TROPONIN T SERPL-MCNC: <0.01 NG/ML — SIGNIFICANT CHANGE UP
UROBILINOGEN FLD QL: 0.2 MG/DL — SIGNIFICANT CHANGE UP
WBC # BLD: 7.54 K/UL — SIGNIFICANT CHANGE UP (ref 4.8–10.8)
WBC # FLD AUTO: 7.54 K/UL — SIGNIFICANT CHANGE UP (ref 4.8–10.8)

## 2022-09-01 PROCEDURE — 71260 CT THORAX DX C+: CPT | Mod: 26,MA

## 2022-09-01 PROCEDURE — 72125 CT NECK SPINE W/O DYE: CPT | Mod: 26,MA

## 2022-09-01 PROCEDURE — 70450 CT HEAD/BRAIN W/O DYE: CPT | Mod: 26,MA

## 2022-09-01 PROCEDURE — 93010 ELECTROCARDIOGRAM REPORT: CPT | Mod: 76

## 2022-09-01 PROCEDURE — 71045 X-RAY EXAM CHEST 1 VIEW: CPT | Mod: 26

## 2022-09-01 PROCEDURE — 74177 CT ABD & PELVIS W/CONTRAST: CPT | Mod: 26,MA

## 2022-09-01 PROCEDURE — 72170 X-RAY EXAM OF PELVIS: CPT | Mod: 26

## 2022-09-01 PROCEDURE — 99285 EMERGENCY DEPT VISIT HI MDM: CPT

## 2022-09-01 RX ORDER — BRIMONIDINE TARTRATE 2 MG/MG
0 SOLUTION/ DROPS OPHTHALMIC
Qty: 0 | Refills: 7 | DISCHARGE

## 2022-09-01 RX ORDER — OMEPRAZOLE 10 MG/1
1 CAPSULE, DELAYED RELEASE ORAL
Qty: 0 | Refills: 0 | DISCHARGE

## 2022-09-01 RX ORDER — ASPIRIN/CALCIUM CARB/MAGNESIUM 324 MG
0 TABLET ORAL
Qty: 0 | Refills: 0 | DISCHARGE

## 2022-09-01 RX ORDER — POLYETHYLENE GLYCOL 3350 17 G/17G
17 POWDER, FOR SOLUTION ORAL ONCE
Refills: 0 | Status: DISCONTINUED | OUTPATIENT
Start: 2022-09-01 | End: 2022-09-01

## 2022-09-01 RX ORDER — DORZOLAMIDE HYDROCHLORIDE TIMOLOL MALEATE 20; 5 MG/ML; MG/ML
0 SOLUTION/ DROPS OPHTHALMIC
Qty: 0 | Refills: 5 | DISCHARGE

## 2022-09-01 RX ORDER — COLESEVELAM HYDROCHLORIDE 625 MG/1
0 TABLET, FILM COATED ORAL
Qty: 0 | Refills: 0 | DISCHARGE

## 2022-09-01 NOTE — ED ADULT NURSE REASSESSMENT NOTE - NS ED NURSE REASSESS COMMENT FT1
pt received from previous rn, pt aaox3, resps even and unlabored. pt on cardiac monitoring. denies pain resting comfortably via stretcher. pending results for  dispo

## 2022-09-01 NOTE — ED ADULT TRIAGE NOTE - CHIEF COMPLAINT QUOTE
patient reports fall off a ladder (4-5 feet) onto his back, patient c/o lower back pain and constipation x 3 days

## 2022-09-01 NOTE — ED PROVIDER NOTE - PROGRESS NOTE DETAILS
RK: results explained to patient and his son at bedside    neurosurgery consulted, spoke with KAYLEEN Mills RK: results explained to patient and son at bedside, all questions answered, nsurg recs appreciated, abdominal binder placed, pt to f/u nsurg outpatient. pt has miralax at home and will take it. he had a large BM here and feel relieved.

## 2022-09-01 NOTE — ED PROVIDER NOTE - PHYSICAL EXAMINATION
CONSTITUTIONAL: well-developed, well-nourished, in no acute distress  SKIN: warm, dry  HEAD: Normocephalic atraumatic  EYES: no conjunctival erythema eomi peerla 3mm b/l  ENT: no nasal discharge, airway clear  NECK: full ROM, non-tender  CARD: regular rate and rhythm  RESP: normal respiratory effort, no wheezes, rales or rhonchi  ABD: soft, non-distended, non-tender  back: nontender spine  EXT: moving all extremities spontaneously  NEURO: alert and oriented x4, strength and sensation 5/5 b/l UE and LE, CN2-12 intact  PSYCH: cooperative, appropriate

## 2022-09-01 NOTE — ED PROVIDER NOTE - NSICDXFAMILYHX_GEN_ALL_CORE_FT
FAMILY HISTORY:  Father  Still living? Unknown  FHx: hypertension, Age at diagnosis: Age Unknown    Mother  Still living? Unknown  FHx: hypertension, Age at diagnosis: Age Unknown

## 2022-09-01 NOTE — ED PROVIDER NOTE - OBJECTIVE STATEMENT
87-year-old male with a pmh of CAD s/p CABG in 2020, Afib on coumadin, HTN, HLD, glaucoma who presented with constipation.  10 days ago he slipped and fell of a ladder from approximately 4 feet high, and landed on his bilateral buttocks.  He was able to get up and walk and continued his life as usual.  No head trauma or LOC.  Since then he has not been able to have a bowel movement.  He is passing gas and urinating without issue and denies any history of abdominal surgery.  Today he denies nausea vomiting chills chest pain fever back pain urinary symptoms.  He just feels abdominal discomfort from not being willing to have a bowel movement.

## 2022-09-01 NOTE — ED PROVIDER NOTE - ATTENDING CONTRIBUTION TO CARE
87y male above PMH on coumadin c/o constipation s/p fall as above, PE unremarkable but given age mechanism and ac use workup performed, imaging appreciated, injury occurred 10d ago, has normal resp mechanics and is neurologically intact, also had large BM in ED unassisted, nsx consulted, will d/c with binder, spirometer, outpatient f/u. Patient counseled regarding conditions which should prompt return.

## 2022-09-01 NOTE — ED PROVIDER NOTE - NS ED ROS FT
Constitutional: No fever   Eyes:  No visual changes  Ears:  No hearing changes  Neck: No neck pain  Cardiac:  No chest pain  Respiratory:  No SOB   GI:  No nausea, or vomiting +abdominal discomfort  :  No dysuria  MS:  No back pain  Neuro:  No headache or weakness.  No LOC  Skin:  No skin rash

## 2022-09-01 NOTE — ED PROVIDER NOTE - CARE PROVIDER_API CALL
Vitaliy Nolasco)  Surgery  Neurosurgery  [address] [place] [address], [address]  [place] [address]  Phone: [phone]  Fax: [phone]  Follow Up Time: 4-6 Days

## 2022-09-01 NOTE — ED ADULT NURSE NOTE - NSIMPLEMENTINTERV_GEN_ALL_ED
Implemented All Fall Risk Interventions:  [place] to call system. Call bell, personal items and telephone within reach. Instruct patient to call for assistance. Room bathroom lighting operational. Non-slip footwear when patient is off stretcher. Physically safe environment: no spills, clutter or unnecessary equipment. Stretcher in lowest position, wheels locked, appropriate side rails in place. Provide visual cue, wrist band, yellow gown, etc. Monitor gait and stability. Monitor for mental status changes and reorient to person, place, and time. Review medications for side effects contributing to fall risk. Reinforce activity limits and safety measures with patient and family.

## 2022-09-01 NOTE — ED PROVIDER NOTE - CARE PLAN
Principal Discharge DX:	Thoracic spine fracture  Secondary Diagnosis:	Fall  Secondary Diagnosis:	Multiple rib fractures   1

## 2022-09-01 NOTE — ED PROVIDER NOTE - PATIENT PORTAL LINK FT
You can access the FollowMyHealth Patient Portal offered by [facility] by registering at the following website: [email or web address]. By joining [employer] [facility] [employer] [facility]’s FollowMyHealth portal, you will also be able to view your health information using other applications (apps) compatible with our system.

## 2022-09-01 NOTE — ED PROVIDER NOTE - NSFOLLOWUPINSTRUCTIONS_ED_ALL_ED_FT
Rib Fracture    A rib fracture is a break or crack in one of the bones of the ribs. The ribs are a group of long, curved bones that wrap around your chest and attach to your spine. They protect your lungs and other organs in the chest cavity. A broken or cracked rib is often painful, but most do not cause other problems. Most rib fractures heal on their own over time. However, rib fractures can be more serious if multiple ribs are broken or if broken ribs move out of place and push against other structures.     CAUSES  A direct blow to the chest. For example, this could happen during contact sports, a car accident, or a fall against a hard object.   Repetitive movements with high force, such as pitching a baseball or having severe coughing spells.     SYMPTOMS  Pain when you breathe in or cough.   Pain when someone presses on the injured area.     DIAGNOSIS  Your caregiver will perform a physical exam. Various imaging tests may be ordered to confirm the diagnosis and to look for related injuries. These tests may include a chest X-ray, computed tomography (CT), magnetic resonance imaging (MRI), or a bone scan.    TREATMENT  Rib fractures usually heal on their own in 1–3 months. The longer healing period is often associated with a continued cough or other aggravating activities. During the healing period, pain control is very important. Medication is usually given to control pain. Hospitalization or surgery may be needed for more severe injuries, such as those in which multiple ribs are broken or the ribs have moved out of place.     HOME CARE INSTRUCTIONS  Avoid strenuous activity and any activities or movements that cause pain. Be careful during activities and avoid bumping the injured rib.   Gradually increase activity as directed by your caregiver.  Only take over-the-counter or prescription medications as directed by your caregiver. Do not take other medications without asking your caregiver first.   Apply ice to the injured area for the first 1–2 days after you have been treated or as directed by your caregiver. Applying ice helps to reduce inflammation and pain.   Put ice in a plastic bag.  Place a towel between your skin and the bag.    Leave the ice on for 15–20 minutes at a time, every 2 hours while you are awake.  Perform deep breathing as directed by your caregiver. This will help prevent pneumonia, which is a common complication of a broken rib. Your caregiver may instruct you to:  Take deep breaths several times a day.   Try to cough several times a day, holding a pillow against the injured area.   Use a device called an incentive spirometer to practice deep breathing several times a day.  Drink enough fluids to keep your urine clear or pale yellow. This will help you avoid constipation.    Do not wear a rib belt or binder. These restrict breathing, which can lead to pneumonia.      SEEK IMMEDIATE MEDICAL CARE IF:  You have a fever.    You have difficulty breathing or shortness of breath.    You develop a continual cough, or you cough up thick or bloody sputum.  You feel sick to your stomach (nausea), throw up (vomit), or have abdominal pain.    You have worsening pain not controlled with medications.      MAKE SURE YOU:  Understand these instructions.   Will watch your condition.   Will get help right away if you are not doing well or get worse.     ADDITIONAL NOTES AND INSTRUCTIONS    Please follow up with your Primary MD in 24-48 hr.  Seek immediate medical care for any new/worsening signs or symptoms.     Thoracic Spine Fracture  Please follow up with neurosurgery as discussed. Please return to the emergency department if you develop new symptoms, numbness, weakness, difficulty urinating or walking.

## 2022-09-01 NOTE — CHART NOTE - NSCHARTNOTEFT_GEN_A_CORE
87-year-old male with a pmh of CAD s/p CABG in 2020, Afib on coumadin, HTN, HLD, glaucoma who presented with constipation.  10 days ago he slipped and fell of a ladder from approximately 4 feet high, and landed on his bilateral buttocks.  He was able to get up and walk and continued his life as usual.  No head trauma or LOC.  Since then he has not been able to have a bowel movement.  He is passing gas and urinating without issue and denies any history of abdominal surgery.  Today he denies nausea vomiting chills chest pain fever back pain urinary symptoms.  He just feels abdominal discomfort from not being willing to have a bowel movement.    Neurosurgery was consulted for patient with T11 acute transverse fracture through verebral body. Per [facility] ED provider, patient is neurologically intact, MAEx4 with good strength, and without any TTP of spine.     PLAN   - No acute neurosurgical intervention   - Abdominal binder for comfort   - Patient can follow up outpatient with Dr. Nolasco   - Discussed case with attending           < from: CT Chest w/ IV Cont (09.01.22 @ 10:17) >    FINDINGS:    CHEST:    LUNGS/PLEURA/AIRWAYS: Bilateral calcified pleural plaques. No lobar   consolidation, pleural effusion or pneumothorax. Central tracheobronchial   tree is grossly patent. No evidence of bronchiectasis or honeycombing.    MEDIASTINUM/THORACIC NODES: No enlarged thoracic lymph nodes.    HEART/GREAT VESSELS: Moderate cardiomegaly is noted, with left atrial   enlargement. Coronary artery calcifications. Mild dilatation of the   ascending thoracic aorta, measuring 4.3 cm the level of the right main   pulmonary artery. No pericardial effusion.      ABDOMEN/PELVIS:    HEPATOBILIARY: Few subcentimeter hepatic hypodensities are too small to   further characterize. No intrahepatic or extrahepatic biliary ductal   dilatation.    SPLEEN: Unremarkable.    PANCREAS: Unremarkable.    ADRENAL GLANDS: Unremarkable.    KIDNEYS: Mild bilateral renal cortical irregularity. Few subcentimeter   bilateral renal hypodensities are too small to further characterize. No   hydronephrosis. Incidental note is made of a circumaortic left renal   vein, which is a normal variant.    ABDOMINOPELVIC NODES: Unremarkable.    PELVIC ORGANS: Enlarged prostate gland. Moderately thickened and   trabeculated urinary bladder, despite underdistention, possibly related   to chronic bladder outlet obstruction.    PERITONEUM/MESENTERY/BOWEL: No evidence of bowel obstruction. Sigmoid   colonic diverticulosis, without evidence of acute diverticulitis. No   ascites or free intraperitoneal air. Normal caliber appendix.    BONES/SOFT TISSUES: Acute transverse fracture along the superior aspect   of the T11 vertebral body. Few subacute bilateral rib fractures,   including the right 9th lateral rib and left 6th and 7th anterolateral   ribs. Few additional chronic bilateral rib fractures are noted.      IMPRESSION:    *1. Acute transverse fracture along the superior aspect of the T11   vertebral body.    *2. Few subacute bilateral rib fractures, including the right 9th lateral   rib and left 6th and 7th anterolateral ribs.    3. Otherwise, no additional acute traumatic pathology within the chest,   abdomen or pelvis.    4. Enlarged prostate gland; moderately thickened and trabeculated urinary   bladder, despite underdistention, possibly related to chronic bladder   outlet obstruction.    5. Sigmoid colonic diverticulosis, without evidence of acute   diverticulitis.    < end of copied text >

## 2022-09-13 ENCOUNTER — APPOINTMENT (OUTPATIENT)
Dept: NEUROSURGERY | Facility: CLINIC | Age: 87
End: 2022-09-13

## 2022-09-13 VITALS — HEIGHT: 70 IN | BODY MASS INDEX: 25.77 KG/M2 | WEIGHT: 180 LBS

## 2022-09-13 PROCEDURE — 99204 OFFICE O/P NEW MOD 45 MIN: CPT

## 2022-09-14 NOTE — HISTORY OF PRESENT ILLNESS
[FreeTextEntry1] : This is a 87 yrs male who presents today after being evaluated at [facility] ER on 8/22/22 after falling off a ladder and landing on his back. Had a CT abd/pelvis which showed T11 compression fx.\par Today he reports of lower back discomfort, which is slightly better. Denies radicular leg pain, paresthesia, weakness, and bladder/bowel incontinence. Has not had any conservative management.

## 2022-09-14 NOTE — REASON FOR VISIT
[Follow-Up: _____] : a [unfilled] follow-up visit [Family Member] : family member [FreeTextEntry1] : Patient report lower back pain and discomfort for the past 6 weeks after falling off of a ladder and landing on his back. Was evaluated in the ED. No PT or Pain mgt noted.

## 2022-09-14 NOTE — END OF VISIT
[FreeTextEntry3] : ATTENDING ADDENDUM\par Patient seen and examined. Clinical history, imaging, and electronic medical record reviewed independently by me. Assessment and plan per ACP note above.  87-year-old man with T11 wedge compression fracture secondary to a fall off a ladder.  He is doing well with most of his back pain resolved within 3 weeks of his incident.  We will continue physical therapy and pain management and reevaluate in several weeks if he is not any better then we will consider kyphoplasty at that level.\par  [Time Spent: ___ minutes] : I have spent [unfilled] minutes of time on the encounter.

## 2022-11-15 ENCOUNTER — APPOINTMENT (OUTPATIENT)
Dept: NEUROSURGERY | Facility: CLINIC | Age: 87
End: 2022-11-15

## 2022-11-15 VITALS — WEIGHT: 170 LBS | BODY MASS INDEX: 24.34 KG/M2 | HEIGHT: 70 IN

## 2022-11-15 PROCEDURE — 99214 OFFICE O/P EST MOD 30 MIN: CPT

## 2022-11-21 NOTE — END OF VISIT
[FreeTextEntry3] : ATTENDING ADDENDUM\par Patient seen and examined. Clinical history, imaging, and electronic medical record reviewed independently by me. Assessment and plan per ACP note above. Doing well with medical management s/p thoracic compression fracture. Will continue PT, can follow-up PRN.\par  [Time Spent: ___ minutes] : I have spent [unfilled] minutes of time on the encounter.

## 2023-02-16 ENCOUNTER — APPOINTMENT (OUTPATIENT)
Dept: CARDIOLOGY | Facility: CLINIC | Age: 88
End: 2023-02-16
Payer: COMMERCIAL

## 2023-02-16 VITALS — WEIGHT: 170 LBS | BODY MASS INDEX: 24.34 KG/M2 | HEIGHT: 70 IN | TEMPERATURE: 97 F

## 2023-02-16 VITALS — DIASTOLIC BLOOD PRESSURE: 78 MMHG | HEART RATE: 79 BPM | SYSTOLIC BLOOD PRESSURE: 110 MMHG

## 2023-02-16 DIAGNOSIS — Z82.49 FAMILY HISTORY OF ISCHEMIC HEART DISEASE AND OTHER DISEASES OF THE CIRCULATORY SYSTEM: ICD-10-CM

## 2023-02-16 DIAGNOSIS — Z78.9 OTHER SPECIFIED HEALTH STATUS: ICD-10-CM

## 2023-02-16 DIAGNOSIS — K21.9 GASTRO-ESOPHAGEAL REFLUX DISEASE W/OUT ESOPHAGITIS: ICD-10-CM

## 2023-02-16 PROCEDURE — 93000 ELECTROCARDIOGRAM COMPLETE: CPT

## 2023-02-16 PROCEDURE — 99204 OFFICE O/P NEW MOD 45 MIN: CPT

## 2023-02-16 RX ORDER — METOPROLOL TARTRATE 75 MG/1
75 TABLET, FILM COATED ORAL
Refills: 0 | Status: DISCONTINUED | COMMUNITY
End: 2023-02-16

## 2023-02-16 NOTE — HISTORY OF PRESENT ILLNESS
[FreeTextEntry1] : Mr. Flores is an 86yo M with PMHx of CAD s/p CABG in 2000, AF on coumadin, HTN, HLD, TIA 2017 who  p[facility] to [facility] care. His PMD is Dr. Elyse Flynn. Patient was hospitalized in September 2022 at [facility] for compression fracture of T 11 vertebrae and a few b/l rib fractures after a fall from a ladder. No acute intracranial changes on CT head. \par He is feeling well. No new complaints. He previously saw a cardiologist affiliated with [place] on [facility]. He believes he had a nuclear stress in past year was normal. He still works in payroll.

## 2023-02-16 NOTE — REASON FOR VISIT
[Other: ____] : [unfilled] [FreeTextEntry1] : Diagnostic Tests:\par ---------------------\par EKG: \par 02/16/23-AF. Inferior infarct?\par 09/01/23-NSR. Low voltage.

## 2023-02-16 NOTE — ASSESSMENT
[FreeTextEntry1] : CAD s/p CABG: Pt s/p CABG in 2000 and PCI 2001. Feels well, without active CP. \par -Continue with ASA, atorvastatin, ezetimibe, wellchol, Toprol 50mg PO daily. \par -Check TTE. \par -Will get records from previous cardiologist. \par \par HTN: BP at goal per ACC/AHA 2018 guidelines\par -Continue with fosinopril, Toprol and triamterene-HCTZ. \par \par AF: Rate controlled. On warfarin. \par -Continue with Toprol 50mg PO daily and warfarin. \par \par HLD: At goal. \par -Continue with atorvastatin, ezetimibe and welchol. \par \par Follow up in 3 months.

## 2023-03-18 NOTE — PATIENT PROFILE ADULT - HOME ACCESSIBILITY CONCERNS
[facility]  ELECTROPH [address]  Scheduled Appointment: 04/18/2023    
none

## 2023-03-31 ENCOUNTER — APPOINTMENT (OUTPATIENT)
Dept: CARDIOLOGY | Facility: CLINIC | Age: 88
End: 2023-03-31
Payer: COMMERCIAL

## 2023-03-31 PROCEDURE — 93306 TTE W/DOPPLER COMPLETE: CPT

## 2023-04-17 NOTE — ED ADULT NURSE NOTE - NSICDXFAMILYHX_GEN_ALL_CORE_FT
Patient needing refill of liquid tacrolimus. Rx sent.     Pt will notify us if he has trouble filling.   
Pt said he tried to fill a script and was told it was canceled and he only has a week to get it  
FAMILY HISTORY:  Father  Still living? Unknown  FHx: hypertension, Age at diagnosis: Age Unknown    Mother  Still living? Unknown  FHx: hypertension, Age at diagnosis: Age Unknown

## 2023-05-11 ENCOUNTER — APPOINTMENT (OUTPATIENT)
Dept: CARDIOLOGY | Facility: CLINIC | Age: 88
End: 2023-05-11

## 2023-06-15 ENCOUNTER — APPOINTMENT (OUTPATIENT)
Dept: CARDIOLOGY | Facility: CLINIC | Age: 88
End: 2023-06-15
Payer: COMMERCIAL

## 2023-06-15 VITALS — DIASTOLIC BLOOD PRESSURE: 62 MMHG | HEART RATE: 80 BPM | SYSTOLIC BLOOD PRESSURE: 106 MMHG

## 2023-06-15 VITALS — HEIGHT: 70 IN | WEIGHT: 177 LBS | BODY MASS INDEX: 25.34 KG/M2 | TEMPERATURE: 97.6 F

## 2023-06-15 DIAGNOSIS — Z79.01 ENCOUNTER FOR THERAPEUTIC DRUG LVL MONITORING: ICD-10-CM

## 2023-06-15 DIAGNOSIS — Z51.81 ENCOUNTER FOR THERAPEUTIC DRUG LVL MONITORING: ICD-10-CM

## 2023-06-15 DIAGNOSIS — Z79.01 LONG TERM (CURRENT) USE OF ANTICOAGULANTS: ICD-10-CM

## 2023-06-15 DIAGNOSIS — S22.080A WEDGE COMPRESSION FRACTURE OF T11-T12 VERTEBRA, INITIAL ENCOUNTER FOR CLOSED FRACTURE: ICD-10-CM

## 2023-06-15 PROCEDURE — 99213 OFFICE O/P EST LOW 20 MIN: CPT

## 2023-06-15 PROCEDURE — 93000 ELECTROCARDIOGRAM COMPLETE: CPT

## 2023-06-15 NOTE — PHYSICAL EXAM
[Well Developed] : well developed [Well Nourished] : well nourished [No Acute Distress] : no acute distress [Normal Conjunctiva] : normal conjunctiva [Normal Venous Pressure] : normal venous pressure [5th Left ICS - MCL] : palpated at the 5th LICS in the midclavicular line [Normal] : normal [No Precordial Heave] : no precordial heave was noted [Normal Rate] : normal [Irregularly Irregular] : irregularly irregular [Normal S1] : normal S1 [Normal S2] : normal S2 [No Pitting Edema] : no pitting edema present [2+] : left 2+ [Clear Lung Fields] : clear lung fields [Good Air Entry] : good air entry [No Respiratory Distress] : no respiratory distress  [Soft] : abdomen soft [Normal Bowel Sounds] : normal bowel sounds [Non Tender] : non-tender [Normal Gait] : normal gait [No Edema] : no edema [No Varicosities] : no varicosities [No Rash] : no rash [Moves all extremities] : moves all extremities [No Focal Deficits] : no focal deficits [Alert and Oriented] : alert and oriented [II] : a grade 2

## 2023-06-15 NOTE — ASSESSMENT
[FreeTextEntry1] : CAD s/p CABG: Pt s/p CABG in 2000 and PCI 2001. Feels well, without active CP. \par -Continue with ASA, atorvastatin, ezetimibe, wellchol, Toprol 50mg PO daily. \par -Will get records from previous cardiologist. \par \par HTN: BP at goal per ACC/AHA 2018 guidelines\par -Continue with fosinopril, Toprol and triamterene-HCTZ. \par \par AF: Rate controlled. On warfarin. \par -Continue with Toprol 50mg PO daily and warfarin. \par \par HLD: At goal. \par -Continue with atorvastatin, ezetimibe and welchol. \par \par Moderate AS: \par -Monitor TTE annually. \par \par Follow up in 6 months.

## 2023-06-15 NOTE — REASON FOR VISIT
[Other: ____] : [unfilled] [FreeTextEntry1] : Diagnostic Tests:\par ---------------------\par EKG: \par 06/15/23-AF. Inferior infarct. \par 02/16/23-AF. Inferior infarct?\par 09/01/23-NSR. Low voltage. \par --------------------\par Echo: \par 03/31/23-TTE: EF 51%. Mild LVH. Borderline reduced RV systolic function. Severe LAE. TAVO. Mod AS. Mild-mod AI. Mild MAC. Mod MR. Mild-mod TR. PASP 23 mmHg.

## 2023-06-15 NOTE — HISTORY OF PRESENT ILLNESS
[FreeTextEntry1] : Mr. Flores is an 89yo M with PMHx of CAD s/p CABG in 2000, AF on coumadin, HTN, HLD, TIA 2017 who  presents to \[facility]\"" care. His PMD is Dr. Elyse Flynn. Patient was hospitalized in September 2022 at [facility] [place] for compression fracture of T 11 vertebrae and a few b/l rib fractures after a fall from a ladder. No acute intracranial changes on CT head. \par He is feeling well. No new complaints. He previously saw a cardiologist affiliated with [place] on [facility]. He believes he had a nuclear stress in past year was normal. He still works in payroll. \par 06/15/23-Patient is feeling well. AF is rate controlled, BP controlled and labs WNL.

## 2023-08-01 NOTE — PATIENT PROFILE ADULT - NSPRESCRUSEDDRG_GEN_A_NUR
[General Appearance - Alert] : alert No [FreeTextEntry1] : + icterus [Normal Oral Mucosa] : normal oral mucosa [] : no respiratory distress [Respiration, Rhythm And Depth] : normal respiratory rhythm and effort [Arterial Pulses Carotid] : carotid pulses were normal with no bruits [Bowel Sounds] : normal bowel sounds [Abdomen Soft] : soft [Cranial Nerves] : cranial nerves 2-12 were intact [Oriented To Time, Place, And Person] : oriented to person, place, and time [Impaired Insight] : insight and judgment were intact

## 2023-08-23 ENCOUNTER — NON-APPOINTMENT (OUTPATIENT)
Age: 88
End: 2023-08-23

## 2023-09-05 ENCOUNTER — APPOINTMENT (OUTPATIENT)
Dept: RADIOLOGY | Facility: CLINIC | Age: 88
End: 2023-09-05

## 2023-09-05 ENCOUNTER — APPOINTMENT (OUTPATIENT)
Dept: PAIN MANAGEMENT | Facility: CLINIC | Age: 88
End: 2023-09-05
Payer: COMMERCIAL

## 2023-09-05 VITALS
HEART RATE: 98 BPM | HEIGHT: 70 IN | SYSTOLIC BLOOD PRESSURE: 128 MMHG | BODY MASS INDEX: 25.48 KG/M2 | WEIGHT: 178 LBS | DIASTOLIC BLOOD PRESSURE: 84 MMHG

## 2023-09-05 DIAGNOSIS — S39.012A STRAIN OF MUSCLE, FASCIA AND TENDON OF LOWER BACK, INITIAL ENCOUNTER: ICD-10-CM

## 2023-09-05 PROCEDURE — 99204 OFFICE O/P NEW MOD 45 MIN: CPT

## 2023-09-05 PROCEDURE — 72170 X-RAY EXAM OF PELVIS: CPT

## 2023-09-05 PROCEDURE — 72110 X-RAY EXAM L-2 SPINE 4/>VWS: CPT

## 2023-09-05 NOTE — PHYSICAL EXAM
[de-identified] : BACK - tenderness into the lumbar paraspinals. ROM restricted. Pain with extension.

## 2023-09-05 NOTE — HISTORY OF PRESENT ILLNESS
[FreeTextEntry1] : Mr. Flores is a 88 year old male presenting to our walk-in clinic to establish care for his lower back pain.   He is presenting with a 2-week history of lower back pain.  He states he slipped and fell backwards landing on his lower lumbar spine.  He states this occurred 2 weeks ago.  He states the pain is mainly on the right side.  He denies any radicular pain.  He denies any numbness or tingling.  He denies any other red flags.  He states the pain can be as high as a 7 out of 10 on the pain scale.

## 2023-09-05 NOTE — ASSESSMENT
[FreeTextEntry1] : 88-year-old male presenting with lower back pain secondary to a fall.  I will work-up his symptoms with an x-ray of the lumbar spine.  He will also begin aggressive physical therapy.  For symptom control, I will prescribe methocarbamol 500 mg twice daily.  He will follow-up in 4 weeks for reassessment.  Will order a lumbar spine, 4 view, x-ray due to pain and decrease in range of motion in that area to delineate a pain generator.   Physical therapy of the lumbar spine 2-3 times a week for 4-8 weeks stressing a home exercise program of walking, shoulder griddle strengthening,  swimming, elliptical , recumbent bike, Baldev chi and Yoga. Use things that heat like hot shower or icy heat before rehab, exercising and at the beginning of the day, and ice (ice in a bag never directly on the skin) after activity and at the end of the day.   A total of 46 minutes was spent on this visit, reviewing previous notes/consultations/imaging, counseling the patient on appropriate biomechanics impacting the pain, ordering tests if needed, refilling meds if needed, and documenting the findings in the note.  Entered by Elyse Thomas, acting as scribe for Dr. Canela.   The documentation recorded by the scribe, in my presence, accurately reflects the service I personally performed, and the decisions made by me with my edits as appropriate.   Best Regards,  Guy Canela MD  Board Certified, Anesthesiology  Board Certified, Pain Medicine

## 2023-10-20 ENCOUNTER — APPOINTMENT (OUTPATIENT)
Dept: PAIN MANAGEMENT | Facility: CLINIC | Age: 88
End: 2023-10-20

## 2023-11-24 ENCOUNTER — RX RENEWAL (OUTPATIENT)
Age: 88
End: 2023-11-24

## 2023-11-26 ENCOUNTER — EMERGENCY (EMERGENCY)
Facility: HOSPITAL | Age: 88
LOS: 0 days | Discharge: HOME | End: 2023-11-26
Attending: EMERGENCY MEDICINE
Payer: COMMERCIAL

## 2023-11-26 VITALS
WEIGHT: 177.91 LBS | DIASTOLIC BLOOD PRESSURE: 87 MMHG | OXYGEN SATURATION: 99 % | HEART RATE: 100 BPM | SYSTOLIC BLOOD PRESSURE: 163 MMHG | TEMPERATURE: 97 F | HEIGHT: 70 IN | RESPIRATION RATE: 18 BRPM

## 2023-11-26 DIAGNOSIS — Z79.01 LONG TERM (CURRENT) USE OF ANTICOAGULANTS: ICD-10-CM

## 2023-11-26 DIAGNOSIS — H57.89 OTHER SPECIFIED DISORDERS OF EYE AND ADNEXA: ICD-10-CM

## 2023-11-26 DIAGNOSIS — H54.7 UNSPECIFIED VISUAL LOSS: ICD-10-CM

## 2023-11-26 DIAGNOSIS — I25.10 ATHEROSCLEROTIC HEART DISEASE OF NATIVE CORONARY ARTERY WITHOUT ANGINA PECTORIS: ICD-10-CM

## 2023-11-26 DIAGNOSIS — Z95.1 PRESENCE OF AORTOCORONARY BYPASS GRAFT: ICD-10-CM

## 2023-11-26 DIAGNOSIS — I10 ESSENTIAL (PRIMARY) HYPERTENSION: ICD-10-CM

## 2023-11-26 DIAGNOSIS — S05.02XA INJURY OF CONJUNCTIVA AND CORNEAL ABRASION WITHOUT FOREIGN BODY, LEFT EYE, INITIAL ENCOUNTER: ICD-10-CM

## 2023-11-26 DIAGNOSIS — I48.91 UNSPECIFIED ATRIAL FIBRILLATION: ICD-10-CM

## 2023-11-26 DIAGNOSIS — S05.01XA INJURY OF CONJUNCTIVA AND CORNEAL ABRASION WITHOUT FOREIGN BODY, RIGHT EYE, INITIAL ENCOUNTER: ICD-10-CM

## 2023-11-26 DIAGNOSIS — H10.9 UNSPECIFIED CONJUNCTIVITIS: ICD-10-CM

## 2023-11-26 DIAGNOSIS — Z95.1 PRESENCE OF AORTOCORONARY BYPASS GRAFT: Chronic | ICD-10-CM
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
## 2023-11-26 PROCEDURE — 99283 EMERGENCY DEPT VISIT LOW MDM: CPT

## 2023-11-26 PROCEDURE — 99284 EMERGENCY DEPT VISIT MOD MDM: CPT

## 2023-11-26 RX ORDER — POLYMYXIN B SULF/TRIMETHOPRIM 10000-1/ML
1 DROPS OPHTHALMIC (EYE)
Qty: 1 | Refills: 0
Start: 2023-11-26 | End: 2023-12-05

## 2023-11-26 NOTE — ED PROVIDER NOTE - CLINICAL SUMMARY MEDICAL DECISION MAKING FREE TEXT BOX
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  Patient and his son counseled regarding conditions which should prompt return.

## 2023-11-26 NOTE — ED PROVIDER NOTE - PHYSICAL EXAMINATION
CONSTITUTIONAL: NAD  SKIN: Warm dry  HEAD: NCAT  EYES: Left conjunctival erythematous and injected, PERRLA (right eye with significant glaucoma so unable to be assessed), EOMI, no pain elicited with movement, left eye with purulence noted.  Visual acuity grossly intact as patient is able to to read numbers, unable to perform Snellen's test.  Fluorescein eye stain notable for corneal abrasion.  Tonometer 9.  ENT: MMM  CARD: RRR  RESP: CTAB  NEURO: Grossly unremarkable  PSYCH: Cooperative, appropriate.

## 2023-11-26 NOTE — ED PROVIDER NOTE - NSFOLLOWUPINSTRUCTIONS_ED_ALL_ED_FT
Please follow-up with PCP in 1 to 3 days. Please take eye drops as prescribed  Return precautions explained in full to patient/family.    Corneal Abrasion    The cornea is the clear covering at the front and center of the eye. This very thin tissue is made up of many layers. If a scratch or injury causes the corneal epithelium to come off, it is called a corneal abrasion. Symptoms include eye pain, redness, tearing, difficulty keeping eye open, and light sensitivity. Do not drive or operate machinery if your eye is patched.  Antibiotic eye drops may be prescribed to reduce the risk of infection.  It is important to follow up with an ophthalmologist (eye doctor) to ensure proper healing.    SEEK IMMEDIATE MEDICAL CARE IF YOU HAVE ANY OF THE FOLLOWING SYMPTOMS: discharge from eyes, changes in vision, fever, or swelling.    Conjunctivitis    Conjunctivitis is an inflammation of the clear membrane that covers the white part of your eye and the inner surface of your eyelid (conjunctiva). Symptoms include eye redness, eye pain, tearing and drainage, crusting of eyelids, swollen eyelids, and light sensitivity. Conjunctivitis may be contagious and easily spread from person to person. It can be caused by a virus, bacteria, or as part of an allergic reaction; the treatment depends on the type of conjunctivitis suspected. Avoid touching or rubbing your eyes and wipe away any drainage gently with a warm wet washcloth. Do not wear contact lenses until the inflammation is gone – wear glasses until your health care provider says it is safe to wear contact again. Do not share towels or washcloths that may spread the infection and wash your hands frequently.    SEEK IMMEDIATE MEDICAL CARE IF YOU HAVE ANY OF THE FOLLOWING SYMPTOMS: increasing pain, blurry vision, blindness, fever, or facial pain/redness/swelling.

## 2023-11-26 NOTE — ED PROVIDER NOTE - OBJECTIVE STATEMENT
88-year-old male past medical history of CAD with CABG in 2020, A-fib on Coumadin, HTN coming with complaints of eye discomfort.  Patient reports that since yesterday his left eye is causing him some discomfort with some initial floaters, floaters have thus resolved.  Denies any changes in visual acuity.  Feels some mild eye irritation. Denies foreign body sensation.

## 2023-11-26 NOTE — ED PROVIDER NOTE - PATIENT PORTAL LINK FT
You can access the FollowMyHealth Patient Portal offered by [facility] by registering at the following website: [email or web address]. By joining [employer]’s FollowMyHealth portal, you will also be able to view your health information using other applications (apps) compatible with our system.

## 2023-12-06 ENCOUNTER — INPATIENT (INPATIENT)
Facility: HOSPITAL | Age: 88
LOS: 2 days | Discharge: HOME HEALTH | DRG: 726 | End: 2023-12-09
Attending: INTERNAL MEDICINE | Admitting: INTERNAL MEDICINE
Payer: COMMERCIAL

## 2023-12-06 VITALS
WEIGHT: 175.05 LBS | SYSTOLIC BLOOD PRESSURE: 127 MMHG | OXYGEN SATURATION: 99 % | HEIGHT: 70 IN | TEMPERATURE: 98 F | RESPIRATION RATE: 17 BRPM | DIASTOLIC BLOOD PRESSURE: 83 MMHG | HEART RATE: 89 BPM

## 2023-12-06 DIAGNOSIS — R33.9 RETENTION OF URINE, UNSPECIFIED: ICD-10-CM

## 2023-12-06 DIAGNOSIS — Z95.1 PRESENCE OF AORTOCORONARY BYPASS GRAFT: Chronic | ICD-10-CM

## 2023-12-06 LAB
ALBUMIN SERPL ELPH-MCNC: 4.1 G/DL — SIGNIFICANT CHANGE UP (ref 3.5–5.2)
ALBUMIN SERPL ELPH-MCNC: 4.1 G/DL — SIGNIFICANT CHANGE UP (ref 3.5–5.2)
ALP SERPL-CCNC: 46 U/L — SIGNIFICANT CHANGE UP (ref 30–115)
ALP SERPL-CCNC: 46 U/L — SIGNIFICANT CHANGE UP (ref 30–115)
ALT FLD-CCNC: 27 U/L — SIGNIFICANT CHANGE UP (ref 0–41)
ALT FLD-CCNC: 27 U/L — SIGNIFICANT CHANGE UP (ref 0–41)
ANION GAP SERPL CALC-SCNC: 12 MMOL/L — SIGNIFICANT CHANGE UP (ref 7–14)
ANION GAP SERPL CALC-SCNC: 12 MMOL/L — SIGNIFICANT CHANGE UP (ref 7–14)
APPEARANCE UR: CLEAR — SIGNIFICANT CHANGE UP
APPEARANCE UR: CLEAR — SIGNIFICANT CHANGE UP
APTT BLD: 45.1 SEC — HIGH (ref 27–39.2)
APTT BLD: 45.1 SEC — HIGH (ref 27–39.2)
AST SERPL-CCNC: 34 U/L — SIGNIFICANT CHANGE UP (ref 0–41)
AST SERPL-CCNC: 34 U/L — SIGNIFICANT CHANGE UP (ref 0–41)
BASOPHILS # BLD AUTO: 0.04 K/UL — SIGNIFICANT CHANGE UP (ref 0–0.2)
BASOPHILS # BLD AUTO: 0.04 K/UL — SIGNIFICANT CHANGE UP (ref 0–0.2)
BASOPHILS NFR BLD AUTO: 0.5 % — SIGNIFICANT CHANGE UP (ref 0–1)
BASOPHILS NFR BLD AUTO: 0.5 % — SIGNIFICANT CHANGE UP (ref 0–1)
BILIRUB SERPL-MCNC: 0.6 MG/DL — SIGNIFICANT CHANGE UP (ref 0.2–1.2)
BILIRUB SERPL-MCNC: 0.6 MG/DL — SIGNIFICANT CHANGE UP (ref 0.2–1.2)
BILIRUB UR-MCNC: NEGATIVE — SIGNIFICANT CHANGE UP
BILIRUB UR-MCNC: NEGATIVE — SIGNIFICANT CHANGE UP
BUN SERPL-MCNC: 29 MG/DL — HIGH (ref 10–20)
BUN SERPL-MCNC: 29 MG/DL — HIGH (ref 10–20)
CALCIUM SERPL-MCNC: 9.4 MG/DL — SIGNIFICANT CHANGE UP (ref 8.4–10.5)
CALCIUM SERPL-MCNC: 9.4 MG/DL — SIGNIFICANT CHANGE UP (ref 8.4–10.5)
CHLORIDE SERPL-SCNC: 101 MMOL/L — SIGNIFICANT CHANGE UP (ref 98–110)
CHLORIDE SERPL-SCNC: 101 MMOL/L — SIGNIFICANT CHANGE UP (ref 98–110)
CO2 SERPL-SCNC: 25 MMOL/L — SIGNIFICANT CHANGE UP (ref 17–32)
CO2 SERPL-SCNC: 25 MMOL/L — SIGNIFICANT CHANGE UP (ref 17–32)
COLOR SPEC: YELLOW — SIGNIFICANT CHANGE UP
COLOR SPEC: YELLOW — SIGNIFICANT CHANGE UP
CREAT SERPL-MCNC: 1.3 MG/DL — SIGNIFICANT CHANGE UP (ref 0.7–1.5)
CREAT SERPL-MCNC: 1.3 MG/DL — SIGNIFICANT CHANGE UP (ref 0.7–1.5)
DIFF PNL FLD: NEGATIVE — SIGNIFICANT CHANGE UP
DIFF PNL FLD: NEGATIVE — SIGNIFICANT CHANGE UP
EGFR: 53 ML/MIN/1.73M2 — LOW
EGFR: 53 ML/MIN/1.73M2 — LOW
EOSINOPHIL # BLD AUTO: 0.12 K/UL — SIGNIFICANT CHANGE UP (ref 0–0.7)
EOSINOPHIL # BLD AUTO: 0.12 K/UL — SIGNIFICANT CHANGE UP (ref 0–0.7)
EOSINOPHIL NFR BLD AUTO: 1.4 % — SIGNIFICANT CHANGE UP (ref 0–8)
EOSINOPHIL NFR BLD AUTO: 1.4 % — SIGNIFICANT CHANGE UP (ref 0–8)
GLUCOSE SERPL-MCNC: 85 MG/DL — SIGNIFICANT CHANGE UP (ref 70–99)
GLUCOSE SERPL-MCNC: 85 MG/DL — SIGNIFICANT CHANGE UP (ref 70–99)
GLUCOSE UR QL: NEGATIVE MG/DL — SIGNIFICANT CHANGE UP
GLUCOSE UR QL: NEGATIVE MG/DL — SIGNIFICANT CHANGE UP
HCT VFR BLD CALC: 36.1 % — LOW (ref 42–52)
HCT VFR BLD CALC: 36.1 % — LOW (ref 42–52)
HGB BLD-MCNC: 12.3 G/DL — LOW (ref 14–18)
HGB BLD-MCNC: 12.3 G/DL — LOW (ref 14–18)
IMM GRANULOCYTES NFR BLD AUTO: 0.4 % — HIGH (ref 0.1–0.3)
IMM GRANULOCYTES NFR BLD AUTO: 0.4 % — HIGH (ref 0.1–0.3)
INR BLD: 7.18 RATIO — CRITICAL HIGH (ref 0.65–1.3)
INR BLD: 7.18 RATIO — CRITICAL HIGH (ref 0.65–1.3)
KETONES UR-MCNC: NEGATIVE MG/DL — SIGNIFICANT CHANGE UP
KETONES UR-MCNC: NEGATIVE MG/DL — SIGNIFICANT CHANGE UP
LACTATE SERPL-SCNC: 1.5 MMOL/L — SIGNIFICANT CHANGE UP (ref 0.7–2)
LACTATE SERPL-SCNC: 1.5 MMOL/L — SIGNIFICANT CHANGE UP (ref 0.7–2)
LEUKOCYTE ESTERASE UR-ACNC: NEGATIVE — SIGNIFICANT CHANGE UP
LEUKOCYTE ESTERASE UR-ACNC: NEGATIVE — SIGNIFICANT CHANGE UP
LIDOCAIN IGE QN: 47 U/L — SIGNIFICANT CHANGE UP (ref 7–60)
LIDOCAIN IGE QN: 47 U/L — SIGNIFICANT CHANGE UP (ref 7–60)
LYMPHOCYTES # BLD AUTO: 2.32 K/UL — SIGNIFICANT CHANGE UP (ref 1.2–3.4)
LYMPHOCYTES # BLD AUTO: 2.32 K/UL — SIGNIFICANT CHANGE UP (ref 1.2–3.4)
LYMPHOCYTES # BLD AUTO: 27.6 % — SIGNIFICANT CHANGE UP (ref 20.5–51.1)
LYMPHOCYTES # BLD AUTO: 27.6 % — SIGNIFICANT CHANGE UP (ref 20.5–51.1)
MAGNESIUM SERPL-MCNC: 1.9 MG/DL — SIGNIFICANT CHANGE UP (ref 1.8–2.4)
MAGNESIUM SERPL-MCNC: 1.9 MG/DL — SIGNIFICANT CHANGE UP (ref 1.8–2.4)
MCHC RBC-ENTMCNC: 31.5 PG — HIGH (ref 27–31)
MCHC RBC-ENTMCNC: 31.5 PG — HIGH (ref 27–31)
MCHC RBC-ENTMCNC: 34.1 G/DL — SIGNIFICANT CHANGE UP (ref 32–37)
MCHC RBC-ENTMCNC: 34.1 G/DL — SIGNIFICANT CHANGE UP (ref 32–37)
MCV RBC AUTO: 92.6 FL — SIGNIFICANT CHANGE UP (ref 80–94)
MCV RBC AUTO: 92.6 FL — SIGNIFICANT CHANGE UP (ref 80–94)
MONOCYTES # BLD AUTO: 1.02 K/UL — HIGH (ref 0.1–0.6)
MONOCYTES # BLD AUTO: 1.02 K/UL — HIGH (ref 0.1–0.6)
MONOCYTES NFR BLD AUTO: 12.1 % — HIGH (ref 1.7–9.3)
MONOCYTES NFR BLD AUTO: 12.1 % — HIGH (ref 1.7–9.3)
NEUTROPHILS # BLD AUTO: 4.89 K/UL — SIGNIFICANT CHANGE UP (ref 1.4–6.5)
NEUTROPHILS # BLD AUTO: 4.89 K/UL — SIGNIFICANT CHANGE UP (ref 1.4–6.5)
NEUTROPHILS NFR BLD AUTO: 58 % — SIGNIFICANT CHANGE UP (ref 42.2–75.2)
NEUTROPHILS NFR BLD AUTO: 58 % — SIGNIFICANT CHANGE UP (ref 42.2–75.2)
NITRITE UR-MCNC: NEGATIVE — SIGNIFICANT CHANGE UP
NITRITE UR-MCNC: NEGATIVE — SIGNIFICANT CHANGE UP
NRBC # BLD: 0 /100 WBCS — SIGNIFICANT CHANGE UP (ref 0–0)
NRBC # BLD: 0 /100 WBCS — SIGNIFICANT CHANGE UP (ref 0–0)
PH UR: 5.5 — SIGNIFICANT CHANGE UP (ref 5–8)
PH UR: 5.5 — SIGNIFICANT CHANGE UP (ref 5–8)
PLATELET # BLD AUTO: 227 K/UL — SIGNIFICANT CHANGE UP (ref 130–400)
PLATELET # BLD AUTO: 227 K/UL — SIGNIFICANT CHANGE UP (ref 130–400)
PMV BLD: 9.6 FL — SIGNIFICANT CHANGE UP (ref 7.4–10.4)
PMV BLD: 9.6 FL — SIGNIFICANT CHANGE UP (ref 7.4–10.4)
POTASSIUM SERPL-MCNC: 3.2 MMOL/L — LOW (ref 3.5–5)
POTASSIUM SERPL-MCNC: 3.2 MMOL/L — LOW (ref 3.5–5)
POTASSIUM SERPL-SCNC: 3.2 MMOL/L — LOW (ref 3.5–5)
POTASSIUM SERPL-SCNC: 3.2 MMOL/L — LOW (ref 3.5–5)
PROT SERPL-MCNC: 6.4 G/DL — SIGNIFICANT CHANGE UP (ref 6–8)
PROT SERPL-MCNC: 6.4 G/DL — SIGNIFICANT CHANGE UP (ref 6–8)
PROT UR-MCNC: NEGATIVE MG/DL — SIGNIFICANT CHANGE UP
PROT UR-MCNC: NEGATIVE MG/DL — SIGNIFICANT CHANGE UP
PROTHROM AB SERPL-ACNC: >40 SEC — HIGH (ref 9.95–12.87)
PROTHROM AB SERPL-ACNC: >40 SEC — HIGH (ref 9.95–12.87)
RBC # BLD: 3.9 M/UL — LOW (ref 4.7–6.1)
RBC # BLD: 3.9 M/UL — LOW (ref 4.7–6.1)
RBC # FLD: 14.3 % — SIGNIFICANT CHANGE UP (ref 11.5–14.5)
RBC # FLD: 14.3 % — SIGNIFICANT CHANGE UP (ref 11.5–14.5)
SODIUM SERPL-SCNC: 138 MMOL/L — SIGNIFICANT CHANGE UP (ref 135–146)
SODIUM SERPL-SCNC: 138 MMOL/L — SIGNIFICANT CHANGE UP (ref 135–146)
SP GR SPEC: 1.02 — SIGNIFICANT CHANGE UP (ref 1–1.03)
SP GR SPEC: 1.02 — SIGNIFICANT CHANGE UP (ref 1–1.03)
UROBILINOGEN FLD QL: 0.2 MG/DL — SIGNIFICANT CHANGE UP (ref 0.2–1)
UROBILINOGEN FLD QL: 0.2 MG/DL — SIGNIFICANT CHANGE UP (ref 0.2–1)
WBC # BLD: 8.42 K/UL — SIGNIFICANT CHANGE UP (ref 4.8–10.8)
WBC # BLD: 8.42 K/UL — SIGNIFICANT CHANGE UP (ref 4.8–10.8)
WBC # FLD AUTO: 8.42 K/UL — SIGNIFICANT CHANGE UP (ref 4.8–10.8)
WBC # FLD AUTO: 8.42 K/UL — SIGNIFICANT CHANGE UP (ref 4.8–10.8)

## 2023-12-06 PROCEDURE — 85027 COMPLETE CBC AUTOMATED: CPT

## 2023-12-06 PROCEDURE — 97162 PT EVAL MOD COMPLEX 30 MIN: CPT | Mod: GP

## 2023-12-06 PROCEDURE — 80048 BASIC METABOLIC PNL TOTAL CA: CPT

## 2023-12-06 PROCEDURE — 51702 INSERT TEMP BLADDER CATH: CPT

## 2023-12-06 PROCEDURE — 85610 PROTHROMBIN TIME: CPT

## 2023-12-06 PROCEDURE — 99285 EMERGENCY DEPT VISIT HI MDM: CPT | Mod: 25

## 2023-12-06 PROCEDURE — 99497 ADVNCD CARE PLAN 30 MIN: CPT | Mod: 25

## 2023-12-06 PROCEDURE — 85025 COMPLETE CBC W/AUTO DIFF WBC: CPT

## 2023-12-06 PROCEDURE — 99222 1ST HOSP IP/OBS MODERATE 55: CPT

## 2023-12-06 PROCEDURE — 76770 US EXAM ABDO BACK WALL COMP: CPT | Mod: 26

## 2023-12-06 PROCEDURE — 80053 COMPREHEN METABOLIC PANEL: CPT

## 2023-12-06 PROCEDURE — 85730 THROMBOPLASTIN TIME PARTIAL: CPT

## 2023-12-06 PROCEDURE — 74177 CT ABD & PELVIS W/CONTRAST: CPT | Mod: 26,MA

## 2023-12-06 PROCEDURE — 36415 COLL VENOUS BLD VENIPUNCTURE: CPT

## 2023-12-06 RX ORDER — LISINOPRIL 2.5 MG/1
40 TABLET ORAL DAILY
Refills: 0 | Status: DISCONTINUED | OUTPATIENT
Start: 2023-12-06 | End: 2023-12-09

## 2023-12-06 RX ORDER — TRAZODONE HCL 50 MG
0 TABLET ORAL
Refills: 0 | DISCHARGE

## 2023-12-06 RX ORDER — POTASSIUM CHLORIDE 20 MEQ
40 PACKET (EA) ORAL ONCE
Refills: 0 | Status: COMPLETED | OUTPATIENT
Start: 2023-12-06 | End: 2023-12-06

## 2023-12-06 RX ORDER — TRIAMTERENE/HYDROCHLOROTHIAZID 75 MG-50MG
0.5 TABLET ORAL DAILY
Refills: 0 | Status: DISCONTINUED | OUTPATIENT
Start: 2023-12-06 | End: 2023-12-09

## 2023-12-06 RX ORDER — CYCLOPENTOLATE HYDROCHLORIDE 10 MG/ML
1 SOLUTION/ DROPS OPHTHALMIC THREE TIMES A DAY
Refills: 0 | Status: DISCONTINUED | OUTPATIENT
Start: 2023-12-06 | End: 2023-12-06

## 2023-12-06 RX ORDER — ATORVASTATIN CALCIUM 80 MG/1
80 TABLET, FILM COATED ORAL AT BEDTIME
Refills: 0 | Status: DISCONTINUED | OUTPATIENT
Start: 2023-12-06 | End: 2023-12-09

## 2023-12-06 RX ORDER — TAMSULOSIN HYDROCHLORIDE 0.4 MG/1
0.4 CAPSULE ORAL AT BEDTIME
Refills: 0 | Status: DISCONTINUED | OUTPATIENT
Start: 2023-12-06 | End: 2023-12-09

## 2023-12-06 RX ORDER — EZETIMIBE 10 MG/1
0 TABLET ORAL
Qty: 0 | Refills: 1 | DISCHARGE

## 2023-12-06 RX ORDER — FOSINOPRIL SODIUM 10 MG/1
0 TABLET ORAL
Qty: 0 | Refills: 2 | DISCHARGE

## 2023-12-06 RX ORDER — POLYETHYLENE GLYCOL 3350 17 G/17G
17 POWDER, FOR SOLUTION ORAL
Refills: 0 | Status: DISCONTINUED | OUTPATIENT
Start: 2023-12-06 | End: 2023-12-09

## 2023-12-06 RX ORDER — METOPROLOL TARTRATE 50 MG
50 TABLET ORAL
Refills: 0 | Status: DISCONTINUED | OUTPATIENT
Start: 2023-12-06 | End: 2023-12-09

## 2023-12-06 RX ORDER — FOSINOPRIL SODIUM 10 MG/1
2 TABLET ORAL
Refills: 0 | DISCHARGE

## 2023-12-06 RX ORDER — CYCLOPENTOLATE HYDROCHLORIDE 10 MG/ML
1 SOLUTION/ DROPS OPHTHALMIC THREE TIMES A DAY
Refills: 0 | Status: DISCONTINUED | OUTPATIENT
Start: 2023-12-06 | End: 2023-12-09

## 2023-12-06 RX ORDER — AMLODIPINE BESYLATE 2.5 MG/1
10 TABLET ORAL DAILY
Refills: 0 | Status: DISCONTINUED | OUTPATIENT
Start: 2023-12-07 | End: 2023-12-09

## 2023-12-06 RX ORDER — LANOLIN ALCOHOL/MO/W.PET/CERES
3 CREAM (GRAM) TOPICAL AT BEDTIME
Refills: 0 | Status: DISCONTINUED | OUTPATIENT
Start: 2023-12-06 | End: 2023-12-09

## 2023-12-06 RX ORDER — PANTOPRAZOLE SODIUM 20 MG/1
40 TABLET, DELAYED RELEASE ORAL
Refills: 0 | Status: DISCONTINUED | OUTPATIENT
Start: 2023-12-06 | End: 2023-12-09

## 2023-12-06 RX ORDER — ONDANSETRON 8 MG/1
4 TABLET, FILM COATED ORAL EVERY 8 HOURS
Refills: 0 | Status: DISCONTINUED | OUTPATIENT
Start: 2023-12-06 | End: 2023-12-09

## 2023-12-06 RX ORDER — ESOMEPRAZOLE MAGNESIUM 40 MG/1
1 CAPSULE, DELAYED RELEASE ORAL
Qty: 0 | Refills: 0 | DISCHARGE

## 2023-12-06 RX ORDER — TOBRAMYCIN 0.3 %
1 DROPS OPHTHALMIC (EYE)
Refills: 0 | Status: DISCONTINUED | OUTPATIENT
Start: 2023-12-06 | End: 2023-12-09

## 2023-12-06 RX ORDER — ACETAMINOPHEN 500 MG
650 TABLET ORAL EVERY 6 HOURS
Refills: 0 | Status: DISCONTINUED | OUTPATIENT
Start: 2023-12-06 | End: 2023-12-09

## 2023-12-06 RX ORDER — SENNA PLUS 8.6 MG/1
2 TABLET ORAL
Refills: 0 | Status: DISCONTINUED | OUTPATIENT
Start: 2023-12-06 | End: 2023-12-09

## 2023-12-06 RX ADMIN — Medication 50 MILLIGRAM(S): at 18:47

## 2023-12-06 RX ADMIN — Medication 40 MILLIEQUIVALENT(S): at 13:53

## 2023-12-06 RX ADMIN — TAMSULOSIN HYDROCHLORIDE 0.4 MILLIGRAM(S): 0.4 CAPSULE ORAL at 21:19

## 2023-12-06 RX ADMIN — ATORVASTATIN CALCIUM 80 MILLIGRAM(S): 80 TABLET, FILM COATED ORAL at 21:19

## 2023-12-06 RX ADMIN — POLYETHYLENE GLYCOL 3350 17 GRAM(S): 17 POWDER, FOR SOLUTION ORAL at 18:47

## 2023-12-06 RX ADMIN — SENNA PLUS 2 TABLET(S): 8.6 TABLET ORAL at 18:48

## 2023-12-06 NOTE — H&P ADULT - NSHPLABSRESULTS_GEN_ALL_CORE
12.3   8.42  )-----------( 227      ( 06 Dec 2023 09:40 )             36.1   12-06    138  |  101  |  29<H>  ----------------------------<  85  3.2<L>   |  25  |  1.3    Ca    9.4      06 Dec 2023 09:40  Mg     1.9     12-06    TPro  6.4  /  Alb  4.1  /  TBili  0.6  /  DBili  x   /  AST  34  /  ALT  27  /  AlkPhos  46  12-06

## 2023-12-06 NOTE — ED PROVIDER NOTE - OBJECTIVE STATEMENT
Patient c/o constipation past 5 days with difficulty urinating,  H/o A fib, take coumadin, no abd pain, no N/V, no fever

## 2023-12-06 NOTE — PATIENT PROFILE ADULT - FUNCTIONAL ASSESSMENT - BASIC MOBILITY 6.
3-calculated by average/Not able to assess (calculate score using [facility] averaging method)  3-calculated by average/Not able to assess (calculate score using [facility] averaging method)

## 2023-12-06 NOTE — ED PROVIDER NOTE - CARE PLAN
1 Principal Discharge DX:	Urinary retention  Secondary Diagnosis:	Constipation  Secondary Diagnosis:	Elevated INR

## 2023-12-06 NOTE — H&P ADULT - HISTORY OF PRESENT ILLNESS
89yo M with PMhx of HTN, HLD, Afib on coumadin, BPH who presents with difficulty urinating x 3 days.     In the ED, afebrile vitals stable. Labs with no leukocytosis, INR elevated at 7.1. CT A/P with mod stool burden, distended bladder, chronic compression fractures. Admitted to medicine for further management.  89yo M with PMhx of HTN, HLD, CAD s/p CABG and PCI, Afib on coumadin, Glaucoma c/b by recent L eye infection who presents with difficulty urinating x 3 days. Patient reports associated constipation as well. In this time, has had difficulty emptying bladder, only passing dribbles of urine. Symptoms progressed until day of presentation where he was unable to urinate at all. Reports no previous episodes of urinary retention. Denies fever, chills, dysuria, hematuria, abdominal pain, chest pain, SOB. Of note, is currently being treated for an infection in L eye by an outpatient ophthalmologist     In the ED, afebrile vitals stable. Labs with no leukocytosis, INR elevated at 7.1. CT A/P with mod stool burden, distended bladder, chronic compression fractures. Admitted to medicine for further management.  87yo M with PMhx of HTN, HLD, CAD s/p CABG and PCI, Afib on coumadin, Glaucoma c/b by recent L eye infection who presents with difficulty urinating x 3 days. Patient reports associated constipation as well. In this time, has had difficulty emptying bladder, only passing dribbles of urine. Symptoms progressed until day of presentation where he was unable to urinate at all. Reports no previous episodes of urinary retention. Denies fever, chills, dysuria, hematuria, abdominal pain, chest pain, SOB. Of note, is currently being treated for an infection in L eye by an outpatient ophthalmologist     In the ED, afebrile vitals stable. Labs with no leukocytosis, INR elevated at 7.1. CT A/P with mod stool burden, distended bladder, chronic compression fractures. Admitted to medicine for further management.  89yo M with PMhx of HTN, HLD, CAD s/p CABG and PCI, Afib on coumadin, Glaucoma c/b by recent L eye infection who presents with difficulty urinating x 3 days. Patient reports associated constipation as well. In this time, has had difficulty emptying bladder, only passing dribbles of urine. Symptoms progressed until day of presentation where he was unable to urinate at all. Reports no previous episodes of urinary retention. Denies fever, chills, dysuria, hematuria, abdominal pain, chest pain, SOB. Of note, is currently being treated for an infection in L eye by an outpatient ophthalmologist     In the ED, afebrile vitals stable. Labs with no leukocytosis, INR elevated at 7.1. CT A/P with mod stool burden, distended bladder, chronic compression fractures. Admitted to medicine for further management.

## 2023-12-06 NOTE — ED ADULT TRIAGE NOTE - CHIEF COMPLAINT QUOTE
As per patient "Since Saturday or Sunday I haven't had a bowel movement, and for a few days I haven't been able to urinate, I've been trickling".

## 2023-12-06 NOTE — ED PROVIDER NOTE - CLINICAL SUMMARY MEDICAL DECISION MAKING FREE TEXT BOX
patient is comfortable appearing we obtained lab work no large elevation white blood cell count however patient's elevated INR at 7.18 however no signs of active bleeding at this time we obtained EKG per my independent evaluation not consistent with STEMI not consistent with arrhythmia in addition urinalysis negative not consistent with infection we obtained ct which reveals moderate stool burden I will continue to monitor at this time

## 2023-12-06 NOTE — ED ADULT NURSE NOTE - NSFALLUNIVINTERV_ED_ALL_ED
Bed/Stretcher in lowest position, wheels locked, appropriate side rails in place/Call bell, personal items and telephone in reach/Instruct patient to call for assistance before getting out of bed/chair/stretcher/Non-slip footwear applied when patient is off stretcher/[place] to call system/Physically safe environment - no spills, clutter or unnecessary equipment/Purposeful proactive rounding/Room/bathroom lighting operational, light cord in reach Bed/Stretcher in lowest position, wheels locked, appropriate side rails in place/Call bell, personal items and telephone in reach/Instruct patient to call for assistance before getting out of bed/chair/stretcher/Non-slip footwear applied when patient is off stretcher/[place] to call system/Physically safe environment - no spills, clutter or unnecessary equipment/Purposeful proactive rounding/Room/bathroom lighting operational, light cord in reach

## 2023-12-06 NOTE — H&P ADULT - NSHPPHYSICALEXAM_GEN_ALL_CORE
LOS:     VITALS:   T(C): 36.5 (12-06-23 @ 09:00), Max: 36.5 (12-06-23 @ 09:00)  HR: 89 (12-06-23 @ 09:00) (89 - 89)  BP: 127/83 (12-06-23 @ 09:00) (127/83 - 127/83)  RR: 17 (12-06-23 @ 09:00) (17 - 17)  SpO2: 99% (12-06-23 @ 09:00) (99% - 99%)    GENERAL: NAD, lying in bed comfortably  HEAD:  Atraumatic, Normocephalic  EYES: EOMI, PERRLA, conjunctiva and sclera clear  ENT: Moist mucous membranes  NECK: Supple, No JVD  CHEST/LUNG: Clear to auscultation bilaterally; No rales, rhonchi, wheezing, or rubs. Unlabored respirations  HEART: Regular rate and rhythm; No murmurs, rubs, or gallops  ABDOMEN: BSx4; Soft, nontender, nondistended  EXTREMITIES:  2+ Peripheral Pulses, brisk capillary refill. No clubbing, cyanosis, or edema  NERVOUS SYSTEM:  A&Ox3, no focal deficits   SKIN: No rashes or lesions VITALS:   T(C): 36.5 (12-06-23 @ 09:00), Max: 36.5 (12-06-23 @ 09:00)  HR: 89 (12-06-23 @ 09:00) (89 - 89)  BP: 127/83 (12-06-23 @ 09:00) (127/83 - 127/83)  RR: 17 (12-06-23 @ 09:00) (17 - 17)  SpO2: 99% (12-06-23 @ 09:00) (99% - 99%)    GENERAL: NAD, lying in bed comfortably  HEAD:  Atraumatic, Normocephalic  EYES: EOMI, PERRLA, conjunctiva and sclera clear  ENT: Moist mucous membranes  NECK: Supple, No JVD  CHEST/LUNG: Clear to auscultation bilaterally; No rales, rhonchi, wheezing, or rubs. Unlabored respirations  HEART: irregularly irregular rhythm; No murmurs, rubs, or gallops  ABDOMEN: hypoactive bowel sounds; Soft, nontender, nondistended. salazar catheter in place with yellow urine  EXTREMITIES:  2+ Peripheral Pulses, brisk capillary refill. No clubbing, cyanosis, or edema  NERVOUS SYSTEM:  A&Ox3, no focal deficits   SKIN: No rashes or lesions

## 2023-12-06 NOTE — PATIENT PROFILE ADULT - FALL HARM RISK - HARM RISK INTERVENTIONS
Assistance with ambulation/Assistance OOB with selected safe patient handling equipment/Communicate Risk of Fall with Harm to all staff/Reinforce activity limits and safety measures with patient and family/Tailored Fall Risk Interventions/Visual Cue: Yellow wristband and red socks/Bed in lowest position, wheels locked, appropriate side rails in place/Call bell, personal items and telephone in reach/Instruct patient to call for assistance before getting out of bed or chair/Non-slip footwear when patient is out of bed/[place] to call system/Physically safe environment - no spills, clutter or unnecessary equipment/Purposeful Proactive Rounding/Room/bathroom lighting operational, light cord in reach Assistance with ambulation/Assistance OOB with selected safe patient handling equipment/Communicate Risk of Fall with Harm to all staff/Reinforce activity limits and safety measures with patient and family/Tailored Fall Risk Interventions/Visual Cue: Yellow wristband and red socks/Bed in lowest position, wheels locked, appropriate side rails in place/Call bell, personal items and telephone in reach/Instruct patient to call for assistance before getting out of bed or chair/Non-slip footwear when patient is out of bed/[place] to call system/Physically safe environment - no spills, clutter or unnecessary equipment/Purposeful Proactive Rounding/Room/bathroom lighting operational, light cord in reach

## 2023-12-06 NOTE — H&P ADULT - NS ATTEND AMEND GEN_ALL_CORE FT
I have made amendments to the note above where applicable. Admitted for constipation and urinary retention that began 3 days ago. CT findings reviewed. Stool softeners as scheduled. Leija cath placed in ER, plan for TOV tomorrow. Supratherapeutic INR, possibly with med interactions? Hold coumadin for now.

## 2023-12-06 NOTE — ED PROVIDER NOTE - ATTENDING APP SHARED VISIT CONTRIBUTION OF CARE
I have personally performed a history and physical exam on this patient and personally directed the management of the patient. Patient is an 88-year-old male presents for evaluation of constipation as well as urinary retention states that he is able to urinate but gets a small amount out more frequently noticed over the past 3 days denies any fevers chills vomiting denies any diaphoresis he denies any headache chest pain shortness of breath other abdominal pain or back pain    On physical exam patient is normocephalic atraumatic oropharynx clear chest clear to auscultation bilaterally abdomen soft nontender nondistended bowel sounds positive no guarding no rebound extremities full range of motion radial pulse 2+ pedal pulse 2+    Assessment plan patient is comfortable appearing we obtained lab work no large elevation white blood cell count however patient's elevated INR at 7.18 however no signs of active bleeding at this time we obtained EKG per my independent evaluation not consistent with STEMI not consistent with arrhythmia in addition urinalysis negative not consistent with infection we obtained ct which reveals moderate stool burden I will continue to monitor at this time I have personally performed a history and physical exam on this patient and personally directed the management of the patient. Patient is an 88-year-old male presents for evaluation of constipation as well as urinary retention states that he is able to urinate but gets a small amount out more frequently noticed over the past 3 days denies any fevers chills vomiting denies any diaphoresis he denies any headache chest pain shortness of breath other abdominal pain or back pain    On physical exam patient is normocephalic atraumatic oropharynx clear chest clear to auscultation bilaterally abdomen soft nontender nondistended bowel sounds positive no guarding no rebound extremities full range of motion radial pulse 2+ pedal pulse 2+    Assessment plan patient is comfortable appearing we obtained lab work no large elevation white blood cell count however patient's elevated INR at 7.18 however no signs of active bleeding at this time we obtained EKG per my independent evaluation not consistent with STEMI in addition urinalysis negative not consistent with infection we obtained ct which reveals moderate stool burden I will continue to monitor at this time I have personally performed a history and physical exam on this patient and personally directed the management of the patient. Patient is an 88-year-old male presents for evaluation of constipation as well as urinary retention states that he is able to urinate but gets a small amount out more frequently noticed over the past 3 days denies any fevers chills vomiting denies any diaphoresis he denies any headache chest pain shortness of breath other abdominal pain or back pain    On physical exam patient is normocephalic atraumatic oropharynx clear chest clear to auscultation bilaterally abdomen soft nontender nondistended bowel sounds positive no guarding no rebound extremities full range of motion radial pulse 2+ pedal pulse 2+    Assessment plan patient is comfortable appearing we obtained lab work no large elevation white blood cell count however patient's elevated INR at 7.18 however no signs of active bleeding at this time we obtained EKG per my independent evaluation not consistent with STEMI in addition urinalysis negative not consistent with infection we obtained ct which reveals moderate stool burden I will continue to monitor at this time.

## 2023-12-06 NOTE — H&P ADULT - ASSESSMENT
#Difficulty urinating   #Urinary retention   #BPH  - Start tamsulosin     #Moderate stool burden  - Start aggressive bowel regimen     #Hypokalmeia   - S/P repletion in ED  - Follow up repeat in AM     #Elevated INR  - Hold coumadin  - Follow daily INR  - Goal 2-3    #Chronic Afib     #HTN     #HLD  89yo M with PMhx of HTN, HLD, CAD s/p CABG and PCI, Afib on coumadin, Glaucoma c/b by recent L eye infection who presents with difficulty urinating x 3 days. Salazar placed in ED now draining clear yellow urine. CT with mod stool burden and BPH likely contributing to urinary retention. Will leave folye in for 24 hrs, start tamsulosin and bowel regimen then attempt TOV. Incidentally found to have elevated INR. Denies double dosing or recent dosing changes. Notably is taking moxifloxacin for an eye infection which may be interacting with coumadin resulting in elevated INR's. Will hold coumadin for now and monitor INR levels    #Difficulty urinating   #Urinary retention   #BPH  - Maintain salazar for 24 hrs, can likely attempt TOV on 12/7  - Start tamsulosin   - Aggressive bowel regimen     #Moderate stool burden  - Start aggressive bowel regimen     #Hypokalmeia   - S/P repletion in ED  - Follow up repeat in AM     #Elevated INR  - Home dose is 5mg on Sun-Wed and Sat, 7.5mg on Thurs-Fri  - Also taking Moxifloxain for eye infection which may be interacting with coumadin to result in higher INR levels   - Hold coumadin for now  - Follow daily INR  - Goal 2-3    #Glaucoma   #Recent L eye infection   - Currently on Moxifloxacin 400mg daily, Vancomycin gtt's tobramycin gtt's, difluprednate gtt, and cyclpentolate gtt  - Advised family to bring in drops to administer inpatient as most drops are non-formulary or require frequent dosing    #Chronic Afib   - Cont Metop 50mg BID  - Hold coumadin for now     #HTN   - Cont Amlodipine 10mg, Lisinopril 40mg (home fosinopril 40mg), Triamterene-HCTZ 37.5-25mg     #HLD   - Cont atorvastatin 80mg   - Home zetia and welchol non formulary  87yo M with PMhx of HTN, HLD, CAD s/p CABG and PCI, Afib on coumadin, Glaucoma c/b by recent L eye infection who presents with difficulty urinating x 3 days. Salazar placed in ED now draining clear yellow urine. CT with mod stool burden and BPH likely contributing to urinary retention. Will leave folye in for 24 hrs, start tamsulosin and bowel regimen then attempt TOV. Incidentally found to have elevated INR. Denies double dosing or recent dosing changes. Notably is taking moxifloxacin for an eye infection which may be interacting with coumadin resulting in elevated INR's. Will hold coumadin for now and monitor INR levels    #Difficulty urinating   #Urinary retention   #BPH  - Maintain salazar for 24 hrs, can likely attempt TOV on 12/7  - Start tamsulosin   - Aggressive bowel regimen     #Moderate stool burden  - Start aggressive bowel regimen     #Hypokalmeia   - S/P repletion in ED  - Follow up repeat in AM     #Supratherapuetic INR  - Home dose is 5mg on Sun-Wed and Sat, 7.5mg on Thurs-Fri  - Also taking Moxifloxain for eye infection which may be interacting with coumadin to result in higher INR levels   - Hold coumadin for now  - Follow daily INR  - Goal 2-3    #Glaucoma   #Recent L eye infection   - Currently on Moxifloxacin 400mg daily, Vancomycin gtt's tobramycin gtt's, difluprednate gtt, and cyclpentolate gtt  - Advised family to bring in drops to administer inpatient as most drops are non-formulary or require frequent dosing    #Chronic Afib   - Not interested in transitioning to DOAC as he has been on this for years  - Takes coumadin 5mg M-W and Sat/Sun. 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

## 2023-12-06 NOTE — ED PROCEDURE NOTE - ATTENDING APP SHARED VISIT CONTRIBUTION OF CARE
Detail Level: Detailed Detail Level: Zone Detail Level: Generalized I have personally performed a history and physical exam on this patient and personally directed the management of the patient.

## 2023-12-06 NOTE — ED PROVIDER NOTE - NS ED ATTENDING STATEMENT MOD
This was a shared visit with the SHANITA. I reviewed and verified the documentation and independently performed the documented:

## 2023-12-07 ENCOUNTER — APPOINTMENT (OUTPATIENT)
Dept: CARDIOLOGY | Facility: CLINIC | Age: 88
End: 2023-12-07

## 2023-12-07 LAB
ALBUMIN SERPL ELPH-MCNC: 3.6 G/DL — SIGNIFICANT CHANGE UP (ref 3.5–5.2)
ALBUMIN SERPL ELPH-MCNC: 3.6 G/DL — SIGNIFICANT CHANGE UP (ref 3.5–5.2)
ALP SERPL-CCNC: 44 U/L — SIGNIFICANT CHANGE UP (ref 30–115)
ALP SERPL-CCNC: 44 U/L — SIGNIFICANT CHANGE UP (ref 30–115)
ALT FLD-CCNC: 26 U/L — SIGNIFICANT CHANGE UP (ref 0–41)
ALT FLD-CCNC: 26 U/L — SIGNIFICANT CHANGE UP (ref 0–41)
ANION GAP SERPL CALC-SCNC: 10 MMOL/L — SIGNIFICANT CHANGE UP (ref 7–14)
ANION GAP SERPL CALC-SCNC: 10 MMOL/L — SIGNIFICANT CHANGE UP (ref 7–14)
APTT BLD: 47.5 SEC — HIGH (ref 27–39.2)
APTT BLD: 47.5 SEC — HIGH (ref 27–39.2)
AST SERPL-CCNC: 34 U/L — SIGNIFICANT CHANGE UP (ref 0–41)
AST SERPL-CCNC: 34 U/L — SIGNIFICANT CHANGE UP (ref 0–41)
BILIRUB SERPL-MCNC: 0.5 MG/DL — SIGNIFICANT CHANGE UP (ref 0.2–1.2)
BILIRUB SERPL-MCNC: 0.5 MG/DL — SIGNIFICANT CHANGE UP (ref 0.2–1.2)
BUN SERPL-MCNC: 29 MG/DL — HIGH (ref 10–20)
BUN SERPL-MCNC: 29 MG/DL — HIGH (ref 10–20)
CALCIUM SERPL-MCNC: 9.2 MG/DL — SIGNIFICANT CHANGE UP (ref 8.4–10.5)
CALCIUM SERPL-MCNC: 9.2 MG/DL — SIGNIFICANT CHANGE UP (ref 8.4–10.5)
CHLORIDE SERPL-SCNC: 104 MMOL/L — SIGNIFICANT CHANGE UP (ref 98–110)
CHLORIDE SERPL-SCNC: 104 MMOL/L — SIGNIFICANT CHANGE UP (ref 98–110)
CO2 SERPL-SCNC: 27 MMOL/L — SIGNIFICANT CHANGE UP (ref 17–32)
CO2 SERPL-SCNC: 27 MMOL/L — SIGNIFICANT CHANGE UP (ref 17–32)
CREAT SERPL-MCNC: 1.4 MG/DL — SIGNIFICANT CHANGE UP (ref 0.7–1.5)
CREAT SERPL-MCNC: 1.4 MG/DL — SIGNIFICANT CHANGE UP (ref 0.7–1.5)
EGFR: 49 ML/MIN/1.73M2 — LOW
EGFR: 49 ML/MIN/1.73M2 — LOW
GLUCOSE SERPL-MCNC: 100 MG/DL — HIGH (ref 70–99)
GLUCOSE SERPL-MCNC: 100 MG/DL — HIGH (ref 70–99)
HCT VFR BLD CALC: 35.7 % — LOW (ref 42–52)
HCT VFR BLD CALC: 35.7 % — LOW (ref 42–52)
HGB BLD-MCNC: 12.2 G/DL — LOW (ref 14–18)
HGB BLD-MCNC: 12.2 G/DL — LOW (ref 14–18)
INR BLD: 7.77 RATIO — CRITICAL HIGH (ref 0.65–1.3)
INR BLD: 7.77 RATIO — CRITICAL HIGH (ref 0.65–1.3)
MCHC RBC-ENTMCNC: 31.4 PG — HIGH (ref 27–31)
MCHC RBC-ENTMCNC: 31.4 PG — HIGH (ref 27–31)
MCHC RBC-ENTMCNC: 34.2 G/DL — SIGNIFICANT CHANGE UP (ref 32–37)
MCHC RBC-ENTMCNC: 34.2 G/DL — SIGNIFICANT CHANGE UP (ref 32–37)
MCV RBC AUTO: 91.8 FL — SIGNIFICANT CHANGE UP (ref 80–94)
MCV RBC AUTO: 91.8 FL — SIGNIFICANT CHANGE UP (ref 80–94)
NRBC # BLD: 0 /100 WBCS — SIGNIFICANT CHANGE UP (ref 0–0)
NRBC # BLD: 0 /100 WBCS — SIGNIFICANT CHANGE UP (ref 0–0)
PLATELET # BLD AUTO: 193 K/UL — SIGNIFICANT CHANGE UP (ref 130–400)
PLATELET # BLD AUTO: 193 K/UL — SIGNIFICANT CHANGE UP (ref 130–400)
PMV BLD: 10 FL — SIGNIFICANT CHANGE UP (ref 7.4–10.4)
PMV BLD: 10 FL — SIGNIFICANT CHANGE UP (ref 7.4–10.4)
POTASSIUM SERPL-MCNC: 3.7 MMOL/L — SIGNIFICANT CHANGE UP (ref 3.5–5)
POTASSIUM SERPL-MCNC: 3.7 MMOL/L — SIGNIFICANT CHANGE UP (ref 3.5–5)
POTASSIUM SERPL-SCNC: 3.7 MMOL/L — SIGNIFICANT CHANGE UP (ref 3.5–5)
POTASSIUM SERPL-SCNC: 3.7 MMOL/L — SIGNIFICANT CHANGE UP (ref 3.5–5)
PROT SERPL-MCNC: 5.8 G/DL — LOW (ref 6–8)
PROT SERPL-MCNC: 5.8 G/DL — LOW (ref 6–8)
PROTHROM AB SERPL-ACNC: >40 SEC — HIGH (ref 9.95–12.87)
PROTHROM AB SERPL-ACNC: >40 SEC — HIGH (ref 9.95–12.87)
RBC # BLD: 3.89 M/UL — LOW (ref 4.7–6.1)
RBC # BLD: 3.89 M/UL — LOW (ref 4.7–6.1)
RBC # FLD: 14.2 % — SIGNIFICANT CHANGE UP (ref 11.5–14.5)
RBC # FLD: 14.2 % — SIGNIFICANT CHANGE UP (ref 11.5–14.5)
SODIUM SERPL-SCNC: 141 MMOL/L — SIGNIFICANT CHANGE UP (ref 135–146)
SODIUM SERPL-SCNC: 141 MMOL/L — SIGNIFICANT CHANGE UP (ref 135–146)
WBC # BLD: 8.5 K/UL — SIGNIFICANT CHANGE UP (ref 4.8–10.8)
WBC # BLD: 8.5 K/UL — SIGNIFICANT CHANGE UP (ref 4.8–10.8)
WBC # FLD AUTO: 8.5 K/UL — SIGNIFICANT CHANGE UP (ref 4.8–10.8)
WBC # FLD AUTO: 8.5 K/UL — SIGNIFICANT CHANGE UP (ref 4.8–10.8)

## 2023-12-07 PROCEDURE — 99232 SBSQ HOSP IP/OBS MODERATE 35: CPT

## 2023-12-07 RX ORDER — CHLORHEXIDINE GLUCONATE 213 G/1000ML
1 SOLUTION TOPICAL
Refills: 0 | Status: DISCONTINUED | OUTPATIENT
Start: 2023-12-07 | End: 2023-12-09

## 2023-12-07 RX ORDER — SODIUM CHLORIDE 9 MG/ML
500 INJECTION INTRAMUSCULAR; INTRAVENOUS; SUBCUTANEOUS ONCE
Refills: 0 | Status: COMPLETED | OUTPATIENT
Start: 2023-12-07 | End: 2023-12-07

## 2023-12-07 RX ADMIN — CYCLOPENTOLATE HYDROCHLORIDE 1 DROP(S): 10 SOLUTION/ DROPS OPHTHALMIC at 13:30

## 2023-12-07 RX ADMIN — POLYETHYLENE GLYCOL 3350 17 GRAM(S): 17 POWDER, FOR SOLUTION ORAL at 17:06

## 2023-12-07 RX ADMIN — SODIUM CHLORIDE 500 MILLILITER(S): 9 INJECTION INTRAMUSCULAR; INTRAVENOUS; SUBCUTANEOUS at 05:36

## 2023-12-07 RX ADMIN — PANTOPRAZOLE SODIUM 40 MILLIGRAM(S): 20 TABLET, DELAYED RELEASE ORAL at 05:28

## 2023-12-07 RX ADMIN — CYCLOPENTOLATE HYDROCHLORIDE 1 DROP(S): 10 SOLUTION/ DROPS OPHTHALMIC at 21:15

## 2023-12-07 RX ADMIN — SENNA PLUS 2 TABLET(S): 8.6 TABLET ORAL at 17:06

## 2023-12-07 RX ADMIN — TAMSULOSIN HYDROCHLORIDE 0.4 MILLIGRAM(S): 0.4 CAPSULE ORAL at 21:14

## 2023-12-07 RX ADMIN — SENNA PLUS 2 TABLET(S): 8.6 TABLET ORAL at 05:29

## 2023-12-07 RX ADMIN — POLYETHYLENE GLYCOL 3350 17 GRAM(S): 17 POWDER, FOR SOLUTION ORAL at 05:29

## 2023-12-07 RX ADMIN — ATORVASTATIN CALCIUM 80 MILLIGRAM(S): 80 TABLET, FILM COATED ORAL at 21:14

## 2023-12-07 RX ADMIN — Medication 10 MILLIGRAM(S): at 14:45

## 2023-12-07 NOTE — PHYSICAL THERAPY INITIAL EVALUATION ADULT - ADDITIONAL COMMENTS
Pt reports he lives with wife in house with no RERE, but flight to bedroom. Pt amb without AD, but relies on wife due to low vision.

## 2023-12-07 NOTE — PROGRESS NOTE ADULT - ASSESSMENT
89yo M with PMhx of HTN, HLD, CAD s/p CABG and PCI, Afib on coumadin, Glaucoma c/b by recent L eye infection who presents with difficulty urinating x 3 days. Leija placed in ED now draining clear yellow urine. CT with mod stool burden and BPH likely contributing to urinary retention. Will leave folye in for 24 hrs, start tamsulosin and bowel regimen then attempt TOV. Incidentally found to have elevated INR. Denies double dosing or recent dosing changes. Notably is taking moxifloxacin for an eye infection which may be interacting with coumadin resulting in elevated INR's. Will hold coumadin for now and monitor INR levels      #urinary retention 2/2 to BPH and large rectal stool load  - aggresive bowel regimen  - pt had a large BM today 12/7 in the afternoon after enema  - initiated TOV after that  - continue tamsulosin  - bladder scan q 8hrs x 3  - Cr wnl  - UA negative  - US renal: Urinary retention with approximately 442 cc of urine within the bladder   post voiding.  Prostatomegaly.  - CT abd: Moderate colorectal stool burden, rectal fecal load 8.7 cm transverse   by 6.9 cm AP. No evidence of mechanical obstruction.BPH. Urinary bladder distended.      #Worsening T11 and L5 vertebral body compression fractures since   9/1/2022; likely chronic process.  - abdominal binder  - PT/OT        #Supratherapuetic INR  - Home dose is 5mg on Sun-Wed and Sat, 7.5mg on Thurs-Fri  - Also taking Moxifloxain for eye infection which may be interacting with coumadin to result in higher INR levels   - Hold coumadin for now  - Follow daily INR  - Goal 2-3  - currently 7.7; holding tonights INR as well  - if increased to 10, should get vitamin K    #Glaucoma   #Recent L eye infection   - Currently on Moxifloxacin 400mg daily, Vancomycin gtt's tobramycin gtt's, difluprednate gtt, and cyclpentolate gtt  - Advised family to bring in drops to administer inpatient as most drops are non-formulary or require frequent dosing    #Chronic Afib   - Not interested in transitioning to DOAC as he has been on this for years  - Takes coumadin 5mg M-W and Sat/Sun. 7.5mg Th and Fri  - Cont Metop 50mg BID  - Hold coumadin for now     #HTN   - Cont Amlodipine 10mg, Lisinopril 40mg (home fosinopril 40mg), Triamterene-HCTZ 37.5-25mg     #HLD   - Cont atorvastatin 80mg   - Home zetia and welchol non formulary     Handoff: f/u INR, f/u PT, f/u TOV     87yo M with PMhx of HTN, HLD, CAD s/p CABG and PCI, Afib on coumadin, Glaucoma c/b by recent L eye infection who presents with difficulty urinating x 3 days. Leija placed in ED now draining clear yellow urine. CT with mod stool burden and BPH likely contributing to urinary retention. Will leave folye in for 24 hrs, start tamsulosin and bowel regimen then attempt TOV. Incidentally found to have elevated INR. Denies double dosing or recent dosing changes. Notably is taking moxifloxacin for an eye infection which may be interacting with coumadin resulting in elevated INR's. Will hold coumadin for now and monitor INR levels      #urinary retention 2/2 to BPH and large rectal stool load  - aggresive bowel regimen  - pt had a large BM today 12/7 in the afternoon after enema  - initiated TOV after that  - continue tamsulosin  - bladder scan q 8hrs x 3  - Cr wnl  - UA negative  - US renal: Urinary retention with approximately 442 cc of urine within the bladder   post voiding.  Prostatomegaly.  - CT abd: Moderate colorectal stool burden, rectal fecal load 8.7 cm transverse   by 6.9 cm AP. No evidence of mechanical obstruction.BPH. Urinary bladder distended.      #Worsening T11 and L5 vertebral body compression fractures since   9/1/2022; likely chronic process.  - abdominal binder  - PT/OT        #Supratherapuetic INR  - Home dose is 5mg on Sun-Wed and Sat, 7.5mg on Thurs-Fri  - Also taking Moxifloxain for eye infection which may be interacting with coumadin to result in higher INR levels   - Hold coumadin for now  - Follow daily INR  - Goal 2-3  - currently 7.7; holding tonights INR as well  - if increased to 10, should get vitamin K    #Glaucoma   #Recent L eye infection   - Currently on Moxifloxacin 400mg daily, Vancomycin gtt's tobramycin gtt's, difluprednate gtt, and cyclpentolate gtt  - Advised family to bring in drops to administer inpatient as most drops are non-formulary or require frequent dosing    #Chronic Afib   - Not interested in transitioning to DOAC as he has been on this for years  - Takes coumadin 5mg M-W and Sat/Sun. 7.5mg Th and Fri  - Cont Metop 50mg BID  - Hold coumadin for now     #HTN   - Cont Amlodipine 10mg, Lisinopril 40mg (home fosinopril 40mg), Triamterene-HCTZ 37.5-25mg     #HLD   - Cont atorvastatin 80mg   - Home zetia and welchol non formulary     Handoff: f/u INR, f/u PT, f/u TOV

## 2023-12-07 NOTE — PHYSICAL THERAPY INITIAL EVALUATION ADULT - PERTINENT HX OF CURRENT PROBLEM, REHAB EVAL
Reason for Admission: Difficulty urinating  History of Present Illness:   87yo M with PMhx of HTN, HLD, CAD s/p CABG and PCI, Afib on coumadin, Glaucoma c/b by recent L eye infection who presents with difficulty urinating x 3 days. Patient reports associated constipation as well. In this time, has had difficulty emptying bladder, only passing dribbles of urine. Symptoms progressed until day of presentation where he was unable to urinate at all. Reports no previous episodes of urinary retention. Denies fever, chills, dysuria, hematuria, abdominal pain, chest pain, SOB. Of note, is currently being treated for an infection in L eye by an outpatient ophthalmologist Reason for Admission: Difficulty urinating  History of Present Illness:   89yo M with PMhx of HTN, HLD, CAD s/p CABG and PCI, Afib on coumadin, Glaucoma c/b by recent L eye infection who presents with difficulty urinating x 3 days. Patient reports associated constipation as well. In this time, has had difficulty emptying bladder, only passing dribbles of urine. Symptoms progressed until day of presentation where he was unable to urinate at all. Reports no previous episodes of urinary retention. Denies fever, chills, dysuria, hematuria, abdominal pain, chest pain, SOB. Of note, is currently being treated for an infection in L eye by an outpatient ophthalmologist

## 2023-12-07 NOTE — PROGRESS NOTE ADULT - SUBJECTIVE AND OBJECTIVE BOX
Patient is a 88y old  Male who presents with a chief complaint of Difficulty urinating (06 Dec 2023 13:50)      SUBJECTIVE / OVERNIGHT EVENTS: Pt had a very small BM but feels like he is urinating just fine without issues. RN states his salazar is not in place. Pt ok with an enema  ADDITIONAL REVIEW OF SYSTEMS: as above    MEDICATIONS  (STANDING):  amLODIPine   Tablet 10 milliGRAM(s) Oral daily  atorvastatin 80 milliGRAM(s) Oral at bedtime  chlorhexidine 2% Cloths 1 Application(s) Topical <User Schedule>  cyclopentolate 1% Solution 1 Drop(s) Left EYE three times a day  lisinopril 40 milliGRAM(s) Oral daily  metoprolol tartrate 50 milliGRAM(s) Oral two times a day  pantoprazole    Tablet 40 milliGRAM(s) Oral before breakfast  polyethylene glycol 3350 17 Gram(s) Oral two times a day  senna 2 Tablet(s) Oral two times a day  tamsulosin 0.4 milliGRAM(s) Oral at bedtime  tobramycin 0.3% Ophthalmic Solution 1 Drop(s) Left EYE four times a day  triamterene 75 mG/hydrochlorothiazide 50 mG Tablet 0.5 Tablet(s) Oral daily    MEDICATIONS  (PRN):  acetaminophen     Tablet .. 650 milliGRAM(s) Oral every 6 hours PRN Temp greater or equal to 38C (100.4F), Mild Pain (1 - 3)  aluminum hydroxide/magnesium hydroxide/simethicone Suspension 30 milliLiter(s) Oral every 4 hours PRN Dyspepsia  melatonin 3 milliGRAM(s) Oral at bedtime PRN Insomnia  ondansetron Injectable 4 milliGRAM(s) IV Push every 8 hours PRN Nausea and/or Vomiting      CAPILLARY BLOOD GLUCOSE        I&O's Summary    06 Dec 2023 07:01  -  07 Dec 2023 07:00  --------------------------------------------------------  IN: 0 mL / OUT: 1300 mL / NET: -1300 mL    07 Dec 2023 07:01  -  07 Dec 2023 17:06  --------------------------------------------------------  IN: 0 mL / OUT: 200 mL / NET: -200 mL        PHYSICAL EXAM:  Vital Signs Last 24 Hrs  T(C): 35.8 (07 Dec 2023 13:48), Max: 36 (07 Dec 2023 04:40)  T(F): 96.5 (07 Dec 2023 13:48), Max: 96.8 (07 Dec 2023 04:40)  HR: 90 (07 Dec 2023 13:48) (64 - 104)  BP: 105/59 (07 Dec 2023 13:48) (86/52 - 126/77)  BP(mean): --  RR: 18 (07 Dec 2023 13:48) (16 - 18)  SpO2: 100% (07 Dec 2023 06:53) (99% - 100%)    Parameters below as of 07 Dec 2023 06:53  Patient On (Oxygen Delivery Method): room air      CONSTITUTIONAL: NAD  EYES: PERRLA; conjunctiva and sclera clear  ENMT: Dry oral mucosa, no pharyngeal injection or exudates; normal dentition  NECK: Supple, no palpable masses; no thyromegaly  RESPIRATORY: Normal respiratory effort; lungs are clear to auscultation bilaterally  CARDIOVASCULAR: Regular rate and rhythm, normal S1 and S2, no murmur/rub/gallop; No lower extremity edema; Peripheral pulses are 2+ bilaterally  ABDOMEN: Nontender to palpation, normoactive bowel sounds, no rebound/guarding; No hepatosplenomegaly  MUSCULOSKELETAL:   no clubbing or cyanosis of digits; no joint swelling or tenderness to palpation      LABS:                        12.2   8.50  )-----------( 193      ( 07 Dec 2023 07:10 )             35.7     12-07    141  |  104  |  29<H>  ----------------------------<  100<H>  3.7   |  27  |  1.4    Ca    9.2      07 Dec 2023 07:10  Mg     1.9     12-06    TPro  5.8<L>  /  Alb  3.6  /  TBili  0.5  /  DBili  x   /  AST  34  /  ALT  26  /  AlkPhos  44  12-07    PT/INR - ( 07 Dec 2023 07:10 )   PT: >40.00 sec;   INR: 7.77 ratio         PTT - ( 07 Dec 2023 07:10 )  PTT:47.5 sec      Urinalysis Basic - ( 07 Dec 2023 07:10 )    Color: x / Appearance: x / SG: x / pH: x  Gluc: 100 mg/dL / Ketone: x  / Bili: x / Urobili: x   Blood: x / Protein: x / Nitrite: x   Leuk Esterase: x / RBC: x / WBC x   Sq Epi: x / Non Sq Epi: x / Bacteria: x          RADIOLOGY & ADDITIONAL TESTS:  Results Reviewed:   Imaging Personally Reviewed:  Electrocardiogram Personally Reviewed:    COORDINATION OF CARE:  Care Discussed with Consultants/Other Providers [Y/N]:  Prior or Outpatient Records Reviewed [Y/N]:

## 2023-12-07 NOTE — PHYSICAL THERAPY INITIAL EVALUATION ADULT - GENERAL OBSERVATIONS, REHAB EVAL
13:05-13:30 Chart reviewed. Patient available to be seen for physical therapy, denies pain, confirmed with RN. Pt rec'd in recliner +salazar in NAD. Pt with low vision, as confirmed with RN Lucero.

## 2023-12-08 LAB
ANION GAP SERPL CALC-SCNC: 9 MMOL/L — SIGNIFICANT CHANGE UP (ref 7–14)
ANION GAP SERPL CALC-SCNC: 9 MMOL/L — SIGNIFICANT CHANGE UP (ref 7–14)
APTT BLD: 44.3 SEC — HIGH (ref 27–39.2)
APTT BLD: 44.3 SEC — HIGH (ref 27–39.2)
BASOPHILS # BLD AUTO: 0.03 K/UL — SIGNIFICANT CHANGE UP (ref 0–0.2)
BASOPHILS # BLD AUTO: 0.03 K/UL — SIGNIFICANT CHANGE UP (ref 0–0.2)
BASOPHILS NFR BLD AUTO: 0.4 % — SIGNIFICANT CHANGE UP (ref 0–1)
BASOPHILS NFR BLD AUTO: 0.4 % — SIGNIFICANT CHANGE UP (ref 0–1)
BUN SERPL-MCNC: 23 MG/DL — HIGH (ref 10–20)
BUN SERPL-MCNC: 23 MG/DL — HIGH (ref 10–20)
CALCIUM SERPL-MCNC: 9 MG/DL — SIGNIFICANT CHANGE UP (ref 8.4–10.5)
CALCIUM SERPL-MCNC: 9 MG/DL — SIGNIFICANT CHANGE UP (ref 8.4–10.5)
CHLORIDE SERPL-SCNC: 101 MMOL/L — SIGNIFICANT CHANGE UP (ref 98–110)
CHLORIDE SERPL-SCNC: 101 MMOL/L — SIGNIFICANT CHANGE UP (ref 98–110)
CO2 SERPL-SCNC: 28 MMOL/L — SIGNIFICANT CHANGE UP (ref 17–32)
CO2 SERPL-SCNC: 28 MMOL/L — SIGNIFICANT CHANGE UP (ref 17–32)
CREAT SERPL-MCNC: 1 MG/DL — SIGNIFICANT CHANGE UP (ref 0.7–1.5)
CREAT SERPL-MCNC: 1 MG/DL — SIGNIFICANT CHANGE UP (ref 0.7–1.5)
CULTURE RESULTS: NO GROWTH — SIGNIFICANT CHANGE UP
CULTURE RESULTS: NO GROWTH — SIGNIFICANT CHANGE UP
EGFR: 73 ML/MIN/1.73M2 — SIGNIFICANT CHANGE UP
EGFR: 73 ML/MIN/1.73M2 — SIGNIFICANT CHANGE UP
EOSINOPHIL # BLD AUTO: 0.14 K/UL — SIGNIFICANT CHANGE UP (ref 0–0.7)
EOSINOPHIL # BLD AUTO: 0.14 K/UL — SIGNIFICANT CHANGE UP (ref 0–0.7)
EOSINOPHIL NFR BLD AUTO: 1.9 % — SIGNIFICANT CHANGE UP (ref 0–8)
EOSINOPHIL NFR BLD AUTO: 1.9 % — SIGNIFICANT CHANGE UP (ref 0–8)
GLUCOSE SERPL-MCNC: 88 MG/DL — SIGNIFICANT CHANGE UP (ref 70–99)
GLUCOSE SERPL-MCNC: 88 MG/DL — SIGNIFICANT CHANGE UP (ref 70–99)
HCT VFR BLD CALC: 35.8 % — LOW (ref 42–52)
HCT VFR BLD CALC: 35.8 % — LOW (ref 42–52)
HGB BLD-MCNC: 12.5 G/DL — LOW (ref 14–18)
HGB BLD-MCNC: 12.5 G/DL — LOW (ref 14–18)
IMM GRANULOCYTES NFR BLD AUTO: 0.3 % — SIGNIFICANT CHANGE UP (ref 0.1–0.3)
IMM GRANULOCYTES NFR BLD AUTO: 0.3 % — SIGNIFICANT CHANGE UP (ref 0.1–0.3)
INR BLD: 4.96 RATIO — HIGH (ref 0.65–1.3)
INR BLD: 4.96 RATIO — HIGH (ref 0.65–1.3)
LYMPHOCYTES # BLD AUTO: 2.06 K/UL — SIGNIFICANT CHANGE UP (ref 1.2–3.4)
LYMPHOCYTES # BLD AUTO: 2.06 K/UL — SIGNIFICANT CHANGE UP (ref 1.2–3.4)
LYMPHOCYTES # BLD AUTO: 28.5 % — SIGNIFICANT CHANGE UP (ref 20.5–51.1)
LYMPHOCYTES # BLD AUTO: 28.5 % — SIGNIFICANT CHANGE UP (ref 20.5–51.1)
MCHC RBC-ENTMCNC: 31.5 PG — HIGH (ref 27–31)
MCHC RBC-ENTMCNC: 31.5 PG — HIGH (ref 27–31)
MCHC RBC-ENTMCNC: 34.9 G/DL — SIGNIFICANT CHANGE UP (ref 32–37)
MCHC RBC-ENTMCNC: 34.9 G/DL — SIGNIFICANT CHANGE UP (ref 32–37)
MCV RBC AUTO: 90.2 FL — SIGNIFICANT CHANGE UP (ref 80–94)
MCV RBC AUTO: 90.2 FL — SIGNIFICANT CHANGE UP (ref 80–94)
MONOCYTES # BLD AUTO: 0.78 K/UL — HIGH (ref 0.1–0.6)
MONOCYTES # BLD AUTO: 0.78 K/UL — HIGH (ref 0.1–0.6)
MONOCYTES NFR BLD AUTO: 10.8 % — HIGH (ref 1.7–9.3)
MONOCYTES NFR BLD AUTO: 10.8 % — HIGH (ref 1.7–9.3)
NEUTROPHILS # BLD AUTO: 4.2 K/UL — SIGNIFICANT CHANGE UP (ref 1.4–6.5)
NEUTROPHILS # BLD AUTO: 4.2 K/UL — SIGNIFICANT CHANGE UP (ref 1.4–6.5)
NEUTROPHILS NFR BLD AUTO: 58.1 % — SIGNIFICANT CHANGE UP (ref 42.2–75.2)
NEUTROPHILS NFR BLD AUTO: 58.1 % — SIGNIFICANT CHANGE UP (ref 42.2–75.2)
NRBC # BLD: 0 /100 WBCS — SIGNIFICANT CHANGE UP (ref 0–0)
NRBC # BLD: 0 /100 WBCS — SIGNIFICANT CHANGE UP (ref 0–0)
PLATELET # BLD AUTO: 187 K/UL — SIGNIFICANT CHANGE UP (ref 130–400)
PLATELET # BLD AUTO: 187 K/UL — SIGNIFICANT CHANGE UP (ref 130–400)
PMV BLD: 9.7 FL — SIGNIFICANT CHANGE UP (ref 7.4–10.4)
PMV BLD: 9.7 FL — SIGNIFICANT CHANGE UP (ref 7.4–10.4)
POTASSIUM SERPL-MCNC: 3.6 MMOL/L — SIGNIFICANT CHANGE UP (ref 3.5–5)
POTASSIUM SERPL-MCNC: 3.6 MMOL/L — SIGNIFICANT CHANGE UP (ref 3.5–5)
POTASSIUM SERPL-SCNC: 3.6 MMOL/L — SIGNIFICANT CHANGE UP (ref 3.5–5)
POTASSIUM SERPL-SCNC: 3.6 MMOL/L — SIGNIFICANT CHANGE UP (ref 3.5–5)
PROTHROM AB SERPL-ACNC: >40 SEC — HIGH (ref 9.95–12.87)
PROTHROM AB SERPL-ACNC: >40 SEC — HIGH (ref 9.95–12.87)
RBC # BLD: 3.97 M/UL — LOW (ref 4.7–6.1)
RBC # BLD: 3.97 M/UL — LOW (ref 4.7–6.1)
RBC # FLD: 14 % — SIGNIFICANT CHANGE UP (ref 11.5–14.5)
RBC # FLD: 14 % — SIGNIFICANT CHANGE UP (ref 11.5–14.5)
SODIUM SERPL-SCNC: 138 MMOL/L — SIGNIFICANT CHANGE UP (ref 135–146)
SODIUM SERPL-SCNC: 138 MMOL/L — SIGNIFICANT CHANGE UP (ref 135–146)
SPECIMEN SOURCE: SIGNIFICANT CHANGE UP
SPECIMEN SOURCE: SIGNIFICANT CHANGE UP
WBC # BLD: 7.23 K/UL — SIGNIFICANT CHANGE UP (ref 4.8–10.8)
WBC # BLD: 7.23 K/UL — SIGNIFICANT CHANGE UP (ref 4.8–10.8)
WBC # FLD AUTO: 7.23 K/UL — SIGNIFICANT CHANGE UP (ref 4.8–10.8)
WBC # FLD AUTO: 7.23 K/UL — SIGNIFICANT CHANGE UP (ref 4.8–10.8)

## 2023-12-08 PROCEDURE — 99232 SBSQ HOSP IP/OBS MODERATE 35: CPT

## 2023-12-08 RX ORDER — POTASSIUM CHLORIDE 20 MEQ
20 PACKET (EA) ORAL
Refills: 0 | Status: COMPLETED | OUTPATIENT
Start: 2023-12-08 | End: 2023-12-08

## 2023-12-08 RX ADMIN — AMLODIPINE BESYLATE 10 MILLIGRAM(S): 2.5 TABLET ORAL at 05:43

## 2023-12-08 RX ADMIN — Medication 0.5 TABLET(S): at 05:50

## 2023-12-08 RX ADMIN — ATORVASTATIN CALCIUM 80 MILLIGRAM(S): 80 TABLET, FILM COATED ORAL at 21:32

## 2023-12-08 RX ADMIN — Medication 20 MILLIEQUIVALENT(S): at 17:27

## 2023-12-08 RX ADMIN — CHLORHEXIDINE GLUCONATE 1 APPLICATION(S): 213 SOLUTION TOPICAL at 05:42

## 2023-12-08 RX ADMIN — TAMSULOSIN HYDROCHLORIDE 0.4 MILLIGRAM(S): 0.4 CAPSULE ORAL at 21:33

## 2023-12-08 RX ADMIN — POLYETHYLENE GLYCOL 3350 17 GRAM(S): 17 POWDER, FOR SOLUTION ORAL at 05:45

## 2023-12-08 RX ADMIN — LISINOPRIL 40 MILLIGRAM(S): 2.5 TABLET ORAL at 05:39

## 2023-12-08 RX ADMIN — Medication 50 MILLIGRAM(S): at 05:39

## 2023-12-08 RX ADMIN — SENNA PLUS 2 TABLET(S): 8.6 TABLET ORAL at 05:50

## 2023-12-08 RX ADMIN — CYCLOPENTOLATE HYDROCHLORIDE 1 DROP(S): 10 SOLUTION/ DROPS OPHTHALMIC at 13:52

## 2023-12-08 RX ADMIN — CYCLOPENTOLATE HYDROCHLORIDE 1 DROP(S): 10 SOLUTION/ DROPS OPHTHALMIC at 05:44

## 2023-12-08 RX ADMIN — Medication 50 MILLIGRAM(S): at 17:27

## 2023-12-08 RX ADMIN — PANTOPRAZOLE SODIUM 40 MILLIGRAM(S): 20 TABLET, DELAYED RELEASE ORAL at 05:38

## 2023-12-08 RX ADMIN — CYCLOPENTOLATE HYDROCHLORIDE 1 DROP(S): 10 SOLUTION/ DROPS OPHTHALMIC at 21:33

## 2023-12-08 RX ADMIN — Medication 20 MILLIEQUIVALENT(S): at 13:51

## 2023-12-08 RX ADMIN — Medication 20 MILLIEQUIVALENT(S): at 10:24

## 2023-12-08 NOTE — CHART NOTE - NSCHARTNOTEFT_GEN_A_CORE
Called for patient with sbp in the 90's  A/P  hypotension/dehydration  5oo ml bolus  hold bp meds
I spoke to patient's Son; Leonel regarding his Dads  3 eye drops  - Steroid eye drop  - Tobramycin eye drop  - Vancomycin eye drop    2 of the drops submitted to Pharmacy for use:  1 is leaking, and 1  and 1 is Non formulary    I asked  the son to bring in current bottles of theses drops

## 2023-12-08 NOTE — PROGRESS NOTE ADULT - SUBJECTIVE AND OBJECTIVE BOX
Progress note    INTERVAL HPI/OVERNIGHT EVENTS:    Patient seen and examined at bedside. No acute distress. Is having BM's and denies issues with urination.      REVIEW OF SYSTEMS:  All other 13 Review of systems were reviewed and are negative    FAMILY HISTORY:  FHx: hypertension (Father, Mother)      T(C): 35.3 (12-08-23 @ 04:48), Max: 35.8 (12-07-23 @ 13:48)  HR: 94 (12-08-23 @ 04:48) (90 - 94)  BP: 127/67 (12-08-23 @ 04:48) (100/65 - 127/67)  RR: 18 (12-07-23 @ 20:50) (18 - 18)  SpO2: 99% (12-08-23 @ 04:48) (99% - 99%)  Wt(kg): --Vital Signs Last 24 Hrs  T(C): 35.3 (08 Dec 2023 04:48), Max: 35.8 (07 Dec 2023 13:48)  T(F): 95.5 (08 Dec 2023 04:48), Max: 96.5 (07 Dec 2023 13:48)  HR: 94 (08 Dec 2023 04:48) (90 - 94)  BP: 127/67 (08 Dec 2023 04:48) (100/65 - 127/67)  BP(mean): --  RR: 18 (07 Dec 2023 20:50) (18 - 18)  SpO2: 99% (08 Dec 2023 04:48) (99% - 99%)    Parameters below as of 08 Dec 2023 04:48  Patient On (Oxygen Delivery Method): room air      No Known Allergies      PHYSICAL EXAM:    CONSTITUTIONAL: NAD  EYES: PERRLA; conjunctiva and sclera clear  ENMT: Dry oral mucosa, no pharyngeal injection or exudates; normal dentition  NECK: Supple, no palpable masses; no thyromegaly  RESPIRATORY: Normal respiratory effort; lungs are clear to auscultation bilaterally  CARDIOVASCULAR: Regular rate and rhythm, normal S1 and S2, no murmur/rub/gallop; No lower extremity edema; Peripheral pulses are 2+ bilaterally  ABDOMEN: Nontender to palpation, normoactive bowel sounds, no rebound/guarding; No hepatosplenomegaly  MUSCULOSKELETAL:   no clubbing or cyanosis of digits; no joint swelling or tenderness to palpation    Consultant(s) Notes Reviewed:  [x ] YES  [ ] NO  Care Discussed with Consultants/Other Providers [ x] YES  [ ] NO    LABS:      RADIOLOGY & ADDITIONAL TESTS:    Imaging Personally Reviewed:  [ ] YES  [ ] NO  acetaminophen     Tablet .. 650 milliGRAM(s) Oral every 6 hours PRN  aluminum hydroxide/magnesium hydroxide/simethicone Suspension 30 milliLiter(s) Oral every 4 hours PRN  amLODIPine   Tablet 10 milliGRAM(s) Oral daily  atorvastatin 80 milliGRAM(s) Oral at bedtime  chlorhexidine 2% Cloths 1 Application(s) Topical <User Schedule>  cyclopentolate 1% Solution 1 Drop(s) Left EYE three times a day  lisinopril 40 milliGRAM(s) Oral daily  melatonin 3 milliGRAM(s) Oral at bedtime PRN  metoprolol tartrate 50 milliGRAM(s) Oral two times a day  ondansetron Injectable 4 milliGRAM(s) IV Push every 8 hours PRN  pantoprazole    Tablet 40 milliGRAM(s) Oral before breakfast  polyethylene glycol 3350 17 Gram(s) Oral two times a day  senna 2 Tablet(s) Oral two times a day  tamsulosin 0.4 milliGRAM(s) Oral at bedtime  tobramycin 0.3% Ophthalmic Solution 1 Drop(s) Left EYE four times a day  triamterene 75 mG/hydrochlorothiazide 50 mG Tablet 0.5 Tablet(s) Oral daily      HEALTH ISSUES - PROBLEM Dx:    89yo M with PMhx of HTN, HLD, CAD s/p CABG and PCI, Afib on coumadin, Glaucoma c/b by recent L eye infection who presents with difficulty urinating x 3 days. Leija placed in ED now draining clear yellow urine. CT with mod stool burden and BPH likely contributing to urinary retention. Will leave folye in for 24 hrs, start tamsulosin and bowel regimen then attempt TOV. Incidentally found to have elevated INR. Denies double dosing or recent dosing changes. Notably is taking moxifloxacin for an eye infection which may be interacting with coumadin resulting in elevated INR's. Will hold coumadin for now and monitor INR levels    #urinary retention 2/2 to BPH and large rectal stool load  - aggresive bowel regimen  - pt had a large BM today 12/7 in the afternoon after enema  - initiated TOV after that  - continue tamsulosin  - Cr wnl  - UA negative  - US renal: Urinary retention with approximately 442 cc of urine within the bladder   post voiding.  - Check bladder scan to monitor PVR  Prostatomegaly.  - CT abd: Moderate colorectal stool burden, rectal fecal load 8.7 cm transverse   by 6.9 cm AP. No evidence of mechanical obstruction.BPH. Urinary bladder distended.    #Worsening T11 and L5 vertebral body compression fractures since   9/1/2022; likely chronic process.  - abdominal binder  - PT/OT    #Supratherapuetic INR  - Home dose is 5mg on Sun-Wed and Sat, 7.5mg on Thurs-Fri  - Also taking Moxifloxain for eye infection which may be interacting with coumadin to result in higher INR levels   - Hold coumadin for now  - Goal 2-3  - Pending todays INR  - if increased to 10, should get vitamin K    #Glaucoma   #Recent L eye infection   - Currently on Moxifloxacin 400mg daily, Vancomycin gtt's tobramycin gtt's, difluprednate gtt, and cyclpentolate gtt  - Advised family to bring in drops to administer inpatient as most drops are non-formulary or require frequent dosing    #Chronic Afib   - Not interested in transitioning to DOAC as he has been on this for years  - Takes coumadin 5mg M-W and Sat/Sun. 7.5mg Th and Fri  - Cont Metop 50mg BID  - Hold coumadin for now     #HTN   - Cont Amlodipine 10mg, Lisinopril 40mg (home fosinopril 40mg), Triamterene-HCTZ 37.5-25mg     #HLD   - Cont atorvastatin 80mg   - Home zetia and welchol non formulary     Handoff: f/u INR, f/u PT, TOV/bladder scan

## 2023-12-09 ENCOUNTER — TRANSCRIPTION ENCOUNTER (OUTPATIENT)
Age: 88
End: 2023-12-09

## 2023-12-09 VITALS
SYSTOLIC BLOOD PRESSURE: 106 MMHG | RESPIRATION RATE: 18 BRPM | HEART RATE: 90 BPM | TEMPERATURE: 96 F | DIASTOLIC BLOOD PRESSURE: 68 MMHG

## 2023-12-09 LAB
ANION GAP SERPL CALC-SCNC: 11 MMOL/L — SIGNIFICANT CHANGE UP (ref 7–14)
ANION GAP SERPL CALC-SCNC: 11 MMOL/L — SIGNIFICANT CHANGE UP (ref 7–14)
APTT BLD: 42 SEC — HIGH (ref 27–39.2)
APTT BLD: 42 SEC — HIGH (ref 27–39.2)
BUN SERPL-MCNC: 26 MG/DL — HIGH (ref 10–20)
BUN SERPL-MCNC: 26 MG/DL — HIGH (ref 10–20)
CALCIUM SERPL-MCNC: 9 MG/DL — SIGNIFICANT CHANGE UP (ref 8.4–10.5)
CALCIUM SERPL-MCNC: 9 MG/DL — SIGNIFICANT CHANGE UP (ref 8.4–10.5)
CHLORIDE SERPL-SCNC: 103 MMOL/L — SIGNIFICANT CHANGE UP (ref 98–110)
CHLORIDE SERPL-SCNC: 103 MMOL/L — SIGNIFICANT CHANGE UP (ref 98–110)
CO2 SERPL-SCNC: 24 MMOL/L — SIGNIFICANT CHANGE UP (ref 17–32)
CO2 SERPL-SCNC: 24 MMOL/L — SIGNIFICANT CHANGE UP (ref 17–32)
CREAT SERPL-MCNC: 1.3 MG/DL — SIGNIFICANT CHANGE UP (ref 0.7–1.5)
CREAT SERPL-MCNC: 1.3 MG/DL — SIGNIFICANT CHANGE UP (ref 0.7–1.5)
EGFR: 53 ML/MIN/1.73M2 — LOW
EGFR: 53 ML/MIN/1.73M2 — LOW
GLUCOSE SERPL-MCNC: 113 MG/DL — HIGH (ref 70–99)
GLUCOSE SERPL-MCNC: 113 MG/DL — HIGH (ref 70–99)
HCT VFR BLD CALC: 36.8 % — LOW (ref 42–52)
HCT VFR BLD CALC: 36.8 % — LOW (ref 42–52)
HGB BLD-MCNC: 12.6 G/DL — LOW (ref 14–18)
HGB BLD-MCNC: 12.6 G/DL — LOW (ref 14–18)
INR BLD: 4.15 RATIO — HIGH (ref 0.65–1.3)
INR BLD: 4.15 RATIO — HIGH (ref 0.65–1.3)
MCHC RBC-ENTMCNC: 31.3 PG — HIGH (ref 27–31)
MCHC RBC-ENTMCNC: 31.3 PG — HIGH (ref 27–31)
MCHC RBC-ENTMCNC: 34.2 G/DL — SIGNIFICANT CHANGE UP (ref 32–37)
MCHC RBC-ENTMCNC: 34.2 G/DL — SIGNIFICANT CHANGE UP (ref 32–37)
MCV RBC AUTO: 91.3 FL — SIGNIFICANT CHANGE UP (ref 80–94)
MCV RBC AUTO: 91.3 FL — SIGNIFICANT CHANGE UP (ref 80–94)
NRBC # BLD: 0 /100 WBCS — SIGNIFICANT CHANGE UP (ref 0–0)
NRBC # BLD: 0 /100 WBCS — SIGNIFICANT CHANGE UP (ref 0–0)
PLATELET # BLD AUTO: 180 K/UL — SIGNIFICANT CHANGE UP (ref 130–400)
PLATELET # BLD AUTO: 180 K/UL — SIGNIFICANT CHANGE UP (ref 130–400)
PMV BLD: 10 FL — SIGNIFICANT CHANGE UP (ref 7.4–10.4)
PMV BLD: 10 FL — SIGNIFICANT CHANGE UP (ref 7.4–10.4)
POTASSIUM SERPL-MCNC: 4.5 MMOL/L — SIGNIFICANT CHANGE UP (ref 3.5–5)
POTASSIUM SERPL-MCNC: 4.5 MMOL/L — SIGNIFICANT CHANGE UP (ref 3.5–5)
POTASSIUM SERPL-SCNC: 4.5 MMOL/L — SIGNIFICANT CHANGE UP (ref 3.5–5)
POTASSIUM SERPL-SCNC: 4.5 MMOL/L — SIGNIFICANT CHANGE UP (ref 3.5–5)
PROTHROM AB SERPL-ACNC: >40 SEC — HIGH (ref 9.95–12.87)
PROTHROM AB SERPL-ACNC: >40 SEC — HIGH (ref 9.95–12.87)
RBC # BLD: 4.03 M/UL — LOW (ref 4.7–6.1)
RBC # BLD: 4.03 M/UL — LOW (ref 4.7–6.1)
RBC # FLD: 14.1 % — SIGNIFICANT CHANGE UP (ref 11.5–14.5)
RBC # FLD: 14.1 % — SIGNIFICANT CHANGE UP (ref 11.5–14.5)
SODIUM SERPL-SCNC: 138 MMOL/L — SIGNIFICANT CHANGE UP (ref 135–146)
SODIUM SERPL-SCNC: 138 MMOL/L — SIGNIFICANT CHANGE UP (ref 135–146)
WBC # BLD: 8.97 K/UL — SIGNIFICANT CHANGE UP (ref 4.8–10.8)
WBC # BLD: 8.97 K/UL — SIGNIFICANT CHANGE UP (ref 4.8–10.8)
WBC # FLD AUTO: 8.97 K/UL — SIGNIFICANT CHANGE UP (ref 4.8–10.8)
WBC # FLD AUTO: 8.97 K/UL — SIGNIFICANT CHANGE UP (ref 4.8–10.8)

## 2023-12-09 RX ORDER — MOXIFLOXACIN HYDROCHLORIDE TABLETS, 400 MG 400 MG/1
1 TABLET, FILM COATED ORAL
Refills: 0 | DISCHARGE

## 2023-12-09 RX ORDER — TAMSULOSIN HYDROCHLORIDE 0.4 MG/1
1 CAPSULE ORAL
Qty: 30 | Refills: 2
Start: 2023-12-09 | End: 2024-03-07

## 2023-12-09 RX ORDER — POLYETHYLENE GLYCOL 3350 17 G/17G
17 POWDER, FOR SOLUTION ORAL
Qty: 510 | Refills: 0
Start: 2023-12-09 | End: 2024-01-07

## 2023-12-09 RX ORDER — ATORVASTATIN CALCIUM 80 MG/1
1 TABLET, FILM COATED ORAL
Qty: 0 | Refills: 0 | DISCHARGE
Start: 2023-12-09

## 2023-12-09 RX ORDER — ATORVASTATIN CALCIUM 80 MG/1
0 TABLET, FILM COATED ORAL
Qty: 0 | Refills: 0 | DISCHARGE

## 2023-12-09 RX ADMIN — PANTOPRAZOLE SODIUM 40 MILLIGRAM(S): 20 TABLET, DELAYED RELEASE ORAL at 06:10

## 2023-12-09 RX ADMIN — Medication 1 DROP(S): at 11:10

## 2023-12-09 RX ADMIN — SENNA PLUS 2 TABLET(S): 8.6 TABLET ORAL at 06:04

## 2023-12-09 RX ADMIN — CHLORHEXIDINE GLUCONATE 1 APPLICATION(S): 213 SOLUTION TOPICAL at 06:00

## 2023-12-09 RX ADMIN — LISINOPRIL 40 MILLIGRAM(S): 2.5 TABLET ORAL at 06:03

## 2023-12-09 RX ADMIN — Medication 0.5 TABLET(S): at 06:06

## 2023-12-09 RX ADMIN — CYCLOPENTOLATE HYDROCHLORIDE 1 DROP(S): 10 SOLUTION/ DROPS OPHTHALMIC at 06:19

## 2023-12-09 RX ADMIN — AMLODIPINE BESYLATE 10 MILLIGRAM(S): 2.5 TABLET ORAL at 06:01

## 2023-12-09 RX ADMIN — Medication 50 MILLIGRAM(S): at 06:03

## 2023-12-09 RX ADMIN — CYCLOPENTOLATE HYDROCHLORIDE 1 DROP(S): 10 SOLUTION/ DROPS OPHTHALMIC at 15:06

## 2023-12-09 NOTE — DISCHARGE NOTE PROVIDER - NSDCCPCAREPLAN_GEN_ALL_CORE_FT
PRINCIPAL DISCHARGE DIAGNOSIS  Diagnosis: Urinary retention  Assessment and Plan of Treatment: You had a large BM today 12/7 in the afternoon after enema. Urinary retention was likely due to extreme constipation. Leija was placed and now taken out since you have passed the trial of voiding. Started on medication tamsulosin to help relax bladder properly. Continue with the medication. Discuss with PCP if needs to be continued for long term.          SECONDARY DISCHARGE DIAGNOSES  Diagnosis: Constipation  Assessment and Plan of Treatment: Continue with stool softner as needed.    Diagnosis: Elevated INR  Assessment and Plan of Treatment: Monitor INR levels. Resume Warfarin once the INR in the range of 2-3.

## 2023-12-09 NOTE — DISCHARGE NOTE PROVIDER - HOSPITAL COURSE
89yo M with PMhx of HTN, HLD, CAD s/p CABG and PCI, Afib on coumadin, Glaucoma c/b by recent L eye infection who presents with difficulty urinating x 3 days. Salazar placed in ED now draining clear yellow urine. CT with mod stool burden and BPH likely contributing to urinary retention. Will leave folye in for 24 hrs, start tamsulosin and bowel regimen then attempt TOV. Incidentally found to have elevated INR. Denies double dosing or recent dosing changes. Notably is taking moxifloxacin for an eye infection which may be interacting with coumadin resulting in elevated INR's. Will hold coumadin for now and monitor INR levels    #urinary retention 2/2 to BPH and large rectal stool load  - aggresive bowel regimen  - pt had a large BM today 12/7 in the afternoon after enema  - initiated TOV after that. So far without salazar.  - Reports having bowel movement today.   - continue tamsulosin  - Cr wnl  - UA negative  - US renal: Urinary retention with approximately 442 cc of urine within the bladder   post voiding.  - CT abd: Moderate colorectal stool burden, rectal fecal load 8.7 cm transverse   by 6.9 cm AP. No evidence of mechanical obstruction. BPH. Urinary bladder distended.  -Repeated bladder scan today, not retaining urine.     #Worsening T11 and L5 vertebral body compression fractures since   9/1/2022; likely chronic process.  - abdominal binder  - PT/OT    #Supratherapuetic INR  - Home dose is 5mg on Sun-Wed and Sat, 7.5mg on Thurs-Fri  - Also taking Moxifloxain for eye infection which may be interacting with coumadin to result in higher INR levels   - Hold coumadin for now  - Goal 2-3  - INR coming down. 4.1 today.     #Glaucoma   #Recent L eye infection   - Currently on Moxifloxacin 400mg daily, Vancomycin gtt's tobramycin gtt's, difluprednate gtt, and cyclpentolate gtt  - Advised family to bring in drops to administer inpatient as most drops are non-formulary or require frequent dosing    #Chronic Afib   - Not interested in transitioning to DOAC as he has been on this for years  - Takes coumadin 5mg M-W and Sat/Sun. 7.5mg Th and Fri  - Cont Metop 50mg BID  - Hold coumadin for now     #HTN   - Cont Amlodipine 10mg, Lisinopril 40mg (home fosinopril 40mg), Triamterene-HCTZ 37.5-25mg     #HLD   - Cont atorvastatin 80mg   - Home zetia and welchol non formulary

## 2023-12-09 NOTE — DISCHARGE NOTE PROVIDER - PROVIDER TOKENS
FREE:[LAST:[PCP],PHONE:[(   )    -],FAX:[(   )    -]],PROVIDER:[TOKEN:[[id number]]] FREE:[LAST:[PCP],PHONE:[(   )    -],FAX:[(   )    -]],PROVIDER:[TOKEN:[[id number]]]

## 2023-12-09 NOTE — DISCHARGE NOTE NURSING/CASE MANAGEMENT/SOCIAL WORK - NSDCPEFALRISK_GEN_ALL_CORE
For information on Fall & Injury Prevention, visit: [email or web address] OR  [email or web address] OR  https://www.cdc.gov/steadi/patient.html For information on Fall & Injury Prevention, visit: [email or web address] OR  [email or web address] OR  https://www.cdc.gov/steadi/patient.html

## 2023-12-09 NOTE — DISCHARGE NOTE PROVIDER - CARE PROVIDER_API CALL
PCP,   Phone: (   )    -  Fax: (   )    -  Follow Up Time:     Elyse Flynn  Internal Medicine  [address] [place] [address]  Phone: [phone]  Fax: ()-  Follow Up Time:    PCP,   Phone: (   )    -  Fax: (   )    -  Follow Up Time:     Elyse Flynn  Internal Medicine  [address] [place] [address]  Phone: [phone]  Fax: ()-  Follow Up Time:

## 2023-12-09 NOTE — DISCHARGE NOTE PROVIDER - NSDCMRMEDTOKEN_GEN_ALL_CORE_FT
amLODIPine 10 mg oral tablet: 1 tab(s) orally once a day  atorvastatin 80 mg oral tablet: 1 tab(s) orally once a day (at bedtime)  cyclopentolate 0.5% ophthalmic solution: 1 drop(s) in each affected eye 3 times a day  difluprednate 0.05% ophthalmic emulsion: 1 drop(s) in each affected eye every 2 hours  fosinopril 40 mg oral tablet: 1 tab(s) orally once a day  metoprolol tartrate 50 mg oral tablet: 1 tab(s) orally 2 times a day  moxifloxacin 400 mg oral tablet: 1 tab(s) orally once a day  omeprazole 40 mg oral delayed release capsule: 1 cap(s) orally once a day  polyethylene glycol 3350 oral powder for reconstitution: 17 gram(s) orally once a day as needed for  constipation  tamsulosin 0.4 mg oral capsule: 1 cap(s) orally once a day (at bedtime)  tobramycin 0.3% ophthalmic solution: 1 drop(s) in each affected eye 6 times a day  triamterene-hydrochlorothiazide 37.5 mg-25 mg oral capsule: 1 cap(s) orally once a day  vancomycin 10 mg/mL intraocular solution: 1 application intraocular 6 times a day  warfarin 5 mg oral tablet: 1 tab(s) orally once a day 5mg on Sun, Mon, Tue, Wed, Sat; 7.5mg on Thurs and Fri  Welchol 625 mg oral tablet: 2 tabs 2 times a day  Zetia 10 mg oral tablet: 1 tab(s) orally once a day

## 2023-12-09 NOTE — DISCHARGE NOTE NURSING/CASE MANAGEMENT/SOCIAL WORK - PATIENT PORTAL LINK FT
You can access the FollowMyHealth Patient Portal offered by [facility] by registering at the following website: [email or web address]. By joining [facility]’s FollowMyHealth portal, you will also be able to view your health information using other applications (apps) compatible with our system. You can access the FollowMyHealth Patient Portal offered by [facility] by registering at the following website: [email or web address]. By joining [facility]’s FollowMyHealth portal, you will also be able to view your health information using other applications (apps) compatible with our system.

## 2023-12-09 NOTE — PROGRESS NOTE ADULT - SUBJECTIVE AND OBJECTIVE BOX
NICKY JOSHI  88y, Male    LOS  3d    INTERVAL EVENTS/HPI  - No acute events overnight  - T(F): , Max: 96 (12-08-23 @ 13:59)  - Denies any worsening symptoms  - Tolerating medication  - WBC Count: 8.97 (12-09-23 @ 08:54)  WBC Count: 7.23 (12-08-23 @ 06:21)  - Creatinine: 1.3 (12-09-23 @ 08:54)  Creatinine: 1.0 (12-08-23 @ 06:21)     REVIEW OF SYSTEMS:  CONSTITUTIONAL: No fever or chills  HEENT: No sore throat  RESPIRATORY: No cough, no shortness of breath  CARDIOVASCULAR: No chest pain or palpitations  GASTROINTESTINAL: No abdominal or epigastric pain  GENITOURINARY: No dysuria  NEUROLOGICAL: No headache/dizziness  MSK: No joint pain, erythema, or swelling; no back pain  SKIN: No itching, rashes  All other ROS negative except noted above    Primary team notes reviewed [Yes]  Other consultant notes reviewed [Yes]    ANTIMICROBIALS:       OTHER MEDS: MEDICATIONS  (STANDING):  acetaminophen     Tablet .. 650 every 6 hours PRN  aluminum hydroxide/magnesium hydroxide/simethicone Suspension 30 every 4 hours PRN  amLODIPine   Tablet 10 daily  atorvastatin 80 at bedtime  lisinopril 40 daily  melatonin 3 at bedtime PRN  metoprolol tartrate 50 two times a day  ondansetron Injectable 4 every 8 hours PRN  pantoprazole    Tablet 40 before breakfast  polyethylene glycol 3350 17 two times a day  senna 2 two times a day  tamsulosin 0.4 at bedtime  triamterene 75 mG/hydrochlorothiazide 50 mG Tablet 0.5 daily      Vital Signs Last 24 Hrs  T(F): 95.4 (12-09-23 @ 04:33), Max: 97.7 (12-06-23 @ 09:00)    Vital Signs Last 24 Hrs  HR: 94 (12-09-23 @ 04:33) (90 - 99)  BP: 96/51 (12-09-23 @ 04:33) (90/55 - 96/51)  RR: 18 (12-09-23 @ 04:33)  SpO2: 99% (12-09-23 @ 04:33) (93% - 99%)  Wt(kg): --    EXAM:  CONSTITUTIONAL: NAD  EYES: PERRLA; conjunctiva and sclera clear  ENMT: Dry oral mucosa  NECK: Supple, no palpable masses; no thyromegaly  RESPIRATORY: Normal respiratory effort; lungs are clear to auscultation bilaterally  CARDIOVASCULAR: Regular rate and rhythm, normal S1 and S2, no murmur/rub/gallop; No lower extremity edema; Peripheral pulses are 2+ bilaterally  ABDOMEN: Nontender to palpation, normoactive bowel sounds, no rebound/guarding; No hepatosplenomegaly  MUSCULOSKELETAL:   no clubbing or cyanosis of digits; no joint swelling or tenderness to palpation    Labs:                        12.6   8.97  )-----------( 180      ( 09 Dec 2023 08:54 )             36.8     12-09    138  |  103  |  26<H>  ----------------------------<  113<H>  4.5   |  24  |  1.3    Ca    9.0      09 Dec 2023 08:54        WBC Trend:  WBC Count: 8.97 (12-09-23 @ 08:54)  WBC Count: 7.23 (12-08-23 @ 06:21)  WBC Count: 8.50 (12-07-23 @ 07:10)  WBC Count: 8.42 (12-06-23 @ 09:40)      Creatine Trend:  Creatinine: 1.3 (12-09)  Creatinine: 1.0 (12-08)  Creatinine: 1.4 (12-07)  Creatinine: 1.3 (12-06)      Liver Biochemical Testing Trend:  Alanine Aminotransferase (ALT/SGPT): 26 (12-07)  Alanine Aminotransferase (ALT/SGPT): 27 (12-06)  Aspartate Aminotransferase (AST/SGOT): 34 (12-07-23 @ 07:10)  Aspartate Aminotransferase (AST/SGOT): 34 (12-06-23 @ 09:40)  Bilirubin Total: 0.5 (12-07)  Bilirubin Total: 0.6 (12-06)      Trend LDH      Urinalysis Basic - ( 09 Dec 2023 08:54 )    Color: x / Appearance: x / SG: x / pH: x  Gluc: 113 mg/dL / Ketone: x  / Bili: x / Urobili: x   Blood: x / Protein: x / Nitrite: x   Leuk Esterase: x / RBC: x / WBC x   Sq Epi: x / Non Sq Epi: x / Bacteria: x        MICROBIOLOGY:    Male    Culture - Urine (collected 06 Dec 2023 11:09)  Source: Catheterized Catheterized  Final Report:    No growth      RADIOLOGY & ADDITIONAL TESTS:  I have personally reviewed the relevant images.   CXR      CT  CT Abdomen and Pelvis w/ IV Cont:   ACC: [id number] EXAM:  CT ABDOMEN AND PELVIS IC   ORDERED BY: ZEE HOOK     PROCEDURE DATE:  12/06/2023          INTERPRETATION:  CLINICAL STATEMENT: Constipation. Urinary retention.    TECHNIQUE: Contiguous axial CT images were obtained from thelower chest   to the pubic symphysis following administration of intravenous contrast.    95 cc administered of Omnipaque 350 (5 cc discarded).  Oral contrast was   not administered.  Reformatted images in the coronal and sagittal planes   were acquired.    Comparison made with CT abdomen and pelvis 9/1/2022.    FINDINGS:    LOWER CHEST: Cardiomegaly. Elevated left hemidiaphragm. Bibasilar   subsegmental atelectasis..    HEPATOBILIARY: Unremarkable.    SPLEEN: Unremarkable.    PANCREAS: Unremarkable.    ADRENAL GLANDS: Unremarkable.    KIDNEYS: Unremarkable.    ABDOMINOPELVIC NODES: Unremarkable.    PELVIC ORGANS: BPH. Urinary bladder distended.    PERITONEUM/MESENTERY/BOWEL: Moderate colorectal stool burden, rectal   fecal load 8.7 cm transverse by 6.9 cm AP. No evidence of mechanical   obstruction. Sigmoid diverticulosis. Normal appendix.    BONES/SOFT TISSUES: Worsening T11 and L5 vertebral body compression   fractures since 9/1/2022; likely chronic process.    OTHER: Bilateral fat-containing inguinal hernias, larger on left.   Vascular calcifications.      IMPRESSION:    1. Moderate colorectal stool burden, rectal fecal load 8.7 cm transverse   by 6.9 cm AP. No evidence of mechanical obstruction.    2. BPH. Urinary bladder distended.    3. Worsening T11 and L5 vertebral body compression fractures since   9/1/2022; likely chronic process.    4. No definite acute abdominal pathology.    --- End of Report ---            MARCEL KHAN MD; Attending Radiologist  This document has been electronically signed. Dec  6 2023 12:24PM (12-06-23 @ 12:00)        WEIGHT  Weight (kg): 74.1 (12-06-23 @ 16:32)  Creatinine: 1.3 mg/dL (12-09-23 @ 08:54)      All available historical records have been reviewed       NICKY JOSHI  88y, Male    LOS  3d    INTERVAL EVENTS/HPI  - No acute events overnight  - T(F): , Max: 96 (12-08-23 @ 13:59)  - Denies any worsening symptoms  - Tolerating medication  - WBC Count: 8.97 (12-09-23 @ 08:54)  WBC Count: 7.23 (12-08-23 @ 06:21)  - Creatinine: 1.3 (12-09-23 @ 08:54)  Creatinine: 1.0 (12-08-23 @ 06:21)     REVIEW OF SYSTEMS:  CONSTITUTIONAL: No fever or chills  HEENT: No sore throat  RESPIRATORY: No cough, no shortness of breath  CARDIOVASCULAR: No chest pain or palpitations  GASTROINTESTINAL: No abdominal or epigastric pain  GENITOURINARY: No dysuria  NEUROLOGICAL: No headache/dizziness  MSK: No joint pain, erythema, or swelling; no back pain  SKIN: No itching, rashes  All other ROS negative except noted above    Primary team notes reviewed [Yes]  Other consultant notes reviewed [Yes]    ANTIMICROBIALS:       OTHER MEDS: MEDICATIONS  (STANDING):  acetaminophen     Tablet .. 650 every 6 hours PRN  aluminum hydroxide/magnesium hydroxide/simethicone Suspension 30 every 4 hours PRN  amLODIPine   Tablet 10 daily  atorvastatin 80 at bedtime  lisinopril 40 daily  melatonin 3 at bedtime PRN  metoprolol tartrate 50 two times a day  ondansetron Injectable 4 every 8 hours PRN  pantoprazole    Tablet 40 before breakfast  polyethylene glycol 3350 17 two times a day  senna 2 two times a day  tamsulosin 0.4 at bedtime  triamterene 75 mG/hydrochlorothiazide 50 mG Tablet 0.5 daily      Vital Signs Last 24 Hrs  T(F): 95.4 (12-09-23 @ 04:33), Max: 97.7 (12-06-23 @ 09:00)    Vital Signs Last 24 Hrs  HR: 94 (12-09-23 @ 04:33) (90 - 99)  BP: 96/51 (12-09-23 @ 04:33) (90/55 - 96/51)  RR: 18 (12-09-23 @ 04:33)  SpO2: 99% (12-09-23 @ 04:33) (93% - 99%)  Wt(kg): --    EXAM:  CONSTITUTIONAL: NAD  EYES: PERRLA; conjunctiva and sclera clear  ENMT: Dry oral mucosa  NECK: Supple, no palpable masses; no thyromegaly  RESPIRATORY: Normal respiratory effort; lungs are clear to auscultation bilaterally  CARDIOVASCULAR: Regular rate and rhythm, normal S1 and S2, no murmur/rub/gallop; No lower extremity edema; Peripheral pulses are 2+ bilaterally  ABDOMEN: Nontender to palpation, normoactive bowel sounds, no rebound/guarding; No hepatosplenomegaly  MUSCULOSKELETAL:   no clubbing or cyanosis of digits; no joint swelling or tenderness to palpation    Labs:                        12.6   8.97  )-----------( 180      ( 09 Dec 2023 08:54 )             36.8     12-09    138  |  103  |  26<H>  ----------------------------<  113<H>  4.5   |  24  |  1.3    Ca    9.0      09 Dec 2023 08:54        WBC Trend:  WBC Count: 8.97 (12-09-23 @ 08:54)  WBC Count: 7.23 (12-08-23 @ 06:21)  WBC Count: 8.50 (12-07-23 @ 07:10)  WBC Count: 8.42 (12-06-23 @ 09:40)      Creatine Trend:  Creatinine: 1.3 (12-09)  Creatinine: 1.0 (12-08)  Creatinine: 1.4 (12-07)  Creatinine: 1.3 (12-06)      Liver Biochemical Testing Trend:  Alanine Aminotransferase (ALT/SGPT): 26 (12-07)  Alanine Aminotransferase (ALT/SGPT): 27 (12-06)  Aspartate Aminotransferase (AST/SGOT): 34 (12-07-23 @ 07:10)  Aspartate Aminotransferase (AST/SGOT): 34 (12-06-23 @ 09:40)  Bilirubin Total: 0.5 (12-07)  Bilirubin Total: 0.6 (12-06)      Trend LDH      Urinalysis Basic - ( 09 Dec 2023 08:54 )    Color: x / Appearance: x / SG: x / pH: x  Gluc: 113 mg/dL / Ketone: x  / Bili: x / Urobili: x   Blood: x / Protein: x / Nitrite: x   Leuk Esterase: x / RBC: x / WBC x   Sq Epi: x / Non Sq Epi: x / Bacteria: x        MICROBIOLOGY:    Male    Culture - Urine (collected 06 Dec 2023 11:09)  Source: Catheterized Catheterized  Final Report:    No growth      RADIOLOGY & ADDITIONAL TESTS:  I have personally reviewed the relevant images.   CXR      CT  CT Abdomen and Pelvis w/ IV Cont:   ACC: [id number] EXAM:  CT ABDOMEN AND PELVIS IC   ORDERED BY: ZEE HOOK     PROCEDURE DATE:  12/06/2023          INTERPRETATION:  CLINICAL STATEMENT: Constipation. Urinary retention.    TECHNIQUE: Contiguous axial CT images were obtained from thelower chest   to the pubic symphysis following administration of intravenous contrast.    95 cc administered of Omnipaque 350 (5 cc discarded).  Oral contrast was   not administered.  Reformatted images in the coronal and sagittal planes   were acquired.    Comparison made with CT abdomen and pelvis 9/1/2022.    FINDINGS:    LOWER CHEST: Cardiomegaly. Elevated left hemidiaphragm. Bibasilar   subsegmental atelectasis..    HEPATOBILIARY: Unremarkable.    SPLEEN: Unremarkable.    PANCREAS: Unremarkable.    ADRENAL GLANDS: Unremarkable.    KIDNEYS: Unremarkable.    ABDOMINOPELVIC NODES: Unremarkable.    PELVIC ORGANS: BPH. Urinary bladder distended.    PERITONEUM/MESENTERY/BOWEL: Moderate colorectal stool burden, rectal   fecal load 8.7 cm transverse by 6.9 cm AP. No evidence of mechanical   obstruction. Sigmoid diverticulosis. Normal appendix.    BONES/SOFT TISSUES: Worsening T11 and L5 vertebral body compression   fractures since 9/1/2022; likely chronic process.    OTHER: Bilateral fat-containing inguinal hernias, larger on left.   Vascular calcifications.      IMPRESSION:    1. Moderate colorectal stool burden, rectal fecal load 8.7 cm transverse   by 6.9 cm AP. No evidence of mechanical obstruction.    2. BPH. Urinary bladder distended.    3. Worsening T11 and L5 vertebral body compression fractures since   9/1/2022; likely chronic process.    4. No definite acute abdominal pathology.    --- End of Report ---            MARCEL KHAN MD; Attending Radiologist  This document has been electronically signed. Dec  6 2023 12:24PM (12-06-23 @ 12:00)        WEIGHT  Weight (kg): 74.1 (12-06-23 @ 16:32)  Creatinine: 1.3 mg/dL (12-09-23 @ 08:54)      All available historical records have been reviewed

## 2023-12-09 NOTE — PROGRESS NOTE ADULT - ASSESSMENT
87yo M with PMhx of HTN, HLD, CAD s/p CABG and PCI, Afib on coumadin, Glaucoma c/b by recent L eye infection who presents with difficulty urinating x 3 days. Salazar placed in ED now draining clear yellow urine. CT with mod stool burden and BPH likely contributing to urinary retention. Will leave folye in for 24 hrs, start tamsulosin and bowel regimen then attempt TOV. Incidentally found to have elevated INR. Denies double dosing or recent dosing changes. Notably is taking moxifloxacin for an eye infection which may be interacting with coumadin resulting in elevated INR's. Will hold coumadin for now and monitor INR levels    #urinary retention 2/2 to BPH and large rectal stool load  - aggresive bowel regimen  - pt had a large BM today 12/7 in the afternoon after enema  - initiated TOV after that. So far without salazar.  - Reports having bowel movement today.   - continue tamsulosin  - Cr wnl  - UA negative  - US renal: Urinary retention with approximately 442 cc of urine within the bladder   post voiding.  - CT abd: Moderate colorectal stool burden, rectal fecal load 8.7 cm transverse   by 6.9 cm AP. No evidence of mechanical obstruction. BPH. Urinary bladder distended.    #Worsening T11 and L5 vertebral body compression fractures since   9/1/2022; likely chronic process.  - abdominal binder  - PT/OT    #Supratherapuetic INR  - Home dose is 5mg on Sun-Wed and Sat, 7.5mg on Thurs-Fri  - Also taking Moxifloxain for eye infection which may be interacting with coumadin to result in higher INR levels   - Hold coumadin for now  - Goal 2-3  - INR coming down. 4.1 today.     #Glaucoma   #Recent L eye infection   - Currently on Moxifloxacin 400mg daily, Vancomycin gtt's tobramycin gtt's, difluprednate gtt, and cyclpentolate gtt  - Advised family to bring in drops to administer inpatient as most drops are non-formulary or require frequent dosing    #Chronic Afib   - Not interested in transitioning to DOAC as he has been on this for years  - Takes coumadin 5mg M-W and Sat/Sun. 7.5mg Th and Fri  - Cont Metop 50mg BID  - Hold coumadin for now     #HTN   - Cont Amlodipine 10mg, Lisinopril 40mg (home fosinopril 40mg), Triamterene-HCTZ 37.5-25mg     #HLD   - Cont atorvastatin 80mg   - Home zetia and welchol non formulary     Dispo: Possible discharge in 24 hours.    89yo M with PMhx of HTN, HLD, CAD s/p CABG and PCI, Afib on coumadin, Glaucoma c/b by recent L eye infection who presents with difficulty urinating x 3 days. Salazar placed in ED now draining clear yellow urine. CT with mod stool burden and BPH likely contributing to urinary retention. Will leave folye in for 24 hrs, start tamsulosin and bowel regimen then attempt TOV. Incidentally found to have elevated INR. Denies double dosing or recent dosing changes. Notably is taking moxifloxacin for an eye infection which may be interacting with coumadin resulting in elevated INR's. Will hold coumadin for now and monitor INR levels    #urinary retention 2/2 to BPH and large rectal stool load  - aggresive bowel regimen  - pt had a large BM today 12/7 in the afternoon after enema  - initiated TOV after that. So far without salazar.  - Reports having bowel movement today.   - continue tamsulosin  - Cr wnl  - UA negative  - US renal: Urinary retention with approximately 442 cc of urine within the bladder   post voiding.  - CT abd: Moderate colorectal stool burden, rectal fecal load 8.7 cm transverse   by 6.9 cm AP. No evidence of mechanical obstruction. BPH. Urinary bladder distended.    #Worsening T11 and L5 vertebral body compression fractures since   9/1/2022; likely chronic process.  - abdominal binder  - PT/OT    #Supratherapuetic INR  - Home dose is 5mg on Sun-Wed and Sat, 7.5mg on Thurs-Fri  - Also taking Moxifloxain for eye infection which may be interacting with coumadin to result in higher INR levels   - Hold coumadin for now  - Goal 2-3  - INR coming down. 4.1 today.     #Glaucoma   #Recent L eye infection   - Currently on Moxifloxacin 400mg daily, Vancomycin gtt's tobramycin gtt's, difluprednate gtt, and cyclpentolate gtt  - Advised family to bring in drops to administer inpatient as most drops are non-formulary or require frequent dosing    #Chronic Afib   - Not interested in transitioning to DOAC as he has been on this for years  - Takes coumadin 5mg M-W and Sat/Sun. 7.5mg Th and Fri  - Cont Metop 50mg BID  - Hold coumadin for now     #HTN   - Cont Amlodipine 10mg, Lisinopril 40mg (home fosinopril 40mg), Triamterene-HCTZ 37.5-25mg     #HLD   - Cont atorvastatin 80mg   - Home zetia and welchol non formulary     Dispo: Possible discharge in 24 hours.

## 2023-12-12 ENCOUNTER — INPATIENT (INPATIENT)
Facility: HOSPITAL | Age: 88
LOS: 2 days | Discharge: HOME HEALTH | DRG: 92 | End: 2023-12-15
Attending: HOSPITALIST | Admitting: INTERNAL MEDICINE
Payer: COMMERCIAL

## 2023-12-12 VITALS
RESPIRATION RATE: 18 BRPM | SYSTOLIC BLOOD PRESSURE: 105 MMHG | HEART RATE: 94 BPM | DIASTOLIC BLOOD PRESSURE: 62 MMHG | TEMPERATURE: 98 F | OXYGEN SATURATION: 98 % | HEIGHT: 70 IN | WEIGHT: 175.05 LBS

## 2023-12-12 DIAGNOSIS — Z95.1 PRESENCE OF AORTOCORONARY BYPASS GRAFT: Chronic | ICD-10-CM
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
## 2023-12-12 LAB
ALBUMIN SERPL ELPH-MCNC: 3.8 G/DL — SIGNIFICANT CHANGE UP (ref 3.5–5.2)
ALBUMIN SERPL ELPH-MCNC: 3.8 G/DL — SIGNIFICANT CHANGE UP (ref 3.5–5.2)
ALP SERPL-CCNC: 41 U/L — SIGNIFICANT CHANGE UP (ref 30–115)
ALP SERPL-CCNC: 41 U/L — SIGNIFICANT CHANGE UP (ref 30–115)
ALT FLD-CCNC: 25 U/L — SIGNIFICANT CHANGE UP (ref 0–41)
ALT FLD-CCNC: 25 U/L — SIGNIFICANT CHANGE UP (ref 0–41)
ANION GAP SERPL CALC-SCNC: 14 MMOL/L — SIGNIFICANT CHANGE UP (ref 7–14)
ANION GAP SERPL CALC-SCNC: 14 MMOL/L — SIGNIFICANT CHANGE UP (ref 7–14)
APPEARANCE UR: CLEAR — SIGNIFICANT CHANGE UP
APPEARANCE UR: CLEAR — SIGNIFICANT CHANGE UP
APTT BLD: 28.9 SEC — SIGNIFICANT CHANGE UP (ref 27–39.2)
APTT BLD: 28.9 SEC — SIGNIFICANT CHANGE UP (ref 27–39.2)
AST SERPL-CCNC: 44 U/L — HIGH (ref 0–41)
AST SERPL-CCNC: 44 U/L — HIGH (ref 0–41)
BASOPHILS # BLD AUTO: 0.03 K/UL — SIGNIFICANT CHANGE UP (ref 0–0.2)
BASOPHILS # BLD AUTO: 0.03 K/UL — SIGNIFICANT CHANGE UP (ref 0–0.2)
BASOPHILS NFR BLD AUTO: 0.4 % — SIGNIFICANT CHANGE UP (ref 0–1)
BASOPHILS NFR BLD AUTO: 0.4 % — SIGNIFICANT CHANGE UP (ref 0–1)
BILIRUB SERPL-MCNC: 1 MG/DL — SIGNIFICANT CHANGE UP (ref 0.2–1.2)
BILIRUB SERPL-MCNC: 1 MG/DL — SIGNIFICANT CHANGE UP (ref 0.2–1.2)
BILIRUB UR-MCNC: NEGATIVE — SIGNIFICANT CHANGE UP
BILIRUB UR-MCNC: NEGATIVE — SIGNIFICANT CHANGE UP
BUN SERPL-MCNC: 26 MG/DL — HIGH (ref 10–20)
BUN SERPL-MCNC: 26 MG/DL — HIGH (ref 10–20)
CALCIUM SERPL-MCNC: 9.4 MG/DL — SIGNIFICANT CHANGE UP (ref 8.4–10.5)
CALCIUM SERPL-MCNC: 9.4 MG/DL — SIGNIFICANT CHANGE UP (ref 8.4–10.5)
CHLORIDE SERPL-SCNC: 103 MMOL/L — SIGNIFICANT CHANGE UP (ref 98–110)
CHLORIDE SERPL-SCNC: 103 MMOL/L — SIGNIFICANT CHANGE UP (ref 98–110)
CO2 SERPL-SCNC: 22 MMOL/L — SIGNIFICANT CHANGE UP (ref 17–32)
CO2 SERPL-SCNC: 22 MMOL/L — SIGNIFICANT CHANGE UP (ref 17–32)
COLOR SPEC: YELLOW — SIGNIFICANT CHANGE UP
COLOR SPEC: YELLOW — SIGNIFICANT CHANGE UP
CREAT SERPL-MCNC: 1.4 MG/DL — SIGNIFICANT CHANGE UP (ref 0.7–1.5)
CREAT SERPL-MCNC: 1.4 MG/DL — SIGNIFICANT CHANGE UP (ref 0.7–1.5)
DIFF PNL FLD: ABNORMAL
DIFF PNL FLD: ABNORMAL
EGFR: 49 ML/MIN/1.73M2 — LOW
EGFR: 49 ML/MIN/1.73M2 — LOW
EOSINOPHIL # BLD AUTO: 0.07 K/UL — SIGNIFICANT CHANGE UP (ref 0–0.7)
EOSINOPHIL # BLD AUTO: 0.07 K/UL — SIGNIFICANT CHANGE UP (ref 0–0.7)
EOSINOPHIL NFR BLD AUTO: 0.9 % — SIGNIFICANT CHANGE UP (ref 0–8)
EOSINOPHIL NFR BLD AUTO: 0.9 % — SIGNIFICANT CHANGE UP (ref 0–8)
GLUCOSE SERPL-MCNC: 99 MG/DL — SIGNIFICANT CHANGE UP (ref 70–99)
GLUCOSE SERPL-MCNC: 99 MG/DL — SIGNIFICANT CHANGE UP (ref 70–99)
GLUCOSE UR QL: NEGATIVE MG/DL — SIGNIFICANT CHANGE UP
GLUCOSE UR QL: NEGATIVE MG/DL — SIGNIFICANT CHANGE UP
HCT VFR BLD CALC: 35.8 % — LOW (ref 42–52)
HCT VFR BLD CALC: 35.8 % — LOW (ref 42–52)
HGB BLD-MCNC: 12 G/DL — LOW (ref 14–18)
HGB BLD-MCNC: 12 G/DL — LOW (ref 14–18)
IMM GRANULOCYTES NFR BLD AUTO: 0.5 % — HIGH (ref 0.1–0.3)
IMM GRANULOCYTES NFR BLD AUTO: 0.5 % — HIGH (ref 0.1–0.3)
INR BLD: 1.67 RATIO — HIGH (ref 0.65–1.3)
INR BLD: 1.67 RATIO — HIGH (ref 0.65–1.3)
KETONES UR-MCNC: NEGATIVE MG/DL — SIGNIFICANT CHANGE UP
KETONES UR-MCNC: NEGATIVE MG/DL — SIGNIFICANT CHANGE UP
LEUKOCYTE ESTERASE UR-ACNC: NEGATIVE — SIGNIFICANT CHANGE UP
LEUKOCYTE ESTERASE UR-ACNC: NEGATIVE — SIGNIFICANT CHANGE UP
LYMPHOCYTES # BLD AUTO: 1.25 K/UL — SIGNIFICANT CHANGE UP (ref 1.2–3.4)
LYMPHOCYTES # BLD AUTO: 1.25 K/UL — SIGNIFICANT CHANGE UP (ref 1.2–3.4)
LYMPHOCYTES # BLD AUTO: 15.3 % — LOW (ref 20.5–51.1)
LYMPHOCYTES # BLD AUTO: 15.3 % — LOW (ref 20.5–51.1)
MCHC RBC-ENTMCNC: 31.4 PG — HIGH (ref 27–31)
MCHC RBC-ENTMCNC: 31.4 PG — HIGH (ref 27–31)
MCHC RBC-ENTMCNC: 33.5 G/DL — SIGNIFICANT CHANGE UP (ref 32–37)
MCHC RBC-ENTMCNC: 33.5 G/DL — SIGNIFICANT CHANGE UP (ref 32–37)
MCV RBC AUTO: 93.7 FL — SIGNIFICANT CHANGE UP (ref 80–94)
MCV RBC AUTO: 93.7 FL — SIGNIFICANT CHANGE UP (ref 80–94)
MONOCYTES # BLD AUTO: 1.08 K/UL — HIGH (ref 0.1–0.6)
MONOCYTES # BLD AUTO: 1.08 K/UL — HIGH (ref 0.1–0.6)
MONOCYTES NFR BLD AUTO: 13.2 % — HIGH (ref 1.7–9.3)
MONOCYTES NFR BLD AUTO: 13.2 % — HIGH (ref 1.7–9.3)
NEUTROPHILS # BLD AUTO: 5.7 K/UL — SIGNIFICANT CHANGE UP (ref 1.4–6.5)
NEUTROPHILS # BLD AUTO: 5.7 K/UL — SIGNIFICANT CHANGE UP (ref 1.4–6.5)
NEUTROPHILS NFR BLD AUTO: 69.7 % — SIGNIFICANT CHANGE UP (ref 42.2–75.2)
NEUTROPHILS NFR BLD AUTO: 69.7 % — SIGNIFICANT CHANGE UP (ref 42.2–75.2)
NITRITE UR-MCNC: NEGATIVE — SIGNIFICANT CHANGE UP
NITRITE UR-MCNC: NEGATIVE — SIGNIFICANT CHANGE UP
NRBC # BLD: 0 /100 WBCS — SIGNIFICANT CHANGE UP (ref 0–0)
NRBC # BLD: 0 /100 WBCS — SIGNIFICANT CHANGE UP (ref 0–0)
PH UR: 5 — SIGNIFICANT CHANGE UP (ref 5–8)
PH UR: 5 — SIGNIFICANT CHANGE UP (ref 5–8)
PLATELET # BLD AUTO: 171 K/UL — SIGNIFICANT CHANGE UP (ref 130–400)
PLATELET # BLD AUTO: 171 K/UL — SIGNIFICANT CHANGE UP (ref 130–400)
PMV BLD: 9.9 FL — SIGNIFICANT CHANGE UP (ref 7.4–10.4)
PMV BLD: 9.9 FL — SIGNIFICANT CHANGE UP (ref 7.4–10.4)
POTASSIUM SERPL-MCNC: 4.5 MMOL/L — SIGNIFICANT CHANGE UP (ref 3.5–5)
POTASSIUM SERPL-MCNC: 4.5 MMOL/L — SIGNIFICANT CHANGE UP (ref 3.5–5)
POTASSIUM SERPL-SCNC: 4.5 MMOL/L — SIGNIFICANT CHANGE UP (ref 3.5–5)
POTASSIUM SERPL-SCNC: 4.5 MMOL/L — SIGNIFICANT CHANGE UP (ref 3.5–5)
PROT SERPL-MCNC: 6.2 G/DL — SIGNIFICANT CHANGE UP (ref 6–8)
PROT SERPL-MCNC: 6.2 G/DL — SIGNIFICANT CHANGE UP (ref 6–8)
PROT UR-MCNC: NEGATIVE MG/DL — SIGNIFICANT CHANGE UP
PROT UR-MCNC: NEGATIVE MG/DL — SIGNIFICANT CHANGE UP
PROTHROM AB SERPL-ACNC: 19.1 SEC — HIGH (ref 9.95–12.87)
PROTHROM AB SERPL-ACNC: 19.1 SEC — HIGH (ref 9.95–12.87)
RBC # BLD: 3.82 M/UL — LOW (ref 4.7–6.1)
RBC # BLD: 3.82 M/UL — LOW (ref 4.7–6.1)
RBC # FLD: 14.8 % — HIGH (ref 11.5–14.5)
RBC # FLD: 14.8 % — HIGH (ref 11.5–14.5)
SODIUM SERPL-SCNC: 139 MMOL/L — SIGNIFICANT CHANGE UP (ref 135–146)
SODIUM SERPL-SCNC: 139 MMOL/L — SIGNIFICANT CHANGE UP (ref 135–146)
SP GR SPEC: 1.01 — SIGNIFICANT CHANGE UP (ref 1–1.03)
SP GR SPEC: 1.01 — SIGNIFICANT CHANGE UP (ref 1–1.03)
TROPONIN T SERPL-MCNC: <0.01 NG/ML — SIGNIFICANT CHANGE UP
TROPONIN T SERPL-MCNC: <0.01 NG/ML — SIGNIFICANT CHANGE UP
UROBILINOGEN FLD QL: 0.2 MG/DL — SIGNIFICANT CHANGE UP (ref 0.2–1)
UROBILINOGEN FLD QL: 0.2 MG/DL — SIGNIFICANT CHANGE UP (ref 0.2–1)
WBC # BLD: 8.17 K/UL — SIGNIFICANT CHANGE UP (ref 4.8–10.8)
WBC # BLD: 8.17 K/UL — SIGNIFICANT CHANGE UP (ref 4.8–10.8)
WBC # FLD AUTO: 8.17 K/UL — SIGNIFICANT CHANGE UP (ref 4.8–10.8)
WBC # FLD AUTO: 8.17 K/UL — SIGNIFICANT CHANGE UP (ref 4.8–10.8)

## 2023-12-12 PROCEDURE — 73130 X-RAY EXAM OF HAND: CPT | Mod: 26,LT

## 2023-12-12 PROCEDURE — 72170 X-RAY EXAM OF PELVIS: CPT | Mod: 26

## 2023-12-12 PROCEDURE — 85027 COMPLETE CBC AUTOMATED: CPT

## 2023-12-12 PROCEDURE — 99223 1ST HOSP IP/OBS HIGH 75: CPT

## 2023-12-12 PROCEDURE — 97162 PT EVAL MOD COMPLEX 30 MIN: CPT | Mod: GP

## 2023-12-12 PROCEDURE — 84484 ASSAY OF TROPONIN QUANT: CPT

## 2023-12-12 PROCEDURE — 85025 COMPLETE CBC W/AUTO DIFF WBC: CPT

## 2023-12-12 PROCEDURE — 84443 ASSAY THYROID STIM HORMONE: CPT

## 2023-12-12 PROCEDURE — 93010 ELECTROCARDIOGRAM REPORT: CPT

## 2023-12-12 PROCEDURE — 83735 ASSAY OF MAGNESIUM: CPT

## 2023-12-12 PROCEDURE — 99285 EMERGENCY DEPT VISIT HI MDM: CPT | Mod: 25

## 2023-12-12 PROCEDURE — 97116 GAIT TRAINING THERAPY: CPT | Mod: GP

## 2023-12-12 PROCEDURE — 81001 URINALYSIS AUTO W/SCOPE: CPT

## 2023-12-12 PROCEDURE — 85730 THROMBOPLASTIN TIME PARTIAL: CPT

## 2023-12-12 PROCEDURE — 80048 BASIC METABOLIC PNL TOTAL CA: CPT

## 2023-12-12 PROCEDURE — 85610 PROTHROMBIN TIME: CPT

## 2023-12-12 PROCEDURE — 83605 ASSAY OF LACTIC ACID: CPT

## 2023-12-12 PROCEDURE — 97110 THERAPEUTIC EXERCISES: CPT | Mod: GP

## 2023-12-12 PROCEDURE — 87086 URINE CULTURE/COLONY COUNT: CPT

## 2023-12-12 PROCEDURE — 73030 X-RAY EXAM OF SHOULDER: CPT | Mod: 26,LT

## 2023-12-12 PROCEDURE — 70450 CT HEAD/BRAIN W/O DYE: CPT | Mod: 26,MA

## 2023-12-12 PROCEDURE — 36415 COLL VENOUS BLD VENIPUNCTURE: CPT

## 2023-12-12 PROCEDURE — 12002 RPR S/N/AX/GEN/TRNK2.6-7.5CM: CPT

## 2023-12-12 PROCEDURE — 71045 X-RAY EXAM CHEST 1 VIEW: CPT | Mod: 26

## 2023-12-12 PROCEDURE — 80053 COMPREHEN METABOLIC PANEL: CPT

## 2023-12-12 RX ORDER — LATANOPROST 0.05 MG/ML
1 SOLUTION/ DROPS OPHTHALMIC; TOPICAL
Refills: 0 | DISCHARGE

## 2023-12-12 RX ORDER — POLYETHYLENE GLYCOL 3350 17 G/17G
17 POWDER, FOR SOLUTION ORAL DAILY
Refills: 0 | Status: DISCONTINUED | OUTPATIENT
Start: 2023-12-12 | End: 2023-12-15

## 2023-12-12 RX ORDER — CYCLOPENTOLATE HYDROCHLORIDE 10 MG/ML
1 SOLUTION/ DROPS OPHTHALMIC THREE TIMES A DAY
Refills: 0 | Status: DISCONTINUED | OUTPATIENT
Start: 2023-12-12 | End: 2023-12-13

## 2023-12-12 RX ORDER — METOPROLOL TARTRATE 50 MG
100 TABLET ORAL DAILY
Refills: 0 | Status: DISCONTINUED | OUTPATIENT
Start: 2023-12-12 | End: 2023-12-13

## 2023-12-12 RX ORDER — SODIUM CHLORIDE 9 MG/ML
250 INJECTION INTRAMUSCULAR; INTRAVENOUS; SUBCUTANEOUS ONCE
Refills: 0 | Status: COMPLETED | OUTPATIENT
Start: 2023-12-12 | End: 2023-12-12

## 2023-12-12 RX ORDER — ATORVASTATIN CALCIUM 80 MG/1
80 TABLET, FILM COATED ORAL AT BEDTIME
Refills: 0 | Status: DISCONTINUED | OUTPATIENT
Start: 2023-12-12 | End: 2023-12-15

## 2023-12-12 RX ORDER — PANTOPRAZOLE SODIUM 20 MG/1
40 TABLET, DELAYED RELEASE ORAL
Refills: 0 | Status: DISCONTINUED | OUTPATIENT
Start: 2023-12-12 | End: 2023-12-15

## 2023-12-12 RX ORDER — AMLODIPINE BESYLATE 2.5 MG/1
10 TABLET ORAL DAILY
Refills: 0 | Status: DISCONTINUED | OUTPATIENT
Start: 2023-12-12 | End: 2023-12-13

## 2023-12-12 RX ORDER — OMEPRAZOLE 10 MG/1
1 CAPSULE, DELAYED RELEASE ORAL
Refills: 0 | DISCHARGE

## 2023-12-12 RX ORDER — TOBRAMYCIN 0.3 %
1 DROPS OPHTHALMIC (EYE)
Refills: 0 | DISCHARGE

## 2023-12-12 RX ORDER — LISINOPRIL 2.5 MG/1
40 TABLET ORAL DAILY
Refills: 0 | Status: DISCONTINUED | OUTPATIENT
Start: 2023-12-12 | End: 2023-12-13

## 2023-12-12 RX ORDER — WARFARIN SODIUM 2.5 MG/1
5 TABLET ORAL ONCE
Refills: 0 | Status: COMPLETED | OUTPATIENT
Start: 2023-12-12 | End: 2023-12-12

## 2023-12-12 RX ORDER — CEFAZOLIN SODIUM 1 G
2000 VIAL (EA) INJECTION ONCE
Refills: 0 | Status: COMPLETED | OUTPATIENT
Start: 2023-12-12 | End: 2023-12-12

## 2023-12-12 RX ORDER — SENNA PLUS 8.6 MG/1
2 TABLET ORAL AT BEDTIME
Refills: 0 | Status: DISCONTINUED | OUTPATIENT
Start: 2023-12-12 | End: 2023-12-15

## 2023-12-12 RX ORDER — ACETAMINOPHEN 500 MG
650 TABLET ORAL EVERY 8 HOURS
Refills: 0 | Status: DISCONTINUED | OUTPATIENT
Start: 2023-12-12 | End: 2023-12-15

## 2023-12-12 RX ORDER — FOSINOPRIL SODIUM 10 MG/1
1 TABLET ORAL
Refills: 0 | DISCHARGE

## 2023-12-12 RX ORDER — LISINOPRIL 2.5 MG/1
1 TABLET ORAL
Refills: 0 | DISCHARGE

## 2023-12-12 RX ADMIN — WARFARIN SODIUM 5 MILLIGRAM(S): 2.5 TABLET ORAL at 21:37

## 2023-12-12 RX ADMIN — Medication 650 MILLIGRAM(S): at 21:36

## 2023-12-12 RX ADMIN — SODIUM CHLORIDE 125 MILLILITER(S): 9 INJECTION INTRAMUSCULAR; INTRAVENOUS; SUBCUTANEOUS at 18:19

## 2023-12-12 RX ADMIN — SENNA PLUS 2 TABLET(S): 8.6 TABLET ORAL at 21:35

## 2023-12-12 RX ADMIN — Medication 100 MILLIGRAM(S): at 18:19

## 2023-12-12 RX ADMIN — Medication 650 MILLIGRAM(S): at 22:28

## 2023-12-12 NOTE — ED PROVIDER NOTE - CLINICAL SUMMARY MEDICAL DECISION MAKING FREE TEXT BOX
Frequent falls.  Deconditioning.  Left hand laceration.    The following studies were ordered and independently interpreted by me -Labs, EKG, imaging.  [Appropriate medications for patient's presenting complaints were ordered and effects were reassessed].   Patient's external records prior hospitalization were reviewed.  Additional history was obtained from son.    Escalation to observation was considered.  Patient requires inpatient hospitalization - monitored setting.

## 2023-12-12 NOTE — ED ADULT NURSE NOTE - NSFALLHARMRISKINTERV_ED_ALL_ED
Assistance OOB with selected safe patient handling equipment if applicable/Assistance with ambulation/Communicate risk of Fall with Harm to all staff, patient, and family/Monitor gait and stability/Provide visual cue: red socks, yellow wristband, yellow gown, etc/Reinforce activity limits and safety measures with patient and family/Bed in lowest position, wheels locked, appropriate side rails in place/Call bell, personal items and telephone in reach/Instruct patient to call for assistance before getting out of bed/chair/stretcher/Non-slip footwear applied when patient is off stretcher/[place] to call system/Physically safe environment - no spills, clutter or unnecessary equipment/Purposeful Proactive Rounding/Room/bathroom lighting operational, light cord in reach Assistance OOB with selected safe patient handling equipment if applicable/Assistance with ambulation/Communicate risk of Fall with Harm to all staff, patient, and family/Monitor gait and stability/Provide visual cue: red socks, yellow wristband, yellow gown, etc/Reinforce activity limits and safety measures with patient and family/Bed in lowest position, wheels locked, appropriate side rails in place/Call bell, personal items and telephone in reach/Instruct patient to call for assistance before getting out of bed/chair/stretcher/Non-slip footwear applied when patient is off stretcher/[place] to call system/Physically safe environment - no spills, clutter or unnecessary equipment/Purposeful Proactive Rounding/Room/bathroom lighting operational, light cord in reach

## 2023-12-12 NOTE — H&P ADULT - ATTENDING COMMENTS
88M MPH: CAD, CABG, Chronic A-Fib on coumadin, who presents with recurrent falls in the last 2 days at least 2-3times a day. Pt recently had eye infection and was seen by ophalmology and was given eye drops antibiotics. Pt states today morning he woke up and walking in the room tripped in the side of his bed and fell on his Left Side for which he lacerated his left forearm and left shoulder. Pt denies head injury or LOC.   Pt denies fevers, chills, n/v, dizziness, chest pain or sob.  Pt has not been falling prior to last 3 days.  Pt usually goes to work until last 3weeks.   Pt lives with his wife who suffers from dementia and his son is close by. Wife has an aid to assist with home needs.     Vital Signs Last 24 Hrs  T(C): 36.4 (12 Dec 2023 16:34), Max: 36.9 (12 Dec 2023 08:26)  T(F): 97.5 (12 Dec 2023 16:34), Max: 98.4 (12 Dec 2023 08:26)  HR: 104 (12 Dec 2023 16:34) (94 - 104)  BP: 116/67 (12 Dec 2023 16:34) (105/62 - 116/67)  RR: 18 (12 Dec 2023 16:34) (18 - 18)  SpO2: 96% (12 Dec 2023 16:34) (96% - 98%)    Parameters below as of 12 Dec 2023 16:34  Patient On (Oxygen Delivery Method): room air    GEN: NAD  Head: NCAT   CV: NL S1/2, tachycardia, irregularly irregular   RESP: CTA B/L   GI: +BS Soft NT ND  Ext: No e/c/c   Lt forearm: ++ laceration close to wrist 1st large finger - extensive area of laceration    PT/INR - ( 12 Dec 2023 09:00 )   PT: 19.10 sec;   INR: 1.67 ratio      PTT - ( 12 Dec 2023 09:00 )  PTT:28.9 sec                        12.0   8.17  )-----------( 171      ( 12 Dec 2023 09:00 )             35.8     12-12    139  |  103  |  26<H>  ----------------------------<  99  4.5   |  22  |  1.4    Ca    9.4      12 Dec 2023 09:00    TPro  6.2  /  Alb  3.8  /  TBili  1.0  /  DBili  x   /  AST  44<H>  /  ALT  25  /  AlkPhos  41  12-12    EKG:   Diagnosis Line Atrial Fibrillation  Left Ventricular Hypertrophy with QRS widening  Inferior infarct , age undetermined  Abnormal ECG    Xrays Reviewed no fractures  CXR: Lt elevated hemidiaphram   Hand xray: errossive OA    IMPRESSION:   Recurrent Falls x 3days suspecting mechanical fall however need to rule out Sick Sinus Syndrome as etiology  Left Hand Laceration - s/p 5 sutures in the ER   Chronic Afib w/ Subtherapeutic INR (1.6)  Left Eye Infection   CAD/CABG (stable)    PLAN:   Ancef 1g IV q8 (laceration infection prophylaxis x3d)   Monitor on tele x 24hrs   Check Orthostatics   Check TTE   Check TSH   Hold Flomax / Triamterine/HTCZ  Coumadin 5mg po tonight   INR daily   PT Evaluation   Discussed with pt about changing to Eliquis but he wants to wait and discuss with his Glenis Eaton all other home meds     Called son but not available: Leonel Flores [phone]    Full code    Dispo: Acute 88M MPH: CAD, CABG, Chronic A-Fib on coumadin, who presents with recurrent falls in the last 2 days at least 2-3times a day. Pt recently had eye infection and was seen by ophalmology and was given eye drops antibiotics. Pt states today morning he woke up and walking in the room tripped in the side of his bed and fell on his Left Side for which he lacerated his left forearm and left shoulder. Pt denies head injury or LOC.   Pt denies fevers, chills, n/v, dizziness, chest pain or sob.  Pt has not been falling prior to last 3 days.  Pt usually goes to work until last 3weeks.   Pt lives with his wife who suffers from dementia and his son is close by. Wife has an aid to assist with home needs.     Vital Signs Last 24 Hrs  T(C): 36.4 (12 Dec 2023 16:34), Max: 36.9 (12 Dec 2023 08:26)  T(F): 97.5 (12 Dec 2023 16:34), Max: 98.4 (12 Dec 2023 08:26)  HR: 104 (12 Dec 2023 16:34) (94 - 104)  BP: 116/67 (12 Dec 2023 16:34) (105/62 - 116/67)  RR: 18 (12 Dec 2023 16:34) (18 - 18)  SpO2: 96% (12 Dec 2023 16:34) (96% - 98%)    Parameters below as of 12 Dec 2023 16:34  Patient On (Oxygen Delivery Method): room air    GEN: NAD  Head: NCAT   CV: NL S1/2, tachycardia, irregularly irregular   RESP: CTA B/L   GI: +BS Soft NT ND  Ext: No e/c/c   Lt forearm: ++ laceration close to wrist 1st large finger - extensive area of laceration    PT/INR - ( 12 Dec 2023 09:00 )   PT: 19.10 sec;   INR: 1.67 ratio      PTT - ( 12 Dec 2023 09:00 )  PTT:28.9 sec                        12.0   8.17  )-----------( 171      ( 12 Dec 2023 09:00 )             35.8     12-12    139  |  103  |  26<H>  ----------------------------<  99  4.5   |  22  |  1.4    Ca    9.4      12 Dec 2023 09:00    TPro  6.2  /  Alb  3.8  /  TBili  1.0  /  DBili  x   /  AST  44<H>  /  ALT  25  /  AlkPhos  41  12-12    EKG:   Diagnosis Line Atrial Fibrillation  Left Ventricular Hypertrophy with QRS widening  Inferior infarct , age undetermined  Abnormal ECG    Xrays Reviewed no fractures  CXR: Lt elevated hemidiaphram   Hand xray: errossive OA    IMPRESSION:   Recurrent Falls x 3days suspecting mechanical fall however need to rule out Sick Sinus Syndrome as etiology  Left Hand Laceration - s/p 5 sutures in the ER   Chronic Afib w/ Subtherapeutic INR (1.6)  Left Eye Infection   CAD/CABG (stable)    PLAN:   Ancef 1g IV q8 (laceration infection prophylaxis x3d)   Monitor on tele x 24hrs   Check Orthostatics   Check TTE   Check TSH   Hold Flomax / Triamterine/HTCZ  Coumadin 5mg po tonight   INR daily   PT Evaluation   Discussed with pt about changing to Eliquis but he wants to wait and discuss with his Glenis Eaton all other home meds     Called son but not available: Leonel Flores [phone]    Full code    Dispo: Acute

## 2023-12-12 NOTE — H&P ADULT - NSHPLABSRESULTS_GEN_ALL_CORE
LABS:                          12.0   8.17  )-----------( 171      ( 12 Dec 2023 09:00 )             35.8     12-12    139  |  103  |  26<H>  ----------------------------<  99  4.5   |  22  |  1.4    Ca    9.4      12 Dec 2023 09:00    TPro  6.2  /  Alb  3.8  /  TBili  1.0  /  DBili  x   /  AST  44<H>  /  ALT  25  /  AlkPhos  41  12-12    LIVER FUNCTIONS - ( 12 Dec 2023 09:00 )  Alb: 3.8 g/dL / Pro: 6.2 g/dL / ALK PHOS: 41 U/L / ALT: 25 U/L / AST: 44 U/L / GGT: x           PT/INR - ( 12 Dec 2023 09:00 )   PT: 19.10 sec;   INR: 1.67 ratio         PTT - ( 12 Dec 2023 09:00 )  PTT:28.9 sec  Urinalysis Basic - ( 12 Dec 2023 09:00 )    Color: x / Appearance: x / SG: x / pH: x  Gluc: 99 mg/dL / Ketone: x  / Bili: x / Urobili: x   Blood: x / Protein: x / Nitrite: x   Leuk Esterase: x / RBC: x / WBC x   Sq Epi: x / Non Sq Epi: x / Bacteria: x

## 2023-12-12 NOTE — ED PROVIDER NOTE - PHYSICAL EXAMINATION
PHYSICAL EXAM: I have reviewed current vital signs.  GENERAL: NAD, well-nourished; well-developed.  HEAD:  Normocephalic, atraumatic.  EYES: Conjunctiva and sclera clear.  ENT: MMM, no erythema/exudates.  NECK: Supple, no JVD.  CHEST/LUNG: Clear to auscultation bilaterally; no wheezes, rales, or rhonchi.  HEART: Regular rate and rhythm, normal S1 and S2; no murmurs, rubs, or gallops.  ABDOMEN: Soft, nontender, nondistended.  EXTREMITIES:  6 cm laceration to left hand distal to second MCP.  PSYCH: Cooperative, appropriate, normal mood and affect.  NEUROLOGY: A&O x 3. No focal neurological deficits.   SKIN: Warm and dry.

## 2023-12-12 NOTE — ED PROVIDER NOTE - CARE PLAN
Principal Discharge DX:	Frequent falls   1 Principal Discharge DX:	Frequent falls  Secondary Diagnosis:	Laceration of hand

## 2023-12-12 NOTE — ED PROVIDER NOTE - OBJECTIVE STATEMENT
88-year-old male with past medical history of HTN, CAD s/p CABG, A-fib on Coumadin, presents to the ED following fall this morning.  Patient reports he tripped over his bedding; no head trauma, LOC, or vomiting.  Patient has a cut on his left hand and complains of mild left shoulder soreness.  As per son, patient has had multiple falls over the last 72 hours due to unsteadiness.  Patient has no other complaints at this time.

## 2023-12-12 NOTE — ED ADULT TRIAGE NOTE - WEIGHT IN LBS
"Requested Prescriptions   Pending Prescriptions Disp Refills    DULoxetine (CYMBALTA) 60 MG capsule [Pharmacy Med Name: DULOXETINE HCL 60MG CPEP] 90 capsule 1     Sig: TAKE ONE CAPSULE BY MOUTH ONCE DAILY       Serotonin-Norepinephrine Reuptake Inhibitors  Failed - 12/16/2022  3:17 AM        Failed - Blood pressure under 140/90 in past 12 months     BP Readings from Last 3 Encounters:   12/09/22 (!) 113/91   10/28/22 125/73   08/18/22 98/62                 Passed - Recent (12 mo) or future (30 days) visit within the authorizing provider's specialty     Patient has had an office visit with the authorizing provider or a provider within the authorizing providers department within the previous 12 mos or has a future within next 30 days. See \"Patient Info\" tab in inbasket, or \"Choose Columns\" in Meds & Orders section of the refill encounter.              Passed - Medication is active on med list        Passed - Patient is age 18 or older               " 175

## 2023-12-12 NOTE — ED PROVIDER NOTE - PROGRESS NOTE DETAILS
AE: Attempted ambulation, patient unable to ambulate unassisted which is not his baseline.  Will admit for inability to ambulate.

## 2023-12-12 NOTE — H&P ADULT - NSICDXPASTMEDICALHX_GEN_ALL_CORE_FT
PAST MEDICAL HISTORY:  Afib     Dyslipidemia     Glaucoma     Hypertension      PAST MEDICAL HISTORY:  Afib     Glaucoma     Hyperlipidemia     Hypertension

## 2023-12-12 NOTE — H&P ADULT - NSHPPHYSICALEXAM_GEN_ALL_CORE
GENERAL: NAD, lying in bed comfortably  HEAD:  Atraumatic, Normocephalic  EYES: left eye is closed/unable to open, right eye appears teary and erythematous   ENT: Moist mucous membranes  NECK: Supple, No JVD  CHEST/LUNG: Clear to auscultation bilaterally  HEART: Regular rate and rhythm; No murmurs, rubs, or gallops  ABDOMEN: Bowel sounds present; Soft, Nontender,  EXTREMITIES:  No edema, Bandage over sutures on left lateral hand.  NERVOUS SYSTEM:  Alert & Oriented X3. Appears tired.  MSK: FROM all 4 extremities, full and equal strength  SKIN: Sutured wound on left lateral hand, healed 2 inch laceration on left anterior shin

## 2023-12-12 NOTE — ED ADULT NURSE NOTE - OBJECTIVE STATEMENT
BIBEMS from home c/o slip and fall at home this AM. Denies HT and LOC. On coumadin. C/o left shoulder and wrist pain.

## 2023-12-12 NOTE — ED PROVIDER NOTE - ATTENDING CONTRIBUTION TO CARE
88-year-old male history of hypertension, CAD status post CABG, A-fib on Coumadin, recent admission to the hospital for urine retention and elevated INR, now presents with 88-year-old male history of hypertension, CAD status post CABG, A-fib on Coumadin, recent admission to the hospital for urine retention and elevated INR, now  now presents for evaluation of frequent falls over the last 72 hours ever since the discharge son at the bedside contributing to the history.  Patient is very unsteady on his feet.  Fell again this morning with a laceration to his left hand.  Patient denies any head injury today.  Recently admitted for supratherapeutic INR, Coumadin was stopped, has not taken it for over a week.  Last INR was 72 hours ago 4.1.  Patient only complains of a laceration to his left hand.    vss, nontoxic, well appearing, airway intact, GCS 15, PERRL, EOMI, MMM, no cervical-thoracic-lumbar spine tenderness, neck supple, CTAB, no crepitus over chest wall, RRR, equal radial pulses bilat, abd soft/nt/nd, no fnd. FROMx4, very unsteady gait when ambulating, left first webspace laceration.

## 2023-12-12 NOTE — ED PROCEDURE NOTE - CPROC ED LOCAL ANESTHESIA1
May 19, 2022    Ivory CHAVIRA Disha  [address]  [facility] [address]        Dear Ivory,    I am writing to inform you of the results of recent testing that you had done. In this letter the word normal means that the test result was acceptable and does not indicate any problem or illness. The word abnormal means that the test does not fit within the normal range and may indicate a problem. The tests that you had done were:    BASIC METABOLIC PANEL (This test looks at the electrolytes including sodium and potassium and also looks at the kidney function with a test called creatinine): Kidney function came back in the normal range.    If you have any further questions please call me at [phone].    Sincerely,    Evangelista Mayfield MD  [facility]  [address]  [facility]  [id number] [phone]  
1% lidocaine

## 2023-12-12 NOTE — H&P ADULT - HISTORY OF PRESENT ILLNESS
88-year-old male history of hypertension, CAD status post CABG, A-fib on Coumadin, with recent admission to the hospital for urine retention and elevated INR (12/6/23 - 12/9/23), now presents for evaluation of frequent falls.    ·	Vitals: /62, HR 94, RR 19, temp 98.4F, SpO2 98% on RA   ·	Labs: hgb at baseline 12, no white count, PT 19, INR 1.67, BUN 26, eGFR 49, AST 44  ·	Imaging:  Ct Head No Cont: no acute findings, cortical atrophy, cerebellar atrophy, B/l basal ganglia calcifications, dentate nuclei calcifications, vascular calcificaitons  Xray shoulder: no acute finding, degenerative changes  Xray hand: no acute findings, erosive OA  CXR: Elevation of left hemidiaphragm stable  Xray pelvis: No acute findings. Right-sided femoral acetabular joint space narrowing and sclerosis. Lower lumbar sacral spine degenerative changes.  ·	Interventions: laceration repair to left lateral hand, 5 sutures    Patient admitted for inability to ambulate  88-year-old male, lives at home with home care, history of hypertension, CAD status post CABG, A-fib on Coumadin, glaucoma c/b recent L eye infection,  with recent admission to the hospital for urine retention and elevated INR (12/6/23 - 12/9/23), now presents for evaluation of frequent falls. Patient endorses first fall on Saturday (12/9), next fall was Monday (12/11) where fell on his backside, and most recent fall was this morning (12/12) where he got out of bed, and tripped on bedsheet, falling forward onto side table. Patient denies any proceeding symptoms to fall, such as dizziness. He endorses poor vision, due to an infection of left eye and h/o glaucoma. Patient denies headache, SOB, chest pain, leg swelling. Patient's baseline was 3 weeks ago where he was going into work every day, able to perform his ADLs. He lives at home with his wife (has dementia), and has a home health aide that comes part time.    ·	Vitals: /62, HR 94, RR 19, temp 98.4F, SpO2 98% on RA     ·	Labs: hgb at baseline 12, no white count, PT 19, INR 1.67, BUN 26, eGFR 49, AST 44    ·	Imaging: negative for acute findings  Ct Head No Cont: no acute findings, cortical atrophy, cerebellar atrophy, B/l basal ganglia calcifications, dentate nuclei calcifications, vascular calcifications  Xray shoulder: no acute finding, degenerative changes  Xray hand: no acute findings, erosive OA  CXR: Elevation of left hemidiaphragm stable  Xray pelvis: No acute findings. Right-sided femoral acetabular joint space narrowing and sclerosis. Lower lumbar sacral spine degenerative changes.    ·	Interventions: laceration repair to left lateral hand, 5 sutures    Patient admitted for inability to ambulate.  88-year-old male, lives at home with part-time aid, history of hypertension, CAD status post CABG, A-fib on Coumadin, glaucoma c/b recent L eye infection,  with recent admission to the hospital for urine retention and elevated INR (12/6/23 - 12/9/23), now presents for evaluation of frequent falls. Patient endorses first fall on Saturday (12/9), next fall was Monday (12/11) where fell on his backside, and most recent fall was this morning (12/12) where he got out of bed, and tripped on bedsheet, falling forward onto side table. Patient denies any proceeding symptoms to fall, such as dizziness. He endorses poor vision, due to an infection of left eye and h/o glaucoma. Patient denies headache, SOB, chest pain, leg swelling. Patient's baseline was 3 weeks ago where he was going into work every day, able to perform his ADLs. He lives at home with his wife (has dementia), and has a home health aide that comes part time. Per son, patient was found on floor, next to bed, and EMS was activated.    ·	Vitals: /62, HR 94, RR 19, temp 98.4F, SpO2 98% on RA     ·	Labs: hgb at baseline 12, no white count, PT 19, INR 1.67, BUN 26, eGFR 49, AST 44    ·	Imaging: negative for acute findings  Ct Head No Cont: no acute findings, cortical atrophy, cerebellar atrophy, B/l basal ganglia calcifications, dentate nuclei calcifications, vascular calcifications  Xray shoulder: no acute finding, degenerative changes  Xray hand: no acute findings, erosive OA  CXR: Elevation of left hemidiaphragm stable  Xray pelvis: No acute findings. Right-sided femoral acetabular joint space narrowing and sclerosis. Lower lumbar sacral spine degenerative changes.    ·	Interventions: laceration repair to left lateral hand, 5 sutures    Patient admitted for inability to ambulate.

## 2023-12-12 NOTE — H&P ADULT - ASSESSMENT
88-year-old male history of hypertension, CAD status post CABG, A-fib on Coumadin, with recent admission to the hospital for urine retention and elevated INR (12/6/23 - 12/9/23), now presents for evaluation of frequent falls.    #Frequent falls  - Trauma workup in ED negative for acute fractures and bleeds  - In ED, left hand laceration repairs with 5 sutures  - Pending physical therapy consult  88-year-old male, from home, with past medical history of hypertension, CAD status post CABG, A-fib on Coumadin, with recent admission to the hospital for urine retention and elevated INR (12/6/23 - 12/9/23), now presents for evaluation of frequent falls.    #Frequent falls  - Trauma workup in ED negative for acute fractures and bleeds  - In ED, left hand laceration repairs with 5 sutures  - Pending orthostatics  - Pending physical therapy consult  - Pending echo to r/o cardiac origin,   - Pending trops, TSH, UA, Ucx  - Give 250cc NS bolus    #Afib on Coumadin  * Pt not interested in switching to eliquis at this time  - Continue coumadin 5mg for tonight, monitor INR, goal 2-3  - Continue metoprolol tartrate 100mg daily  - Monitor on tele for 24 hours    #HTN  - Holding home acetazolamide and triamterene-HCTZ due to h/o recent falls (euvolemic on exam)  - Start lisinopril 40mg (home fosinopril)    #HLD   - Cont atorvastatin 80mg daily  - Home zetia and welchol non formulary     #Left hand laceration, s/p 5 sutures wound in ED  - Start ancef for prophlaxis     #Urinary retention  - Hold flomax due to falls  - Monitor I&O's    #Glaucoma   #Recent L eye infection   - Unable to verify glaucoma meds, please confirm with family in AM    #Constipation  *Last BM 12/9.23  - Start senna and miralax      #Diet: DASH  #Activity: ambulate as tolerated  #GI ppx: protonix 40 mg daily  88-year-old male, from home, with past medical history of hypertension, CAD status post CABG, A-fib on Coumadin, with recent admission to the hospital for urine retention and elevated INR (12/6/23 - 12/9/23), now presents for evaluation of frequent falls.    #Frequent falls  - Trauma workup in ED negative for acute fractures and bleeds  - In ED, left hand laceration repairs with 5 sutures  - Pending orthostatics  - Pending physical therapy consult  - Pending echo to r/o cardiac origin,   - Pending trops, TSH, UA, Ucx  - Give 250cc NS bolus    #Afib on Coumadin  * Pt not interested in switching to eliquis at this time  - Continue coumadin 5mg for tonight, monitor INR, goal 2-3  - Continue metoprolol tartrate 100mg daily  - Monitor on tele for 24 hours    #HTN  - Holding home acetazolamide and triamterene-HCTZ due to h/o recent falls (euvolemic on exam)  - Start lisinopril 40mg (home fosinopril 40mg daily) and continue home amlodipine 10mg daily    #HLD   - Cont atorvastatin 80mg daily  - Home zetia and welchol non formulary     #Left hand laceration, s/p 5 sutures wound in ED  - Start ancef for prophylaxis     #Urinary retention  - Hold Flomax due to falls  - Monitor I&O's    #Glaucoma   #Recent L eye infection   - Currently on Vancomycin gtt's, tobramycin gtt's, difluprednate gtt, and cyclpentolate gtt  - Advised family to bring in drops to administer inpatient as most drops are non-formulary or require frequent dosing      #Constipation  *Last BM 12/9.23  - Start senna and miralax      #Diet: DASH  #Activity: ambulate as tolerated  #GI ppx: protonix 40 mg daily  88-year-old male, from home, with past medical history of hypertension, HLD, CAD status post CABG, A-fib on Coumadin, with recent admission to the hospital for urine retention and elevated INR (12/6/23 - 12/9/23), now presents for evaluation of frequent falls.    #Frequent falls  - Trauma workup in ED negative for acute fractures and bleeds  - In ED, left hand laceration repairs with 5 sutures  - Pending orthostatics  - Pending physical therapy consult  - Pending echo to r/o cardiac origin,   - Pending trops, TSH, UA, Ucx  - Give 250cc NS bolus    #Afib on Coumadin  * Pt not interested in switching to eliquis at this time  - Continue coumadin 5mg for tonight, monitor INR, goal 2-3  - Continue metoprolol tartrate 100mg daily  - Monitor on tele for 24 hours    #HTN  - Holding home acetazolamide and triamterene-HCTZ due to h/o recent falls (euvolemic on exam)  - Start lisinopril 40mg (home fosinopril 40mg daily) and continue home amlodipine 10mg daily    #HLD   - Cont atorvastatin 80mg daily  - Home zetia and welchol are non formulary     #Left hand laceration, s/p 5 sutures wound in ED  - Start ancef for prophylaxis     #Urinary retention  - Hold Flomax due to falls  - Monitor I&O's    #Glaucoma   #Recent L eye infection   - Currently on Vancomycin gtt's, tobramycin gtt's, difluprednate gtt, and cyclpentolate gtt  - Advised family to bring in drops to administer inpatient as most drops are non-formulary or require frequent dosing    #Constipation  *Last BM 12/9.23  - Start senna and miralax    #Diet: DASH  #Activity: ambulate as tolerated  #GI ppx: Protonix 40 mg daily  88-year-old male, from home, with past medical history of hypertension, HLD, CAD status post CABG, A-fib on Coumadin, with recent admission to the hospital for urine retention and elevated INR (12/6/23 - 12/9/23), now presents for evaluation of frequent falls.    #Frequent falls  - Trauma workup in ED negative for acute fractures and bleeds  - In ED, left hand laceration repairs with 5 sutures  - Pending orthostatics  - Pending physical therapy consult  - Pending echo to r/o cardiac origin,   - Pending trops, TSH, UA, Ucx  - Give 250cc NS bolus    #Afib on Coumadin  * Pt not interested in switching to eliquis at this time  - Continue coumadin 5mg for tonight, monitor INR, goal 2-3  - Continue metoprolol tartrate 100mg daily  - Monitor on tele for 24 hours    #HTN  - Holding home acetazolamide and triamterene-HCTZ due to h/o recent falls (euvolemic on exam)  - Start lisinopril 40mg (home fosinopril 40mg daily) and continue home amlodipine 10mg daily    #HLD   - Cont atorvastatin 80mg daily  - Home zetia and welchol are non formulary     #Left hand laceration, s/p 5 sutures wound in ED  - Start ancef for prophylaxis     #Urinary retention  - Hold Flomax due to falls  - Monitor I&O's    #Glaucoma   #Recent L eye infection   - Was taking moxifloxacin 400mg oral tablet at home for recent eye infection, non-formulary  - Currently on Vancomycin gtt's, tobramycin gtt's, difluprednate gtt, and cyclpentolate gtt  - Advised family to bring in drops to administer inpatient as most drops are non-formulary or require frequent dosing    #Constipation  *Last BM 12/9.23  - Start senna and Miralax    #Diet: DASH  #Activity: ambulate as tolerated  #GI ppx: Protonix 40 mg daily  88-year-old male, from home, with past medical history of hypertension, HLD, CAD status post CABG, A-fib on Coumadin, with recent admission to the hospital for urine retention and elevated INR (12/6/23 - 12/9/23), now presents for evaluation of frequent falls.    #Frequent falls  - Trauma workup in ED negative for acute fractures and bleeds  - In ED, left hand laceration repairs with 5 sutures  - Pending orthostatics  - Pending physical therapy consult  - Pending echo to r/o cardiac origin,   - Pending trops, TSH, UA, Ucx  - Give 250cc NS bolus    #Afib on Coumadin  * Pt not interested in switching to eliquis at this time  - Continue coumadin 5mg for tonight, monitor INR, goal 2-3  - Continue metoprolol tartrate 100mg daily  - Monitor on tele for 24 hours    #HTN  - Holding home acetazolamide and triamterene-HCTZ due to h/o recent falls (euvolemic on exam)  - Start lisinopril 40mg (home fosinopril 40mg daily) and continue home amlodipine 10mg daily    #HLD   - Cont home atorvastatin 80mg daily  - Home zetia and welchol are non formulary     #Left hand laceration, s/p 5 sutures wound in ED  - S/p ancef 2g IV x1 for prophylaxis     #Urinary retention  - Hold Flomax due to falls  - Monitor I&O's    #Glaucoma   #Recent L eye infection   - Was taking moxifloxacin 400mg oral tablet at home for recent eye infection, non-formulary  - Currently on Vancomycin gtt's, tobramycin gtt's, difluprednate gtt, and cyclpentolate gtt  - Advised family to bring in drops to administer inpatient as most drops are non-formulary or require frequent dosing    #Constipation  *Last BM 12/9.23  - Start senna and Miralax    #Diet: DASH  #Activity: ambulate as tolerated  #GI ppx: Protonix 40 mg daily  88-year-old male, from home, with past medical history of hypertension, HLD, CAD status post CABG, A-fib on Coumadin, with recent admission to the hospital for urine retention and elevated INR (12/6/23 - 12/9/23), now presents for evaluation of frequent falls.    #Frequent falls  #Left hand laceration, s/p 5 sutures wound in ED  - Trauma workup in ED negative for acute fractures and bleeds  - In ED, left hand laceration repairs with 5 sutures - S/p ancef 2g IV x1 for prophylaxis   - Pending orthostatics  - Pending physical therapy consult  - Pending echo to r/o cardiac origin of fall  - Pending trops, TSH, UA, Ucx  - Give 250cc NS bolus    #Afib on Coumadin  * Pt not interested in switching to eliquis at this time  - Continue coumadin 5mg for tonight, monitor INR, goal 2-3  - Continue metoprolol tartrate 100mg daily  - Monitor on tele for 24 hours    #HTN  - Holding home acetazolamide and triamterene-HCTZ due to h/o recent falls (euvolemic on exam)  - Start lisinopril 40mg (home fosinopril 40mg daily) and continue home amlodipine 10mg daily    #HLD   - Cont home atorvastatin 80mg daily  - Home zetia and welchol are non formulary     #Urinary retention  - Hold Flomax due to falls  - Monitor I&O's    #Glaucoma   #Recent L eye infection   - Was taking moxifloxacin 400mg oral tablet at home for recent eye infection, non-formulary  - Currently on Vancomycin gtt's, tobramycin gtt's, difluprednate gtt, and cyclpentolate gtt  - Advised family to bring in drops to administer inpatient as most drops are non-formulary or require frequent dosing    #Constipation  *Last BM 12/9.23  - Start senna and Miralax    #Diet: DASH  #Activity: ambulate as tolerated  #GI ppx: Protonix 40 mg daily

## 2023-12-13 ENCOUNTER — TRANSCRIPTION ENCOUNTER (OUTPATIENT)
Age: 88
End: 2023-12-13

## 2023-12-13 LAB
ALBUMIN SERPL ELPH-MCNC: 3.6 G/DL — SIGNIFICANT CHANGE UP (ref 3.5–5.2)
ALBUMIN SERPL ELPH-MCNC: 3.6 G/DL — SIGNIFICANT CHANGE UP (ref 3.5–5.2)
ALP SERPL-CCNC: 43 U/L — SIGNIFICANT CHANGE UP (ref 30–115)
ALP SERPL-CCNC: 43 U/L — SIGNIFICANT CHANGE UP (ref 30–115)
ALT FLD-CCNC: 20 U/L — SIGNIFICANT CHANGE UP (ref 0–41)
ALT FLD-CCNC: 20 U/L — SIGNIFICANT CHANGE UP (ref 0–41)
ANION GAP SERPL CALC-SCNC: 10 MMOL/L — SIGNIFICANT CHANGE UP (ref 7–14)
ANION GAP SERPL CALC-SCNC: 10 MMOL/L — SIGNIFICANT CHANGE UP (ref 7–14)
ANION GAP SERPL CALC-SCNC: 14 MMOL/L — SIGNIFICANT CHANGE UP (ref 7–14)
ANION GAP SERPL CALC-SCNC: 14 MMOL/L — SIGNIFICANT CHANGE UP (ref 7–14)
AST SERPL-CCNC: 36 U/L — SIGNIFICANT CHANGE UP (ref 0–41)
AST SERPL-CCNC: 36 U/L — SIGNIFICANT CHANGE UP (ref 0–41)
BASOPHILS # BLD AUTO: 0.04 K/UL — SIGNIFICANT CHANGE UP (ref 0–0.2)
BASOPHILS # BLD AUTO: 0.04 K/UL — SIGNIFICANT CHANGE UP (ref 0–0.2)
BASOPHILS NFR BLD AUTO: 0.5 % — SIGNIFICANT CHANGE UP (ref 0–1)
BASOPHILS NFR BLD AUTO: 0.5 % — SIGNIFICANT CHANGE UP (ref 0–1)
BILIRUB SERPL-MCNC: 1 MG/DL — SIGNIFICANT CHANGE UP (ref 0.2–1.2)
BILIRUB SERPL-MCNC: 1 MG/DL — SIGNIFICANT CHANGE UP (ref 0.2–1.2)
BUN SERPL-MCNC: 20 MG/DL — SIGNIFICANT CHANGE UP (ref 10–20)
BUN SERPL-MCNC: 20 MG/DL — SIGNIFICANT CHANGE UP (ref 10–20)
BUN SERPL-MCNC: 26 MG/DL — HIGH (ref 10–20)
BUN SERPL-MCNC: 26 MG/DL — HIGH (ref 10–20)
CALCIUM SERPL-MCNC: 9.2 MG/DL — SIGNIFICANT CHANGE UP (ref 8.4–10.5)
CHLORIDE SERPL-SCNC: 102 MMOL/L — SIGNIFICANT CHANGE UP (ref 98–110)
CHLORIDE SERPL-SCNC: 102 MMOL/L — SIGNIFICANT CHANGE UP (ref 98–110)
CHLORIDE SERPL-SCNC: 104 MMOL/L — SIGNIFICANT CHANGE UP (ref 98–110)
CHLORIDE SERPL-SCNC: 104 MMOL/L — SIGNIFICANT CHANGE UP (ref 98–110)
CO2 SERPL-SCNC: 22 MMOL/L — SIGNIFICANT CHANGE UP (ref 17–32)
CO2 SERPL-SCNC: 22 MMOL/L — SIGNIFICANT CHANGE UP (ref 17–32)
CO2 SERPL-SCNC: 26 MMOL/L — SIGNIFICANT CHANGE UP (ref 17–32)
CO2 SERPL-SCNC: 26 MMOL/L — SIGNIFICANT CHANGE UP (ref 17–32)
CREAT SERPL-MCNC: 1 MG/DL — SIGNIFICANT CHANGE UP (ref 0.7–1.5)
CREAT SERPL-MCNC: 1 MG/DL — SIGNIFICANT CHANGE UP (ref 0.7–1.5)
CREAT SERPL-MCNC: 1.4 MG/DL — SIGNIFICANT CHANGE UP (ref 0.7–1.5)
CREAT SERPL-MCNC: 1.4 MG/DL — SIGNIFICANT CHANGE UP (ref 0.7–1.5)
EGFR: 49 ML/MIN/1.73M2 — LOW
EGFR: 49 ML/MIN/1.73M2 — LOW
EGFR: 73 ML/MIN/1.73M2 — SIGNIFICANT CHANGE UP
EGFR: 73 ML/MIN/1.73M2 — SIGNIFICANT CHANGE UP
EOSINOPHIL # BLD AUTO: 0.31 K/UL — SIGNIFICANT CHANGE UP (ref 0–0.7)
EOSINOPHIL # BLD AUTO: 0.31 K/UL — SIGNIFICANT CHANGE UP (ref 0–0.7)
EOSINOPHIL NFR BLD AUTO: 3.6 % — SIGNIFICANT CHANGE UP (ref 0–8)
EOSINOPHIL NFR BLD AUTO: 3.6 % — SIGNIFICANT CHANGE UP (ref 0–8)
GLUCOSE SERPL-MCNC: 106 MG/DL — HIGH (ref 70–99)
GLUCOSE SERPL-MCNC: 106 MG/DL — HIGH (ref 70–99)
GLUCOSE SERPL-MCNC: 93 MG/DL — SIGNIFICANT CHANGE UP (ref 70–99)
GLUCOSE SERPL-MCNC: 93 MG/DL — SIGNIFICANT CHANGE UP (ref 70–99)
HCT VFR BLD CALC: 37.5 % — LOW (ref 42–52)
HCT VFR BLD CALC: 37.5 % — LOW (ref 42–52)
HCT VFR BLD CALC: 38.2 % — LOW (ref 42–52)
HCT VFR BLD CALC: 38.2 % — LOW (ref 42–52)
HGB BLD-MCNC: 12.6 G/DL — LOW (ref 14–18)
HGB BLD-MCNC: 12.6 G/DL — LOW (ref 14–18)
HGB BLD-MCNC: 12.7 G/DL — LOW (ref 14–18)
HGB BLD-MCNC: 12.7 G/DL — LOW (ref 14–18)
IMM GRANULOCYTES NFR BLD AUTO: 0.3 % — SIGNIFICANT CHANGE UP (ref 0.1–0.3)
IMM GRANULOCYTES NFR BLD AUTO: 0.3 % — SIGNIFICANT CHANGE UP (ref 0.1–0.3)
INR BLD: 1.48 RATIO — HIGH (ref 0.65–1.3)
INR BLD: 1.48 RATIO — HIGH (ref 0.65–1.3)
LACTATE SERPL-SCNC: 2.6 MMOL/L — HIGH (ref 0.7–2)
LACTATE SERPL-SCNC: 2.6 MMOL/L — HIGH (ref 0.7–2)
LYMPHOCYTES # BLD AUTO: 1.7 K/UL — SIGNIFICANT CHANGE UP (ref 1.2–3.4)
LYMPHOCYTES # BLD AUTO: 1.7 K/UL — SIGNIFICANT CHANGE UP (ref 1.2–3.4)
LYMPHOCYTES # BLD AUTO: 19.5 % — LOW (ref 20.5–51.1)
LYMPHOCYTES # BLD AUTO: 19.5 % — LOW (ref 20.5–51.1)
MAGNESIUM SERPL-MCNC: 1.4 MG/DL — LOW (ref 1.8–2.4)
MAGNESIUM SERPL-MCNC: 1.4 MG/DL — LOW (ref 1.8–2.4)
MCHC RBC-ENTMCNC: 31.6 PG — HIGH (ref 27–31)
MCHC RBC-ENTMCNC: 31.6 PG — HIGH (ref 27–31)
MCHC RBC-ENTMCNC: 31.8 PG — HIGH (ref 27–31)
MCHC RBC-ENTMCNC: 31.8 PG — HIGH (ref 27–31)
MCHC RBC-ENTMCNC: 33.2 G/DL — SIGNIFICANT CHANGE UP (ref 32–37)
MCHC RBC-ENTMCNC: 33.2 G/DL — SIGNIFICANT CHANGE UP (ref 32–37)
MCHC RBC-ENTMCNC: 33.6 G/DL — SIGNIFICANT CHANGE UP (ref 32–37)
MCHC RBC-ENTMCNC: 33.6 G/DL — SIGNIFICANT CHANGE UP (ref 32–37)
MCV RBC AUTO: 94 FL — SIGNIFICANT CHANGE UP (ref 80–94)
MCV RBC AUTO: 94 FL — SIGNIFICANT CHANGE UP (ref 80–94)
MCV RBC AUTO: 95.5 FL — HIGH (ref 80–94)
MCV RBC AUTO: 95.5 FL — HIGH (ref 80–94)
MONOCYTES # BLD AUTO: 0.88 K/UL — HIGH (ref 0.1–0.6)
MONOCYTES # BLD AUTO: 0.88 K/UL — HIGH (ref 0.1–0.6)
MONOCYTES NFR BLD AUTO: 10.1 % — HIGH (ref 1.7–9.3)
MONOCYTES NFR BLD AUTO: 10.1 % — HIGH (ref 1.7–9.3)
NEUTROPHILS # BLD AUTO: 5.74 K/UL — SIGNIFICANT CHANGE UP (ref 1.4–6.5)
NEUTROPHILS # BLD AUTO: 5.74 K/UL — SIGNIFICANT CHANGE UP (ref 1.4–6.5)
NEUTROPHILS NFR BLD AUTO: 66 % — SIGNIFICANT CHANGE UP (ref 42.2–75.2)
NEUTROPHILS NFR BLD AUTO: 66 % — SIGNIFICANT CHANGE UP (ref 42.2–75.2)
NRBC # BLD: 0 /100 WBCS — SIGNIFICANT CHANGE UP (ref 0–0)
PLATELET # BLD AUTO: 160 K/UL — SIGNIFICANT CHANGE UP (ref 130–400)
PLATELET # BLD AUTO: 160 K/UL — SIGNIFICANT CHANGE UP (ref 130–400)
PLATELET # BLD AUTO: 178 K/UL — SIGNIFICANT CHANGE UP (ref 130–400)
PLATELET # BLD AUTO: 178 K/UL — SIGNIFICANT CHANGE UP (ref 130–400)
PMV BLD: 9.7 FL — SIGNIFICANT CHANGE UP (ref 7.4–10.4)
PMV BLD: 9.7 FL — SIGNIFICANT CHANGE UP (ref 7.4–10.4)
PMV BLD: 9.9 FL — SIGNIFICANT CHANGE UP (ref 7.4–10.4)
PMV BLD: 9.9 FL — SIGNIFICANT CHANGE UP (ref 7.4–10.4)
POTASSIUM SERPL-MCNC: 3.9 MMOL/L — SIGNIFICANT CHANGE UP (ref 3.5–5)
POTASSIUM SERPL-MCNC: 3.9 MMOL/L — SIGNIFICANT CHANGE UP (ref 3.5–5)
POTASSIUM SERPL-MCNC: 4.3 MMOL/L — SIGNIFICANT CHANGE UP (ref 3.5–5)
POTASSIUM SERPL-MCNC: 4.3 MMOL/L — SIGNIFICANT CHANGE UP (ref 3.5–5)
POTASSIUM SERPL-SCNC: 3.9 MMOL/L — SIGNIFICANT CHANGE UP (ref 3.5–5)
POTASSIUM SERPL-SCNC: 3.9 MMOL/L — SIGNIFICANT CHANGE UP (ref 3.5–5)
POTASSIUM SERPL-SCNC: 4.3 MMOL/L — SIGNIFICANT CHANGE UP (ref 3.5–5)
POTASSIUM SERPL-SCNC: 4.3 MMOL/L — SIGNIFICANT CHANGE UP (ref 3.5–5)
PROT SERPL-MCNC: 6.1 G/DL — SIGNIFICANT CHANGE UP (ref 6–8)
PROT SERPL-MCNC: 6.1 G/DL — SIGNIFICANT CHANGE UP (ref 6–8)
PROTHROM AB SERPL-ACNC: 16.9 SEC — HIGH (ref 9.95–12.87)
PROTHROM AB SERPL-ACNC: 16.9 SEC — HIGH (ref 9.95–12.87)
RBC # BLD: 3.99 M/UL — LOW (ref 4.7–6.1)
RBC # BLD: 3.99 M/UL — LOW (ref 4.7–6.1)
RBC # BLD: 4 M/UL — LOW (ref 4.7–6.1)
RBC # BLD: 4 M/UL — LOW (ref 4.7–6.1)
RBC # FLD: 14.5 % — SIGNIFICANT CHANGE UP (ref 11.5–14.5)
RBC # FLD: 14.5 % — SIGNIFICANT CHANGE UP (ref 11.5–14.5)
RBC # FLD: 14.6 % — HIGH (ref 11.5–14.5)
RBC # FLD: 14.6 % — HIGH (ref 11.5–14.5)
SODIUM SERPL-SCNC: 138 MMOL/L — SIGNIFICANT CHANGE UP (ref 135–146)
SODIUM SERPL-SCNC: 138 MMOL/L — SIGNIFICANT CHANGE UP (ref 135–146)
SODIUM SERPL-SCNC: 140 MMOL/L — SIGNIFICANT CHANGE UP (ref 135–146)
SODIUM SERPL-SCNC: 140 MMOL/L — SIGNIFICANT CHANGE UP (ref 135–146)
TROPONIN T SERPL-MCNC: <0.01 NG/ML — SIGNIFICANT CHANGE UP
TSH SERPL-MCNC: 0.78 UIU/ML — SIGNIFICANT CHANGE UP (ref 0.27–4.2)
TSH SERPL-MCNC: 0.78 UIU/ML — SIGNIFICANT CHANGE UP (ref 0.27–4.2)
WBC # BLD: 8.26 K/UL — SIGNIFICANT CHANGE UP (ref 4.8–10.8)
WBC # BLD: 8.26 K/UL — SIGNIFICANT CHANGE UP (ref 4.8–10.8)
WBC # BLD: 8.7 K/UL — SIGNIFICANT CHANGE UP (ref 4.8–10.8)
WBC # BLD: 8.7 K/UL — SIGNIFICANT CHANGE UP (ref 4.8–10.8)
WBC # FLD AUTO: 8.26 K/UL — SIGNIFICANT CHANGE UP (ref 4.8–10.8)
WBC # FLD AUTO: 8.26 K/UL — SIGNIFICANT CHANGE UP (ref 4.8–10.8)
WBC # FLD AUTO: 8.7 K/UL — SIGNIFICANT CHANGE UP (ref 4.8–10.8)
WBC # FLD AUTO: 8.7 K/UL — SIGNIFICANT CHANGE UP (ref 4.8–10.8)

## 2023-12-13 PROCEDURE — L9997: CPT

## 2023-12-13 PROCEDURE — 99233 SBSQ HOSP IP/OBS HIGH 50: CPT

## 2023-12-13 RX ORDER — SODIUM CHLORIDE 9 MG/ML
1000 INJECTION, SOLUTION INTRAVENOUS ONCE
Refills: 0 | Status: COMPLETED | OUTPATIENT
Start: 2023-12-13 | End: 2023-12-13

## 2023-12-13 RX ORDER — METOPROLOL TARTRATE 50 MG
50 TABLET ORAL
Refills: 0 | Status: DISCONTINUED | OUTPATIENT
Start: 2023-12-13 | End: 2023-12-13

## 2023-12-13 RX ORDER — WARFARIN SODIUM 2.5 MG/1
7.5 TABLET ORAL ONCE
Refills: 0 | Status: COMPLETED | OUTPATIENT
Start: 2023-12-13 | End: 2023-12-13

## 2023-12-13 RX ORDER — CYCLOPENTOLATE HYDROCHLORIDE 10 MG/ML
1 SOLUTION/ DROPS OPHTHALMIC THREE TIMES A DAY
Refills: 0 | Status: DISCONTINUED | OUTPATIENT
Start: 2023-12-13 | End: 2023-12-15

## 2023-12-13 RX ORDER — MAGNESIUM SULFATE 500 MG/ML
2 VIAL (ML) INJECTION ONCE
Refills: 0 | Status: COMPLETED | OUTPATIENT
Start: 2023-12-13 | End: 2023-12-13

## 2023-12-13 RX ORDER — METOPROLOL TARTRATE 50 MG
50 TABLET ORAL
Refills: 0 | Status: DISCONTINUED | OUTPATIENT
Start: 2023-12-14 | End: 2023-12-15

## 2023-12-13 RX ADMIN — Medication 25 GRAM(S): at 10:57

## 2023-12-13 RX ADMIN — Medication 650 MILLIGRAM(S): at 14:00

## 2023-12-13 RX ADMIN — Medication 650 MILLIGRAM(S): at 06:13

## 2023-12-13 RX ADMIN — Medication 650 MILLIGRAM(S): at 07:30

## 2023-12-13 RX ADMIN — POLYETHYLENE GLYCOL 3350 17 GRAM(S): 17 POWDER, FOR SOLUTION ORAL at 12:08

## 2023-12-13 RX ADMIN — CYCLOPENTOLATE HYDROCHLORIDE 1 DROP(S): 10 SOLUTION/ DROPS OPHTHALMIC at 14:00

## 2023-12-13 RX ADMIN — Medication 650 MILLIGRAM(S): at 21:56

## 2023-12-13 RX ADMIN — CYCLOPENTOLATE HYDROCHLORIDE 1 DROP(S): 10 SOLUTION/ DROPS OPHTHALMIC at 21:55

## 2023-12-13 RX ADMIN — Medication 650 MILLIGRAM(S): at 22:54

## 2023-12-13 RX ADMIN — ATORVASTATIN CALCIUM 80 MILLIGRAM(S): 80 TABLET, FILM COATED ORAL at 21:55

## 2023-12-13 RX ADMIN — WARFARIN SODIUM 7.5 MILLIGRAM(S): 2.5 TABLET ORAL at 21:54

## 2023-12-13 RX ADMIN — LISINOPRIL 40 MILLIGRAM(S): 2.5 TABLET ORAL at 06:12

## 2023-12-13 RX ADMIN — Medication 650 MILLIGRAM(S): at 15:00

## 2023-12-13 RX ADMIN — SODIUM CHLORIDE 1000 MILLILITER(S): 9 INJECTION, SOLUTION INTRAVENOUS at 13:22

## 2023-12-13 RX ADMIN — SENNA PLUS 2 TABLET(S): 8.6 TABLET ORAL at 21:56

## 2023-12-13 RX ADMIN — PANTOPRAZOLE SODIUM 40 MILLIGRAM(S): 20 TABLET, DELAYED RELEASE ORAL at 06:13

## 2023-12-13 RX ADMIN — AMLODIPINE BESYLATE 10 MILLIGRAM(S): 2.5 TABLET ORAL at 06:12

## 2023-12-13 RX ADMIN — Medication 100 MILLIGRAM(S): at 06:13

## 2023-12-13 RX ADMIN — CYCLOPENTOLATE HYDROCHLORIDE 1 DROP(S): 10 SOLUTION/ DROPS OPHTHALMIC at 06:23

## 2023-12-13 NOTE — PROGRESS NOTE ADULT - SUBJECTIVE AND OBJECTIVE BOX
NICKY JOSHI  88y  Male      Patient is a 88y old  Male who presents with a chief complaint of Falls (13 Dec 2023 14:11)      INTERVAL HPI/OVERNIGHT EVENTS:  He feels ok, he became hypotensive he denies dizziness, chest pain or palpitation  Vital Signs Last 24 Hrs  T(C): 37.1 (13 Dec 2023 13:50), Max: 37.2 (12 Dec 2023 22:44)  T(F): 98.7 (13 Dec 2023 13:50), Max: 99 (12 Dec 2023 22:44)  HR: 100 (13 Dec 2023 17:28) (89 - 104)  BP: 98/56 (13 Dec 2023 17:28) (64/34 - 125/81)  BP(mean): 72 (13 Dec 2023 17:28) (54 - 72)  RR: 17 (13 Dec 2023 13:54) (17 - 18)  SpO2: 95% (12 Dec 2023 22:44) (94% - 95%)    Parameters below as of 12 Dec 2023 22:44  Patient On (Oxygen Delivery Method): room air                Consultant(s) Notes Reviewed:  [x ] YES  [ ] NO          MEDICATIONS  (STANDING):  acetaminophen     Tablet .. 650 milliGRAM(s) Oral every 8 hours  atorvastatin 80 milliGRAM(s) Oral at bedtime  cyclopentolate 1% Solution 1 Drop(s) Both EYES three times a day  pantoprazole    Tablet 40 milliGRAM(s) Oral before breakfast  polyethylene glycol 3350 17 Gram(s) Oral daily  senna 2 Tablet(s) Oral at bedtime  warfarin 7.5 milliGRAM(s) Oral once    MEDICATIONS  (PRN):      LABS                          12.6   8.26  )-----------( 178      ( 13 Dec 2023 17:04 )             37.5     12-13    138  |  102  |  20  ----------------------------<  93  3.9   |  22  |  1.0    Ca    9.2      13 Dec 2023 07:54  Mg     1.4     12-13    TPro  6.1  /  Alb  3.6  /  TBili  1.0  /  DBili  x   /  AST  36  /  ALT  20  /  AlkPhos  43  12-13      Urinalysis Basic - ( 13 Dec 2023 07:54 )    Color: x / Appearance: x / SG: x / pH: x  Gluc: 93 mg/dL / Ketone: x  / Bili: x / Urobili: x   Blood: x / Protein: x / Nitrite: x   Leuk Esterase: x / RBC: x / WBC x   Sq Epi: x / Non Sq Epi: x / Bacteria: x      PT/INR - ( 13 Dec 2023 07:54 )   PT: 16.90 sec;   INR: 1.48 ratio         PTT - ( 12 Dec 2023 09:00 )  PTT:28.9 sec  Lactate Trend    CARDIAC MARKERS ( 13 Dec 2023 07:54 )  x     / <0.01 ng/mL / x     / x     / x      CARDIAC MARKERS ( 12 Dec 2023 20:31 )  x     / <0.01 ng/mL / x     / x     / x          CAPILLARY BLOOD GLUCOSE      POCT Blood Glucose.: 98 mg/dL (12 Dec 2023 08:35)        RADIOLOGY & ADDITIONAL TESTS:    Imaging Personally Reviewed:  [ ] YES  [ ] NO    HEALTH ISSUES - PROBLEM Dx:          PHYSICAL EXAM:  GENERAL: NAD, well-developed.  HEAD:  Atraumatic, Normocephalic.  EYES: EOMI, PERRLA, conjunctiva and sclera clear.  NECK: Supple, No JVD.  CHEST/LUNG: Clear to auscultation bilaterally; No wheeze.  HEART: Regular rate and rhythm; S1 S2.   ABDOMEN: Soft, Nontender, Nondistended; Bowel sounds present.  EXTREMITIES:  2+ Peripheral Pulses, No clubbing, cyanosis, left hand laceration  PSYCH: AAOx3.  NEUROLOGY: non-focal.  SKIN: No rashes or lesions.

## 2023-12-13 NOTE — PATIENT PROFILE ADULT - FALL HARM RISK - HARM RISK INTERVENTIONS
Assistance with ambulation/Assistance OOB with selected safe patient handling equipment/Communicate Risk of Fall with Harm to all staff/Reinforce activity limits and safety measures with patient and family/Tailored Fall Risk Interventions/Visual Cue: Yellow wristband and red socks/Bed in lowest position, wheels locked, appropriate side rails in place/Call bell, personal items and telephone in reach/Instruct patient to call for assistance before getting out of bed or chair/Non-slip footwear when patient is out of bed/[place] to call system/Physically safe environment - no spills, clutter or unnecessary equipment/Purposeful Proactive Rounding/Room/bathroom lighting operational, light cord in reach Assistance with ambulation/Assistance OOB with selected safe patient handling equipment/Communicate Risk of Fall with Harm to all staff/Reinforce activity limits and safety measures with patient and family/Tailored Fall Risk Interventions/Visual Cue: Yellow wristband and red socks/Bed in lowest position, wheels locked, appropriate side rails in place/Call bell, personal items and telephone in reach/Instruct patient to call for assistance before getting out of bed or chair/Non-slip footwear when patient is out of bed/[place] to call system/Physically safe environment - no spills, clutter or unnecessary equipment/Purposeful Proactive Rounding/Room/bathroom lighting operational, light cord in reach

## 2023-12-13 NOTE — DISCHARGE NOTE NURSING/CASE MANAGEMENT/SOCIAL WORK - NSDCPEFALRISK_GEN_ALL_CORE
For information on Fall & Injury Prevention, visit: [email or web address] OR  [email or web address] OR  https://www.cdc.gov/steadi/patient.html For information on Fall & Injury Prevention, visit: [email or web address] OR  [email or web address] OR  https://www.cdc.gov/steadi/patient.html

## 2023-12-13 NOTE — PROCEDURE NOTE - ADDITIONAL PROCEDURE DETAILS
procedure team called for urgent ring removal from finger as finger was becoming swollen and team was unable to remove.   ring given to son after removal

## 2023-12-13 NOTE — DISCHARGE NOTE NURSING/CASE MANAGEMENT/SOCIAL WORK - PATIENT PORTAL LINK FT
You can access the FollowMyHealth Patient Portal offered by [facility] by registering at the following website: [email or web address]. By joining [employer]’s FollowMyHealth portal, you will also be able to view your health information using other applications (apps) compatible with our system. You can access the FollowMyHealth Patient Portal offered by [facility] by registering at the following website: [email or web address]. By joining [facility]’s FollowMyHealth portal, you will also be able to view your health information using other applications (apps) compatible with our system.

## 2023-12-13 NOTE — DISCHARGE NOTE NURSING/CASE MANAGEMENT/SOCIAL WORK - NSDCPEELIQUIS_GEN_ALL_CORE
mvi Apixaban/Eliquis - Compliance/Apixaban/Eliquis - Dietary Advice/Apixaban/Eliquis - Follow up monitoring/Apixaban/Eliquis - Potential for adverse drug reactions and interactions

## 2023-12-13 NOTE — PATIENT PROFILE ADULT - FUNCTIONAL ASSESSMENT - BASIC MOBILITY 6.
2-calculated by average/Not able to assess (calculate score using [facility] averaging method)  2-calculated by average/Not able to assess (calculate score using [facility] averaging method)

## 2023-12-13 NOTE — PHYSICAL THERAPY INITIAL EVALUATION ADULT - GENERAL OBSERVATIONS, REHAB EVAL
PT IE [id number]. Chart reviewed. Pt encountered semi-reclined in bed. In NAD. + IV lock PT IE [id number]. Chart reviewed. Pt encountered semi-reclined in bed. In NAD. + IV lock

## 2023-12-13 NOTE — PROGRESS NOTE ADULT - SUBJECTIVE AND OBJECTIVE BOX
24H events:    Patient is a 88y old Male who presents with a chief complaint of Falls (12 Dec 2023 14:34)    Primary diagnosis of Frequent falls    Today is hospital day 1d. This morning patient was seen and examined at bedside, resting comfortably in bed.    No acute or major events overnight.    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 650 milliGRAM(s) Oral every 8 hours  atorvastatin 80 milliGRAM(s) Oral at bedtime  cyclopentolate 1% Solution 1 Drop(s) Both EYES three times a day  lisinopril 40 milliGRAM(s) Oral daily  metoprolol tartrate 50 milliGRAM(s) Oral two times a day  pantoprazole    Tablet 40 milliGRAM(s) Oral before breakfast  polyethylene glycol 3350 17 Gram(s) Oral daily  senna 2 Tablet(s) Oral at bedtime  warfarin 7.5 milliGRAM(s) Oral once    PRN MEDICATIONS    VITALS:   T(F): 98.7  HR: 100  BP: 76/53  RR: 17  SpO2: 95%    PHYSICAL EXAM:  GENERAL:   (x  ) NAD, lying in bed comfortably     (  ) obtunded     (  ) lethargic     (  ) somnolent    HEAD:   ( x ) Atraumatic     (  ) hematoma     (  ) laceration (specify location:       )     NECK:  ( x ) Supple     (  ) neck stiffness     (  ) nuchal rigidity     (  )  no JVD     (  ) JVD present ( -- cm)    HEART:  Rate -->     ( x ) normal rate     (  ) bradycardic     (  ) tachycardic  Rhythm -->     (x  ) regular     (  ) regularly irregular     (  ) irregularly irregular  Murmurs -->     ( x ) normal s1s2     (  ) systolic murmur     (  ) diastolic murmur     (  ) continuous murmur      (  ) S3 present     (  ) S4 present    LUNGS:   (x  )Unlabored respirations     (  ) tachypnea  (x  ) B/L air entry     (  ) decreased breath sounds in:  (location     )    (  ) no adventitious sound     (  ) crackles     (  ) wheezing      (  ) rhonchi      (specify location:       )  (  ) chest wall tenderness (specify location:       )    ABDOMEN:   (x  ) Soft     (  ) tense   |   (  ) nondistended     (  ) distended   |   (  ) +BS     (  ) hypoactive bowel sounds     (  ) hyperactive bowel sounds  (  ) nontender     (  ) RUQ tenderness     (  ) RLQ tenderness     (  ) LLQ tenderness     (  ) epigastric tenderness     (  ) diffuse tenderness  (  ) Splenomegaly      (  ) Hepatomegaly      (  ) Jaundice     (  ) ecchymosis     EXTREMITIES:  ( xx ) Normal     (  ) Rash     (  ) ecchymosis     (  ) varicose veins      (  ) pitting edema     (  ) non-pitting edema   (  ) ulceration     (  ) gangrene:     (location:     )    NERVOUS SYSTEM:    (x  ) A&Ox3     (  ) confused     (  ) lethargic  CN II-XII:     (  ) Intact     (  ) deficits found     (Specify:     )   Upper extremities:     (  ) no sensorimotor deficits     (  ) weakness     (  ) loss of proprioception/vibration     (  ) loss of touch/temperature (specify:    )  Lower extremities:     (  ) no sensorimotor deficits     (  ) weakness     (  ) loss of proprioception/vibration     (  ) loss of touch/temperature (specify:    )    SKIN:   (  ) No rashes or lesions     (  ) maculopapular rash     (  ) pustules     (  ) vesicles     (  ) ulcer     (  ) ecchymosis     (specify location:     )    AMPAC score:    (  ) Indwelling Leija Catheter:   Date insterted:    Reason (  ) Critical illness     (  ) urinary retention    (  ) Accurate Ins/Outs Monitoring     (  ) CMO patient    (  ) Central Line:   Date inserted:  Location: (  ) Right IJ     (  ) Left IJ     (  ) Right Fem     (  ) Left Fem    (  ) SPC        (  ) pigtail       (  ) PEG tube       (  ) colostomy       (  ) jejunostomy  (  ) U-Dall    LABS:                        12.7   8.70  )-----------( 160      ( 13 Dec 2023 07:54 )             38.2     12-13    138  |  102  |  20  ----------------------------<  93  3.9   |  22  |  1.0    Ca    9.2      13 Dec 2023 07:54  Mg     1.4     12-13    TPro  6.1  /  Alb  3.6  /  TBili  1.0  /  DBili  x   /  AST  36  /  ALT  20  /  AlkPhos  43  12-13    PT/INR - ( 13 Dec 2023 07:54 )   PT: 16.90 sec;   INR: 1.48 ratio      PTT - ( 12 Dec 2023 09:00 )  PTT:28.9 sec  Urinalysis Basic - ( 13 Dec 2023 07:54 )    Color: x / Appearance: x / SG: x / pH: x  Gluc: 93 mg/dL / Ketone: x  / Bili: x / Urobili: x   Blood: x / Protein: x / Nitrite: x   Leuk Esterase: x / RBC: x / WBC x   Sq Epi: x / Non Sq Epi: x / Bacteria: x    Troponin T, Serum: <0.01 ng/mL (12-13-23 @ 07:54)  Troponin T, Serum: <0.01 ng/mL (12-12-23 @ 20:31)      CARDIAC MARKERS ( 13 Dec 2023 07:54 )  x     / <0.01 ng/mL / x     / x     / x      CARDIAC MARKERS ( 12 Dec 2023 20:31 )  x     / <0.01 ng/mL / x     / x     / x

## 2023-12-13 NOTE — PROCEDURE NOTE - GENERAL PROCEDURE DETAILS
metal barrier placed between ring and digit, dremil tool was used to cut ring with frequent reassessment of temperature. Ring was cut through and subsequently removed. pt remained neurovascularly intact, cap refil <2 seconds after removal, sensation intact, ROM to PIP/DIP/MCP nml and with good alignment, no complications or lacerations to hand from removal. ice pack applied to hand after

## 2023-12-14 DIAGNOSIS — M48.54XA COLLAPSED VERTEBRA, NOT ELSEWHERE CLASSIFIED, THORACIC REGION, INITIAL ENCOUNTER FOR FRACTURE: ICD-10-CM

## 2023-12-14 DIAGNOSIS — R33.8 OTHER RETENTION OF URINE: ICD-10-CM

## 2023-12-14 DIAGNOSIS — K59.00 CONSTIPATION, UNSPECIFIED: ICD-10-CM

## 2023-12-14 DIAGNOSIS — I48.20 CHRONIC ATRIAL FIBRILLATION, UNSPECIFIED: ICD-10-CM

## 2023-12-14 DIAGNOSIS — I10 ESSENTIAL (PRIMARY) HYPERTENSION: ICD-10-CM

## 2023-12-14 DIAGNOSIS — N40.1 BENIGN PROSTATIC HYPERPLASIA WITH LOWER URINARY TRACT SYMPTOMS: ICD-10-CM

## 2023-12-14 DIAGNOSIS — H40.9 UNSPECIFIED GLAUCOMA: ICD-10-CM

## 2023-12-14 DIAGNOSIS — Z95.1 PRESENCE OF AORTOCORONARY BYPASS GRAFT: ICD-10-CM
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
## 2023-12-14 DIAGNOSIS — R79.1 ABNORMAL COAGULATION PROFILE: ICD-10-CM
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
## 2023-12-14 DIAGNOSIS — I25.10 ATHEROSCLEROTIC HEART DISEASE OF NATIVE CORONARY ARTERY WITHOUT ANGINA PECTORIS: ICD-10-CM

## 2023-12-14 DIAGNOSIS — E78.5 HYPERLIPIDEMIA, UNSPECIFIED: ICD-10-CM

## 2023-12-14 DIAGNOSIS — E87.6 HYPOKALEMIA: ICD-10-CM

## 2023-12-14 DIAGNOSIS — Z95.5 PRESENCE OF CORONARY ANGIOPLASTY IMPLANT AND GRAFT: ICD-10-CM

## 2023-12-14 DIAGNOSIS — Z79.01 LONG TERM (CURRENT) USE OF ANTICOAGULANTS: ICD-10-CM

## 2023-12-14 DIAGNOSIS — M48.56XA COLLAPSED VERTEBRA, NOT ELSEWHERE CLASSIFIED, LUMBAR REGION, INITIAL ENCOUNTER FOR FRACTURE: ICD-10-CM

## 2023-12-14 LAB
ALBUMIN SERPL ELPH-MCNC: 3.2 G/DL — LOW (ref 3.5–5.2)
ALBUMIN SERPL ELPH-MCNC: 3.2 G/DL — LOW (ref 3.5–5.2)
ALP SERPL-CCNC: 46 U/L — SIGNIFICANT CHANGE UP (ref 30–115)
ALP SERPL-CCNC: 46 U/L — SIGNIFICANT CHANGE UP (ref 30–115)
ALT FLD-CCNC: 14 U/L — SIGNIFICANT CHANGE UP (ref 0–41)
ALT FLD-CCNC: 14 U/L — SIGNIFICANT CHANGE UP (ref 0–41)
ANION GAP SERPL CALC-SCNC: 10 MMOL/L — SIGNIFICANT CHANGE UP (ref 7–14)
ANION GAP SERPL CALC-SCNC: 10 MMOL/L — SIGNIFICANT CHANGE UP (ref 7–14)
APTT BLD: 29.7 SEC — SIGNIFICANT CHANGE UP (ref 27–39.2)
APTT BLD: 29.7 SEC — SIGNIFICANT CHANGE UP (ref 27–39.2)
AST SERPL-CCNC: 27 U/L — SIGNIFICANT CHANGE UP (ref 0–41)
AST SERPL-CCNC: 27 U/L — SIGNIFICANT CHANGE UP (ref 0–41)
BILIRUB SERPL-MCNC: 0.6 MG/DL — SIGNIFICANT CHANGE UP (ref 0.2–1.2)
BILIRUB SERPL-MCNC: 0.6 MG/DL — SIGNIFICANT CHANGE UP (ref 0.2–1.2)
BUN SERPL-MCNC: 25 MG/DL — HIGH (ref 10–20)
BUN SERPL-MCNC: 25 MG/DL — HIGH (ref 10–20)
CALCIUM SERPL-MCNC: 8.6 MG/DL — SIGNIFICANT CHANGE UP (ref 8.4–10.5)
CALCIUM SERPL-MCNC: 8.6 MG/DL — SIGNIFICANT CHANGE UP (ref 8.4–10.5)
CHLORIDE SERPL-SCNC: 104 MMOL/L — SIGNIFICANT CHANGE UP (ref 98–110)
CHLORIDE SERPL-SCNC: 104 MMOL/L — SIGNIFICANT CHANGE UP (ref 98–110)
CO2 SERPL-SCNC: 25 MMOL/L — SIGNIFICANT CHANGE UP (ref 17–32)
CO2 SERPL-SCNC: 25 MMOL/L — SIGNIFICANT CHANGE UP (ref 17–32)
CREAT SERPL-MCNC: 1.3 MG/DL — SIGNIFICANT CHANGE UP (ref 0.7–1.5)
CREAT SERPL-MCNC: 1.3 MG/DL — SIGNIFICANT CHANGE UP (ref 0.7–1.5)
CULTURE RESULTS: SIGNIFICANT CHANGE UP
CULTURE RESULTS: SIGNIFICANT CHANGE UP
EGFR: 53 ML/MIN/1.73M2 — LOW
EGFR: 53 ML/MIN/1.73M2 — LOW
GLUCOSE SERPL-MCNC: 89 MG/DL — SIGNIFICANT CHANGE UP (ref 70–99)
GLUCOSE SERPL-MCNC: 89 MG/DL — SIGNIFICANT CHANGE UP (ref 70–99)
HCT VFR BLD CALC: 33 % — LOW (ref 42–52)
HCT VFR BLD CALC: 33 % — LOW (ref 42–52)
HGB BLD-MCNC: 11.1 G/DL — LOW (ref 14–18)
HGB BLD-MCNC: 11.1 G/DL — LOW (ref 14–18)
INR BLD: 1.68 RATIO — HIGH (ref 0.65–1.3)
INR BLD: 1.68 RATIO — HIGH (ref 0.65–1.3)
MCHC RBC-ENTMCNC: 31.4 PG — HIGH (ref 27–31)
MCHC RBC-ENTMCNC: 31.4 PG — HIGH (ref 27–31)
MCHC RBC-ENTMCNC: 33.6 G/DL — SIGNIFICANT CHANGE UP (ref 32–37)
MCHC RBC-ENTMCNC: 33.6 G/DL — SIGNIFICANT CHANGE UP (ref 32–37)
MCV RBC AUTO: 93.5 FL — SIGNIFICANT CHANGE UP (ref 80–94)
MCV RBC AUTO: 93.5 FL — SIGNIFICANT CHANGE UP (ref 80–94)
NRBC # BLD: 0 /100 WBCS — SIGNIFICANT CHANGE UP (ref 0–0)
NRBC # BLD: 0 /100 WBCS — SIGNIFICANT CHANGE UP (ref 0–0)
PLATELET # BLD AUTO: 156 K/UL — SIGNIFICANT CHANGE UP (ref 130–400)
PLATELET # BLD AUTO: 156 K/UL — SIGNIFICANT CHANGE UP (ref 130–400)
PMV BLD: 10.1 FL — SIGNIFICANT CHANGE UP (ref 7.4–10.4)
PMV BLD: 10.1 FL — SIGNIFICANT CHANGE UP (ref 7.4–10.4)
POTASSIUM SERPL-MCNC: 3.4 MMOL/L — LOW (ref 3.5–5)
POTASSIUM SERPL-MCNC: 3.4 MMOL/L — LOW (ref 3.5–5)
POTASSIUM SERPL-SCNC: 3.4 MMOL/L — LOW (ref 3.5–5)
POTASSIUM SERPL-SCNC: 3.4 MMOL/L — LOW (ref 3.5–5)
PROT SERPL-MCNC: 5.3 G/DL — LOW (ref 6–8)
PROT SERPL-MCNC: 5.3 G/DL — LOW (ref 6–8)
PROTHROM AB SERPL-ACNC: 19.2 SEC — HIGH (ref 9.95–12.87)
PROTHROM AB SERPL-ACNC: 19.2 SEC — HIGH (ref 9.95–12.87)
RBC # BLD: 3.53 M/UL — LOW (ref 4.7–6.1)
RBC # BLD: 3.53 M/UL — LOW (ref 4.7–6.1)
RBC # FLD: 14.5 % — SIGNIFICANT CHANGE UP (ref 11.5–14.5)
RBC # FLD: 14.5 % — SIGNIFICANT CHANGE UP (ref 11.5–14.5)
SODIUM SERPL-SCNC: 139 MMOL/L — SIGNIFICANT CHANGE UP (ref 135–146)
SODIUM SERPL-SCNC: 139 MMOL/L — SIGNIFICANT CHANGE UP (ref 135–146)
SPECIMEN SOURCE: SIGNIFICANT CHANGE UP
SPECIMEN SOURCE: SIGNIFICANT CHANGE UP
WBC # BLD: 7.45 K/UL — SIGNIFICANT CHANGE UP (ref 4.8–10.8)
WBC # BLD: 7.45 K/UL — SIGNIFICANT CHANGE UP (ref 4.8–10.8)
WBC # FLD AUTO: 7.45 K/UL — SIGNIFICANT CHANGE UP (ref 4.8–10.8)
WBC # FLD AUTO: 7.45 K/UL — SIGNIFICANT CHANGE UP (ref 4.8–10.8)

## 2023-12-14 PROCEDURE — 99232 SBSQ HOSP IP/OBS MODERATE 35: CPT

## 2023-12-14 RX ORDER — WARFARIN SODIUM 2.5 MG/1
1 TABLET ORAL
Refills: 0 | DISCHARGE

## 2023-12-14 RX ORDER — AMLODIPINE BESYLATE 2.5 MG/1
1 TABLET ORAL
Refills: 0 | DISCHARGE

## 2023-12-14 RX ORDER — FOSINOPRIL SODIUM 10 MG/1
1 TABLET ORAL
Refills: 0 | DISCHARGE

## 2023-12-14 RX ORDER — APIXABAN 2.5 MG/1
1 TABLET, FILM COATED ORAL
Qty: 60 | Refills: 0
Start: 2023-12-14 | End: 2024-01-12

## 2023-12-14 RX ORDER — APIXABAN 2.5 MG/1
5 TABLET, FILM COATED ORAL EVERY 12 HOURS
Refills: 0 | Status: DISCONTINUED | OUTPATIENT
Start: 2023-12-14 | End: 2023-12-15

## 2023-12-14 RX ORDER — TRIAMTERENE/HYDROCHLOROTHIAZID 75 MG-50MG
1 TABLET ORAL
Qty: 0 | Refills: 0 | DISCHARGE

## 2023-12-14 RX ORDER — ACETAZOLAMIDE 250 MG/1
1 TABLET ORAL
Refills: 0 | DISCHARGE

## 2023-12-14 RX ORDER — WARFARIN SODIUM 2.5 MG/1
7.5 TABLET ORAL ONCE
Refills: 0 | Status: DISCONTINUED | OUTPATIENT
Start: 2023-12-14 | End: 2023-12-14

## 2023-12-14 RX ADMIN — SENNA PLUS 2 TABLET(S): 8.6 TABLET ORAL at 21:28

## 2023-12-14 RX ADMIN — Medication 650 MILLIGRAM(S): at 22:14

## 2023-12-14 RX ADMIN — CYCLOPENTOLATE HYDROCHLORIDE 1 DROP(S): 10 SOLUTION/ DROPS OPHTHALMIC at 21:28

## 2023-12-14 RX ADMIN — Medication 50 MILLIGRAM(S): at 05:13

## 2023-12-14 RX ADMIN — Medication 650 MILLIGRAM(S): at 14:10

## 2023-12-14 RX ADMIN — APIXABAN 5 MILLIGRAM(S): 2.5 TABLET, FILM COATED ORAL at 17:21

## 2023-12-14 RX ADMIN — Medication 50 MILLIGRAM(S): at 17:20

## 2023-12-14 RX ADMIN — CYCLOPENTOLATE HYDROCHLORIDE 1 DROP(S): 10 SOLUTION/ DROPS OPHTHALMIC at 05:14

## 2023-12-14 RX ADMIN — Medication 650 MILLIGRAM(S): at 13:28

## 2023-12-14 RX ADMIN — PANTOPRAZOLE SODIUM 40 MILLIGRAM(S): 20 TABLET, DELAYED RELEASE ORAL at 05:13

## 2023-12-14 RX ADMIN — Medication 650 MILLIGRAM(S): at 05:14

## 2023-12-14 RX ADMIN — ATORVASTATIN CALCIUM 80 MILLIGRAM(S): 80 TABLET, FILM COATED ORAL at 21:27

## 2023-12-14 RX ADMIN — Medication 650 MILLIGRAM(S): at 21:27

## 2023-12-14 RX ADMIN — CYCLOPENTOLATE HYDROCHLORIDE 1 DROP(S): 10 SOLUTION/ DROPS OPHTHALMIC at 13:30

## 2023-12-14 NOTE — PROGRESS NOTE ADULT - SUBJECTIVE AND OBJECTIVE BOX
24H events:    Patient is a 88y old Male who presents with a chief complaint of Falls (14 Dec 2023 12:58)    Primary diagnosis of Frequent falls    Today is hospital day 2d. This morning patient was seen and examined at bedside, resting comfortably in bed.    No acute or major events overnight.    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 650 milliGRAM(s) Oral every 8 hours  apixaban 5 milliGRAM(s) Oral every 12 hours  atorvastatin 80 milliGRAM(s) Oral at bedtime  cyclopentolate 1% Solution 1 Drop(s) Both EYES three times a day  metoprolol tartrate 50 milliGRAM(s) Oral two times a day  pantoprazole    Tablet 40 milliGRAM(s) Oral before breakfast  polyethylene glycol 3350 17 Gram(s) Oral daily  senna 2 Tablet(s) Oral at bedtime    PRN MEDICATIONS    VITALS:   T(F): 96.7  HR: 119  BP: 112/55  RR: 18  SpO2: --    PHYSICAL EXAM:  GENERAL:   ( x ) NAD, lying in bed comfortably     (  ) obtunded     (  ) lethargic     (  ) somnolent    HEAD:   ( x ) Atraumatic     (  ) hematoma     (  ) laceration (specify location:       )     NECK:  ( x ) Supple     (  ) neck stiffness     (  ) nuchal rigidity     (  )  no JVD     (  ) JVD present ( -- cm)    HEART:  Rate -->     ( x ) normal rate     (  ) bradycardic     (  ) tachycardic  Rhythm -->     ( x ) regular     (  ) regularly irregular     (  ) irregularly irregular  Murmurs -->     ( x ) normal s1s2     (  ) systolic murmur     (  ) diastolic murmur     (  ) continuous murmur      (  ) S3 present     (  ) S4 present    LUNGS:   ( x )Unlabored respirations     (  ) tachypnea  (x  ) B/L air entry     (  ) decreased breath sounds in:  (location     )    (  ) no adventitious sound     (  ) crackles     (  ) wheezing      (  ) rhonchi      (specify location:       )  (  ) chest wall tenderness (specify location:       )    ABDOMEN:   (x  ) Soft     (  ) tense   |   ( x) nondistended     (  ) distended   |   (  ) +BS     (  ) hypoactive bowel sounds     (  ) hyperactive bowel sounds  (  ) nontender     (  ) RUQ tenderness     (  ) RLQ tenderness     (  ) LLQ tenderness     (  ) epigastric tenderness     (  ) diffuse tenderness  (  ) Splenomegaly      (  ) Hepatomegaly      (  ) Jaundice     (  ) ecchymosis     EXTREMITIES:  ( x ) Normal     (  ) Rash     (  ) ecchymosis     (  ) varicose veins      (  ) pitting edema     (  ) non-pitting edema   (  ) ulceration     (  ) gangrene:     (location:     )    NERVOUS SYSTEM:    (x  ) A&Ox3     (  ) confused     (  ) lethargic  CN II-XII:     (  ) Intact     (  ) deficits found     (Specify:     )   Upper extremities:     (  ) no sensorimotor deficits     (  ) weakness     (  ) loss of proprioception/vibration     (  ) loss of touch/temperature (specify:    )  Lower extremities:     (  ) no sensorimotor deficits     (  ) weakness     (  ) loss of proprioception/vibration     (  ) loss of touch/temperature (specify:    )    SKIN:   (  ) No rashes or lesions     (  ) maculopapular rash     (  ) pustules     (  ) vesicles     (  ) ulcer     (  ) ecchymosis     (specify location:     )    AMPAC score:    (  ) Indwelling Leija Catheter:   Date insterted:    Reason (  ) Critical illness     (  ) urinary retention    (  ) Accurate Ins/Outs Monitoring     (  ) CMO patient    (  ) Central Line:   Date inserted:  Location: (  ) Right IJ     (  ) Left IJ     (  ) Right Fem     (  ) Left Fem    (  ) SPC        (  ) pigtail       (  ) PEG tube       (  ) colostomy       (  ) jejunostomy  (  ) U-Dall    LABS:                        11.1   7.45  )-----------( 156      ( 14 Dec 2023 04:30 )             33.0     12-14    139  |  104  |  25<H>  ----------------------------<  89  3.4<L>   |  25  |  1.3    Ca    8.6      14 Dec 2023 04:30  Mg     1.4     12-13    TPro  5.3<L>  /  Alb  3.2<L>  /  TBili  0.6  /  DBili  x   /  AST  27  /  ALT  14  /  AlkPhos  46  12-14    PT/INR - ( 14 Dec 2023 04:30 )   PT: 19.20 sec;   INR: 1.68 ratio      PTT - ( 14 Dec 2023 04:30 )  PTT:29.7 sec  Urinalysis Basic - ( 14 Dec 2023 04:30 )    Color: x / Appearance: x / SG: x / pH: x  Gluc: 89 mg/dL / Ketone: x  / Bili: x / Urobili: x   Blood: x / Protein: x / Nitrite: x   Leuk Esterase: x / RBC: x / WBC x   Sq Epi: x / Non Sq Epi: x / Bacteria: x    Troponin T, Serum: <0.01 ng/mL (12-13-23 @ 17:04)  Lactate, Blood: 2.6 mmol/L *H* (12-13-23 @ 17:04)    Culture - Urine (collected 12 Dec 2023 22:24)  Source: .Urine None  Final Report (14 Dec 2023 11:31):    <10,000 CFU/mL Normal Urogenital Kaylynn    CARDIAC MARKERS ( 13 Dec 2023 17:04 )  x     / <0.01 ng/mL / x     / x     / x      CARDIAC MARKERS ( 13 Dec 2023 07:54 )  x     / <0.01 ng/mL / x     / x     / x      CARDIAC MARKERS ( 12 Dec 2023 20:31 )  x     / <0.01 ng/mL / x     / x     / x

## 2023-12-14 NOTE — PROGRESS NOTE ADULT - SUBJECTIVE AND OBJECTIVE BOX
NICKY JOSHI  88y  Male      Patient is a 88y old  Male who presents with a chief complaint of Falls (13 Dec 2023 17:20)      INTERVAL HPI/OVERNIGHT EVENTS:  He feels weak, no fever, no chest pain, BP improved  Vital Signs Last 24 Hrs  T(C): 36.4 (14 Dec 2023 04:55), Max: 37.2 (13 Dec 2023 20:31)  T(F): 97.6 (14 Dec 2023 04:55), Max: 99 (13 Dec 2023 20:31)  HR: 84 (14 Dec 2023 04:55) (84 - 101)  BP: 100/61 (14 Dec 2023 04:55) (64/34 - 100/65)  BP(mean): 78 (13 Dec 2023 18:27) (54 - 78)  RR: 18 (14 Dec 2023 04:55) (17 - 18)  SpO2: --                Consultant(s) Notes Reviewed:  [x ] YES  [ ] NO          MEDICATIONS  (STANDING):  acetaminophen     Tablet .. 650 milliGRAM(s) Oral every 8 hours  atorvastatin 80 milliGRAM(s) Oral at bedtime  cyclopentolate 1% Solution 1 Drop(s) Both EYES three times a day  metoprolol tartrate 50 milliGRAM(s) Oral two times a day  pantoprazole    Tablet 40 milliGRAM(s) Oral before breakfast  polyethylene glycol 3350 17 Gram(s) Oral daily  senna 2 Tablet(s) Oral at bedtime    MEDICATIONS  (PRN):      LABS                          11.1   7.45  )-----------( 156      ( 14 Dec 2023 04:30 )             33.0     12-14    139  |  104  |  25<H>  ----------------------------<  89  3.4<L>   |  25  |  1.3    Ca    8.6      14 Dec 2023 04:30  Mg     1.4     12-13    TPro  5.3<L>  /  Alb  3.2<L>  /  TBili  0.6  /  DBili  x   /  AST  27  /  ALT  14  /  AlkPhos  46  12-14      Urinalysis Basic - ( 14 Dec 2023 04:30 )    Color: x / Appearance: x / SG: x / pH: x  Gluc: 89 mg/dL / Ketone: x  / Bili: x / Urobili: x   Blood: x / Protein: x / Nitrite: x   Leuk Esterase: x / RBC: x / WBC x   Sq Epi: x / Non Sq Epi: x / Bacteria: x      PT/INR - ( 14 Dec 2023 04:30 )   PT: 19.20 sec;   INR: 1.68 ratio         PTT - ( 14 Dec 2023 04:30 )  PTT:29.7 sec  Lactate Trend  12-13 @ 17:04 Lactate:2.6     CARDIAC MARKERS ( 13 Dec 2023 17:04 )  x     / <0.01 ng/mL / x     / x     / x      CARDIAC MARKERS ( 13 Dec 2023 07:54 )  x     / <0.01 ng/mL / x     / x     / x      CARDIAC MARKERS ( 12 Dec 2023 20:31 )  x     / <0.01 ng/mL / x     / x     / x          CAPILLARY BLOOD GLUCOSE          Culture - Urine (collected 12-12-23 @ 22:24)  Source: .Urine None  Final Report (12-14-23 @ 11:31):    <10,000 CFU/mL Normal Urogenital Kaylynn        RADIOLOGY & ADDITIONAL TESTS:    Imaging Personally Reviewed:  [ ] YES  [ ] NO    HEALTH ISSUES - PROBLEM Dx:          PHYSICAL EXAM:  GENERAL: NAD, well-developed.  HEAD:  Atraumatic, Normocephalic.  EYES: EOMI, PERRLA, conjunctiva and sclera clear.  NECK: Supple, No JVD.  CHEST/LUNG: Clear to auscultation bilaterally; No wheeze.  HEART: Regular rate and rhythm; S1 S2.   ABDOMEN: Soft, Nontender, Nondistended; Bowel sounds present.  EXTREMITIES:  2+ Peripheral Pulses, No clubbing, cyanosis, left hand laceration  PSYCH: AAOx3.  NEUROLOGY: non-focal.  SKIN: No rashes or lesions.

## 2023-12-14 NOTE — PROGRESS NOTE ADULT - ASSESSMENT
88-year-old male, from home, with past medical history of hypertension, HLD, CAD status post CABG, A-fib on Coumadin, with recent admission to the hospital for urine retention and elevated INR (12/6/23 - 12/9/23), now presents for evaluation of frequent falls.    #Frequent falls  #Left hand laceration, s/p 5 sutures wound in ED  - Trauma workup in ED negative for acute fractures and bleeds  - In ED, left hand laceration repairs with 5 sutures - S/p ancef 2g IV x1 for prophylaxis   - Pending orthostatics  - PT suggested STR or HOME PT.   - No need for ECHO    #Afib on Coumadin  * Pt not interested in switching to eliquis at this time  - Continue coumadin 5mg for tonight, monitor INR, goal 2-3  - Continue metoprolol tartrate 50mg q12.  - Monitor on tele for 24 hours    #HTN  - Holding home acetazolamide and triamterene-HCTZ due to h/o recent falls (euvolemic on exam)  - holding home amlodipine 10mg and holding lisinopril.     #HLD   - Cont home atorvastatin 80mg daily  - Home zetia and welchol are non formulary     #Urinary retention  - Hold Flomax due to falls  - Monitor I&O's    #Glaucoma   #Recent L eye infection   - Was taking moxifloxacin 400mg oral tablet at home for recent eye infection, non-formulary  - Currently on Vancomycin gtt's, tobramycin gtt's, difluprednate gtt, and cyclpentolate gtt  - Advised family to bring in drops to administer inpatient as most drops are non-formulary or require frequent dosing    #Constipation  *Last BM 12/9.23  - Start senna and Miralax    #Diet: DASH  #Activity: ambulate as tolerated  #GI ppx: Protonix 40 mg daily  --> DC pending BP control and disposition planned.   
 88-year-old male, from home, with past medical history of hypertension, HLD, CAD status post CABG, A-fib on Coumadin, with recent admission to the hospital for urine retention and elevated INR (12/6/23 - 12/9/23), now presents for evaluation of frequent falls.    #Frequent falls  #Left hand laceration, s/p 5 sutures wound in ED  - Trauma workup in ED negative for acute fractures and bleeds  - In ED, left hand laceration repairs with 5 sutures - S/p ancef 2g IV x1 for prophylaxis   - Pending orthostatics  - PT suggested STR or HOME PT.   - No need for ECHO    #Afib on Coumadin  * Pt not interested in switching to eliquis at this time  - Started eliquis today.   - Continue metoprolol tartrate 50mg q12.  - Monitor on tele for 24 hours    #HTN  - Holding home acetazolamide and triamterene-HCTZ due to h/o recent falls (euvolemic on exam)  - holding home amlodipine 10mg and holding lisinopril.     #HLD   - Cont home atorvastatin 80mg daily  - Home zetia and welchol are non formulary     #Urinary retention  - Hold Flomax due to falls  - Monitor I&O's    #Glaucoma   #Recent L eye infection   - Was taking moxifloxacin 400mg oral tablet at home for recent eye infection, non-formulary  - Currently on Vancomycin gtt's, tobramycin gtt's, difluprednate gtt, and cyclpentolate gtt  - Advised family to bring in drops to administer inpatient as most drops are non-formulary or require frequent dosing    #Constipation  *Last BM 12/9.23  - Start senna and Miralax    #Diet: DASH  #Activity: ambulate as tolerated  #GI ppx: Protonix 40 mg daily  --> DC pending BP control and disposition planned.   
88-year-old male, from home, with past medical history of hypertension, HLD, CAD status post CABG, A-fib on Coumadin, with recent admission to the hospital for urine retention and elevated INR (12/6/23 - 12/9/23), now presents for evaluation of frequent falls.    A/P:   hypotension:   Asymptomatic, patient is on Metoprolol, Amlodipine 10mg and Lisinopril 40mg (patient is on Fosinopril 40mg which equal to Lisinopril 30mg).   Hold Amlodipine and Lisinopril, s/p IV fluid, BP improved, no signs of infection or bleeding.     Recurrent Falls:   Unsteady Gait:   Left hand laceration, s/p 5 sutures wound in ED  Neuro exam showed CN II -XII intact, strength 5/5 in all limbs, finger to nose negative  Trauma workup in ED negative for acute fractures and bleeds  Seen by PT, he has unsteady gait, per Son he has been unsteady since last week after discharge   Fall precaution,     Chronic Atrial Fibrillation:   On Coumadin, due to frequent falls, will recommend to switch to DOAC(less bleeding). Patient agreed.     CAD s/p CABG:   EKG showed q III, aVF, TWI II, III, aVF< V4-V6, these changes noted also on 12/6 but new since last year  Troponin negative.   Patient denies chest pain, Continue Metoprolol and Lipitor. He was scheduled to follow up with Dr. Correa outpatient.     HTN  Hold BP meds as above.     HLD   Cont home atorvastatin 80mg daily  Home zetia and welchol are non formulary     BPH:   Flomax held due to fall, check orthostatic. Monitor for any urinary retention.     Glaucoma   Recent Left eye infection: patient is blind in left eye.    Was taking moxifloxacin 400mg oral tablet at home for recent eye infection, non-formulary  Currently on Vancomycin gtt's, tobramycin gtt's, difluprednate gtt, and cyclpentolate gtt    Constipation  Start senna and Miralax    DVT Prophylaxis:   GI ppx: Protonix 40 mg daily  #Progress Note Handoff:  Pending (specify): improving BP, PT  Family discussion: with his Son Leonel  Disposition: SNF, discussed with his Son, the patient is refusing but the son will try to convince him.   
88-year-old male, from home, with past medical history of hypertension, HLD, CAD status post CABG, A-fib on Coumadin, with recent admission to the hospital for urine retention and elevated INR (12/6/23 - 12/9/23), now presents for evaluation of frequent falls.    A/P:   hypotension:   Asymptomatic, patient is on Metoprolol, Amlodipine 10mg and Lisinopril 40mg (patient is on Fosinopril 40mg which equal to Lisinopril 30mg).   Hold Amlodipine and Lisinopril, s/p IV fluid, BP improved, send Lactate, repeat     Recurrent Falls:   Unsteady Gait:   Left hand laceration, s/p 5 sutures wound in ED  Neuro exam showed CN II -XII intact, strength 5/5 in all limbs, finger to nose negative  Trauma workup in ED negative for acute fractures and bleeds  Seen by PT, he has unsteady gait, per Son he has been unsteady since last week after discharge   Fall precaution,     Chronic Atrial Fibrillation:   On Coumadin, due to frequent falls, will recommend to switch to DOAC(less bleeding).     CAD s/p CABG:   EKG showed q III, aVF, TWI II, III, aVF< V4-V6, these changes noted also on 12/6 but new since last year  Troponin negative.   Patient denies chest pain, Continue Metoprolol and Lipitor. He was scheduled to follow up with Dr. Correa outpatient.     HTN  Hold BP meds as above.     #HLD   - Cont home atorvastatin 80mg daily  - Home zetia and welchol are non formulary     #Urinary retention  - Hold Flomax due to falls  - Monitor I&O's    #Glaucoma   #Recent L eye infection   - Was taking moxifloxacin 400mg oral tablet at home for recent eye infection, non-formulary  - Currently on Vancomycin gtt's, tobramycin gtt's, difluprednate gtt, and cyclpentolate gtt  - Advised family to bring in drops to administer inpatient as most drops are non-formulary or require frequent dosing    Constipation  Start senna and Miralax    #Diet: DASH  #Activity: ambulate as tolerated  #GI ppx: Protonix 40 mg daily  #Progress Note Handoff:  Pending (specify): improving BP, PT  Family discussion: with his Son Leonel  Disposition: SNF, discussed with his Son, the patient is refusing but the son will try to convince him.

## 2023-12-15 ENCOUNTER — TRANSCRIPTION ENCOUNTER (OUTPATIENT)
Age: 88
End: 2023-12-15

## 2023-12-15 VITALS
SYSTOLIC BLOOD PRESSURE: 104 MMHG | TEMPERATURE: 98 F | DIASTOLIC BLOOD PRESSURE: 67 MMHG | RESPIRATION RATE: 18 BRPM | HEART RATE: 105 BPM

## 2023-12-15 LAB
ALBUMIN SERPL ELPH-MCNC: 3.1 G/DL — LOW (ref 3.5–5.2)
ALBUMIN SERPL ELPH-MCNC: 3.1 G/DL — LOW (ref 3.5–5.2)
ALP SERPL-CCNC: 43 U/L — SIGNIFICANT CHANGE UP (ref 30–115)
ALP SERPL-CCNC: 43 U/L — SIGNIFICANT CHANGE UP (ref 30–115)
ALT FLD-CCNC: 15 U/L — SIGNIFICANT CHANGE UP (ref 0–41)
ALT FLD-CCNC: 15 U/L — SIGNIFICANT CHANGE UP (ref 0–41)
ANION GAP SERPL CALC-SCNC: 11 MMOL/L — SIGNIFICANT CHANGE UP (ref 7–14)
ANION GAP SERPL CALC-SCNC: 11 MMOL/L — SIGNIFICANT CHANGE UP (ref 7–14)
APTT BLD: 35.2 SEC — SIGNIFICANT CHANGE UP (ref 27–39.2)
APTT BLD: 35.2 SEC — SIGNIFICANT CHANGE UP (ref 27–39.2)
AST SERPL-CCNC: 26 U/L — SIGNIFICANT CHANGE UP (ref 0–41)
AST SERPL-CCNC: 26 U/L — SIGNIFICANT CHANGE UP (ref 0–41)
BILIRUB SERPL-MCNC: 0.6 MG/DL — SIGNIFICANT CHANGE UP (ref 0.2–1.2)
BILIRUB SERPL-MCNC: 0.6 MG/DL — SIGNIFICANT CHANGE UP (ref 0.2–1.2)
BUN SERPL-MCNC: 26 MG/DL — HIGH (ref 10–20)
BUN SERPL-MCNC: 26 MG/DL — HIGH (ref 10–20)
CALCIUM SERPL-MCNC: 8.6 MG/DL — SIGNIFICANT CHANGE UP (ref 8.4–10.5)
CALCIUM SERPL-MCNC: 8.6 MG/DL — SIGNIFICANT CHANGE UP (ref 8.4–10.5)
CHLORIDE SERPL-SCNC: 104 MMOL/L — SIGNIFICANT CHANGE UP (ref 98–110)
CHLORIDE SERPL-SCNC: 104 MMOL/L — SIGNIFICANT CHANGE UP (ref 98–110)
CO2 SERPL-SCNC: 25 MMOL/L — SIGNIFICANT CHANGE UP (ref 17–32)
CO2 SERPL-SCNC: 25 MMOL/L — SIGNIFICANT CHANGE UP (ref 17–32)
CREAT SERPL-MCNC: 1.1 MG/DL — SIGNIFICANT CHANGE UP (ref 0.7–1.5)
CREAT SERPL-MCNC: 1.1 MG/DL — SIGNIFICANT CHANGE UP (ref 0.7–1.5)
EGFR: 65 ML/MIN/1.73M2 — SIGNIFICANT CHANGE UP
EGFR: 65 ML/MIN/1.73M2 — SIGNIFICANT CHANGE UP
GLUCOSE SERPL-MCNC: 92 MG/DL — SIGNIFICANT CHANGE UP (ref 70–99)
GLUCOSE SERPL-MCNC: 92 MG/DL — SIGNIFICANT CHANGE UP (ref 70–99)
HCT VFR BLD CALC: 35.1 % — LOW (ref 42–52)
HCT VFR BLD CALC: 35.1 % — LOW (ref 42–52)
HGB BLD-MCNC: 11.6 G/DL — LOW (ref 14–18)
HGB BLD-MCNC: 11.6 G/DL — LOW (ref 14–18)
INR BLD: 3.44 RATIO — HIGH (ref 0.65–1.3)
INR BLD: 3.44 RATIO — HIGH (ref 0.65–1.3)
MCHC RBC-ENTMCNC: 31.5 PG — HIGH (ref 27–31)
MCHC RBC-ENTMCNC: 31.5 PG — HIGH (ref 27–31)
MCHC RBC-ENTMCNC: 33 G/DL — SIGNIFICANT CHANGE UP (ref 32–37)
MCHC RBC-ENTMCNC: 33 G/DL — SIGNIFICANT CHANGE UP (ref 32–37)
MCV RBC AUTO: 95.4 FL — HIGH (ref 80–94)
MCV RBC AUTO: 95.4 FL — HIGH (ref 80–94)
NRBC # BLD: 0 /100 WBCS — SIGNIFICANT CHANGE UP (ref 0–0)
NRBC # BLD: 0 /100 WBCS — SIGNIFICANT CHANGE UP (ref 0–0)
PLATELET # BLD AUTO: 164 K/UL — SIGNIFICANT CHANGE UP (ref 130–400)
PLATELET # BLD AUTO: 164 K/UL — SIGNIFICANT CHANGE UP (ref 130–400)
PMV BLD: 9.9 FL — SIGNIFICANT CHANGE UP (ref 7.4–10.4)
PMV BLD: 9.9 FL — SIGNIFICANT CHANGE UP (ref 7.4–10.4)
POTASSIUM SERPL-MCNC: 3.8 MMOL/L — SIGNIFICANT CHANGE UP (ref 3.5–5)
POTASSIUM SERPL-MCNC: 3.8 MMOL/L — SIGNIFICANT CHANGE UP (ref 3.5–5)
POTASSIUM SERPL-SCNC: 3.8 MMOL/L — SIGNIFICANT CHANGE UP (ref 3.5–5)
POTASSIUM SERPL-SCNC: 3.8 MMOL/L — SIGNIFICANT CHANGE UP (ref 3.5–5)
PROT SERPL-MCNC: 5.6 G/DL — LOW (ref 6–8)
PROT SERPL-MCNC: 5.6 G/DL — LOW (ref 6–8)
PROTHROM AB SERPL-ACNC: 39.7 SEC — HIGH (ref 9.95–12.87)
PROTHROM AB SERPL-ACNC: 39.7 SEC — HIGH (ref 9.95–12.87)
RBC # BLD: 3.68 M/UL — LOW (ref 4.7–6.1)
RBC # BLD: 3.68 M/UL — LOW (ref 4.7–6.1)
RBC # FLD: 14.6 % — HIGH (ref 11.5–14.5)
RBC # FLD: 14.6 % — HIGH (ref 11.5–14.5)
SODIUM SERPL-SCNC: 140 MMOL/L — SIGNIFICANT CHANGE UP (ref 135–146)
SODIUM SERPL-SCNC: 140 MMOL/L — SIGNIFICANT CHANGE UP (ref 135–146)
WBC # BLD: 5.91 K/UL — SIGNIFICANT CHANGE UP (ref 4.8–10.8)
WBC # BLD: 5.91 K/UL — SIGNIFICANT CHANGE UP (ref 4.8–10.8)
WBC # FLD AUTO: 5.91 K/UL — SIGNIFICANT CHANGE UP (ref 4.8–10.8)
WBC # FLD AUTO: 5.91 K/UL — SIGNIFICANT CHANGE UP (ref 4.8–10.8)

## 2023-12-15 PROCEDURE — 99239 HOSP IP/OBS DSCHRG MGMT >30: CPT

## 2023-12-15 RX ADMIN — PANTOPRAZOLE SODIUM 40 MILLIGRAM(S): 20 TABLET, DELAYED RELEASE ORAL at 06:00

## 2023-12-15 RX ADMIN — Medication 650 MILLIGRAM(S): at 05:59

## 2023-12-15 RX ADMIN — CYCLOPENTOLATE HYDROCHLORIDE 1 DROP(S): 10 SOLUTION/ DROPS OPHTHALMIC at 13:41

## 2023-12-15 RX ADMIN — Medication 650 MILLIGRAM(S): at 06:45

## 2023-12-15 RX ADMIN — APIXABAN 5 MILLIGRAM(S): 2.5 TABLET, FILM COATED ORAL at 05:59

## 2023-12-15 RX ADMIN — CYCLOPENTOLATE HYDROCHLORIDE 1 DROP(S): 10 SOLUTION/ DROPS OPHTHALMIC at 05:59

## 2023-12-15 RX ADMIN — Medication 50 MILLIGRAM(S): at 06:00

## 2023-12-15 NOTE — DISCHARGE NOTE PROVIDER - PROVIDER TOKENS
PROVIDER:[TOKEN:[[id number]],FOLLOWUP:[2 weeks]] PROVIDER:[TOKEN:[[id number]],FOLLOWUP:[2 weeks]]

## 2023-12-15 NOTE — DISCHARGE NOTE PROVIDER - HOSPITAL COURSE
88-year-old male, lives at home with part-time aid, history of hypertension, CAD status post CABG, A-fib on Coumadin, glaucoma c/b recent L eye infection,  with recent admission to the hospital for urine retention and elevated INR (12/6/23 - 12/9/23), now presents for evaluation of frequent falls. Patient endorses first fall on Saturday (12/9), next fall was Monday (12/11) where fell on his backside, and most recent fall was this morning (12/12) where he got out of bed, and tripped on bedsheet, falling forward onto side table. Patient denies any proceeding symptoms to fall, such as dizziness. He endorses poor vision, due to an infection of left eye and h/o glaucoma. Patient denies headache, SOB, chest pain, leg swelling. Patient's baseline was 3 weeks ago where he was going into work every day, able to perform his ADLs. He lives at home with his wife (has dementia), and has a home health aide that comes part time. Per son, patient was found on floor, next to bed, and EMS was activated.  ·Vitals: /62, HR 94, RR 19, temp 98.4F, SpO2 98% on RA   ·Labs: hgb at baseline 12, no white count, PT 19, INR 1.67, BUN 26, eGFR 49, AST 44  ·Imaging: negative for acute findings  1.Ct Head No Cont: no acute findings, cortical atrophy, cerebellar atrophy, B/l basal ganglia calcifications, dentate nuclei calcifications, vascular calcifications  2.Xray shoulder: no acute finding, degenerative changes  3.Xray hand: no acute findings, erosive OA  4.CXR: Elevation of left hemidiaphragm stable  5.Xray pelvis: No acute findings. Right-sided femoral acetabular joint space narrowing and sclerosis. Lower lumbar sacral spine degenerative changes.  ·Interventions: laceration repair to left lateral hand, 5 sutures  Patient admitted for inability to ambulate.    In the hospital pt worked with Physical therapy and was advised SNF but Patient denied. Pt to go home with home PT + assistance. He was also found to be hypotensive. We adjusted his BP medications. And his BP improved. Due to recurrent falls, we also switched pt to eliquis from warfarin.

## 2023-12-15 NOTE — DISCHARGE NOTE PROVIDER - NSDCMRMEDTOKEN_GEN_ALL_CORE_FT
atorvastatin 80 mg oral tablet: 1 tab(s) orally once a day (at bedtime)  cyclopentolate 0.5% ophthalmic solution: 1 drop(s) in each affected eye 3 times a day  difluprednate 0.05% ophthalmic emulsion: 1 drop(s) in each affected eye every 2 hours  Eliquis 5 mg oral tablet: 1 tab(s) orally 2 times a day  Flonase 50 mcg/inh nasal spray: 1 spray(s) in each nostril 2 times a day  methocarbamol 500 mg oral tablet: 2 tab(s) orally 2 times a day  metoprolol tartrate 50 mg oral tablet: 2 tab(s) orally once a day  moxifloxacin 400 mg oral tablet: 1 tab(s) orally once a day  omeprazole 20 mg oral delayed release tablet: 1 tab(s) orally once a day  tamsulosin 0.4 mg oral capsule: 1 cap(s) orally once a day (at bedtime)  tobramycin 0.3% ophthalmic solution: 1 drop(s) in each affected eye 6 times a day  vancomycin 10 mg/mL intraocular solution: 1 application intraocular 6 times a day  Welchol 625 mg oral tablet: 2 tabs 2 times a day  Zetia 10 mg oral tablet: 1 tab(s) orally once a day

## 2023-12-15 NOTE — DISCHARGE NOTE PROVIDER - NSDCCPCAREPLAN_GEN_ALL_CORE_FT
PRINCIPAL DISCHARGE DIAGNOSIS  Diagnosis: Frequent falls  Assessment and Plan of Treatment: You came in with frequent falls. PT assessed you and advised SNF but you wanted to go home. You are advised to do PT at home. We stopped fosonopril and amlodipine given your low BP. We also changed your warfarin to eliquis. Please take your medicines as prescribed. You will need to follow up with your PCP and Cardiologist in 2 weeks time.      SECONDARY DISCHARGE DIAGNOSES  Diagnosis: Laceration of hand  Assessment and Plan of Treatment:

## 2023-12-15 NOTE — DISCHARGE NOTE PROVIDER - ATTENDING DISCHARGE PHYSICAL EXAMINATION:
PHYSICAL EXAM:  GENERAL: NAD   HEAD:  Atraumatic, Normocephalic  EYES:  conjunctiva and sclera clear  NECK: Supple, No JVD  CHEST/LUNG: Clear to auscultation bilaterally; No wheeze  HEART: Regular rate and rhythm; No murmurs, rubs, or gallops  ABDOMEN: Soft, Nontender, Nondistended; Bowel sounds present  NEUROLOGY: non-focal  SKIN: No rashes or lesions

## 2023-12-15 NOTE — DISCHARGE NOTE PROVIDER - CARE PROVIDER_API CALL
Elyse Flynn  Internal Medicine  [address] [place] [id number]  Phone: [phone]  Fax: [phone])-  Follow Up Time: 2 weeks   Elyse Flynn  Internal Medicine  [address] [place] [id number]  Phone: [phone]  Fax: ()-  Follow Up Time: 2 weeks

## 2023-12-16 RX ORDER — APIXABAN 2.5 MG/1
1 TABLET, FILM COATED ORAL
Qty: 60 | Refills: 0
Start: 2023-12-16 | End: 2024-01-14

## 2023-12-21 DIAGNOSIS — I48.20 CHRONIC ATRIAL FIBRILLATION, UNSPECIFIED: ICD-10-CM

## 2023-12-21 DIAGNOSIS — E78.5 HYPERLIPIDEMIA, UNSPECIFIED: ICD-10-CM
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
## 2023-12-21 DIAGNOSIS — R26.81 UNSTEADINESS ON FEET: ICD-10-CM
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
## 2023-12-21 DIAGNOSIS — Z91.81 HISTORY OF FALLING: ICD-10-CM

## 2023-12-21 DIAGNOSIS — I10 ESSENTIAL (PRIMARY) HYPERTENSION: ICD-10-CM

## 2023-12-21 DIAGNOSIS — K59.00 CONSTIPATION, UNSPECIFIED: ICD-10-CM

## 2023-12-21 DIAGNOSIS — N40.0 BENIGN PROSTATIC HYPERPLASIA WITHOUT LOWER URINARY TRACT SYMPTOMS: ICD-10-CM

## 2023-12-21 DIAGNOSIS — I25.10 ATHEROSCLEROTIC HEART DISEASE OF NATIVE CORONARY ARTERY WITHOUT ANGINA PECTORIS: ICD-10-CM

## 2023-12-21 DIAGNOSIS — Z79.01 LONG TERM (CURRENT) USE OF ANTICOAGULANTS: ICD-10-CM

## 2023-12-21 DIAGNOSIS — R29.6 REPEATED FALLS: ICD-10-CM

## 2023-12-21 DIAGNOSIS — R33.9 RETENTION OF URINE, UNSPECIFIED: ICD-10-CM

## 2023-12-21 DIAGNOSIS — S61.412A LACERATION WITHOUT FOREIGN BODY OF LEFT HAND, INITIAL ENCOUNTER: ICD-10-CM

## 2023-12-21 DIAGNOSIS — H40.9 UNSPECIFIED GLAUCOMA: ICD-10-CM

## 2023-12-21 DIAGNOSIS — Z95.1 PRESENCE OF AORTOCORONARY BYPASS GRAFT: ICD-10-CM

## 2023-12-21 DIAGNOSIS — I95.9 HYPOTENSION, UNSPECIFIED: ICD-10-CM

## 2024-01-01 ENCOUNTER — INPATIENT (INPATIENT)
Facility: HOSPITAL | Age: 89
LOS: 8 days | Discharge: HOME | DRG: 178 | End: 2024-01-10
Attending: STUDENT IN AN ORGANIZED HEALTH CARE EDUCATION/TRAINING PROGRAM | Admitting: INTERNAL MEDICINE
Payer: COMMERCIAL

## 2024-01-01 VITALS
SYSTOLIC BLOOD PRESSURE: 149 MMHG | DIASTOLIC BLOOD PRESSURE: 78 MMHG | HEART RATE: 120 BPM | RESPIRATION RATE: 20 BRPM | HEIGHT: 70 IN | TEMPERATURE: 96 F | OXYGEN SATURATION: 96 % | WEIGHT: 169.98 LBS

## 2024-01-01 DIAGNOSIS — J18.9 PNEUMONIA, UNSPECIFIED ORGANISM: ICD-10-CM

## 2024-01-01 DIAGNOSIS — Z95.1 PRESENCE OF AORTOCORONARY BYPASS GRAFT: Chronic | ICD-10-CM

## 2024-01-01 LAB
ALBUMIN SERPL ELPH-MCNC: 3.4 G/DL — LOW (ref 3.5–5.2)
ALBUMIN SERPL ELPH-MCNC: 3.4 G/DL — LOW (ref 3.5–5.2)
ALP SERPL-CCNC: 115 U/L — SIGNIFICANT CHANGE UP (ref 30–115)
ALP SERPL-CCNC: 115 U/L — SIGNIFICANT CHANGE UP (ref 30–115)
ALT FLD-CCNC: 42 U/L — HIGH (ref 0–41)
ALT FLD-CCNC: 42 U/L — HIGH (ref 0–41)
ANION GAP SERPL CALC-SCNC: 11 MMOL/L — SIGNIFICANT CHANGE UP (ref 7–14)
ANION GAP SERPL CALC-SCNC: 11 MMOL/L — SIGNIFICANT CHANGE UP (ref 7–14)
AST SERPL-CCNC: 45 U/L — HIGH (ref 0–41)
AST SERPL-CCNC: 45 U/L — HIGH (ref 0–41)
BASOPHILS # BLD AUTO: 0.02 K/UL — SIGNIFICANT CHANGE UP (ref 0–0.2)
BASOPHILS # BLD AUTO: 0.02 K/UL — SIGNIFICANT CHANGE UP (ref 0–0.2)
BASOPHILS NFR BLD AUTO: 0.2 % — SIGNIFICANT CHANGE UP (ref 0–1)
BASOPHILS NFR BLD AUTO: 0.2 % — SIGNIFICANT CHANGE UP (ref 0–1)
BILIRUB SERPL-MCNC: 0.7 MG/DL — SIGNIFICANT CHANGE UP (ref 0.2–1.2)
BILIRUB SERPL-MCNC: 0.7 MG/DL — SIGNIFICANT CHANGE UP (ref 0.2–1.2)
BUN SERPL-MCNC: 16 MG/DL — SIGNIFICANT CHANGE UP (ref 10–20)
BUN SERPL-MCNC: 16 MG/DL — SIGNIFICANT CHANGE UP (ref 10–20)
CALCIUM SERPL-MCNC: 8.5 MG/DL — SIGNIFICANT CHANGE UP (ref 8.4–10.5)
CALCIUM SERPL-MCNC: 8.5 MG/DL — SIGNIFICANT CHANGE UP (ref 8.4–10.5)
CHLORIDE SERPL-SCNC: 106 MMOL/L — SIGNIFICANT CHANGE UP (ref 98–110)
CHLORIDE SERPL-SCNC: 106 MMOL/L — SIGNIFICANT CHANGE UP (ref 98–110)
CO2 SERPL-SCNC: 27 MMOL/L — SIGNIFICANT CHANGE UP (ref 17–32)
CO2 SERPL-SCNC: 27 MMOL/L — SIGNIFICANT CHANGE UP (ref 17–32)
CREAT SERPL-MCNC: 0.7 MG/DL — SIGNIFICANT CHANGE UP (ref 0.7–1.5)
CREAT SERPL-MCNC: 0.7 MG/DL — SIGNIFICANT CHANGE UP (ref 0.7–1.5)
EGFR: 89 ML/MIN/1.73M2 — SIGNIFICANT CHANGE UP
EGFR: 89 ML/MIN/1.73M2 — SIGNIFICANT CHANGE UP
EOSINOPHIL # BLD AUTO: 0.06 K/UL — SIGNIFICANT CHANGE UP (ref 0–0.7)
EOSINOPHIL # BLD AUTO: 0.06 K/UL — SIGNIFICANT CHANGE UP (ref 0–0.7)
EOSINOPHIL NFR BLD AUTO: 0.7 % — SIGNIFICANT CHANGE UP (ref 0–8)
EOSINOPHIL NFR BLD AUTO: 0.7 % — SIGNIFICANT CHANGE UP (ref 0–8)
FLUAV AG NPH QL: SIGNIFICANT CHANGE UP
FLUAV AG NPH QL: SIGNIFICANT CHANGE UP
FLUBV AG NPH QL: SIGNIFICANT CHANGE UP
FLUBV AG NPH QL: SIGNIFICANT CHANGE UP
GLUCOSE SERPL-MCNC: 95 MG/DL — SIGNIFICANT CHANGE UP (ref 70–99)
GLUCOSE SERPL-MCNC: 95 MG/DL — SIGNIFICANT CHANGE UP (ref 70–99)
HCT VFR BLD CALC: 34 % — LOW (ref 42–52)
HCT VFR BLD CALC: 34 % — LOW (ref 42–52)
HGB BLD-MCNC: 11.2 G/DL — LOW (ref 14–18)
HGB BLD-MCNC: 11.2 G/DL — LOW (ref 14–18)
IMM GRANULOCYTES NFR BLD AUTO: 0.6 % — HIGH (ref 0.1–0.3)
IMM GRANULOCYTES NFR BLD AUTO: 0.6 % — HIGH (ref 0.1–0.3)
LACTATE SERPL-SCNC: 1.8 MMOL/L — SIGNIFICANT CHANGE UP (ref 0.7–2)
LACTATE SERPL-SCNC: 1.8 MMOL/L — SIGNIFICANT CHANGE UP (ref 0.7–2)
LIDOCAIN IGE QN: 33 U/L — SIGNIFICANT CHANGE UP (ref 7–60)
LIDOCAIN IGE QN: 33 U/L — SIGNIFICANT CHANGE UP (ref 7–60)
LYMPHOCYTES # BLD AUTO: 1.25 K/UL — SIGNIFICANT CHANGE UP (ref 1.2–3.4)
LYMPHOCYTES # BLD AUTO: 1.25 K/UL — SIGNIFICANT CHANGE UP (ref 1.2–3.4)
LYMPHOCYTES # BLD AUTO: 13.9 % — LOW (ref 20.5–51.1)
LYMPHOCYTES # BLD AUTO: 13.9 % — LOW (ref 20.5–51.1)
MCHC RBC-ENTMCNC: 31.3 PG — HIGH (ref 27–31)
MCHC RBC-ENTMCNC: 31.3 PG — HIGH (ref 27–31)
MCHC RBC-ENTMCNC: 32.9 G/DL — SIGNIFICANT CHANGE UP (ref 32–37)
MCHC RBC-ENTMCNC: 32.9 G/DL — SIGNIFICANT CHANGE UP (ref 32–37)
MCV RBC AUTO: 95 FL — HIGH (ref 80–94)
MCV RBC AUTO: 95 FL — HIGH (ref 80–94)
MONOCYTES # BLD AUTO: 0.73 K/UL — HIGH (ref 0.1–0.6)
MONOCYTES # BLD AUTO: 0.73 K/UL — HIGH (ref 0.1–0.6)
MONOCYTES NFR BLD AUTO: 8.1 % — SIGNIFICANT CHANGE UP (ref 1.7–9.3)
MONOCYTES NFR BLD AUTO: 8.1 % — SIGNIFICANT CHANGE UP (ref 1.7–9.3)
NEUTROPHILS # BLD AUTO: 6.89 K/UL — HIGH (ref 1.4–6.5)
NEUTROPHILS # BLD AUTO: 6.89 K/UL — HIGH (ref 1.4–6.5)
NEUTROPHILS NFR BLD AUTO: 76.5 % — HIGH (ref 42.2–75.2)
NEUTROPHILS NFR BLD AUTO: 76.5 % — HIGH (ref 42.2–75.2)
NRBC # BLD: 0 /100 WBCS — SIGNIFICANT CHANGE UP (ref 0–0)
NRBC # BLD: 0 /100 WBCS — SIGNIFICANT CHANGE UP (ref 0–0)
PLATELET # BLD AUTO: 257 K/UL — SIGNIFICANT CHANGE UP (ref 130–400)
PLATELET # BLD AUTO: 257 K/UL — SIGNIFICANT CHANGE UP (ref 130–400)
PMV BLD: 10 FL — SIGNIFICANT CHANGE UP (ref 7.4–10.4)
PMV BLD: 10 FL — SIGNIFICANT CHANGE UP (ref 7.4–10.4)
POTASSIUM SERPL-MCNC: 3.6 MMOL/L — SIGNIFICANT CHANGE UP (ref 3.5–5)
POTASSIUM SERPL-MCNC: 3.6 MMOL/L — SIGNIFICANT CHANGE UP (ref 3.5–5)
POTASSIUM SERPL-SCNC: 3.6 MMOL/L — SIGNIFICANT CHANGE UP (ref 3.5–5)
POTASSIUM SERPL-SCNC: 3.6 MMOL/L — SIGNIFICANT CHANGE UP (ref 3.5–5)
PROT SERPL-MCNC: 6.2 G/DL — SIGNIFICANT CHANGE UP (ref 6–8)
PROT SERPL-MCNC: 6.2 G/DL — SIGNIFICANT CHANGE UP (ref 6–8)
RBC # BLD: 3.58 M/UL — LOW (ref 4.7–6.1)
RBC # BLD: 3.58 M/UL — LOW (ref 4.7–6.1)
RBC # FLD: 14.9 % — HIGH (ref 11.5–14.5)
RBC # FLD: 14.9 % — HIGH (ref 11.5–14.5)
RSV RNA NPH QL NAA+NON-PROBE: SIGNIFICANT CHANGE UP
RSV RNA NPH QL NAA+NON-PROBE: SIGNIFICANT CHANGE UP
SARS-COV-2 RNA SPEC QL NAA+PROBE: SIGNIFICANT CHANGE UP
SARS-COV-2 RNA SPEC QL NAA+PROBE: SIGNIFICANT CHANGE UP
SODIUM SERPL-SCNC: 144 MMOL/L — SIGNIFICANT CHANGE UP (ref 135–146)
SODIUM SERPL-SCNC: 144 MMOL/L — SIGNIFICANT CHANGE UP (ref 135–146)
WBC # BLD: 9 K/UL — SIGNIFICANT CHANGE UP (ref 4.8–10.8)
WBC # BLD: 9 K/UL — SIGNIFICANT CHANGE UP (ref 4.8–10.8)
WBC # FLD AUTO: 9 K/UL — SIGNIFICANT CHANGE UP (ref 4.8–10.8)
WBC # FLD AUTO: 9 K/UL — SIGNIFICANT CHANGE UP (ref 4.8–10.8)

## 2024-01-01 PROCEDURE — 71045 X-RAY EXAM CHEST 1 VIEW: CPT | Mod: 26

## 2024-01-01 PROCEDURE — 93005 ELECTROCARDIOGRAM TRACING: CPT

## 2024-01-01 PROCEDURE — 85027 COMPLETE CBC AUTOMATED: CPT

## 2024-01-01 PROCEDURE — 92526 ORAL FUNCTION THERAPY: CPT | Mod: GN

## 2024-01-01 PROCEDURE — 84443 ASSAY THYROID STIM HORMONE: CPT

## 2024-01-01 PROCEDURE — 80048 BASIC METABOLIC PNL TOTAL CA: CPT

## 2024-01-01 PROCEDURE — 97116 GAIT TRAINING THERAPY: CPT | Mod: GP

## 2024-01-01 PROCEDURE — 87449 NOS EACH ORGANISM AG IA: CPT

## 2024-01-01 PROCEDURE — 83735 ASSAY OF MAGNESIUM: CPT

## 2024-01-01 PROCEDURE — 87070 CULTURE OTHR SPECIMN AEROBIC: CPT

## 2024-01-01 PROCEDURE — 99222 1ST HOSP IP/OBS MODERATE 55: CPT

## 2024-01-01 PROCEDURE — 81001 URINALYSIS AUTO W/SCOPE: CPT

## 2024-01-01 PROCEDURE — 93010 ELECTROCARDIOGRAM REPORT: CPT

## 2024-01-01 PROCEDURE — 97110 THERAPEUTIC EXERCISES: CPT | Mod: GP

## 2024-01-01 PROCEDURE — 80053 COMPREHEN METABOLIC PANEL: CPT

## 2024-01-01 PROCEDURE — 92610 EVALUATE SWALLOWING FUNCTION: CPT | Mod: GN

## 2024-01-01 PROCEDURE — 87635 SARS-COV-2 COVID-19 AMP PRB: CPT

## 2024-01-01 PROCEDURE — 74230 X-RAY XM SWLNG FUNCJ C+: CPT

## 2024-01-01 PROCEDURE — 84145 PROCALCITONIN (PCT): CPT

## 2024-01-01 PROCEDURE — 97162 PT EVAL MOD COMPLEX 30 MIN: CPT | Mod: GP

## 2024-01-01 PROCEDURE — 76870 US EXAM SCROTUM: CPT

## 2024-01-01 PROCEDURE — 87086 URINE CULTURE/COLONY COUNT: CPT

## 2024-01-01 PROCEDURE — 76870 US EXAM SCROTUM: CPT | Mod: 26

## 2024-01-01 PROCEDURE — 99285 EMERGENCY DEPT VISIT HI MDM: CPT

## 2024-01-01 PROCEDURE — 92611 MOTION FLUOROSCOPY/SWALLOW: CPT | Mod: GN

## 2024-01-01 PROCEDURE — 36415 COLL VENOUS BLD VENIPUNCTURE: CPT

## 2024-01-01 PROCEDURE — 87899 AGENT NOS ASSAY W/OPTIC: CPT

## 2024-01-01 PROCEDURE — 85025 COMPLETE CBC W/AUTO DIFF WBC: CPT

## 2024-01-01 RX ORDER — METOPROLOL TARTRATE 50 MG
1.5 TABLET ORAL
Refills: 0 | DISCHARGE

## 2024-01-01 RX ORDER — TOBRAMYCIN 0.3 %
1 DROPS OPHTHALMIC (EYE)
Refills: 0 | DISCHARGE

## 2024-01-01 RX ORDER — CEFEPIME 1 G/1
2000 INJECTION, POWDER, FOR SOLUTION INTRAMUSCULAR; INTRAVENOUS EVERY 8 HOURS
Refills: 0 | Status: DISCONTINUED | OUTPATIENT
Start: 2024-01-01 | End: 2024-01-06

## 2024-01-01 RX ORDER — METOPROLOL TARTRATE 50 MG
75 TABLET ORAL DAILY
Refills: 0 | Status: DISCONTINUED | OUTPATIENT
Start: 2024-01-01 | End: 2024-01-06

## 2024-01-01 RX ORDER — NYSTATIN CREAM 100000 [USP'U]/G
1 CREAM TOPICAL ONCE
Refills: 0 | Status: DISCONTINUED | OUTPATIENT
Start: 2024-01-01 | End: 2024-01-10

## 2024-01-01 RX ORDER — APIXABAN 2.5 MG/1
5 TABLET, FILM COATED ORAL EVERY 12 HOURS
Refills: 0 | Status: DISCONTINUED | OUTPATIENT
Start: 2024-01-01 | End: 2024-01-10

## 2024-01-01 RX ORDER — METHOCARBAMOL 500 MG/1
2 TABLET, FILM COATED ORAL
Refills: 0 | DISCHARGE

## 2024-01-01 RX ORDER — CEFEPIME 1 G/1
2000 INJECTION, POWDER, FOR SOLUTION INTRAMUSCULAR; INTRAVENOUS EVERY 8 HOURS
Refills: 0 | Status: DISCONTINUED | OUTPATIENT
Start: 2024-01-01 | End: 2024-01-01

## 2024-01-01 RX ORDER — LANOLIN ALCOHOL/MO/W.PET/CERES
3 CREAM (GRAM) TOPICAL AT BEDTIME
Refills: 0 | Status: DISCONTINUED | OUTPATIENT
Start: 2024-01-01 | End: 2024-01-10

## 2024-01-01 RX ORDER — CEFEPIME 1 G/1
2000 INJECTION, POWDER, FOR SOLUTION INTRAMUSCULAR; INTRAVENOUS ONCE
Refills: 0 | Status: COMPLETED | OUTPATIENT
Start: 2024-01-01 | End: 2024-01-01

## 2024-01-01 RX ORDER — ATORVASTATIN CALCIUM 80 MG/1
80 TABLET, FILM COATED ORAL AT BEDTIME
Refills: 0 | Status: DISCONTINUED | OUTPATIENT
Start: 2024-01-01 | End: 2024-01-10

## 2024-01-01 RX ORDER — COLESEVELAM HYDROCHLORIDE 625 MG/1
0 TABLET, FILM COATED ORAL
Qty: 0 | Refills: 0 | DISCHARGE

## 2024-01-01 RX ORDER — FLUTICASONE PROPIONATE 50 MCG
1 SPRAY, SUSPENSION NASAL
Refills: 0 | DISCHARGE

## 2024-01-01 RX ORDER — SODIUM CHLORIDE 9 MG/ML
1000 INJECTION INTRAMUSCULAR; INTRAVENOUS; SUBCUTANEOUS ONCE
Refills: 0 | Status: COMPLETED | OUTPATIENT
Start: 2024-01-01 | End: 2024-01-01

## 2024-01-01 RX ORDER — DUREZOL 0.5 MG/ML
1 EMULSION OPHTHALMIC
Refills: 0 | DISCHARGE

## 2024-01-01 RX ORDER — CYCLOPENTOLATE HYDROCHLORIDE 10 MG/ML
1 SOLUTION/ DROPS OPHTHALMIC
Refills: 0 | DISCHARGE

## 2024-01-01 RX ORDER — PANTOPRAZOLE SODIUM 20 MG/1
40 TABLET, DELAYED RELEASE ORAL
Refills: 0 | Status: DISCONTINUED | OUTPATIENT
Start: 2024-01-01 | End: 2024-01-10

## 2024-01-01 RX ORDER — ASPIRIN/CALCIUM CARB/MAGNESIUM 324 MG
81 TABLET ORAL DAILY
Refills: 0 | Status: DISCONTINUED | OUTPATIENT
Start: 2024-01-01 | End: 2024-01-10

## 2024-01-01 RX ORDER — ACETAMINOPHEN 500 MG
650 TABLET ORAL EVERY 6 HOURS
Refills: 0 | Status: DISCONTINUED | OUTPATIENT
Start: 2024-01-01 | End: 2024-01-10

## 2024-01-01 RX ORDER — IPRATROPIUM/ALBUTEROL SULFATE 18-103MCG
3 AEROSOL WITH ADAPTER (GRAM) INHALATION
Refills: 0 | Status: COMPLETED | OUTPATIENT
Start: 2024-01-01 | End: 2024-01-01

## 2024-01-01 RX ORDER — MOXIFLOXACIN HYDROCHLORIDE TABLETS, 400 MG 400 MG/1
1 TABLET, FILM COATED ORAL
Refills: 0 | DISCHARGE

## 2024-01-01 RX ORDER — TAMSULOSIN HYDROCHLORIDE 0.4 MG/1
0.4 CAPSULE ORAL AT BEDTIME
Refills: 0 | Status: DISCONTINUED | OUTPATIENT
Start: 2024-01-01 | End: 2024-01-10

## 2024-01-01 RX ORDER — CHLORHEXIDINE GLUCONATE 213 G/1000ML
1 SOLUTION TOPICAL
Refills: 0 | Status: DISCONTINUED | OUTPATIENT
Start: 2024-01-01 | End: 2024-01-10

## 2024-01-01 RX ORDER — AZITHROMYCIN 500 MG/1
500 TABLET, FILM COATED ORAL EVERY 24 HOURS
Refills: 0 | Status: COMPLETED | OUTPATIENT
Start: 2024-01-02 | End: 2024-01-05

## 2024-01-01 RX ADMIN — Medication 3 MILLILITER(S): at 12:15

## 2024-01-01 RX ADMIN — PANTOPRAZOLE SODIUM 40 MILLIGRAM(S): 20 TABLET, DELAYED RELEASE ORAL at 18:45

## 2024-01-01 RX ADMIN — CEFEPIME 100 MILLIGRAM(S): 1 INJECTION, POWDER, FOR SOLUTION INTRAMUSCULAR; INTRAVENOUS at 21:27

## 2024-01-01 RX ADMIN — Medication 3 MILLILITER(S): at 13:28

## 2024-01-01 RX ADMIN — TAMSULOSIN HYDROCHLORIDE 0.4 MILLIGRAM(S): 0.4 CAPSULE ORAL at 21:27

## 2024-01-01 RX ADMIN — APIXABAN 5 MILLIGRAM(S): 2.5 TABLET, FILM COATED ORAL at 18:45

## 2024-01-01 RX ADMIN — ATORVASTATIN CALCIUM 80 MILLIGRAM(S): 80 TABLET, FILM COATED ORAL at 21:27

## 2024-01-01 RX ADMIN — Medication 75 MILLIGRAM(S): at 18:46

## 2024-01-01 RX ADMIN — CEFEPIME 100 MILLIGRAM(S): 1 INJECTION, POWDER, FOR SOLUTION INTRAMUSCULAR; INTRAVENOUS at 13:27

## 2024-01-01 RX ADMIN — Medication 3 MILLILITER(S): at 12:43

## 2024-01-01 RX ADMIN — SODIUM CHLORIDE 1000 MILLILITER(S): 9 INJECTION INTRAMUSCULAR; INTRAVENOUS; SUBCUTANEOUS at 12:14

## 2024-01-01 NOTE — H&P ADULT - HISTORY OF PRESENT ILLNESS
87 y/o M PMHx HTN, CAD s/p CABG and stent, A-fib( on eliquis), b/l glaucoma , chronic constipation, with recent hosp course for mechanical fall at [facility] N c/o 1 week h/o dry cough, fatigue, and decrease appetite. Per pt's son at bedside, pt saw PCP few days ago for current sxs, and completed e-rxed azithromycin w/o relief. pt also reporting to have b/l scrotal edema, which he reports started during previous hospital course; pt states he noticed scrotal swelling 2 days ago, but cannot recall if initial onset of sxs resolved on its own or if scrotal swelling persisted     Pt denies fever, chills, n/v, chest pain, SOB, hx of scrotal edema prior to episode in Dec, hematuria 87 y/o M PMHx HTN, CAD s/p CABG and stent, A-fib( on eliquis), b/l glaucoma , chronic constipation, with recent hosp course for mechanical fall at [facility] N c/o 1 week h/o dry cough, fatigue, and decrease appetite. Per pt's son at bedside, pt saw PCP few days ago for current sxs, and completed e-rxed azithromycin w/o relief. pt also reporting to have b/l scrotal edema, which he reports started during previous hospital course; pt states he noticed scrotal swelling 2 days ago, but cannot recall if initial onset of sxs resolved on its own or if scrotal swelling persisted     Pt denies fever, chills, n/v, chest pain, SOB, hx of scrotal edema prior to episode in Dec, hematuria 87 y/o M PMHx HTN, CAD s/p CABG and stent, A-fib( on eliquis), b/l glaucoma , chronic constipation, with recent hosp course for mechanical fall at [facility] N c/o 1 week h/o dry cough, fatigue, and decrease appetite. Per pt's son at bedside, pt saw PCP few days ago for current sxs, and completed e-rxed azithromycin w/o relief. pt also reporting to have b/l scrotal edema, which he reports started during previous hospital course; pt states he noticed scrotal swelling 2 days ago, but cannot recall if initial onset of sxs resolved on its own or if scrotal swelling persisted     Pt denies fever, chills, n/v, chest pain, SOB, hx of scrotal edema prior to episode in Dec, hematuria, unable to tolerate PO intake 89 y/o M PMHx HTN, CAD s/p CABG and stent, A-fib( on eliquis), b/l glaucoma , chronic constipation, with recent hosp course for mechanical fall at [facility] N c/o 1 week h/o dry cough, fatigue, and decrease appetite. Per pt's son at bedside, pt saw PCP few days ago for current sxs, and completed e-rxed azithromycin w/o relief. pt also reporting to have b/l scrotal edema, which he reports started during previous hospital course; pt states he noticed scrotal swelling 2 days ago, but cannot recall if initial onset of sxs resolved on its own or if scrotal swelling persisted     Pt denies fever, chills, n/v, chest pain, SOB, hx of scrotal edema prior to episode in Dec, hematuria, unable to tolerate PO intake 89 y/o M PMHx HTN, CAD s/p CABG and stent, A-fib(on eliquis), b/l glaucoma, chronic constipation, with recent hosp course for mechanical fall at [facility] N c/o 1 week h/o dry cough, fatigue, and decrease appetite. Per pt's son at bedside, pt saw PCP few days ago for current sxs, and completed e-rxed azithromycin w/o relief. pt also reporting to have b/l scrotal edema, which he reports started during previous hospital course; pt states he noticed scrotal swelling 2 days ago, but cannot recall if initial onset of sxs resolved on its own or if scrotal swelling persisted     Pt denies fever, chills, n/v, chest pain, SOB, hx of scrotal edema prior to episode in Dec, hematuria, unable to tolerate PO intake 87 y/o M PMHx HTN, CAD s/p CABG and stent, A-fib(on eliquis), b/l glaucoma, chronic constipation, with recent hosp course for mechanical fall at [facility] N c/o 1 week h/o dry cough, fatigue, and decrease appetite. Per pt's son at bedside, pt saw PCP few days ago for current sxs, and completed e-rxed azithromycin w/o relief. pt also reporting to have b/l scrotal edema, which he reports started during previous hospital course; pt states he noticed scrotal swelling 2 days ago, but cannot recall if initial onset of sxs resolved on its own or if scrotal swelling persisted     Pt denies fever, chills, n/v, chest pain, SOB, hx of scrotal edema prior to episode in Dec, hematuria, unable to tolerate PO intake

## 2024-01-01 NOTE — ED PROVIDER NOTE - CARE PLAN
Principal Discharge DX:	Left lower lobe pneumonia  Secondary Diagnosis:	Candida infection  Secondary Diagnosis:	Generalized weakness   1

## 2024-01-01 NOTE — ED PROVIDER NOTE - DIFFERENTIAL DIAGNOSIS
Infection, Hypoperfusion state such as hypoperfusion, anemia cardiac ischemia, increased demand such as a sepsis endocrinopathy, CNS depression such as sedative hypnontic toxicity, stimular withdrawal, metabolic derangement, stroke, brain stem abnormality, myelopathy, neuromuscular junction disorder, malignancy Differential Diagnosis

## 2024-01-01 NOTE — ED ADULT NURSE NOTE - NSICDXPASTSURGICALHX_GEN_ALL_CORE_FT
Detail Level: Detailed Quality 110: Preventive Care And Screening: Influenza Immunization: Influenza immunization was not ordered or administered, reason not given Quality 130: Documentation Of Current Medications In The Medical Record: Current Medications Documented Quality 226: Preventive Care And Screening: Tobacco Use: Screening And Cessation Intervention: Patient screened for tobacco use and is an ex/non-smoker PAST SURGICAL HISTORY:  S/P CABG (coronary artery bypass graft)

## 2024-01-01 NOTE — ED PROVIDER NOTE - CLINICAL SUMMARY MEDICAL DECISION MAKING FREE TEXT BOX
88 male history above presents for eval of dry cough fatigue decreased p.o. intake.  Patient was recentlu on Z-Justin by PMD.  Patient also is having scrota; edema and is too weak to shower.  He has no fever chills.  Here scrotal exam with scrotal fungal infection, possibly hydrocele. .  Patient found to have pneumonia likely contributing to patient's weakness.  Patient given abx admitted for further eval and treatment.

## 2024-01-01 NOTE — ED PROVIDER NOTE - PHYSICAL EXAMINATION
VITAL SIGNS: I have reviewed nursing notes and confirm.  CONSTITUTIONAL: non-toxic, well appearing  SKIN: no laceration, no petechiae.  EYES: pink conjunctiva, anicteric  ENT: tongue midline, no exudates, + dry MM  NECK: Supple; no meningismus  CARD: RRR, no murmurs, equal radial pulses bilaterally 2+  RESP: no respiratory distress, no tachypnea, + wheezing and rhonchi to L sided base  ABD: Soft, non-tender, non-distended, no peritoneal signs  : no hernias, no testicular tenderness, no crepitus, + b/l erythematous skin changes to groin and inguinal folds c/w candida   EXT: Normal ROM x4. No edema.  NEURO: Alert, oriented. no focal deficits   PSYCH: Cooperative, appropriate.

## 2024-01-01 NOTE — H&P ADULT - NSHPLABSRESULTS_GEN_ALL_CORE
11.2   9.00  )-----------( 257      ( 01 Jan 2024 12:10 )             34.0       01-01    144  |  106  |  16  ----------------------------<  95  3.6   |  27  |  0.7    Ca    8.5      01 Jan 2024 12:10    TPro  6.2  /  Alb  3.4<L>  /  TBili  0.7  /  DBili  x   /  AST  45<H>  /  ALT  42<H>  /  AlkPhos  115  01-01              Urinalysis Basic - ( 01 Jan 2024 12:10 )    Color: x / Appearance: x / SG: x / pH: x  Gluc: 95 mg/dL / Ketone: x  / Bili: x / Urobili: x   Blood: x / Protein: x / Nitrite: x   Leuk Esterase: x / RBC: x / WBC x   Sq Epi: x / Non Sq Epi: x / Bacteria: x

## 2024-01-01 NOTE — PATIENT PROFILE ADULT - FUNCTIONAL ASSESSMENT - BASIC MOBILITY 6.
2-calculated by average/Not able to assess (calculate score using [facility] averaging method) 2-calculated by average/Not able to assess (calculate score using [facility] averaging method)

## 2024-01-01 NOTE — PATIENT PROFILE ADULT - FALL HARM RISK - HARM RISK INTERVENTIONS
Assistance with ambulation/Assistance OOB with selected safe patient handling equipment/Communicate Risk of Fall with Harm to all staff/Reinforce activity limits and safety measures with patient and family/Tailored Fall Risk Interventions/Visual Cue: Yellow wristband and red socks/Bed in lowest position, wheels locked, appropriate side rails in place/Call bell, personal items and telephone in reach/Instruct patient to call for assistance before getting out of bed or chair/Non-slip footwear when patient is out of bed/[place] to call system/Physically safe environment - no spills, clutter or unnecessary equipment/Purposeful Proactive Rounding/Room/bathroom lighting operational, light cord in reach Assistance with ambulation/Assistance OOB with selected safe patient handling equipment/Communicate Risk of Fall with Harm to all staff/Reinforce activity limits and safety measures with patient and family/Tailored Fall Risk Interventions/Visual Cue: Yellow wristband and red socks/Bed in lowest position, wheels locked, appropriate side rails in place/Call bell, personal items and telephone in reach/Instruct patient to call for assistance before getting out of bed or chair/Non-slip footwear when patient is out of bed/[place] to call system/Physically safe environment - no spills, clutter or unnecessary equipment/Purposeful Proactive Rounding/Room/bathroom lighting operational, light cord in reach

## 2024-01-01 NOTE — H&P ADULT - ASSESSMENT
87 y/o M PMHx HTN, CAD s/p CABG and stent, A-fib( on eliquis), b/l glaucoma , chronic constipation, with recent hosp course for mechanical fall at [facility] N c/o 1 week h/o dry cough, fatigue, and decrease appetite. Per pt's son at bedside, pt saw PCP few days ago for current sxs, and completed e-rxed azithromycin w/o relief. pt also reporting to have b/l scrotal edema, which he reports started during previous hospital course; pt states he noticed scrotal swelling 2 days ago, but cannot recall if initial onset of sxs resolved on its own or if scrotal swelling persisted     Pt denies fever, chills, n/v, chest pain, SOB, hx of scrotal edema prior to episode in Dec, hematuria, unable to tolerate PO intake  in ED, CXR demonstrated PNA per ED provider    Plan  Admit    # Pneumonia  - start Ceftriaxone + Azithromycin  - trend WBC   - f/u procal  - f/u blood cx  - f/u Urine streptococcal and Legionella Ag   - f/u MRSA swabs     #Afib  - c/w eliquis      #HTN  - c/w metoprolol QD me fosinopril 40mg daily)     #HLD   - Cont home atorvastatin  - Home zetia non formulary ( pt's family will need to bring supply from pt wants to c/w during hospital course)       #Glaucoma   -outpt f/u       pt d/w Dr. BORDEN       87 y/o M PMHx HTN, CAD s/p CABG and stent, A-fib( on eliquis), b/l glaucoma , chronic constipation, with recent hosp course for mechanical fall at [facility] N c/o 1 week h/o dry cough, fatigue, and decrease appetite. Per pt's son at bedside, pt saw PCP few days ago for current sxs, and completed e-rxed azithromycin w/o relief. pt also reporting to have b/l scrotal edema, which he reports started during previous hospital course; pt states he noticed scrotal swelling 2 days ago, but cannot recall if initial onset of sxs resolved on its own or if scrotal swelling persisted     Pt denies fever, chills, n/v, chest pain, SOB, hx of scrotal edema prior to episode in Dec, hematuria, unable to tolerate PO intake  in ED, CXR demonstrated PNA per ED provider    Plan  Admit    # Pneumonia  - start Ceftriaxone + Azithromycin  - trend WBC   - f/u procal  - f/u blood cx  - f/u Urine streptococcal and Legionella Ag   - f/u MRSA swabs     #Afib  - c/w eliquis      #HTN  - c/w metoprolol QD me fosinopril 40mg daily)     #HLD   - Cont home atorvastatin  - Home zetia non formulary ( pt's family will need to bring supply from pt wants to c/w during hospital course)       #Glaucoma   -outpt f/u       pt d/w Dr. BORDEN       89 y/o M PMHx HTN, CAD s/p CABG and stent, A-fib( on eliquis), b/l glaucoma , chronic constipation, with recent hosp course for mechanical fall at [facility] N c/o 1 week h/o dry cough, fatigue, and decrease appetite. Per pt's son at bedside, pt saw PCP few days ago for current sxs, and completed e-rxed azithromycin w/o relief. pt also reporting to have b/l scrotal edema, which he reports started during previous hospital course; pt states he noticed scrotal swelling 2 days ago, but cannot recall if initial onset of sxs resolved on its own or if scrotal swelling persisted     Pt denies fever, chills, n/v, chest pain, SOB, hx of scrotal edema prior to episode in Dec, hematuria, unable to tolerate PO intake  in ED, CXR demonstrated PNA per ED provider    Plan  Admit    # Pneumonia  - c/w cefepime  - trend WBC   - f/u procal  - f/u sputum cx  - f/u Urine streptococcal and Legionella Ag   - f/u MRSA swabs     #weakness  - PT consult    # new scrotal edema  -  f/u US results    #Afib  - c/w Eliquis      #HTN  - c/w metoprolol QD me fosinopril 40mg daily)     #HLD   - Cont home atorvastatin  - Home zetia non formulary ( pt's family will need to bring supply from pt wants to c/w during hospital course)       #Glaucoma   -outpt f/u       pt d/w Dr. BORDEN       87 y/o M PMHx HTN, CAD s/p CABG and stent, A-fib( on eliquis), b/l glaucoma , chronic constipation, with recent hosp course for mechanical fall at [facility] N c/o 1 week h/o dry cough, fatigue, and decrease appetite. Per pt's son at bedside, pt saw PCP few days ago for current sxs, and completed e-rxed azithromycin w/o relief. pt also reporting to have b/l scrotal edema, which he reports started during previous hospital course; pt states he noticed scrotal swelling 2 days ago, but cannot recall if initial onset of sxs resolved on its own or if scrotal swelling persisted     Pt denies fever, chills, n/v, chest pain, SOB, hx of scrotal edema prior to episode in Dec, hematuria, unable to tolerate PO intake  in ED, CXR demonstrated PNA per ED provider    Plan  Admit    # Pneumonia  - c/w cefepime  - trend WBC   - f/u procal  - f/u sputum cx  - f/u Urine streptococcal and Legionella Ag   - f/u MRSA swabs     #weakness  - PT consult    # new scrotal edema  -  f/u US results    #Afib  - c/w Eliquis      #HTN  - c/w metoprolol QD me fosinopril 40mg daily)     #HLD   - Cont home atorvastatin  - Home zetia non formulary ( pt's family will need to bring supply from pt wants to c/w during hospital course)       #Glaucoma   -outpt f/u       pt d/w Dr. BORDEN       87 y/o M PMHx HTN, CAD s/p CABG and stent, A-fib( on eliquis), b/l glaucoma , chronic constipation, with recent hosp course for mechanical fall at [facility] N c/o 1 week h/o dry cough, fatigue, and decrease appetite. Per pt's son at bedside, pt saw PCP few days ago for current sxs, and completed e-rxed azithromycin w/o relief. pt also reporting to have b/l scrotal edema, which he reports started during previous hospital course; pt states he noticed scrotal swelling 2 days ago, but cannot recall if initial onset of sxs resolved on its own or if scrotal swelling persisted     Pt denies fever, chills, n/v, chest pain, SOB, hx of scrotal edema prior to episode in Dec, hematuria, unable to tolerate PO intake  in ED, CXR demonstrated PNA per ED provider    Plan  Admit    # Pneumonia  - c/w cefepime  - trend WBC   - f/u procal  - f/u sputum cx  - f/u Urine streptococcal and Legionella Ag   - f/u MRSA swabs     #weakness  - PT consult    # new scrotal edema  -  f/u US results    #Afib  - c/w Eliquis      #HTN  - c/w metoprolol      #HLD   - Cont home atorvastatin  - Home zetia non formulary ( pt's family will need to bring supply from pt wants to c/w during hospital course)       #Glaucoma   -outpt f/u       pt d/w Dr. BORDEN       87 y/o M PMHx HTN, CAD s/p CABG and stent, A-fib( on eliquis), b/l glaucoma , chronic constipation, with recent hosp course for mechanical fall at [facility] N c/o 1 week h/o dry cough, fatigue, and decrease appetite. Per pt's son at bedside, pt saw PCP few days ago for current sxs, and completed e-rxed azithromycin w/o relief. pt also reporting to have b/l scrotal edema, which he reports started during previous hospital course; pt states he noticed scrotal swelling 2 days ago, but cannot recall if initial onset of sxs resolved on its own or if scrotal swelling persisted     Pt denies fever, chills, n/v, chest pain, SOB, hx of scrotal edema prior to episode in Dec, hematuria, unable to tolerate PO intake  in ED, CXR demonstrated PNA per ED provider    Plan  Admit    # Pneumonia  - c/w cefepime  - trend WBC   - f/u procal  - f/u sputum cx  - f/u Urine streptococcal and Legionella Ag   - f/u MRSA swabs     #weakness  - PT consult    # new scrotal edema  -  f/u US results    #Afib  - c/w Eliquis      #HTN  - c/w metoprolol      #HLD   - Cont home atorvastatin  - Home zetia non formulary ( pt's family will need to bring supply from pt wants to c/w during hospital course)       #Glaucoma   -outpt f/u       pt d/w Dr. BORDEN       89 y/o M PMHx HTN, CAD s/p CABG and stent, A-fib( on eliquis), b/l glaucoma , chronic constipation, with recent hosp course for mechanical fall at [facility] N c/o 1 week h/o dry cough, fatigue, and decrease appetite. Per pt's son at bedside, pt saw PCP few days ago for current sxs, and completed e-rxed azithromycin w/o relief. pt also reporting to have b/l scrotal edema, which he reports started during previous hospital course; pt states he noticed scrotal swelling 2 days ago, but cannot recall if initial onset of sxs resolved on its own or if scrotal swelling persisted     Pt denies fever, chills, n/v, chest pain, SOB, hx of scrotal edema prior to episode in Dec, hematuria, unable to tolerate PO intake, dysuria, changes in urination  in ED, CXR demonstrated PNA per ED provider    Plan  Admit    # Pneumonia  - c/w cefepime  - trend WBC   - f/u procal  - f/u sputum cx  - f/u Urine streptococcal and Legionella Ag   - f/u MRSA swabs     #weakness  - PT consult    # new scrotal edema  -  f/u US results    #Afib  - c/w Eliquis    #BPH  - c/w tamsulosin     #HTN  - c/w metoprolol    # GERD  - PPI      #HLD   - Cont home atorvastatin  - Home zetia non formulary ( pt's family will need to bring supply from pt wants to c/w during hospital course)       #Glaucoma   -outpt f/u         Activity: out of bed assistance; fall risk precautions  DVT PPX: c/w home eliquis  GI PPX: c/w home PPI  CHG ordered    pt d/w Dr. BORDEN       89 y/o M PMHx HTN, CAD s/p CABG and stent, A-fib( on eliquis), b/l glaucoma , chronic constipation, with recent hosp course for mechanical fall at [facility] N c/o 1 week h/o dry cough, fatigue, and decrease appetite. Per pt's son at bedside, pt saw PCP few days ago for current sxs, and completed e-rxed azithromycin w/o relief. pt also reporting to have b/l scrotal edema, which he reports started during previous hospital course; pt states he noticed scrotal swelling 2 days ago, but cannot recall if initial onset of sxs resolved on its own or if scrotal swelling persisted     Pt denies fever, chills, n/v, chest pain, SOB, hx of scrotal edema prior to episode in Dec, hematuria, unable to tolerate PO intake, dysuria, changes in urination  in ED, CXR demonstrated PNA per ED provider    Plan  Admit    # Pneumonia  - c/w cefepime  - trend WBC   - f/u procal  - f/u sputum cx  - f/u Urine streptococcal and Legionella Ag   - f/u MRSA swabs     #weakness  - PT consult    # new scrotal edema  -  f/u US results    #Afib  - c/w Eliquis    #BPH  - c/w tamsulosin     #HTN  - c/w metoprolol    # GERD  - PPI      #HLD   - Cont home atorvastatin  - Home zetia non formulary ( pt's family will need to bring supply from pt wants to c/w during hospital course)       #Glaucoma   -outpt f/u         Activity: out of bed assistance; fall risk precautions  DVT PPX: c/w home eliquis  GI PPX: c/w home PPI  CHG ordered    pt d/w Dr. BORDEN       89 y/o M PMHx HTN, CAD s/p CABG and stent, A-fib( on eliquis), b/l glaucoma , chronic constipation, with recent hosp course for mechanical fall at [facility] N c/o 1 week h/o dry cough, fatigue, and decrease appetite. Per pt's son at bedside, pt saw PCP few days ago for current sxs, and completed e-rxed azithromycin w/o relief. pt also reporting to have b/l scrotal edema, which he reports started during previous hospital course; pt states he noticed scrotal swelling 2 days ago, but cannot recall if initial onset of sxs resolved on its own or if scrotal swelling persisted     Pt denies fever, chills, n/v, chest pain, SOB, hx of scrotal edema prior to episode in Dec, hematuria, unable to tolerate PO intake, dysuria, changes in urination  in ED, CXR demonstrated PNA per ED provider    Plan  Admit    # Pneumonia  - CXr shows bilateral opacities with cough  - c/w cefepime, (finished course of azithromycin as outpt by outpt provider)  - trend WBC   - f/u procal  - f/u sputum cx  - f/u Urine streptococcal and Legionella Ag   - f/u MRSA swabs     #weakness  - PT consult    # new scrotal edema  -  f/u US results    #Afib  - c/w Eliquis    #BPH  - c/w tamsulosin     #HTN  - c/w metoprolol    # GERD  - PPI      #HLD   - Cont home atorvastatin  - Home zetia non formulary ( pt's family will need to bring supply from pt wants to c/w during hospital course)       #Glaucoma   -outpt f/u         Activity: out of bed assistance; fall risk precautions  DVT PPX: c/w home eliquis  GI PPX: c/w home PPI  CHG ordered    pt d/w Dr. BORDEN       87 y/o M PMHx HTN, CAD s/p CABG and stent, A-fib( on eliquis), b/l glaucoma , chronic constipation, with recent hosp course for mechanical fall at [facility] N c/o 1 week h/o dry cough, fatigue, and decrease appetite. Per pt's son at bedside, pt saw PCP few days ago for current sxs, and completed e-rxed azithromycin w/o relief. pt also reporting to have b/l scrotal edema, which he reports started during previous hospital course; pt states he noticed scrotal swelling 2 days ago, but cannot recall if initial onset of sxs resolved on its own or if scrotal swelling persisted     Pt denies fever, chills, n/v, chest pain, SOB, hx of scrotal edema prior to episode in Dec, hematuria, unable to tolerate PO intake, dysuria, changes in urination  in ED, CXR demonstrated PNA per ED provider    Plan  Admit    # Pneumonia  - CXr shows bilateral opacities with cough  - c/w cefepime, (finished course of azithromycin as outpt by outpt provider)  - trend WBC   - f/u procal  - f/u sputum cx  - f/u Urine streptococcal and Legionella Ag   - f/u MRSA swabs     #weakness  - PT consult    # new scrotal edema  -  f/u US results    #Afib  - c/w Eliquis    #BPH  - c/w tamsulosin     #HTN  - c/w metoprolol    # GERD  - PPI      #HLD   - Cont home atorvastatin  - Home zetia non formulary ( pt's family will need to bring supply from pt wants to c/w during hospital course)       #Glaucoma   -outpt f/u         Activity: out of bed assistance; fall risk precautions  DVT PPX: c/w home eliquis  GI PPX: c/w home PPI  CHG ordered    pt d/w Dr. BORDEN

## 2024-01-01 NOTE — ED PROVIDER NOTE - OBJECTIVE STATEMENT
88-year-old male w pmhx of hypertension, CAD status post CABG, A-fib on eliquis, and chronic constipation presents to the emergency department with poor appetite and generalized weakness for about 1 week.  Patient also has persistent cough during this time.  Patient reports intermittent episodes of nausea for the past couple of days.  Patient has a chronic history of constipation, took Dulcolax recently, last had a bowel movement yesterday that was described as loose and nonbloody.  Patient is also reporting rash to the testicle for the past couple days.  Son is present at bedside.  Denies fever chills chest pain shortness of breath vomiting abdominal pain dysuria hematuria.

## 2024-01-01 NOTE — ED ADULT NURSE NOTE - NSFALLRISKINTERV_ED_ALL_ED
Assistance OOB with selected safe patient handling equipment if applicable/Assistance with ambulation/Communicate fall risk and risk factors to all staff, patient, and family/Monitor gait and stability/Provide patient with walking aids/Provide visual cue: yellow wristband, yellow gown, etc/Reinforce activity limits and safety measures with patient and family/Call bell, personal items and telephone in reach/Instruct patient to call for assistance before getting out of bed/chair/stretcher/Non-slip footwear applied when patient is off stretcher/[place] to call system/Physically safe environment - no spills, clutter or unnecessary equipment/Purposeful Proactive Rounding/Room/bathroom lighting operational, light cord in reach Assistance OOB with selected safe patient handling equipment if applicable/Assistance with ambulation/Communicate fall risk and risk factors to all staff, patient, and family/Monitor gait and stability/Provide patient with walking aids/Provide visual cue: yellow wristband, yellow gown, etc/Reinforce activity limits and safety measures with patient and family/Call bell, personal items and telephone in reach/Instruct patient to call for assistance before getting out of bed/chair/stretcher/Non-slip footwear applied when patient is off stretcher/[place] to call system/Physically safe environment - no spills, clutter or unnecessary equipment/Purposeful Proactive Rounding/Room/bathroom lighting operational, light cord in reach

## 2024-01-01 NOTE — ED ADULT NURSE NOTE - PRO INTERPRETER NEED 2
Discharge Planning Assessment  [facility]     Patient Name: Derrek Estrada  MRN: [id number]  Today's Date: 1/10/2019    Admit Date: 1/9/2019    Discharge Needs Assessment     Row Name 01/10/19 1237       Living Environment    Lives With  spouse    Name(s) of Who Lives With Patient  Silvia Estrada [phone]    Current Living Arrangements  home/apartment/condo    Primary Care Provided by  self    Provides Primary Care For  no one    Family Caregiver if Needed  spouse    Family Caregiver Names  Silvia    Quality of Family Relationships  supportive;involved;helpful       Resource/Environmental Concerns    Resource/Environmental Concerns  none       Transition Planning    Patient/Family Anticipates Transition to  home    Transportation Anticipated  family or friend will provide       Discharge Needs Assessment    Equipment Currently Used at Home  walker, rolling;cane, quad        Discharge Plan     Row Name 01/10/19 1239       Plan    Plan  Home with wife    Plan Comments  Pt's IMM signed 1/9/19.  Spokew ith pt for screening of DCP/needs.   Pt reports that he lives with his wife in a 2 story home with his bedroom up 11 steps.  Pt stated that he does plan to return home with his wife and get up the 11 steps and not  travel up and down the steps.  Pt confirmed that he does have a walker already at home but wnats CCP to discuss 3:1 commode with his wife.   Call placed and HIPPA compliant VM left for pt's wife to return call to CCP to discuss pt's DCP is to home and if a 3:1 commode will be needed. Pt stated that he has been to [facility] for rehab in the past but is hoping he will not need that upon D/c.  Pt denies ever using HH.  Pt is awaiting brace before he can work with PT.  CCP will follow progress with tx team to assist as needed with DCP.         Destination      No service coordination in this encounter.      Durable Medical Equipment      No service coordination in this encounter.      Dialysis/Infusion      No  service coordination in this encounter.      Home Medical Care      No service coordination in this encounter.      Community Resources      No service coordination in this encounter.          Demographic Summary     Row Name 01/10/19 1237       General Information    Admission Type  inpatient    Arrived From  home    Referral Source  admission list;physician    Reason for Consult  discharge planning        Functional Status     Row Name 01/10/19 1237       Functional Status    Usual Activity Tolerance  moderate    Current Activity Tolerance  fair       Functional Status, IADL    Medications  independent    Meal Preparation  independent;assistive person    Housekeeping  independent;assistive person    Laundry  independent;assistive person    Shopping  independent;assistive person        Psychosocial    No documentation.       Abuse/Neglect    No documentation.       Legal    No documentation.       Substance Abuse    No documentation.       Patient Forms    No documentation.           MICHAEL Clay     English

## 2024-01-01 NOTE — H&P ADULT - NS ATTEND AMEND GEN_ALL_CORE FT
I have made amendments to the note above where applicable. Here for scrotal swelling and nonproductive cough refractory to azithromycin. US scrotum pending. Chest X-Ray shows bilateral opacities with cough suspiciouws for PNA but not septic on admission. Recent hospitalization few weeks prior, will treat for hospital acquired pna. Procalcitonin pending. Possibly dc abx if procal (-). PT eval for weakness.

## 2024-01-02 LAB
ANION GAP SERPL CALC-SCNC: 13 MMOL/L — SIGNIFICANT CHANGE UP (ref 7–14)
ANION GAP SERPL CALC-SCNC: 13 MMOL/L — SIGNIFICANT CHANGE UP (ref 7–14)
APPEARANCE UR: CLEAR — SIGNIFICANT CHANGE UP
APPEARANCE UR: CLEAR — SIGNIFICANT CHANGE UP
BACTERIA # UR AUTO: ABNORMAL /HPF
BACTERIA # UR AUTO: ABNORMAL /HPF
BILIRUB UR-MCNC: NEGATIVE — SIGNIFICANT CHANGE UP
BILIRUB UR-MCNC: NEGATIVE — SIGNIFICANT CHANGE UP
BUN SERPL-MCNC: 13 MG/DL — SIGNIFICANT CHANGE UP (ref 10–20)
BUN SERPL-MCNC: 13 MG/DL — SIGNIFICANT CHANGE UP (ref 10–20)
CALCIUM SERPL-MCNC: 8.3 MG/DL — LOW (ref 8.4–10.5)
CALCIUM SERPL-MCNC: 8.3 MG/DL — LOW (ref 8.4–10.5)
CHLORIDE SERPL-SCNC: 107 MMOL/L — SIGNIFICANT CHANGE UP (ref 98–110)
CHLORIDE SERPL-SCNC: 107 MMOL/L — SIGNIFICANT CHANGE UP (ref 98–110)
CO2 SERPL-SCNC: 25 MMOL/L — SIGNIFICANT CHANGE UP (ref 17–32)
CO2 SERPL-SCNC: 25 MMOL/L — SIGNIFICANT CHANGE UP (ref 17–32)
COLOR SPEC: SIGNIFICANT CHANGE UP
COLOR SPEC: SIGNIFICANT CHANGE UP
CREAT SERPL-MCNC: 0.7 MG/DL — SIGNIFICANT CHANGE UP (ref 0.7–1.5)
CREAT SERPL-MCNC: 0.7 MG/DL — SIGNIFICANT CHANGE UP (ref 0.7–1.5)
DIFF PNL FLD: NEGATIVE — SIGNIFICANT CHANGE UP
DIFF PNL FLD: NEGATIVE — SIGNIFICANT CHANGE UP
EGFR: 89 ML/MIN/1.73M2 — SIGNIFICANT CHANGE UP
EGFR: 89 ML/MIN/1.73M2 — SIGNIFICANT CHANGE UP
EPI CELLS # UR: PRESENT
EPI CELLS # UR: PRESENT
GLUCOSE SERPL-MCNC: 101 MG/DL — HIGH (ref 70–99)
GLUCOSE SERPL-MCNC: 101 MG/DL — HIGH (ref 70–99)
GLUCOSE UR QL: NEGATIVE MG/DL — SIGNIFICANT CHANGE UP
GLUCOSE UR QL: NEGATIVE MG/DL — SIGNIFICANT CHANGE UP
GRAM STN FLD: ABNORMAL
GRAM STN FLD: ABNORMAL
HCT VFR BLD CALC: 30.1 % — LOW (ref 42–52)
HCT VFR BLD CALC: 30.1 % — LOW (ref 42–52)
HGB BLD-MCNC: 9.8 G/DL — LOW (ref 14–18)
HGB BLD-MCNC: 9.8 G/DL — LOW (ref 14–18)
KETONES UR-MCNC: ABNORMAL MG/DL
KETONES UR-MCNC: ABNORMAL MG/DL
LEGIONELLA AG UR QL: NEGATIVE — SIGNIFICANT CHANGE UP
LEGIONELLA AG UR QL: NEGATIVE — SIGNIFICANT CHANGE UP
LEUKOCYTE ESTERASE UR-ACNC: ABNORMAL
LEUKOCYTE ESTERASE UR-ACNC: ABNORMAL
MAGNESIUM SERPL-MCNC: 1.4 MG/DL — LOW (ref 1.8–2.4)
MAGNESIUM SERPL-MCNC: 1.4 MG/DL — LOW (ref 1.8–2.4)
MCHC RBC-ENTMCNC: 30.8 PG — SIGNIFICANT CHANGE UP (ref 27–31)
MCHC RBC-ENTMCNC: 30.8 PG — SIGNIFICANT CHANGE UP (ref 27–31)
MCHC RBC-ENTMCNC: 32.6 G/DL — SIGNIFICANT CHANGE UP (ref 32–37)
MCHC RBC-ENTMCNC: 32.6 G/DL — SIGNIFICANT CHANGE UP (ref 32–37)
MCV RBC AUTO: 94.7 FL — HIGH (ref 80–94)
MCV RBC AUTO: 94.7 FL — HIGH (ref 80–94)
NITRITE UR-MCNC: NEGATIVE — SIGNIFICANT CHANGE UP
NITRITE UR-MCNC: NEGATIVE — SIGNIFICANT CHANGE UP
NRBC # BLD: 0 /100 WBCS — SIGNIFICANT CHANGE UP (ref 0–0)
NRBC # BLD: 0 /100 WBCS — SIGNIFICANT CHANGE UP (ref 0–0)
PH UR: 6 — SIGNIFICANT CHANGE UP (ref 5–8)
PH UR: 6 — SIGNIFICANT CHANGE UP (ref 5–8)
PLATELET # BLD AUTO: 229 K/UL — SIGNIFICANT CHANGE UP (ref 130–400)
PLATELET # BLD AUTO: 229 K/UL — SIGNIFICANT CHANGE UP (ref 130–400)
PMV BLD: 10.2 FL — SIGNIFICANT CHANGE UP (ref 7.4–10.4)
PMV BLD: 10.2 FL — SIGNIFICANT CHANGE UP (ref 7.4–10.4)
POTASSIUM SERPL-MCNC: 3 MMOL/L — LOW (ref 3.5–5)
POTASSIUM SERPL-MCNC: 3 MMOL/L — LOW (ref 3.5–5)
POTASSIUM SERPL-SCNC: 3 MMOL/L — LOW (ref 3.5–5)
POTASSIUM SERPL-SCNC: 3 MMOL/L — LOW (ref 3.5–5)
PROT UR-MCNC: 30 MG/DL
PROT UR-MCNC: 30 MG/DL
RBC # BLD: 3.18 M/UL — LOW (ref 4.7–6.1)
RBC # BLD: 3.18 M/UL — LOW (ref 4.7–6.1)
RBC # FLD: 14.9 % — HIGH (ref 11.5–14.5)
RBC # FLD: 14.9 % — HIGH (ref 11.5–14.5)
RBC CASTS # UR COMP ASSIST: 2 /HPF — SIGNIFICANT CHANGE UP (ref 0–4)
RBC CASTS # UR COMP ASSIST: 2 /HPF — SIGNIFICANT CHANGE UP (ref 0–4)
SODIUM SERPL-SCNC: 145 MMOL/L — SIGNIFICANT CHANGE UP (ref 135–146)
SODIUM SERPL-SCNC: 145 MMOL/L — SIGNIFICANT CHANGE UP (ref 135–146)
SP GR SPEC: 1.03 — SIGNIFICANT CHANGE UP (ref 1–1.03)
SP GR SPEC: 1.03 — SIGNIFICANT CHANGE UP (ref 1–1.03)
SPECIMEN SOURCE: SIGNIFICANT CHANGE UP
SPECIMEN SOURCE: SIGNIFICANT CHANGE UP
SPERM AB SPEC-ACNC: PRESENT
SPERM AB SPEC-ACNC: PRESENT
SQUAMOUS # UR AUTO: 30 /HPF — HIGH (ref 0–5)
SQUAMOUS # UR AUTO: 30 /HPF — HIGH (ref 0–5)
UROBILINOGEN FLD QL: 1 MG/DL — SIGNIFICANT CHANGE UP (ref 0.2–1)
UROBILINOGEN FLD QL: 1 MG/DL — SIGNIFICANT CHANGE UP (ref 0.2–1)
WBC # BLD: 6.62 K/UL — SIGNIFICANT CHANGE UP (ref 4.8–10.8)
WBC # BLD: 6.62 K/UL — SIGNIFICANT CHANGE UP (ref 4.8–10.8)
WBC # FLD AUTO: 6.62 K/UL — SIGNIFICANT CHANGE UP (ref 4.8–10.8)
WBC # FLD AUTO: 6.62 K/UL — SIGNIFICANT CHANGE UP (ref 4.8–10.8)
WBC UR QL: 40 /HPF — HIGH (ref 0–5)
WBC UR QL: 40 /HPF — HIGH (ref 0–5)
YEAST-LIKE CELLS: PRESENT
YEAST-LIKE CELLS: PRESENT

## 2024-01-02 PROCEDURE — 99232 SBSQ HOSP IP/OBS MODERATE 35: CPT

## 2024-01-02 PROCEDURE — 93010 ELECTROCARDIOGRAM REPORT: CPT

## 2024-01-02 RX ORDER — MAGNESIUM SULFATE 500 MG/ML
2 VIAL (ML) INJECTION
Refills: 0 | Status: COMPLETED | OUTPATIENT
Start: 2024-01-02 | End: 2024-01-02

## 2024-01-02 RX ORDER — POTASSIUM CHLORIDE 20 MEQ
40 PACKET (EA) ORAL EVERY 4 HOURS
Refills: 0 | Status: COMPLETED | OUTPATIENT
Start: 2024-01-02 | End: 2024-01-03

## 2024-01-02 RX ADMIN — APIXABAN 5 MILLIGRAM(S): 2.5 TABLET, FILM COATED ORAL at 17:54

## 2024-01-02 RX ADMIN — Medication 81 MILLIGRAM(S): at 11:15

## 2024-01-02 RX ADMIN — Medication 75 MILLIGRAM(S): at 05:21

## 2024-01-02 RX ADMIN — CHLORHEXIDINE GLUCONATE 1 APPLICATION(S): 213 SOLUTION TOPICAL at 05:21

## 2024-01-02 RX ADMIN — PANTOPRAZOLE SODIUM 40 MILLIGRAM(S): 20 TABLET, DELAYED RELEASE ORAL at 05:21

## 2024-01-02 RX ADMIN — Medication 40 MILLIEQUIVALENT(S): at 21:09

## 2024-01-02 RX ADMIN — CEFEPIME 100 MILLIGRAM(S): 1 INJECTION, POWDER, FOR SOLUTION INTRAMUSCULAR; INTRAVENOUS at 05:22

## 2024-01-02 RX ADMIN — AZITHROMYCIN 255 MILLIGRAM(S): 500 TABLET, FILM COATED ORAL at 02:50

## 2024-01-02 RX ADMIN — CEFEPIME 100 MILLIGRAM(S): 1 INJECTION, POWDER, FOR SOLUTION INTRAMUSCULAR; INTRAVENOUS at 14:37

## 2024-01-02 RX ADMIN — Medication 25 GRAM(S): at 17:54

## 2024-01-02 RX ADMIN — Medication 3 MILLIGRAM(S): at 03:12

## 2024-01-02 RX ADMIN — CEFEPIME 100 MILLIGRAM(S): 1 INJECTION, POWDER, FOR SOLUTION INTRAMUSCULAR; INTRAVENOUS at 21:08

## 2024-01-02 RX ADMIN — Medication 40 MILLIEQUIVALENT(S): at 17:54

## 2024-01-02 RX ADMIN — TAMSULOSIN HYDROCHLORIDE 0.4 MILLIGRAM(S): 0.4 CAPSULE ORAL at 21:09

## 2024-01-02 RX ADMIN — ATORVASTATIN CALCIUM 80 MILLIGRAM(S): 80 TABLET, FILM COATED ORAL at 21:08

## 2024-01-02 RX ADMIN — APIXABAN 5 MILLIGRAM(S): 2.5 TABLET, FILM COATED ORAL at 05:21

## 2024-01-02 RX ADMIN — Medication 25 GRAM(S): at 21:08

## 2024-01-02 NOTE — PHYSICAL THERAPY INITIAL EVALUATION ADULT - PERTINENT HX OF CURRENT PROBLEM, REHAB EVAL
Reason for Admission: PNA  History of Present Illness:   87 y/o M PMHx HTN, CAD s/p CABG and stent, A-fib(on eliquis), b/l glaucoma, chronic constipation, with recent hosp course for mechanical fall at [facility] N c/o 1 week h/o dry cough, fatigue, and decrease appetite. Per pt's son at bedside, pt saw PCP few days ago for current sxs, and completed e-rxed azithromycin w/o relief. pt also reporting to have b/l scrotal edema, which he reports started during previous hospital course; pt states he noticed scrotal swelling 2 days ago, but cannot recall if initial onset of sxs resolved on its own or if scrotal swelling persisted Reason for Admission: PNA  History of Present Illness:   89 y/o M PMHx HTN, CAD s/p CABG and stent, A-fib(on eliquis), b/l glaucoma, chronic constipation, with recent hosp course for mechanical fall at [facility] N c/o 1 week h/o dry cough, fatigue, and decrease appetite. Per pt's son at bedside, pt saw PCP few days ago for current sxs, and completed e-rxed azithromycin w/o relief. pt also reporting to have b/l scrotal edema, which he reports started during previous hospital course; pt states he noticed scrotal swelling 2 days ago, but cannot recall if initial onset of sxs resolved on its own or if scrotal swelling persisted

## 2024-01-02 NOTE — PHYSICAL THERAPY INITIAL EVALUATION ADULT - ADDITIONAL COMMENTS
Pt reports he lives with his wife in house with 4 RERE and flight to bedroom. Pt uses RW for amb PTA.

## 2024-01-02 NOTE — PROGRESS NOTE ADULT - SUBJECTIVE AND OBJECTIVE BOX
Progress note    INTERVAL HPI/OVERNIGHT EVENTS:    Patient seen and examined at bedside. He denies any acute distress.      REVIEW OF SYSTEMS:  All other 13 Review of systems were reviewed and are negative    FAMILY HISTORY:  FHx: hypertension (Father, Mother)      T(C): 35.7 (01-02-24 @ 13:10), Max: 36.3 (01-01-24 @ 16:38)  HR: 82 (01-02-24 @ 13:10) (82 - 112)  BP: 145/79 (01-02-24 @ 13:10) (121/78 - 145/79)  RR: 16 (01-02-24 @ 13:10) (16 - 18)  SpO2: 96% (01-02-24 @ 13:10) (96% - 96%)  Wt(kg): --Vital Signs Last 24 Hrs  T(C): 35.7 (02 Jan 2024 13:10), Max: 36.3 (01 Jan 2024 16:38)  T(F): 96.3 (02 Jan 2024 13:10), Max: 97.3 (01 Jan 2024 16:38)  HR: 82 (02 Jan 2024 13:10) (82 - 112)  BP: 145/79 (02 Jan 2024 13:10) (121/78 - 145/79)  BP(mean): --  RR: 16 (02 Jan 2024 13:10) (16 - 18)  SpO2: 96% (02 Jan 2024 13:10) (96% - 96%)    Parameters below as of 02 Jan 2024 13:10  Patient On (Oxygen Delivery Method): room air      No Known Allergies      PHYSICAL EXAM:    GENERAL: laying in bed, appearing comfortable and in NAD  HEENT: Dry mucous membranes  NECK: Supple  CHEST/LUNG: even respirations b/l; no accessory muscle use; (+)  wheeze and rhonchi upon auscultation of base of left lung  ABDOMEN: Soft, Nontender, Nondistended  : b/l scrotal edema. no pain upon palpitation  per pt; + b/l erythema and scaling overlying groin and inguinal folds appearing consistent w/ scrotal dermatitis   EXTREMITIES:   No calf TTP b/l  SKIN: warm    Consultant(s) Notes Reviewed:  [x ] YES  [ ] NO  Care Discussed with Consultants/Other Providers [ x] YES  [ ] NO    LABS:      RADIOLOGY & ADDITIONAL TESTS:    Imaging Personally Reviewed:  [ ] YES  [ ] NO  acetaminophen     Tablet .. 650 milliGRAM(s) Oral every 6 hours PRN  apixaban 5 milliGRAM(s) Oral every 12 hours  aspirin  chewable 81 milliGRAM(s) Oral daily  atorvastatin 80 milliGRAM(s) Oral at bedtime  azithromycin  IVPB 500 milliGRAM(s) IV Intermittent every 24 hours  benzonatate 100 milliGRAM(s) Oral every 8 hours PRN  cefepime   IVPB 2000 milliGRAM(s) IV Intermittent every 8 hours  chlorhexidine 2% Cloths 1 Application(s) Topical <User Schedule>  melatonin 3 milliGRAM(s) Oral at bedtime PRN  metoprolol succinate ER 75 milliGRAM(s) Oral daily  nystatin Ointment 1 Application(s) Topical once  pantoprazole    Tablet 40 milliGRAM(s) Oral before breakfast  tamsulosin 0.4 milliGRAM(s) Oral at bedtime      HEALTH ISSUES - PROBLEM Dx:    87 y/o M PMHx HTN, CAD s/p CABG and stent, A-fib( on eliquis), b/l glaucoma , chronic constipation, with recent hosp course for mechanical fall at [facility] N c/o 1 week h/o dry cough, fatigue, and decrease appetite. Per pt's son at bedside, pt saw PCP few days ago for current sxs, and completed e-rxed azithromycin w/o relief. pt also reporting to have b/l scrotal edema, which he reports started during previous hospital course; pt states he noticed scrotal swelling 2 days ago, but cannot recall if initial onset of sxs resolved on its own or if scrotal swelling persisted     # Pneumonia  - CXr shows bilateral opacities with cough  - c/w cefepime, (finished course of azithromycin as outpt by outpt provider)   - f/u procal  - f/u sputum cx  - f/u Urine streptococcal and Legionella Ag   - f/u MRSA swabs     #weakness  - PT consult    # new scrotal edema  - US results - Complex collection adjacent to the right testicle measuring 2.4 x 2.3 x 1.5 cm.  Cyst adjacent to the left testicle measuring 4.3 x 3.4 x 2.3 cm.  Left-sided hydrocele with approximate volume of 14 mL.    #Afib  - c/w Eliquis    #BPH  - c/w tamsulosin     #HTN  - c/w metoprolol    # GERD  - PPI    #HLD   - Cont home atorvastatin  - Home zetia non formulary ( pt's family will need to bring supply from pt wants to c/w during hospital course)     #Glaucoma   -outpt f/u     Activity: out of bed assistance; fall risk precautions  DVT PPX: c/w home eliquis  GI PPX: c/w home PPI  CHG ordered     Progress note    INTERVAL HPI/OVERNIGHT EVENTS:    Patient seen and examined at bedside. He denies any acute distress.      REVIEW OF SYSTEMS:  All other 13 Review of systems were reviewed and are negative    FAMILY HISTORY:  FHx: hypertension (Father, Mother)      T(C): 35.7 (01-02-24 @ 13:10), Max: 36.3 (01-01-24 @ 16:38)  HR: 82 (01-02-24 @ 13:10) (82 - 112)  BP: 145/79 (01-02-24 @ 13:10) (121/78 - 145/79)  RR: 16 (01-02-24 @ 13:10) (16 - 18)  SpO2: 96% (01-02-24 @ 13:10) (96% - 96%)  Wt(kg): --Vital Signs Last 24 Hrs  T(C): 35.7 (02 Jan 2024 13:10), Max: 36.3 (01 Jan 2024 16:38)  T(F): 96.3 (02 Jan 2024 13:10), Max: 97.3 (01 Jan 2024 16:38)  HR: 82 (02 Jan 2024 13:10) (82 - 112)  BP: 145/79 (02 Jan 2024 13:10) (121/78 - 145/79)  BP(mean): --  RR: 16 (02 Jan 2024 13:10) (16 - 18)  SpO2: 96% (02 Jan 2024 13:10) (96% - 96%)    Parameters below as of 02 Jan 2024 13:10  Patient On (Oxygen Delivery Method): room air      No Known Allergies      PHYSICAL EXAM:    GENERAL: laying in bed, appearing comfortable and in NAD  HEENT: Dry mucous membranes  NECK: Supple  CHEST/LUNG: even respirations b/l; no accessory muscle use; (+)  wheeze and rhonchi upon auscultation of base of left lung  ABDOMEN: Soft, Nontender, Nondistended  : b/l scrotal edema. no pain upon palpitation  per pt; + b/l erythema and scaling overlying groin and inguinal folds appearing consistent w/ scrotal dermatitis   EXTREMITIES:   No calf TTP b/l  SKIN: warm    Consultant(s) Notes Reviewed:  [x ] YES  [ ] NO  Care Discussed with Consultants/Other Providers [ x] YES  [ ] NO    LABS:      RADIOLOGY & ADDITIONAL TESTS:    Imaging Personally Reviewed:  [ ] YES  [ ] NO  acetaminophen     Tablet .. 650 milliGRAM(s) Oral every 6 hours PRN  apixaban 5 milliGRAM(s) Oral every 12 hours  aspirin  chewable 81 milliGRAM(s) Oral daily  atorvastatin 80 milliGRAM(s) Oral at bedtime  azithromycin  IVPB 500 milliGRAM(s) IV Intermittent every 24 hours  benzonatate 100 milliGRAM(s) Oral every 8 hours PRN  cefepime   IVPB 2000 milliGRAM(s) IV Intermittent every 8 hours  chlorhexidine 2% Cloths 1 Application(s) Topical <User Schedule>  melatonin 3 milliGRAM(s) Oral at bedtime PRN  metoprolol succinate ER 75 milliGRAM(s) Oral daily  nystatin Ointment 1 Application(s) Topical once  pantoprazole    Tablet 40 milliGRAM(s) Oral before breakfast  tamsulosin 0.4 milliGRAM(s) Oral at bedtime      HEALTH ISSUES - PROBLEM Dx:    87 y/o M PMHx HTN, CAD s/p CABG and stent, A-fib( on eliquis), b/l glaucoma , chronic constipation, with recent hosp course for mechanical fall at [facility] N c/o 1 week h/o dry cough, fatigue, and decrease appetite. Per pt's son at bedside, pt saw PCP few days ago for current sxs, and completed e-rxed azithromycin w/o relief. pt also reporting to have b/l scrotal edema, which he reports started during previous hospital course; pt states he noticed scrotal swelling 2 days ago, but cannot recall if initial onset of sxs resolved on its own or if scrotal swelling persisted     # Pneumonia  - CXr shows bilateral opacities with cough  - c/w cefepime, (finished course of azithromycin as outpt by outpt provider)   - f/u procal  - f/u sputum cx  - f/u Urine streptococcal and Legionella Ag   - f/u MRSA swabs     #weakness  - PT consult    # new scrotal edema  - US results - Complex collection adjacent to the right testicle measuring 2.4 x 2.3 x 1.5 cm.  Cyst adjacent to the left testicle measuring 4.3 x 3.4 x 2.3 cm.  Left-sided hydrocele with approximate volume of 14 mL.    #Afib  - c/w Eliquis    #BPH  - c/w tamsulosin     #HTN  - c/w metoprolol    # GERD  - PPI    #HLD   - Cont home atorvastatin  - Home zetia non formulary ( pt's family will need to bring supply from pt wants to c/w during hospital course)     #Glaucoma   -outpt f/u     Activity: out of bed assistance; fall risk precautions  DVT PPX: c/w home eliquis  GI PPX: c/w home PPI  CHG ordered

## 2024-01-02 NOTE — PHYSICAL THERAPY INITIAL EVALUATION ADULT - GENERAL OBSERVATIONS, REHAB EVAL
10:22-10:40 Chart reviewed. Patient available to be seen for physical therapy, denies pain, confirmed with RN.  Pt rec'd in recliner in NAD.

## 2024-01-03 ENCOUNTER — APPOINTMENT (OUTPATIENT)
Dept: CARDIOLOGY | Facility: CLINIC | Age: 89
End: 2024-01-03

## 2024-01-03 LAB
ANION GAP SERPL CALC-SCNC: 13 MMOL/L — SIGNIFICANT CHANGE UP (ref 7–14)
ANION GAP SERPL CALC-SCNC: 13 MMOL/L — SIGNIFICANT CHANGE UP (ref 7–14)
BUN SERPL-MCNC: 13 MG/DL — SIGNIFICANT CHANGE UP (ref 10–20)
BUN SERPL-MCNC: 13 MG/DL — SIGNIFICANT CHANGE UP (ref 10–20)
CALCIUM SERPL-MCNC: 8.6 MG/DL — SIGNIFICANT CHANGE UP (ref 8.4–10.5)
CALCIUM SERPL-MCNC: 8.6 MG/DL — SIGNIFICANT CHANGE UP (ref 8.4–10.5)
CHLORIDE SERPL-SCNC: 107 MMOL/L — SIGNIFICANT CHANGE UP (ref 98–110)
CHLORIDE SERPL-SCNC: 107 MMOL/L — SIGNIFICANT CHANGE UP (ref 98–110)
CO2 SERPL-SCNC: 24 MMOL/L — SIGNIFICANT CHANGE UP (ref 17–32)
CO2 SERPL-SCNC: 24 MMOL/L — SIGNIFICANT CHANGE UP (ref 17–32)
CREAT SERPL-MCNC: 0.7 MG/DL — SIGNIFICANT CHANGE UP (ref 0.7–1.5)
CREAT SERPL-MCNC: 0.7 MG/DL — SIGNIFICANT CHANGE UP (ref 0.7–1.5)
CULTURE RESULTS: SIGNIFICANT CHANGE UP
CULTURE RESULTS: SIGNIFICANT CHANGE UP
EGFR: 89 ML/MIN/1.73M2 — SIGNIFICANT CHANGE UP
EGFR: 89 ML/MIN/1.73M2 — SIGNIFICANT CHANGE UP
GLUCOSE SERPL-MCNC: 123 MG/DL — HIGH (ref 70–99)
GLUCOSE SERPL-MCNC: 123 MG/DL — HIGH (ref 70–99)
HCT VFR BLD CALC: 29.6 % — LOW (ref 42–52)
HCT VFR BLD CALC: 29.6 % — LOW (ref 42–52)
HGB BLD-MCNC: 9.8 G/DL — LOW (ref 14–18)
HGB BLD-MCNC: 9.8 G/DL — LOW (ref 14–18)
MAGNESIUM SERPL-MCNC: 2.1 MG/DL — SIGNIFICANT CHANGE UP (ref 1.8–2.4)
MAGNESIUM SERPL-MCNC: 2.1 MG/DL — SIGNIFICANT CHANGE UP (ref 1.8–2.4)
MCHC RBC-ENTMCNC: 31.3 PG — HIGH (ref 27–31)
MCHC RBC-ENTMCNC: 31.3 PG — HIGH (ref 27–31)
MCHC RBC-ENTMCNC: 33.1 G/DL — SIGNIFICANT CHANGE UP (ref 32–37)
MCHC RBC-ENTMCNC: 33.1 G/DL — SIGNIFICANT CHANGE UP (ref 32–37)
MCV RBC AUTO: 94.6 FL — HIGH (ref 80–94)
MCV RBC AUTO: 94.6 FL — HIGH (ref 80–94)
NRBC # BLD: 0 /100 WBCS — SIGNIFICANT CHANGE UP (ref 0–0)
NRBC # BLD: 0 /100 WBCS — SIGNIFICANT CHANGE UP (ref 0–0)
PLATELET # BLD AUTO: 237 K/UL — SIGNIFICANT CHANGE UP (ref 130–400)
PLATELET # BLD AUTO: 237 K/UL — SIGNIFICANT CHANGE UP (ref 130–400)
PMV BLD: 10.1 FL — SIGNIFICANT CHANGE UP (ref 7.4–10.4)
PMV BLD: 10.1 FL — SIGNIFICANT CHANGE UP (ref 7.4–10.4)
POTASSIUM SERPL-MCNC: 3.6 MMOL/L — SIGNIFICANT CHANGE UP (ref 3.5–5)
POTASSIUM SERPL-MCNC: 3.6 MMOL/L — SIGNIFICANT CHANGE UP (ref 3.5–5)
POTASSIUM SERPL-SCNC: 3.6 MMOL/L — SIGNIFICANT CHANGE UP (ref 3.5–5)
POTASSIUM SERPL-SCNC: 3.6 MMOL/L — SIGNIFICANT CHANGE UP (ref 3.5–5)
PROCALCITONIN SERPL-MCNC: 0.06 NG/ML — SIGNIFICANT CHANGE UP (ref 0.02–0.1)
PROCALCITONIN SERPL-MCNC: 0.06 NG/ML — SIGNIFICANT CHANGE UP (ref 0.02–0.1)
RBC # BLD: 3.13 M/UL — LOW (ref 4.7–6.1)
RBC # BLD: 3.13 M/UL — LOW (ref 4.7–6.1)
RBC # FLD: 15.2 % — HIGH (ref 11.5–14.5)
RBC # FLD: 15.2 % — HIGH (ref 11.5–14.5)
S PNEUM AG UR QL: NEGATIVE — SIGNIFICANT CHANGE UP
S PNEUM AG UR QL: NEGATIVE — SIGNIFICANT CHANGE UP
SODIUM SERPL-SCNC: 144 MMOL/L — SIGNIFICANT CHANGE UP (ref 135–146)
SODIUM SERPL-SCNC: 144 MMOL/L — SIGNIFICANT CHANGE UP (ref 135–146)
SPECIMEN SOURCE: SIGNIFICANT CHANGE UP
SPECIMEN SOURCE: SIGNIFICANT CHANGE UP
WBC # BLD: 6.43 K/UL — SIGNIFICANT CHANGE UP (ref 4.8–10.8)
WBC # BLD: 6.43 K/UL — SIGNIFICANT CHANGE UP (ref 4.8–10.8)
WBC # FLD AUTO: 6.43 K/UL — SIGNIFICANT CHANGE UP (ref 4.8–10.8)
WBC # FLD AUTO: 6.43 K/UL — SIGNIFICANT CHANGE UP (ref 4.8–10.8)

## 2024-01-03 PROCEDURE — 99222 1ST HOSP IP/OBS MODERATE 55: CPT

## 2024-01-03 PROCEDURE — 99232 SBSQ HOSP IP/OBS MODERATE 35: CPT

## 2024-01-03 RX ORDER — POTASSIUM CHLORIDE 20 MEQ
40 PACKET (EA) ORAL EVERY 4 HOURS
Refills: 0 | Status: COMPLETED | OUTPATIENT
Start: 2024-01-03 | End: 2024-01-03

## 2024-01-03 RX ADMIN — TAMSULOSIN HYDROCHLORIDE 0.4 MILLIGRAM(S): 0.4 CAPSULE ORAL at 21:42

## 2024-01-03 RX ADMIN — ATORVASTATIN CALCIUM 80 MILLIGRAM(S): 80 TABLET, FILM COATED ORAL at 21:42

## 2024-01-03 RX ADMIN — CHLORHEXIDINE GLUCONATE 1 APPLICATION(S): 213 SOLUTION TOPICAL at 05:52

## 2024-01-03 RX ADMIN — AZITHROMYCIN 255 MILLIGRAM(S): 500 TABLET, FILM COATED ORAL at 05:04

## 2024-01-03 RX ADMIN — Medication 40 MILLIEQUIVALENT(S): at 14:36

## 2024-01-03 RX ADMIN — Medication 40 MILLIEQUIVALENT(S): at 05:51

## 2024-01-03 RX ADMIN — Medication 40 MILLIEQUIVALENT(S): at 17:15

## 2024-01-03 RX ADMIN — Medication 81 MILLIGRAM(S): at 11:36

## 2024-01-03 RX ADMIN — APIXABAN 5 MILLIGRAM(S): 2.5 TABLET, FILM COATED ORAL at 05:51

## 2024-01-03 RX ADMIN — CEFEPIME 100 MILLIGRAM(S): 1 INJECTION, POWDER, FOR SOLUTION INTRAMUSCULAR; INTRAVENOUS at 05:51

## 2024-01-03 RX ADMIN — CEFEPIME 100 MILLIGRAM(S): 1 INJECTION, POWDER, FOR SOLUTION INTRAMUSCULAR; INTRAVENOUS at 21:39

## 2024-01-03 RX ADMIN — Medication 3 MILLIGRAM(S): at 01:51

## 2024-01-03 RX ADMIN — PANTOPRAZOLE SODIUM 40 MILLIGRAM(S): 20 TABLET, DELAYED RELEASE ORAL at 07:00

## 2024-01-03 RX ADMIN — CEFEPIME 100 MILLIGRAM(S): 1 INJECTION, POWDER, FOR SOLUTION INTRAMUSCULAR; INTRAVENOUS at 14:36

## 2024-01-03 RX ADMIN — Medication 75 MILLIGRAM(S): at 05:51

## 2024-01-03 RX ADMIN — APIXABAN 5 MILLIGRAM(S): 2.5 TABLET, FILM COATED ORAL at 17:15

## 2024-01-03 NOTE — CONSULT NOTE ADULT - NS ATTEND AMEND GEN_ALL_CORE FT
I personally saw and examined the patient, reviewed the chart and available data. I discussed the situation with the patient and the charge nurse as well as the MEAGAN PA. I also reviewed and/or amended the note as necessary.

## 2024-01-03 NOTE — PROGRESS NOTE ADULT - SUBJECTIVE AND OBJECTIVE BOX
Progress note    INTERVAL HPI/OVERNIGHT EVENTS:    Patient seen and examined at bedside. No acute distress. Cough is still present.      REVIEW OF SYSTEMS:  All other 13 Review of systems were reviewed and are negative    FAMILY HISTORY:  FHx: hypertension (Father, Mother)      T(C): 36 (01-03-24 @ 05:02), Max: 36.6 (01-02-24 @ 21:15)  HR: 94 (01-03-24 @ 05:02) (82 - 112)  BP: 150/74 (01-03-24 @ 05:02) (145/79 - 178/98)  RR: 18 (01-03-24 @ 05:02) (16 - 18)  SpO2: 97% (01-03-24 @ 05:02) (96% - 97%)  Wt(kg): --Vital Signs Last 24 Hrs  T(C): 36 (03 Jan 2024 05:02), Max: 36.6 (02 Jan 2024 21:15)  T(F): 96.8 (03 Jan 2024 05:02), Max: 97.8 (02 Jan 2024 21:15)  HR: 94 (03 Jan 2024 05:02) (82 - 112)  BP: 150/74 (03 Jan 2024 05:02) (145/79 - 178/98)  BP(mean): --  RR: 18 (03 Jan 2024 05:02) (16 - 18)  SpO2: 97% (03 Jan 2024 05:02) (96% - 97%)    Parameters below as of 03 Jan 2024 05:02  Patient On (Oxygen Delivery Method): room air      No Known Allergies      PHYSICAL EXAM:    GENERAL: laying in bed, appearing comfortable and in NAD  HEENT: Dry mucous membranes  NECK: Supple  CHEST/LUNG: even respirations b/l; no accessory muscle use; (+)  wheeze and rhonchi upon auscultation of base of left lung  ABDOMEN: Soft, Nontender, Nondistended  : b/l scrotal edema. no pain upon palpitation  per pt; + b/l erythema and scaling overlying groin and inguinal folds appearing consistent w/ scrotal dermatitis   EXTREMITIES:   No calf TTP b/l  SKIN: warm    Consultant(s) Notes Reviewed:  [x ] YES  [ ] NO  Care Discussed with Consultants/Other Providers [ x] YES  [ ] NO    LABS:      RADIOLOGY & ADDITIONAL TESTS:    Imaging Personally Reviewed:  [ ] YES  [ ] NO  acetaminophen     Tablet .. 650 milliGRAM(s) Oral every 6 hours PRN  apixaban 5 milliGRAM(s) Oral every 12 hours  aspirin  chewable 81 milliGRAM(s) Oral daily  atorvastatin 80 milliGRAM(s) Oral at bedtime  azithromycin  IVPB 500 milliGRAM(s) IV Intermittent every 24 hours  benzonatate 100 milliGRAM(s) Oral every 8 hours PRN  cefepime   IVPB 2000 milliGRAM(s) IV Intermittent every 8 hours  chlorhexidine 2% Cloths 1 Application(s) Topical <User Schedule>  melatonin 3 milliGRAM(s) Oral at bedtime PRN  metoprolol succinate ER 75 milliGRAM(s) Oral daily  nystatin Ointment 1 Application(s) Topical once  pantoprazole    Tablet 40 milliGRAM(s) Oral before breakfast  tamsulosin 0.4 milliGRAM(s) Oral at bedtime      HEALTH ISSUES - PROBLEM Dx:    89 y/o M PMHx HTN, CAD s/p CABG and stent, A-fib( on eliquis), b/l glaucoma , chronic constipation, with recent hosp course for mechanical fall at [facility] N c/o 1 week h/o dry cough, fatigue, and decrease appetite. Per pt's son at bedside, pt saw PCP few days ago for current sxs, and completed e-rxed azithromycin w/o relief. pt also reporting to have b/l scrotal edema, which he reports started during previous hospital course; pt states he noticed scrotal swelling 2 days ago, but cannot recall if initial onset of sxs resolved on its own or if scrotal swelling persisted     # Pneumonia  - CXr shows bilateral opacities with cough  - c/w cefepime, (finished course of azithromycin as outpt by outpt provider)   - Legionella/strep (-)  - f/u procal  - sputum cx noted normal sputum ethan  - f/u MRSA swabs     #weakness  - PT consult - needs rehab    # L sided hydrocele with cyst  - US results - Complex collection adjacent to the right testicle measuring 2.4 x 2.3 x 1.5 cm.  Cyst adjacent to the left testicle measuring 4.3 x 3.4 x 2.3 cm.  Left-sided hydrocele with approximate volume of 14 mL.  -Urology recs appreciated    #Afib  - c/w Eliquis    #BPH  - c/w tamsulosin     #HTN  - c/w metoprolol    # GERD  - PPI    #HLD   - Cont home atorvastatin  - Home zetia non formulary ( pt's family will need to bring supply from pt wants to c/w during hospital course)     #Glaucoma   -outpt f/u     Activity: out of bed assistance; fall risk precautions  DVT PPX: c/w home eliquis  GI PPX: c/w home PPI  CHG ordered    Handoff: came here for scrotal edema and cough. does not appear septic. Needs SNF. c/w abx. Can dc when SNF bed available.     Progress note    INTERVAL HPI/OVERNIGHT EVENTS:    Patient seen and examined at bedside. No acute distress. Cough is still present.      REVIEW OF SYSTEMS:  All other 13 Review of systems were reviewed and are negative    FAMILY HISTORY:  FHx: hypertension (Father, Mother)      T(C): 36 (01-03-24 @ 05:02), Max: 36.6 (01-02-24 @ 21:15)  HR: 94 (01-03-24 @ 05:02) (82 - 112)  BP: 150/74 (01-03-24 @ 05:02) (145/79 - 178/98)  RR: 18 (01-03-24 @ 05:02) (16 - 18)  SpO2: 97% (01-03-24 @ 05:02) (96% - 97%)  Wt(kg): --Vital Signs Last 24 Hrs  T(C): 36 (03 Jan 2024 05:02), Max: 36.6 (02 Jan 2024 21:15)  T(F): 96.8 (03 Jan 2024 05:02), Max: 97.8 (02 Jan 2024 21:15)  HR: 94 (03 Jan 2024 05:02) (82 - 112)  BP: 150/74 (03 Jan 2024 05:02) (145/79 - 178/98)  BP(mean): --  RR: 18 (03 Jan 2024 05:02) (16 - 18)  SpO2: 97% (03 Jan 2024 05:02) (96% - 97%)    Parameters below as of 03 Jan 2024 05:02  Patient On (Oxygen Delivery Method): room air      No Known Allergies      PHYSICAL EXAM:    GENERAL: laying in bed, appearing comfortable and in NAD  HEENT: Dry mucous membranes  NECK: Supple  CHEST/LUNG: even respirations b/l; no accessory muscle use; (+)  wheeze and rhonchi upon auscultation of base of left lung  ABDOMEN: Soft, Nontender, Nondistended  : b/l scrotal edema. no pain upon palpitation  per pt; + b/l erythema and scaling overlying groin and inguinal folds appearing consistent w/ scrotal dermatitis   EXTREMITIES:   No calf TTP b/l  SKIN: warm    Consultant(s) Notes Reviewed:  [x ] YES  [ ] NO  Care Discussed with Consultants/Other Providers [ x] YES  [ ] NO    LABS:      RADIOLOGY & ADDITIONAL TESTS:    Imaging Personally Reviewed:  [ ] YES  [ ] NO  acetaminophen     Tablet .. 650 milliGRAM(s) Oral every 6 hours PRN  apixaban 5 milliGRAM(s) Oral every 12 hours  aspirin  chewable 81 milliGRAM(s) Oral daily  atorvastatin 80 milliGRAM(s) Oral at bedtime  azithromycin  IVPB 500 milliGRAM(s) IV Intermittent every 24 hours  benzonatate 100 milliGRAM(s) Oral every 8 hours PRN  cefepime   IVPB 2000 milliGRAM(s) IV Intermittent every 8 hours  chlorhexidine 2% Cloths 1 Application(s) Topical <User Schedule>  melatonin 3 milliGRAM(s) Oral at bedtime PRN  metoprolol succinate ER 75 milliGRAM(s) Oral daily  nystatin Ointment 1 Application(s) Topical once  pantoprazole    Tablet 40 milliGRAM(s) Oral before breakfast  tamsulosin 0.4 milliGRAM(s) Oral at bedtime      HEALTH ISSUES - PROBLEM Dx:    89 y/o M PMHx HTN, CAD s/p CABG and stent, A-fib( on eliquis), b/l glaucoma , chronic constipation, with recent hosp course for mechanical fall at [facility] N c/o 1 week h/o dry cough, fatigue, and decrease appetite. Per pt's son at bedside, pt saw PCP few days ago for current sxs, and completed e-rxed azithromycin w/o relief. pt also reporting to have b/l scrotal edema, which he reports started during previous hospital course; pt states he noticed scrotal swelling 2 days ago, but cannot recall if initial onset of sxs resolved on its own or if scrotal swelling persisted     # Pneumonia  - CXr shows bilateral opacities with cough  - c/w cefepime, (finished course of azithromycin as outpt by outpt provider)   - Legionella/strep (-)  - f/u procal  - sputum cx noted normal sputum ethan  - f/u MRSA swabs     #weakness  - PT consult - needs rehab    # L sided hydrocele with cyst  - US results - Complex collection adjacent to the right testicle measuring 2.4 x 2.3 x 1.5 cm.  Cyst adjacent to the left testicle measuring 4.3 x 3.4 x 2.3 cm.  Left-sided hydrocele with approximate volume of 14 mL.  -Urology recs appreciated    #Afib  - c/w Eliquis    #BPH  - c/w tamsulosin     #HTN  - c/w metoprolol    # GERD  - PPI    #HLD   - Cont home atorvastatin  - Home zetia non formulary ( pt's family will need to bring supply from pt wants to c/w during hospital course)     #Glaucoma   -outpt f/u     Activity: out of bed assistance; fall risk precautions  DVT PPX: c/w home eliquis  GI PPX: c/w home PPI  CHG ordered    Handoff: came here for scrotal edema and cough. does not appear septic. Needs SNF. c/w abx. Can dc when SNF bed available.

## 2024-01-03 NOTE — CONSULT NOTE ADULT - ASSESSMENT
87 y/o M PMHx HTN, CAD s/p CABG and stent, A-fib(on eliquis), b/l glaucoma, chronic constipation, with recent hosp course for mechanical fall at [facility] N now admitted for pneumonia. Urology consulted for swollen scrotum.  WBC 6.62, Cr 0.7  Testicular US shows: Complex collection adjacent to the right testicle measuring 2.4 x 2.3 x   1.5 cm. Cyst adjacent to the left testicle measuring 4.3 x 3.4 x 2.3 cm.  Left-sided hydrocele with approximate volume of 14 mL.    A/P:  Will discuss with Dr. Clay  87 y/o M PMHx HTN, CAD s/p CABG and stent, A-fib(on eliquis), b/l glaucoma, chronic constipation, with recent hosp course for mechanical fall at [facility] N now admitted for pneumonia. Urology consulted for swollen scrotum.  WBC 6.62, Cr 0.7  Testicular US shows: Complex collection adjacent to the right testicle measuring 2.4 x 2.3 x   1.5 cm. Cyst adjacent to the left testicle measuring 4.3 x 3.4 x 2.3 cm.  Left-sided hydrocele with approximate volume of 14 mL.    A/P:  Will discuss with Dr. Clay  89 y/o M PMHx HTN, CAD s/p CABG and stent, A-fib(on eliquis), b/l glaucoma, chronic constipation, with recent hosp course for mechanical fall at [facility] N now admitted for pneumonia. Urology consulted for swollen scrotum.  WBC 6.62, Cr 0.7  Testicular US shows: Complex collection adjacent to the right testicle measuring 2.4 x 2.3 x   1.5 cm. Cyst adjacent to the left testicle measuring 4.3 x 3.4 x 2.3 cm.  Left-sided hydrocele with approximate volume of 14 mL.    A/P:  Discussed with Dr. Clay   Recommends out pt follow up 89 y/o M PMHx HTN, CAD s/p CABG and stent, A-fib(on eliquis), b/l glaucoma, chronic constipation, with recent hosp course for mechanical fall at [facility] N now admitted for pneumonia. Urology consulted for swollen scrotum.  WBC 6.62, Cr 0.7  Testicular US shows: Complex collection adjacent to the right testicle measuring 2.4 x 2.3 x   1.5 cm. Cyst adjacent to the left testicle measuring 4.3 x 3.4 x 2.3 cm.  Left-sided hydrocele with approximate volume of 14 mL.    A/P:  Discussed with Dr. Clay   Recommends out pt follow up 89 y/o M PMHx HTN, CAD s/p CABG and stent, A-fib(on eliquis), b/l glaucoma, chronic constipation, with recent hosp course for mechanical fall at [facility] N now admitted for pneumonia. Urology consulted for swollen scrotum.  WBC 6.62, Cr 0.7  Testicular US shows: Complex collection adjacent to the right testicle measuring 2.4 x 2.3 x   1.5 cm. Cyst adjacent to the left testicle measuring 4.3 x 3.4 x 2.3 cm.  Left-sided hydrocele with approximate volume of 14 mL.    A/P:  Discussed with Dr. Clay   - NO ACUTE  INTERVENTION WARRANTED  -Recommends out pt follow up 87 y/o M PMHx HTN, CAD s/p CABG and stent, A-fib(on eliquis), b/l glaucoma, chronic constipation, with recent hosp course for mechanical fall at [facility] N now admitted for pneumonia. Urology consulted for swollen scrotum.  WBC 6.62, Cr 0.7  Testicular US shows: Complex collection adjacent to the right testicle measuring 2.4 x 2.3 x   1.5 cm. Cyst adjacent to the left testicle measuring 4.3 x 3.4 x 2.3 cm.  Left-sided hydrocele with approximate volume of 14 mL.    A/P:  Discussed with Dr. lCay   - NO ACUTE  INTERVENTION WARRANTED  -Recommends out pt follow up 89 y/o M PMHx HTN, CAD s/p CABG and stent, A-fib(on eliquis), b/l glaucoma, chronic constipation, with recent hosp course for mechanical fall at [facility] N now admitted for pneumonia. Urology consulted for swollen scrotum.  WBC 6.62, Cr 0.7  Testicular US shows: Complex collection adjacent to the right testicle measuring 2.4 x 2.3 x   1.5 cm. Cyst adjacent to the left testicle measuring 4.3 x 3.4 x 2.3 cm.  Left-sided hydrocele with approximate volume of 14 mL.    A/P:  Discussed with Dr. Clay   - NO ACUTE  INTERVENTION WARRANTED  -Recommends out pt follow up      Addendum: 645pm  Patient seen and examined with Dr Clayton. Patient has balanitis and poor hygiene of penile foreskin/glans. Discussed with nursing staff to clean patient foreskin.   Urology team will follow up on 1/4/24 to reassess. Follow up patient for hydrocele.    87 y/o M PMHx HTN, CAD s/p CABG and stent, A-fib(on eliquis), b/l glaucoma, chronic constipation, with recent hosp course for mechanical fall at [facility] N now admitted for pneumonia. Urology consulted for swollen scrotum.  WBC 6.62, Cr 0.7  Testicular US shows: Complex collection adjacent to the right testicle measuring 2.4 x 2.3 x   1.5 cm. Cyst adjacent to the left testicle measuring 4.3 x 3.4 x 2.3 cm.  Left-sided hydrocele with approximate volume of 14 mL.    A/P:  Discussed with Dr. Clay   - NO ACUTE  INTERVENTION WARRANTED  -Recommends out pt follow up      Addendum: 645pm  Patient seen and examined with Dr Clayton. Patient has balanitis and poor hygiene of penile foreskin/glans. Discussed with nursing staff to clean patient foreskin.   Urology team will follow up on 1/4/24 to reassess. Follow up patient for hydrocele.    89 y/o M PMHx HTN, CAD s/p CABG and stent, A-fib(on eliquis), b/l glaucoma, chronic constipation, with recent hosp course for mechanical fall at [facility] N now admitted for pneumonia. Urology consulted for swollen scrotum.  WBC 6.62, Cr 0.7  Testicular US shows: Complex collection adjacent to the right testicle measuring 2.4 x 2.3 x   1.5 cm. Cyst adjacent to the left testicle measuring 4.3 x 3.4 x 2.3 cm.  Left-sided hydrocele with approximate volume of 14 mL.    A/P:  Discussed with Dr. Clay   - NO ACUTE  INTERVENTION WARRANTED  -Recommends out pt follow up      Addendum: 645pm  Patient seen and examined with Dr Clayton. Patient has balanitis and poor hygiene of penile foreskin/glans. Discussed with nursing staff to clean patient foreskin.   Urology team will follow up on 1/4/24 to reassess. Follow up patient for hydrocele.     I saw the patient and examined him.  Scrotum is not bothering him and given his concurrent issues I would recommend watchful waiting.  However when I examined him he noticed that the head of the penis has a balanitis with really very poor hygiene with lots of smegma.  I discussed this with the charge nurse who will have the staff practice careful about nurse care making sure that you do not hold the foreskin back down.  If it becomes an issue further urologic intervention may be necessary but for now proper hygiene should take care of it.       89 y/o M PMHx HTN, CAD s/p CABG and stent, A-fib(on eliquis), b/l glaucoma, chronic constipation, with recent hosp course for mechanical fall at [facility] N now admitted for pneumonia. Urology consulted for swollen scrotum.  WBC 6.62, Cr 0.7  Testicular US shows: Complex collection adjacent to the right testicle measuring 2.4 x 2.3 x   1.5 cm. Cyst adjacent to the left testicle measuring 4.3 x 3.4 x 2.3 cm.  Left-sided hydrocele with approximate volume of 14 mL.    A/P:  Discussed with Dr. Clay   - NO ACUTE  INTERVENTION WARRANTED  -Recommends out pt follow up      Addendum: 645pm  Patient seen and examined with Dr Clayton. Patient has balanitis and poor hygiene of penile foreskin/glans. Discussed with nursing staff to clean patient foreskin.   Urology team will follow up on 1/4/24 to reassess. Follow up patient for hydrocele.     I saw the patient and examined him.  Scrotum is not bothering him and given his concurrent issues I would recommend watchful waiting.  However when I examined him he noticed that the head of the penis has a balanitis with really very poor hygiene with lots of smegma.  I discussed this with the charge nurse who will have the staff practice careful about nurse care making sure that you do not hold the foreskin back down.  If it becomes an issue further urologic intervention may be necessary but for now proper hygiene should take care of it.

## 2024-01-03 NOTE — CONSULT NOTE ADULT - SUBJECTIVE AND OBJECTIVE BOX
87 y/o M PMHx HTN, CAD s/p CABG and stent, A-fib(on eliquis), b/l glaucoma, chronic constipation, with recent hosp course for mechanical fall at [facility] N c/o 1 week h/o dry cough, fatigue, and decrease appetite. According to chart, pt's son states, pt saw PCP few days ago for current sxs, and completed e-rxed azithromycin w/o relief. pt also reporting to have b/l scrotal edema, which he reports started during previous hospital course; pt states he noticed scrotal swelling 2 days ago, but cannot recall if initial onset of sxs resolved on its own or if scrotal swelling persisted   Pt denies fever, chills, n/v, chest pain, SOB, hx of scrotal edema prior to episode in Dec, hematuria.     PAST MEDICAL & SURGICAL HISTORY:  Afib  Hypertension  Glaucoma  Hyperlipidemia  S/P CABG (coronary artery bypass graft)      Vital Signs Last 24 Hrs  T(C): 36.6 (02 Jan 2024 21:15), Max: 36.6 (02 Jan 2024 21:15)  T(F): 97.8 (02 Jan 2024 21:15), Max: 97.8 (02 Jan 2024 21:15)  HR: 112 (02 Jan 2024 21:15) (82 - 112)  BP: 178/98 (02 Jan 2024 21:15) (132/66 - 178/98)  BP(mean): --  RR: 18 (02 Jan 2024 21:15) (16 - 18)  SpO2: 97% (02 Jan 2024 21:15) (96% - 97%)    Parameters below as of 02 Jan 2024 21:15  Patient On (Oxygen Delivery Method): room air    PHYSICAL EXAM:      Constitutional: NAD, A&O x3    Eyes: PERRLA, no conjuctivitis    Neck: no lymphadenopathy    Respiratory: +air entry, no rales, no rhonchi, no wheezes    Cardiovascular: +S1 and S2, regular rate and rhythm    Gastrointestinal: +BS, soft, non-tender, not distended    : minimally swollen scrotum Lt > RT, no redness/ecchymosis/bruises, non tender     Extremities:  no edema, no calf tenderness    Neurological: sensation intact, ROM equal B/L, CN II-XII intact    Skin: no rashes, normal turgor                            9.8    6.62  )-----------( 229      ( 02 Jan 2024 07:00 )             30.1     01-02    145  |  107  |  13  ----------------------------<  101<H>  3.0<L>   |  25  |  0.7    Ca    8.3<L>      02 Jan 2024 07:00  Mg     1.4     01-02    TPro  6.2  /  Alb  3.4<L>  /  TBili  0.7  /  DBili  x   /  AST  45<H>  /  ALT  42<H>  /  AlkPhos  115  01-01          Urinalysis Basic - ( 02 Jan 2024 07:00 )    Color: x / Appearance: x / SG: x / pH: x  Gluc: 101 mg/dL / Ketone: x  / Bili: x / Urobili: x   Blood: x / Protein: x / Nitrite: x   Leuk Esterase: x / RBC: x / WBC x   Sq Epi: x / Non Sq Epi: x / Bacteria: x        Culture - Sputum (collected 01-02-24 @ 06:00)  Source: .Sputum Sputum  Gram Stain (01-02-24 @ 18:56):    Rare polymorphonuclear leukocytes per low power field    Few Squamous epithelial cells per low power field    Moderate Gram Negative Rods seen per oil power field    Rare Gram Positive Rods seen per oil power field    Few Gram positive cocci in pairs, chains and clusters seen per oil power    field      < from: US Testicles (01.01.24 @ 17:50) >  IMPRESSION:    Complex collection adjacent to the right testicle measuring 2.4 x 2.3 x   1.5 cm.    Cyst adjacent to the left testicle measuring 4.3 x 3.4 x 2.3 cm.    Left-sided hydrocele with approximate volume of 14 mL.                 89 y/o M PMHx HTN, CAD s/p CABG and stent, A-fib(on eliquis), b/l glaucoma, chronic constipation, with recent hosp course for mechanical fall at [facility] N c/o 1 week h/o dry cough, fatigue, and decrease appetite. According to chart, pt's son states, pt saw PCP few days ago for current sxs, and completed e-rxed azithromycin w/o relief. pt also reporting to have b/l scrotal edema, which he reports started during previous hospital course; pt states he noticed scrotal swelling 2 days ago, but cannot recall if initial onset of sxs resolved on its own or if scrotal swelling persisted   Pt denies fever, chills, n/v, chest pain, SOB, hx of scrotal edema prior to episode in Dec, hematuria.     PAST MEDICAL & SURGICAL HISTORY:  Afib  Hypertension  Glaucoma  Hyperlipidemia  S/P CABG (coronary artery bypass graft)      Vital Signs Last 24 Hrs  T(C): 36.6 (02 Jan 2024 21:15), Max: 36.6 (02 Jan 2024 21:15)  T(F): 97.8 (02 Jan 2024 21:15), Max: 97.8 (02 Jan 2024 21:15)  HR: 112 (02 Jan 2024 21:15) (82 - 112)  BP: 178/98 (02 Jan 2024 21:15) (132/66 - 178/98)  BP(mean): --  RR: 18 (02 Jan 2024 21:15) (16 - 18)  SpO2: 97% (02 Jan 2024 21:15) (96% - 97%)    Parameters below as of 02 Jan 2024 21:15  Patient On (Oxygen Delivery Method): room air    PHYSICAL EXAM:      Constitutional: NAD, A&O x3    Eyes: PERRLA, no conjuctivitis    Neck: no lymphadenopathy    Respiratory: +air entry, no rales, no rhonchi, no wheezes    Cardiovascular: +S1 and S2, regular rate and rhythm    Gastrointestinal: +BS, soft, non-tender, not distended    : minimally swollen scrotum Lt > RT, no redness/ecchymosis/bruises, non tender     Extremities:  no edema, no calf tenderness    Neurological: sensation intact, ROM equal B/L, CN II-XII intact    Skin: no rashes, normal turgor                            9.8    6.62  )-----------( 229      ( 02 Jan 2024 07:00 )             30.1     01-02    145  |  107  |  13  ----------------------------<  101<H>  3.0<L>   |  25  |  0.7    Ca    8.3<L>      02 Jan 2024 07:00  Mg     1.4     01-02    TPro  6.2  /  Alb  3.4<L>  /  TBili  0.7  /  DBili  x   /  AST  45<H>  /  ALT  42<H>  /  AlkPhos  115  01-01          Urinalysis Basic - ( 02 Jan 2024 07:00 )    Color: x / Appearance: x / SG: x / pH: x  Gluc: 101 mg/dL / Ketone: x  / Bili: x / Urobili: x   Blood: x / Protein: x / Nitrite: x   Leuk Esterase: x / RBC: x / WBC x   Sq Epi: x / Non Sq Epi: x / Bacteria: x        Culture - Sputum (collected 01-02-24 @ 06:00)  Source: .Sputum Sputum  Gram Stain (01-02-24 @ 18:56):    Rare polymorphonuclear leukocytes per low power field    Few Squamous epithelial cells per low power field    Moderate Gram Negative Rods seen per oil power field    Rare Gram Positive Rods seen per oil power field    Few Gram positive cocci in pairs, chains and clusters seen per oil power    field      < from: US Testicles (01.01.24 @ 17:50) >  IMPRESSION:    Complex collection adjacent to the right testicle measuring 2.4 x 2.3 x   1.5 cm.    Cyst adjacent to the left testicle measuring 4.3 x 3.4 x 2.3 cm.    Left-sided hydrocele with approximate volume of 14 mL.                 89 y/o M PMHx HTN, CAD s/p CABG and stent, A-fib(on eliquis), b/l glaucoma, chronic constipation, with recent hosp course for mechanical fall at [facility] N c/o 1 week h/o dry cough, fatigue, and decrease appetite. According to chart, pt's son states, pt saw PCP few days ago for current sxs, and completed e-rxed azithromycin w/o relief. pt also reporting to have b/l scrotal edema, which he reports started during previous hospital course; pt states he noticed scrotal swelling 2 days ago, but cannot recall if initial onset of sxs resolved on its own or if scrotal swelling persisted   Pt denies fever, chills, n/v, chest pain, SOB, hx of scrotal edema prior to episode in Dec, hematuria.     PAST MEDICAL & SURGICAL HISTORY:  Afib  Hypertension  Glaucoma  Hyperlipidemia  S/P CABG (coronary artery bypass graft)      Vital Signs Last 24 Hrs  T(C): 36.6 (02 Jan 2024 21:15), Max: 36.6 (02 Jan 2024 21:15)  T(F): 97.8 (02 Jan 2024 21:15), Max: 97.8 (02 Jan 2024 21:15)  HR: 112 (02 Jan 2024 21:15) (82 - 112)  BP: 178/98 (02 Jan 2024 21:15) (132/66 - 178/98)  BP(mean): --  RR: 18 (02 Jan 2024 21:15) (16 - 18)  SpO2: 97% (02 Jan 2024 21:15) (96% - 97%)    Parameters below as of 02 Jan 2024 21:15  Patient On (Oxygen Delivery Method): room air    PHYSICAL EXAM:      Constitutional: NAD, A&O x3    Eyes: PERRLA, no conjunctivitis    Neck: no lymphadenopathy    Respiratory: +air entry, no rales, no rhonchi, no wheezes    Cardiovascular: +S1 and S2, regular rate and rhythm    Gastrointestinal: +BS, soft, non-tender, not distended    : minimally swollen scrotum Lt > RT, no redness/ecchymosis/bruises, non tender     Extremities:  no edema, no calf tenderness    Neurological: sensation intact, ROM equal B/L, CN II-XII intact    Skin: no rashes, normal turgor                            9.8    6.62  )-----------( 229      ( 02 Jan 2024 07:00 )             30.1     01-02    145  |  107  |  13  ----------------------------<  101<H>  3.0<L>   |  25  |  0.7    Ca    8.3<L>      02 Jan 2024 07:00  Mg     1.4     01-02    TPro  6.2  /  Alb  3.4<L>  /  TBili  0.7  /  DBili  x   /  AST  45<H>  /  ALT  42<H>  /  AlkPhos  115  01-01          Urinalysis Basic - ( 02 Jan 2024 07:00 )    Color: x / Appearance: x / SG: x / pH: x  Gluc: 101 mg/dL / Ketone: x  / Bili: x / Urobili: x   Blood: x / Protein: x / Nitrite: x   Leuk Esterase: x / RBC: x / WBC x   Sq Epi: x / Non Sq Epi: x / Bacteria: x        Culture - Sputum (collected 01-02-24 @ 06:00)  Source: .Sputum Sputum  Gram Stain (01-02-24 @ 18:56):    Rare polymorphonuclear leukocytes per low power field    Few Squamous epithelial cells per low power field    Moderate Gram Negative Rods seen per oil power field    Rare Gram Positive Rods seen per oil power field    Few Gram positive cocci in pairs, chains and clusters seen per oil power    field      < from: US Testicles (01.01.24 @ 17:50) >  IMPRESSION:    Complex collection adjacent to the right testicle measuring 2.4 x 2.3 x   1.5 cm.    Cyst adjacent to the left testicle measuring 4.3 x 3.4 x 2.3 cm.    Left-sided hydrocele with approximate volume of 14 mL.                 87 y/o M PMHx HTN, CAD s/p CABG and stent, A-fib(on eliquis), b/l glaucoma, chronic constipation, with recent hosp course for mechanical fall at [facility] N c/o 1 week h/o dry cough, fatigue, and decrease appetite. According to chart, pt's son states, pt saw PCP few days ago for current sxs, and completed e-rxed azithromycin w/o relief. pt also reporting to have b/l scrotal edema, which he reports started during previous hospital course; pt states he noticed scrotal swelling 2 days ago, but cannot recall if initial onset of sxs resolved on its own or if scrotal swelling persisted   Pt denies fever, chills, n/v, chest pain, SOB, hx of scrotal edema prior to episode in Dec, hematuria.     PAST MEDICAL & SURGICAL HISTORY:  Afib  Hypertension  Glaucoma  Hyperlipidemia  S/P CABG (coronary artery bypass graft)      Vital Signs Last 24 Hrs  T(C): 36.6 (02 Jan 2024 21:15), Max: 36.6 (02 Jan 2024 21:15)  T(F): 97.8 (02 Jan 2024 21:15), Max: 97.8 (02 Jan 2024 21:15)  HR: 112 (02 Jan 2024 21:15) (82 - 112)  BP: 178/98 (02 Jan 2024 21:15) (132/66 - 178/98)  BP(mean): --  RR: 18 (02 Jan 2024 21:15) (16 - 18)  SpO2: 97% (02 Jan 2024 21:15) (96% - 97%)    Parameters below as of 02 Jan 2024 21:15  Patient On (Oxygen Delivery Method): room air    PHYSICAL EXAM:      Constitutional: NAD, A&O x3    Eyes: PERRLA, no conjunctivitis    Neck: no lymphadenopathy    Respiratory: +air entry, no rales, no rhonchi, no wheezes    Cardiovascular: +S1 and S2, regular rate and rhythm    Gastrointestinal: +BS, soft, non-tender, not distended    : minimally swollen scrotum Lt > RT, no redness/ecchymosis/bruises, non tender     Extremities:  no edema, no calf tenderness    Neurological: sensation intact, ROM equal B/L, CN II-XII intact    Skin: no rashes, normal turgor                            9.8    6.62  )-----------( 229      ( 02 Jan 2024 07:00 )             30.1     01-02    145  |  107  |  13  ----------------------------<  101<H>  3.0<L>   |  25  |  0.7    Ca    8.3<L>      02 Jan 2024 07:00  Mg     1.4     01-02    TPro  6.2  /  Alb  3.4<L>  /  TBili  0.7  /  DBili  x   /  AST  45<H>  /  ALT  42<H>  /  AlkPhos  115  01-01          Urinalysis Basic - ( 02 Jan 2024 07:00 )    Color: x / Appearance: x / SG: x / pH: x  Gluc: 101 mg/dL / Ketone: x  / Bili: x / Urobili: x   Blood: x / Protein: x / Nitrite: x   Leuk Esterase: x / RBC: x / WBC x   Sq Epi: x / Non Sq Epi: x / Bacteria: x        Culture - Sputum (collected 01-02-24 @ 06:00)  Source: .Sputum Sputum  Gram Stain (01-02-24 @ 18:56):    Rare polymorphonuclear leukocytes per low power field    Few Squamous epithelial cells per low power field    Moderate Gram Negative Rods seen per oil power field    Rare Gram Positive Rods seen per oil power field    Few Gram positive cocci in pairs, chains and clusters seen per oil power    field      < from: US Testicles (01.01.24 @ 17:50) >  IMPRESSION:    Complex collection adjacent to the right testicle measuring 2.4 x 2.3 x   1.5 cm.    Cyst adjacent to the left testicle measuring 4.3 x 3.4 x 2.3 cm.    Left-sided hydrocele with approximate volume of 14 mL.

## 2024-01-04 ENCOUNTER — TRANSCRIPTION ENCOUNTER (OUTPATIENT)
Age: 89
End: 2024-01-04

## 2024-01-04 LAB
ANION GAP SERPL CALC-SCNC: 10 MMOL/L — SIGNIFICANT CHANGE UP (ref 7–14)
ANION GAP SERPL CALC-SCNC: 10 MMOL/L — SIGNIFICANT CHANGE UP (ref 7–14)
BUN SERPL-MCNC: 15 MG/DL — SIGNIFICANT CHANGE UP (ref 10–20)
BUN SERPL-MCNC: 15 MG/DL — SIGNIFICANT CHANGE UP (ref 10–20)
CALCIUM SERPL-MCNC: 8.6 MG/DL — SIGNIFICANT CHANGE UP (ref 8.4–10.5)
CALCIUM SERPL-MCNC: 8.6 MG/DL — SIGNIFICANT CHANGE UP (ref 8.4–10.5)
CHLORIDE SERPL-SCNC: 108 MMOL/L — SIGNIFICANT CHANGE UP (ref 98–110)
CHLORIDE SERPL-SCNC: 108 MMOL/L — SIGNIFICANT CHANGE UP (ref 98–110)
CO2 SERPL-SCNC: 26 MMOL/L — SIGNIFICANT CHANGE UP (ref 17–32)
CO2 SERPL-SCNC: 26 MMOL/L — SIGNIFICANT CHANGE UP (ref 17–32)
CREAT SERPL-MCNC: 0.7 MG/DL — SIGNIFICANT CHANGE UP (ref 0.7–1.5)
CREAT SERPL-MCNC: 0.7 MG/DL — SIGNIFICANT CHANGE UP (ref 0.7–1.5)
CULTURE RESULTS: ABNORMAL
CULTURE RESULTS: ABNORMAL
EGFR: 89 ML/MIN/1.73M2 — SIGNIFICANT CHANGE UP
EGFR: 89 ML/MIN/1.73M2 — SIGNIFICANT CHANGE UP
GLUCOSE SERPL-MCNC: 102 MG/DL — HIGH (ref 70–99)
GLUCOSE SERPL-MCNC: 102 MG/DL — HIGH (ref 70–99)
POTASSIUM SERPL-MCNC: 4.4 MMOL/L — SIGNIFICANT CHANGE UP (ref 3.5–5)
POTASSIUM SERPL-MCNC: 4.4 MMOL/L — SIGNIFICANT CHANGE UP (ref 3.5–5)
POTASSIUM SERPL-SCNC: 4.4 MMOL/L — SIGNIFICANT CHANGE UP (ref 3.5–5)
POTASSIUM SERPL-SCNC: 4.4 MMOL/L — SIGNIFICANT CHANGE UP (ref 3.5–5)
SARS-COV-2 RNA SPEC QL NAA+PROBE: SIGNIFICANT CHANGE UP
SARS-COV-2 RNA SPEC QL NAA+PROBE: SIGNIFICANT CHANGE UP
SODIUM SERPL-SCNC: 144 MMOL/L — SIGNIFICANT CHANGE UP (ref 135–146)
SODIUM SERPL-SCNC: 144 MMOL/L — SIGNIFICANT CHANGE UP (ref 135–146)
SPECIMEN SOURCE: SIGNIFICANT CHANGE UP
SPECIMEN SOURCE: SIGNIFICANT CHANGE UP

## 2024-01-04 PROCEDURE — 99239 HOSP IP/OBS DSCHRG MGMT >30: CPT

## 2024-01-04 RX ORDER — CEFPODOXIME PROXETIL 100 MG
1 TABLET ORAL
Qty: 6 | Refills: 0
Start: 2024-01-04 | End: 2024-01-06

## 2024-01-04 RX ORDER — GUAIFENESIN/DEXTROMETHORPHAN 600MG-30MG
10 TABLET, EXTENDED RELEASE 12 HR ORAL
Qty: 120 | Refills: 0
Start: 2024-01-04 | End: 2024-01-06

## 2024-01-04 RX ORDER — SACCHAROMYCES BOULARDII 250 MG
1 POWDER IN PACKET (EA) ORAL
Qty: 120 | Refills: 0
Start: 2024-01-04 | End: 2024-03-03

## 2024-01-04 RX ORDER — AZITHROMYCIN 500 MG/1
1 TABLET, FILM COATED ORAL
Qty: 3 | Refills: 0
Start: 2024-01-04 | End: 2024-01-06

## 2024-01-04 RX ORDER — NYSTATIN CREAM 100000 [USP'U]/G
1 CREAM TOPICAL
Qty: 0 | Refills: 0 | DISCHARGE
Start: 2024-01-04

## 2024-01-04 RX ADMIN — ATORVASTATIN CALCIUM 80 MILLIGRAM(S): 80 TABLET, FILM COATED ORAL at 22:29

## 2024-01-04 RX ADMIN — CHLORHEXIDINE GLUCONATE 1 APPLICATION(S): 213 SOLUTION TOPICAL at 05:55

## 2024-01-04 RX ADMIN — CEFEPIME 100 MILLIGRAM(S): 1 INJECTION, POWDER, FOR SOLUTION INTRAMUSCULAR; INTRAVENOUS at 14:31

## 2024-01-04 RX ADMIN — Medication 75 MILLIGRAM(S): at 05:35

## 2024-01-04 RX ADMIN — APIXABAN 5 MILLIGRAM(S): 2.5 TABLET, FILM COATED ORAL at 17:13

## 2024-01-04 RX ADMIN — TAMSULOSIN HYDROCHLORIDE 0.4 MILLIGRAM(S): 0.4 CAPSULE ORAL at 22:30

## 2024-01-04 RX ADMIN — APIXABAN 5 MILLIGRAM(S): 2.5 TABLET, FILM COATED ORAL at 05:35

## 2024-01-04 RX ADMIN — CEFEPIME 100 MILLIGRAM(S): 1 INJECTION, POWDER, FOR SOLUTION INTRAMUSCULAR; INTRAVENOUS at 05:04

## 2024-01-04 RX ADMIN — AZITHROMYCIN 255 MILLIGRAM(S): 500 TABLET, FILM COATED ORAL at 05:02

## 2024-01-04 RX ADMIN — CEFEPIME 100 MILLIGRAM(S): 1 INJECTION, POWDER, FOR SOLUTION INTRAMUSCULAR; INTRAVENOUS at 22:28

## 2024-01-04 RX ADMIN — Medication 81 MILLIGRAM(S): at 11:34

## 2024-01-04 RX ADMIN — PANTOPRAZOLE SODIUM 40 MILLIGRAM(S): 20 TABLET, DELAYED RELEASE ORAL at 06:12

## 2024-01-04 NOTE — DISCHARGE NOTE PROVIDER - PROVIDER TOKENS
PROVIDER:[TOKEN:[[id number]]],PROVIDER:[TOKEN:[[id number]]] PROVIDER:[TOKEN:[[id number]]],PROVIDER:[TOKEN:[[id number]]]

## 2024-01-04 NOTE — DISCHARGE NOTE PROVIDER - NSDCFUADDINST_GEN_ALL_CORE_FT
Diet: soft & bite sized with thin liquids  Alternate food with liquid; maintain upright posture during/after eating for 30 mins; no straws; oral hygiene; position upright (90 degrees); small sips/bites  Dependent on  1:1 feeds  Change of breathing pattern; oral hygiene; position upright (90Y); cough; gurgly voice; fever; pneumonia; throat clearing; upper respiratory infection

## 2024-01-04 NOTE — DISCHARGE NOTE PROVIDER - ATTENDING DISCHARGE PHYSICAL EXAMINATION:
GENERAL: laying in bed, appearing comfortable and in NAD  HEENT: Dry mucous membranes  CHEST/LUNG: even respirations b/l; no accessory muscle use; (+)  wheeze and rhonchi upon auscultation of base of left lung: improved   ABDOMEN: Soft, Nontender, Nondistended  : b/l scrotal edema. no pain upon palpitation  per pt; + b/l erythema and scaling overlying groin and inguinal folds appearing consistent w/ scrotal dermatitis   EXTREMITIES:   No calf TTP b/l  SKIN: warm

## 2024-01-04 NOTE — DISCHARGE NOTE PROVIDER - HOSPITAL COURSE
# PneumoniaCXr shows bilateral opacities with cough  complete antibiotics course and follow-with PCP     # L sided hydrocele with cyst  - US results - Complex collection adjacent to the right testicle measuring 2.4 x 2.3 x 1.5 cm.  Cyst adjacent to the left testicle measuring 4.3 x 3.4 x 2.3 cm.  Left-sided hydrocele with approximate volume of 14 mL.  -Urology recs appreciated     87 y/o M PMHx HTN, CAD s/p CABG and stent, A-fib( on Eliquis b/l glaucoma , chronic constipation, with recent hosp course for mechanical fall at [facility] N c/o 1 week h/o dry cough, fatigue, and decrease appetite. Per pt's son at bedside, pt saw PCP few days ago for current sxs, and completed e-rxed azithromycin w/o relief. pt also reporting to have b/l scrotal edema, which he reports started during previous hospital course; pt states he noticed scrotal swelling 2 days ago, but cannot recall if initial onset of sxs resolved on its own or if scrotal swelling persisted     Suspected Aspiration PNA  Family endorsed Choking episode at home with becoming cyanotic   -Nurse also witnessing another episode on 1/6  -CXR shows bilateral opacities , sputum cx noted normal sputum ethan  -Urine Legionella/strep (-) completed  7day course of cefepime  -S/S evaluation: rec barium swallow  -Barium showed mild-moderate oropharyngeal dysphagia characterized generalized weakness resulting in moderate pharyngeal residue after initial swallow and aspiration of thins via straw sips.  -Continue with recommended diet, S/S spoke with family concerning feeding as well    Chronic Atrial Fibrillation   -BB increased to 150mg ER 1/10. VS wnl  -TSH WNL  -c/w  Eliquis    L sided hydrocele with cyst  - US results - Complex collection adjacent to the right testicle measuring 2.4 x 2.3 x 1.5 cm.  Cyst adjacent to the left testicle measuring 4.3 x 3.4 x 2.3 cm.  Left-sided hydrocele with approximate volume of 14 mL.  -Urology recs appreciated: o/p F/up      Pt seen and examined today. Spoke to son regarding plan. Pt is stable for d/c to SNF today with op f/u with pmd and urology.      89 y/o M PMHx HTN, CAD s/p CABG and stent, A-fib( on Eliquis b/l glaucoma , chronic constipation, with recent hosp course for mechanical fall at [facility] N c/o 1 week h/o dry cough, fatigue, and decrease appetite. Per pt's son at bedside, pt saw PCP few days ago for current sxs, and completed e-rxed azithromycin w/o relief. pt also reporting to have b/l scrotal edema, which he reports started during previous hospital course; pt states he noticed scrotal swelling 2 days ago, but cannot recall if initial onset of sxs resolved on its own or if scrotal swelling persisted     Suspected Aspiration PNA  Family endorsed Choking episode at home with becoming cyanotic   -Nurse also witnessing another episode on 1/6  -CXR shows bilateral opacities , sputum cx noted normal sputum ethan  -Urine Legionella/strep (-) completed  7day course of cefepime  -S/S evaluation: rec barium swallow  -Barium showed mild-moderate oropharyngeal dysphagia characterized generalized weakness resulting in moderate pharyngeal residue after initial swallow and aspiration of thins via straw sips.  -Continue with recommended diet, S/S spoke with family concerning feeding as well    Chronic Atrial Fibrillation   -BB increased to 150mg ER 1/10. VS wnl  -TSH WNL  -c/w  Eliquis    L sided hydrocele with cyst  - US results - Complex collection adjacent to the right testicle measuring 2.4 x 2.3 x 1.5 cm.  Cyst adjacent to the left testicle measuring 4.3 x 3.4 x 2.3 cm.  Left-sided hydrocele with approximate volume of 14 mL.  -Urology recs appreciated: o/p F/up      Pt seen and examined today. Spoke to son regarding plan. Pt is stable for d/c to SNF today with op f/u with pmd and urology.

## 2024-01-04 NOTE — PROGRESS NOTE ADULT - ASSESSMENT
87 y/o M PMHx HTN, CAD s/p CABG and stent, A-fib(on eliquis), b/l glaucoma, chronic constipation, with recent hosp course for mechanical fall at [facility] N now admitted for pneumonia. Urology consulted for swollen scrotum.    #Testicular US shows: Complex collection adjacent to the right testicle measuring 2.4 x 2.3 x 1.5 cm. Cyst adjacent to the left testicle measuring 4.3 x 3.4 x 2.3 cm. Left-sided hydrocele with approximate volume of 14 mL.    - NO ACUTE  INTERVENTION WARRANTED  -Recommends out pt follow up    #Balanitis  - improved with proper hygiene - continu  -no further intervention warranted at this time    Reconsult PRN   89 y/o M PMHx HTN, CAD s/p CABG and stent, A-fib(on eliquis), b/l glaucoma, chronic constipation, with recent hosp course for mechanical fall at [facility] N now admitted for pneumonia. Urology consulted for swollen scrotum.    #Testicular US shows: Complex collection adjacent to the right testicle measuring 2.4 x 2.3 x 1.5 cm. Cyst adjacent to the left testicle measuring 4.3 x 3.4 x 2.3 cm. Left-sided hydrocele with approximate volume of 14 mL.    - NO ACUTE  INTERVENTION WARRANTED  -Recommends out pt follow up    #Balanitis  - improved with proper hygiene - continu  -no further intervention warranted at this time    Reconsult PRN

## 2024-01-04 NOTE — CHART NOTE - NSCHARTNOTEFT_GEN_A_CORE
Upon arrival of ambulance to transfer patient to NH his vital signs were noted to be 178/100, HR of 128 and pulse ox of 92%. At this time patient is not stable to transfer to NH facility so I cancelled discharge

## 2024-01-04 NOTE — DISCHARGE NOTE PROVIDER - NSDCCPCAREPLAN_GEN_ALL_CORE_FT
PRINCIPAL DISCHARGE DIAGNOSIS  Diagnosis: Left lower lobe pneumonia  Assessment and Plan of Treatment: Complete your antibiotics, antitussives  with probiotics  and follow-up with PCP in 1-2 weeks      SECONDARY DISCHARGE DIAGNOSES  Diagnosis: Hydrocele  Assessment and Plan of Treatment: Complex collection adjacent to the right testicle measuring 2.4 x 2.3 x 1.5 cm. Cyst adjacent to the left testicle measuring 4.3 x 3.4 x 2.3 cm.  Left-sided hydrocele with approximate volume of 14 mL.3, You were evelauated by Urologist whom deem no acute intervention at this time   Follow-up with the Urologist in 1-2 weeks        PRINCIPAL DISCHARGE DIAGNOSIS  Diagnosis: Left lower lobe pneumonia  Assessment and Plan of Treatment: Likely due to aspiration. You completed your antibiotics and were seen by [facility]. Please continue diet consitency as per their recommendations (found in the diet section of this discharge summary) and follow-up with PCP in 1-2 weeks      SECONDARY DISCHARGE DIAGNOSES  Diagnosis: Hydrocele  Assessment and Plan of Treatment: Complex collection adjacent to the right testicle measuring 2.4 x 2.3 x 1.5 cm. Cyst adjacent to the left testicle measuring 4.3 x 3.4 x 2.3 cm.  Left-sided hydrocele with approximate volume of 14 mL.3, You were evelauated by Urologist whom deem no acute intervention at this time   Follow-up with the Urologist in 1-2 weeks       Diagnosis: Chronic atrial fibrillation  Assessment and Plan of Treatment: Your Metoprolol dosage was increased to 150mg once daily. Please follow up with your PMD in 1-2 weeks. Do not take this medication if your heart rate is below     PRINCIPAL DISCHARGE DIAGNOSIS  Diagnosis: Left lower lobe pneumonia  Assessment and Plan of Treatment: Likely due to aspiration. You completed your antibiotics and were seen by [facility] Sw[facility]. Please continue diet consitency as per their recommendations (found in the diet section of this discharge summary) and follow-up with PCP in 1-2 weeks      SECONDARY DISCHARGE DIAGNOSES  Diagnosis: Hydrocele  Assessment and Plan of Treatment: Complex collection adjacent to the right testicle measuring 2.4 x 2.3 x 1.5 cm. Cyst adjacent to the left testicle measuring 4.3 x 3.4 x 2.3 cm.  Left-sided hydrocele with approximate volume of 14 mL.3, You were evelauated by Urologist whom deem no acute intervention at this time   Follow-up with the Urologist in 1-2 weeks       Diagnosis: Chronic atrial fibrillation  Assessment and Plan of Treatment: Your Metoprolol dosage was increased to 150mg once daily. Please follow up with your PMD in 1-2 weeks. Do not take this medication if your heart rate is below 65 beats per minute and/or your systolic blood pressure is below 90.

## 2024-01-04 NOTE — DISCHARGE NOTE PROVIDER - CARE PROVIDERS DIRECT ADDRESSES
,DirectAddress_Unknown,[email or web address] ,DirectAddress_Unknown,[email or web address]

## 2024-01-04 NOTE — DISCHARGE NOTE NURSING/CASE MANAGEMENT/SOCIAL WORK - NSDCPEFALRISK_GEN_ALL_CORE
For information on Fall & Injury Prevention, visit: [email or web address] OR  [email or web address] OR  https://www.cdc.gov/steadi/patient.html For information on Fall & Injury Prevention, visit: [email or web address] OR  [email or web address] OR  https://www.cdc.gov/steadi/patient.html

## 2024-01-04 NOTE — DISCHARGE NOTE PROVIDER - CARE PROVIDER_API CALL
Elyse Flynn  Internal Medicine  [address] [place] [address]  Phone: [phone]  Fax: [phone])-  Follow Up Time:     Sugar Nice  Urology  [address] [place] [id number]  Phone: [phone]  Fax: [phone]  Follow Up Time:    Elyse Flynn  Internal Medicine  [address] [place] [address]  Phone: [phone]  Fax: ()-  Follow Up Time:     Sugar Nice  Urology  [address] [place] [address]  Phone: [phone]  Fax: [phone]  Follow Up Time:

## 2024-01-04 NOTE — PROGRESS NOTE ADULT - SUBJECTIVE AND OBJECTIVE BOX
S; no significant change in patient  O; Vital Signs Last 24 Hrs  T(C): 36 (04 Jan 2024 05:28), Max: 36.8 (03 Jan 2024 23:04)  T(F): 96.8 (04 Jan 2024 05:28), Max: 98.3 (03 Jan 2024 23:04)  HR: 76 (04 Jan 2024 05:28) (76 - 120)  BP: 136/89 (04 Jan 2024 05:28) (136/89 - 148/93)  BP(mean): --  RR: 18 (04 Jan 2024 05:28) (18 - 18)  SpO2: 97% (03 Jan 2024 23:04) (97% - 97%)    Parameters below as of 03 Jan 2024 23:04  Patient On (Oxygen Delivery Method): room air      EXAM:  : minimally swollen scrotum Lt > RT, no redness/ecchymosis/bruises/smegma, non tender

## 2024-01-04 NOTE — DISCHARGE NOTE PROVIDER - NSDCMRMEDTOKEN_GEN_ALL_CORE_FT
aspirin 81 mg oral capsule: 1 cap(s) orally once a day  atorvastatin 80 mg oral tablet: 1 tab(s) orally once a day (at bedtime)  benzonatate 100 mg oral capsule: 1 cap(s) orally every 8 hours Stop 1/7/23  Eliquis 5 mg oral tablet: 1 tab(s) orally 2 times a day  metoprolol succinate 50 mg oral capsule, extended release: 1.5 cap(s) orally once a day  nystatin 100,000 units/g topical ointment: 1 Apply topically to affected area once  omeprazole 20 mg oral delayed release tablet: 1 tab(s) orally once a day  tamsulosin 0.4 mg oral capsule: 1 cap(s) orally once a day (at bedtime)  Zetia 10 mg oral tablet: 1 tab(s) orally once a day   aspirin 81 mg oral capsule: 1 cap(s) orally once a day  atorvastatin 80 mg oral tablet: 1 tab(s) orally once a day (at bedtime)  benzonatate 100 mg oral capsule: 1 cap(s) orally every 8 hours Stop 1/7/23  Eliquis 5 mg oral tablet: 1 tab(s) orally 2 times a day  Metoprolol Succinate ER 50 mg oral tablet, extended release: 3 tab(s) orally once a day  nystatin 100,000 units/g topical ointment: 1 Apply topically to affected area once  omeprazole 20 mg oral delayed release tablet: 1 tab(s) orally once a day  tamsulosin 0.4 mg oral capsule: 1 cap(s) orally once a day (at bedtime)  Zetia 10 mg oral tablet: 1 tab(s) orally once a day

## 2024-01-04 NOTE — DISCHARGE NOTE NURSING/CASE MANAGEMENT/SOCIAL WORK - PATIENT PORTAL LINK FT
You can access the FollowMyHealth Patient Portal offered by [facility] by registering at the following website: [email or web address]. By joining [employer]’s FollowMyHealth portal, you will also be able to view your health information using other applications (apps) compatible with our system. You can access the FollowMyHealth Patient Portal offered by [facility] by registering at the following website: [email or web address]. By joining [employer]’s FollowMyHealth portal, you will also be able to view your health information using other applications (apps) compatible with our system.

## 2024-01-05 PROCEDURE — 99232 SBSQ HOSP IP/OBS MODERATE 35: CPT

## 2024-01-05 PROCEDURE — 93010 ELECTROCARDIOGRAM REPORT: CPT

## 2024-01-05 RX ORDER — METOPROLOL TARTRATE 50 MG
5 TABLET ORAL ONCE
Refills: 0 | Status: COMPLETED | OUTPATIENT
Start: 2024-01-05 | End: 2024-01-05

## 2024-01-05 RX ORDER — METOPROLOL TARTRATE 50 MG
25 TABLET ORAL EVERY 6 HOURS
Refills: 0 | Status: DISCONTINUED | OUTPATIENT
Start: 2024-01-05 | End: 2024-01-05

## 2024-01-05 RX ORDER — SODIUM CHLORIDE 9 MG/ML
500 INJECTION INTRAMUSCULAR; INTRAVENOUS; SUBCUTANEOUS ONCE
Refills: 0 | Status: COMPLETED | OUTPATIENT
Start: 2024-01-05 | End: 2024-01-05

## 2024-01-05 RX ORDER — METOPROLOL TARTRATE 50 MG
25 TABLET ORAL EVERY 8 HOURS
Refills: 0 | Status: DISCONTINUED | OUTPATIENT
Start: 2024-01-05 | End: 2024-01-06

## 2024-01-05 RX ADMIN — APIXABAN 5 MILLIGRAM(S): 2.5 TABLET, FILM COATED ORAL at 05:33

## 2024-01-05 RX ADMIN — AZITHROMYCIN 255 MILLIGRAM(S): 500 TABLET, FILM COATED ORAL at 02:12

## 2024-01-05 RX ADMIN — CEFEPIME 100 MILLIGRAM(S): 1 INJECTION, POWDER, FOR SOLUTION INTRAMUSCULAR; INTRAVENOUS at 21:59

## 2024-01-05 RX ADMIN — Medication 75 MILLIGRAM(S): at 05:32

## 2024-01-05 RX ADMIN — APIXABAN 5 MILLIGRAM(S): 2.5 TABLET, FILM COATED ORAL at 17:20

## 2024-01-05 RX ADMIN — CEFEPIME 100 MILLIGRAM(S): 1 INJECTION, POWDER, FOR SOLUTION INTRAMUSCULAR; INTRAVENOUS at 05:31

## 2024-01-05 RX ADMIN — PANTOPRAZOLE SODIUM 40 MILLIGRAM(S): 20 TABLET, DELAYED RELEASE ORAL at 05:31

## 2024-01-05 RX ADMIN — CHLORHEXIDINE GLUCONATE 1 APPLICATION(S): 213 SOLUTION TOPICAL at 05:33

## 2024-01-05 RX ADMIN — Medication 5 MILLIGRAM(S): at 16:58

## 2024-01-05 RX ADMIN — ATORVASTATIN CALCIUM 80 MILLIGRAM(S): 80 TABLET, FILM COATED ORAL at 21:59

## 2024-01-05 RX ADMIN — Medication 81 MILLIGRAM(S): at 11:20

## 2024-01-05 RX ADMIN — TAMSULOSIN HYDROCHLORIDE 0.4 MILLIGRAM(S): 0.4 CAPSULE ORAL at 21:59

## 2024-01-05 RX ADMIN — SODIUM CHLORIDE 1000 MILLILITER(S): 9 INJECTION INTRAMUSCULAR; INTRAVENOUS; SUBCUTANEOUS at 11:20

## 2024-01-05 RX ADMIN — CEFEPIME 100 MILLIGRAM(S): 1 INJECTION, POWDER, FOR SOLUTION INTRAMUSCULAR; INTRAVENOUS at 14:10

## 2024-01-05 NOTE — PROGRESS NOTE ADULT - ASSESSMENT
87 y/o M PMHx HTN, CAD s/p CABG and stent, A-fib( on eliquis), b/l glaucoma , chronic constipation, with recent hosp course for mechanical fall at [facility] N c/o 1 week h/o dry cough, fatigue, and decrease appetite. Per pt's son at bedside, pt saw PCP few days ago for current sxs, and completed e-rxed azithromycin w/o relief. pt also reporting to have b/l scrotal edema, which he reports started during previous hospital course; pt states he noticed scrotal swelling 2 days ago, but cannot recall if initial onset of sxs resolved on its own or if scrotal swelling persisted     Community Acquired Pneumonia   -CXR shows bilateral opacities   -sputum cx noted normal sputum ethan  -Urine Legionella/strep (-)  -c/w cefepime, Vantin on Discharge     Chronic Atrial Fibrillation   HR elevated to 160's this am   -IVF bolus given, BB ordered q8 for HR >110bpm   -c/w BB and Eliquis, F/up TSH      L sided hydrocele with cyst  - US results - Complex collection adjacent to the right testicle measuring 2.4 x 2.3 x 1.5 cm.  Cyst adjacent to the left testicle measuring 4.3 x 3.4 x 2.3 cm.  Left-sided hydrocele with approximate volume of 14 mL.  -Urology recs appreciated: o/p F/up      Generalized weakness: PT rehab     Hypertension: c/w - c/w metoprolol, will add   HLD : c/w atorvastatin in House   Glaucoma: c/w Eye drops   BPH: c/w tamsulosin   GERD: c/w  PPI    DVT PPX: c/w home Eliquis  GI PPX: c/w home PPI  Full Code   Dispo: from Home   -Anticipate D/C in AM if HR remains controlled  87 y/o M PMHx HTN, CAD s/p CABG and stent, A-fib( on eliquis), b/l glaucoma , chronic constipation, with recent hosp course for mechanical fall at [facility] N c/o 1 week h/o dry cough, fatigue, and decrease appetite. Per pt's son at bedside, pt saw PCP few days ago for current sxs, and completed e-rxed azithromycin w/o relief. pt also reporting to have b/l scrotal edema, which he reports started during previous hospital course; pt states he noticed scrotal swelling 2 days ago, but cannot recall if initial onset of sxs resolved on its own or if scrotal swelling persisted     Community Acquired Pneumonia   -CXR shows bilateral opacities   -sputum cx noted normal sputum ethan  -Urine Legionella/strep (-)  -c/w cefepime, Vantin on Discharge     Chronic Atrial Fibrillation   HR elevated to 160's this am   -IVF bolus given, BB ordered q8 for HR >110bpm   -c/w BB and Eliquis, F/up TSH      L sided hydrocele with cyst  - US results - Complex collection adjacent to the right testicle measuring 2.4 x 2.3 x 1.5 cm.  Cyst adjacent to the left testicle measuring 4.3 x 3.4 x 2.3 cm.  Left-sided hydrocele with approximate volume of 14 mL.  -Urology recs appreciated: o/p F/up      Generalized weakness: PT rehab     Hypertension: c/w - c/w metoprolol, will add   HLD : c/w atorvastatin in House   Glaucoma: c/w Eye drops   BPH: c/w tamsulosin   GERD: c/w  PPI    DVT PPX: c/w home Eliquis  GI PPX: c/w home PPI  Full Code   Dispo: from Home   -Anticipate D/C in AM if HR remains controlled

## 2024-01-05 NOTE — PROGRESS NOTE ADULT - SUBJECTIVE AND OBJECTIVE BOX
Patient is a 88y old  Male who presents with a chief complaint of PNA      MEDICATIONS  (STANDING):  apixaban 5 milliGRAM(s) Oral every 12 hours  aspirin  chewable 81 milliGRAM(s) Oral daily  atorvastatin 80 milliGRAM(s) Oral at bedtime  cefepime   IVPB 2000 milliGRAM(s) IV Intermittent every 8 hours  chlorhexidine 2% Cloths 1 Application(s) Topical <User Schedule>  metoprolol succinate ER 75 milliGRAM(s) Oral daily  metoprolol tartrate 25 milliGRAM(s) Oral every 6 hours  metoprolol tartrate Injectable 5 milliGRAM(s) IV Push once  nystatin Ointment 1 Application(s) Topical once  pantoprazole    Tablet 40 milliGRAM(s) Oral before breakfast  tamsulosin 0.4 milliGRAM(s) Oral at bedtime    MEDICATIONS  (PRN):  acetaminophen     Tablet .. 650 milliGRAM(s) Oral every 6 hours PRN Temp greater or equal to 38C (100.4F), Mild Pain (1 - 3)  benzonatate 100 milliGRAM(s) Oral every 8 hours PRN Cough  melatonin 3 milliGRAM(s) Oral at bedtime PRN Insomnia      CAPILLARY BLOOD GLUCOSE  I&O's Summary    04 Jan 2024 07:01  -  05 Jan 2024 07:00  --------------------------------------------------------  IN: 0 mL / OUT: 550 mL / NET: -550 mL      PHYSICAL EXAM:  Vital Signs Last 24 Hrs  T(C): 36.1 (05 Jan 2024 13:08), Max: 36.1 (05 Jan 2024 13:08)  T(F): 97 (05 Jan 2024 13:08), Max: 97 (05 Jan 2024 13:08)  HR: 130 (05 Jan 2024 13:08) (125 - 160)  BP: 159/98 (05 Jan 2024 13:08) (136/88 - 159/98)  BP(mean): --  RR: 18 (05 Jan 2024 13:08) (18 - 18)  SpO2: 96% (05 Jan 2024 13:08) (94% - 96%)    Parameters below as of 05 Jan 2024 13:08  Patient On (Oxygen Delivery Method): room air        GENERAL: No acute distress, well-developed  HEAD:  Atraumatic, Normocephalic  EYES: EOMI, PERRLA, conjunctiva and sclera clear  NECK: Supple, no lymphadenopathy, no JVD  CHEST/LUNG: CTAB; No wheezes, rales, or rhonchi  HEART: Regular rate and rhythm; No murmurs, rubs, or gallops  ABDOMEN: Soft, non-tender, non-distended; normal bowel sounds, no organomegaly  EXTREMITIES:  2+ peripheral pulses b/l, No clubbing, cyanosis, or edema  NEUROLOGY: A&O x 3, no focal deficits  SKIN: No rashes or lesions    LABS:    01-04    144  |  108  |  15  ----------------------------<  102<H>  4.4   |  26  |  0.7    Ca    8.6      04 Jan 2024 07:07      Urinalysis Basic - ( 04 Jan 2024 07:07 )    Color: x / Appearance: x / SG: x / pH: x  Gluc: 102 mg/dL / Ketone: x  / Bili: x / Urobili: x   Blood: x / Protein: x / Nitrite: x   Leuk Esterase: x / RBC: x / WBC x   Sq Epi: x / Non Sq Epi: x / Bacteria: x

## 2024-01-06 LAB
ALBUMIN SERPL ELPH-MCNC: 3.3 G/DL — LOW (ref 3.5–5.2)
ALBUMIN SERPL ELPH-MCNC: 3.3 G/DL — LOW (ref 3.5–5.2)
ALP SERPL-CCNC: 97 U/L — SIGNIFICANT CHANGE UP (ref 30–115)
ALP SERPL-CCNC: 97 U/L — SIGNIFICANT CHANGE UP (ref 30–115)
ALT FLD-CCNC: 20 U/L — SIGNIFICANT CHANGE UP (ref 0–41)
ALT FLD-CCNC: 20 U/L — SIGNIFICANT CHANGE UP (ref 0–41)
ANION GAP SERPL CALC-SCNC: 9 MMOL/L — SIGNIFICANT CHANGE UP (ref 7–14)
ANION GAP SERPL CALC-SCNC: 9 MMOL/L — SIGNIFICANT CHANGE UP (ref 7–14)
AST SERPL-CCNC: 24 U/L — SIGNIFICANT CHANGE UP (ref 0–41)
AST SERPL-CCNC: 24 U/L — SIGNIFICANT CHANGE UP (ref 0–41)
BASOPHILS # BLD AUTO: 0.04 K/UL — SIGNIFICANT CHANGE UP (ref 0–0.2)
BASOPHILS # BLD AUTO: 0.04 K/UL — SIGNIFICANT CHANGE UP (ref 0–0.2)
BASOPHILS NFR BLD AUTO: 0.4 % — SIGNIFICANT CHANGE UP (ref 0–1)
BASOPHILS NFR BLD AUTO: 0.4 % — SIGNIFICANT CHANGE UP (ref 0–1)
BILIRUB SERPL-MCNC: 1.5 MG/DL — HIGH (ref 0.2–1.2)
BILIRUB SERPL-MCNC: 1.5 MG/DL — HIGH (ref 0.2–1.2)
BUN SERPL-MCNC: 17 MG/DL — SIGNIFICANT CHANGE UP (ref 10–20)
BUN SERPL-MCNC: 17 MG/DL — SIGNIFICANT CHANGE UP (ref 10–20)
CALCIUM SERPL-MCNC: 9 MG/DL — SIGNIFICANT CHANGE UP (ref 8.4–10.5)
CALCIUM SERPL-MCNC: 9 MG/DL — SIGNIFICANT CHANGE UP (ref 8.4–10.5)
CHLORIDE SERPL-SCNC: 107 MMOL/L — SIGNIFICANT CHANGE UP (ref 98–110)
CHLORIDE SERPL-SCNC: 107 MMOL/L — SIGNIFICANT CHANGE UP (ref 98–110)
CO2 SERPL-SCNC: 25 MMOL/L — SIGNIFICANT CHANGE UP (ref 17–32)
CO2 SERPL-SCNC: 25 MMOL/L — SIGNIFICANT CHANGE UP (ref 17–32)
CREAT SERPL-MCNC: 0.7 MG/DL — SIGNIFICANT CHANGE UP (ref 0.7–1.5)
CREAT SERPL-MCNC: 0.7 MG/DL — SIGNIFICANT CHANGE UP (ref 0.7–1.5)
EGFR: 89 ML/MIN/1.73M2 — SIGNIFICANT CHANGE UP
EGFR: 89 ML/MIN/1.73M2 — SIGNIFICANT CHANGE UP
EOSINOPHIL # BLD AUTO: 0.13 K/UL — SIGNIFICANT CHANGE UP (ref 0–0.7)
EOSINOPHIL # BLD AUTO: 0.13 K/UL — SIGNIFICANT CHANGE UP (ref 0–0.7)
EOSINOPHIL NFR BLD AUTO: 1.3 % — SIGNIFICANT CHANGE UP (ref 0–8)
EOSINOPHIL NFR BLD AUTO: 1.3 % — SIGNIFICANT CHANGE UP (ref 0–8)
GLUCOSE SERPL-MCNC: 96 MG/DL — SIGNIFICANT CHANGE UP (ref 70–99)
GLUCOSE SERPL-MCNC: 96 MG/DL — SIGNIFICANT CHANGE UP (ref 70–99)
HCT VFR BLD CALC: 33.1 % — LOW (ref 42–52)
HCT VFR BLD CALC: 33.1 % — LOW (ref 42–52)
HGB BLD-MCNC: 11.1 G/DL — LOW (ref 14–18)
HGB BLD-MCNC: 11.1 G/DL — LOW (ref 14–18)
IMM GRANULOCYTES NFR BLD AUTO: 0.5 % — HIGH (ref 0.1–0.3)
IMM GRANULOCYTES NFR BLD AUTO: 0.5 % — HIGH (ref 0.1–0.3)
LYMPHOCYTES # BLD AUTO: 1.93 K/UL — SIGNIFICANT CHANGE UP (ref 1.2–3.4)
LYMPHOCYTES # BLD AUTO: 1.93 K/UL — SIGNIFICANT CHANGE UP (ref 1.2–3.4)
LYMPHOCYTES # BLD AUTO: 19.9 % — LOW (ref 20.5–51.1)
LYMPHOCYTES # BLD AUTO: 19.9 % — LOW (ref 20.5–51.1)
MCHC RBC-ENTMCNC: 31.5 PG — HIGH (ref 27–31)
MCHC RBC-ENTMCNC: 31.5 PG — HIGH (ref 27–31)
MCHC RBC-ENTMCNC: 33.5 G/DL — SIGNIFICANT CHANGE UP (ref 32–37)
MCHC RBC-ENTMCNC: 33.5 G/DL — SIGNIFICANT CHANGE UP (ref 32–37)
MCV RBC AUTO: 94 FL — SIGNIFICANT CHANGE UP (ref 80–94)
MCV RBC AUTO: 94 FL — SIGNIFICANT CHANGE UP (ref 80–94)
MONOCYTES # BLD AUTO: 1 K/UL — HIGH (ref 0.1–0.6)
MONOCYTES # BLD AUTO: 1 K/UL — HIGH (ref 0.1–0.6)
MONOCYTES NFR BLD AUTO: 10.3 % — HIGH (ref 1.7–9.3)
MONOCYTES NFR BLD AUTO: 10.3 % — HIGH (ref 1.7–9.3)
NEUTROPHILS # BLD AUTO: 6.53 K/UL — HIGH (ref 1.4–6.5)
NEUTROPHILS # BLD AUTO: 6.53 K/UL — HIGH (ref 1.4–6.5)
NEUTROPHILS NFR BLD AUTO: 67.6 % — SIGNIFICANT CHANGE UP (ref 42.2–75.2)
NEUTROPHILS NFR BLD AUTO: 67.6 % — SIGNIFICANT CHANGE UP (ref 42.2–75.2)
NRBC # BLD: 0 /100 WBCS — SIGNIFICANT CHANGE UP (ref 0–0)
NRBC # BLD: 0 /100 WBCS — SIGNIFICANT CHANGE UP (ref 0–0)
PLATELET # BLD AUTO: 277 K/UL — SIGNIFICANT CHANGE UP (ref 130–400)
PLATELET # BLD AUTO: 277 K/UL — SIGNIFICANT CHANGE UP (ref 130–400)
PMV BLD: 10.2 FL — SIGNIFICANT CHANGE UP (ref 7.4–10.4)
PMV BLD: 10.2 FL — SIGNIFICANT CHANGE UP (ref 7.4–10.4)
POTASSIUM SERPL-MCNC: 4.2 MMOL/L — SIGNIFICANT CHANGE UP (ref 3.5–5)
POTASSIUM SERPL-MCNC: 4.2 MMOL/L — SIGNIFICANT CHANGE UP (ref 3.5–5)
POTASSIUM SERPL-SCNC: 4.2 MMOL/L — SIGNIFICANT CHANGE UP (ref 3.5–5)
POTASSIUM SERPL-SCNC: 4.2 MMOL/L — SIGNIFICANT CHANGE UP (ref 3.5–5)
PROT SERPL-MCNC: 5.6 G/DL — LOW (ref 6–8)
PROT SERPL-MCNC: 5.6 G/DL — LOW (ref 6–8)
RBC # BLD: 3.52 M/UL — LOW (ref 4.7–6.1)
RBC # BLD: 3.52 M/UL — LOW (ref 4.7–6.1)
RBC # FLD: 15.3 % — HIGH (ref 11.5–14.5)
RBC # FLD: 15.3 % — HIGH (ref 11.5–14.5)
SODIUM SERPL-SCNC: 141 MMOL/L — SIGNIFICANT CHANGE UP (ref 135–146)
SODIUM SERPL-SCNC: 141 MMOL/L — SIGNIFICANT CHANGE UP (ref 135–146)
TSH SERPL-MCNC: 1.31 UIU/ML — SIGNIFICANT CHANGE UP (ref 0.27–4.2)
TSH SERPL-MCNC: 1.31 UIU/ML — SIGNIFICANT CHANGE UP (ref 0.27–4.2)
WBC # BLD: 9.68 K/UL — SIGNIFICANT CHANGE UP (ref 4.8–10.8)
WBC # BLD: 9.68 K/UL — SIGNIFICANT CHANGE UP (ref 4.8–10.8)
WBC # FLD AUTO: 9.68 K/UL — SIGNIFICANT CHANGE UP (ref 4.8–10.8)
WBC # FLD AUTO: 9.68 K/UL — SIGNIFICANT CHANGE UP (ref 4.8–10.8)

## 2024-01-06 PROCEDURE — 99232 SBSQ HOSP IP/OBS MODERATE 35: CPT

## 2024-01-06 RX ORDER — METOPROLOL TARTRATE 50 MG
5 TABLET ORAL ONCE
Refills: 0 | Status: COMPLETED | OUTPATIENT
Start: 2024-01-06 | End: 2024-01-06

## 2024-01-06 RX ORDER — CEFEPIME 1 G/1
2000 INJECTION, POWDER, FOR SOLUTION INTRAMUSCULAR; INTRAVENOUS EVERY 12 HOURS
Refills: 0 | Status: COMPLETED | OUTPATIENT
Start: 2024-01-06 | End: 2024-01-08

## 2024-01-06 RX ORDER — METOPROLOL TARTRATE 50 MG
50 TABLET ORAL EVERY 6 HOURS
Refills: 0 | Status: DISCONTINUED | OUTPATIENT
Start: 2024-01-06 | End: 2024-01-09

## 2024-01-06 RX ADMIN — Medication 81 MILLIGRAM(S): at 12:49

## 2024-01-06 RX ADMIN — Medication 50 MILLIGRAM(S): at 09:32

## 2024-01-06 RX ADMIN — APIXABAN 5 MILLIGRAM(S): 2.5 TABLET, FILM COATED ORAL at 17:51

## 2024-01-06 RX ADMIN — ATORVASTATIN CALCIUM 80 MILLIGRAM(S): 80 TABLET, FILM COATED ORAL at 21:20

## 2024-01-06 RX ADMIN — Medication 75 MILLIGRAM(S): at 05:11

## 2024-01-06 RX ADMIN — Medication 5 MILLIGRAM(S): at 09:56

## 2024-01-06 RX ADMIN — Medication 50 MILLIGRAM(S): at 12:49

## 2024-01-06 RX ADMIN — PANTOPRAZOLE SODIUM 40 MILLIGRAM(S): 20 TABLET, DELAYED RELEASE ORAL at 05:11

## 2024-01-06 RX ADMIN — CEFEPIME 100 MILLIGRAM(S): 1 INJECTION, POWDER, FOR SOLUTION INTRAMUSCULAR; INTRAVENOUS at 05:21

## 2024-01-06 RX ADMIN — TAMSULOSIN HYDROCHLORIDE 0.4 MILLIGRAM(S): 0.4 CAPSULE ORAL at 21:20

## 2024-01-06 RX ADMIN — CEFEPIME 100 MILLIGRAM(S): 1 INJECTION, POWDER, FOR SOLUTION INTRAMUSCULAR; INTRAVENOUS at 17:51

## 2024-01-06 RX ADMIN — APIXABAN 5 MILLIGRAM(S): 2.5 TABLET, FILM COATED ORAL at 05:10

## 2024-01-06 RX ADMIN — CHLORHEXIDINE GLUCONATE 1 APPLICATION(S): 213 SOLUTION TOPICAL at 05:09

## 2024-01-06 RX ADMIN — Medication 50 MILLIGRAM(S): at 17:51

## 2024-01-06 NOTE — PROGRESS NOTE ADULT - SUBJECTIVE AND OBJECTIVE BOX
Patient is a 88y old  Male who presents with a chief complaint of PNA       MEDICATIONS  (STANDING):  apixaban 5 milliGRAM(s) Oral every 12 hours  aspirin  chewable 81 milliGRAM(s) Oral daily  atorvastatin 80 milliGRAM(s) Oral at bedtime  chlorhexidine 2% Cloths 1 Application(s) Topical <User Schedule>  metoprolol tartrate 50 milliGRAM(s) Oral every 6 hours  nystatin Ointment 1 Application(s) Topical once  pantoprazole    Tablet 40 milliGRAM(s) Oral before breakfast  tamsulosin 0.4 milliGRAM(s) Oral at bedtime    MEDICATIONS  (PRN):  acetaminophen     Tablet .. 650 milliGRAM(s) Oral every 6 hours PRN Temp greater or equal to 38C (100.4F), Mild Pain (1 - 3)  benzonatate 100 milliGRAM(s) Oral every 8 hours PRN Cough  melatonin 3 milliGRAM(s) Oral at bedtime PRN Insomnia      CAPILLARY BLOOD GLUCOSE    I&O's Summary      PHYSICAL EXAM:  Vital Signs Last 24 Hrs  T(C): 35.7 (06 Jan 2024 05:00), Max: 36.1 (05 Jan 2024 13:08)  T(F): 96.2 (06 Jan 2024 05:00), Max: 97 (05 Jan 2024 13:08)  HR: 146 (06 Jan 2024 05:00) (121 - 146)  BP: 147/85 (06 Jan 2024 05:00) (144/96 - 159/98)  BP(mean): --  RR: 18 (06 Jan 2024 05:00) (18 - 18)  SpO2: 92% (06 Jan 2024 05:00) (92% - 96%)    Parameters below as of 06 Jan 2024 05:00  Patient On (Oxygen Delivery Method): room air      GENERAL: No acute distress, well-developed  HEAD:  Atraumatic, Normocephalic  EYES: Legally blind  conjunctiva and sclera clear  NECK: Supple, no lymphadenopathy, no JVD  CHEST/LUNG: CTAB; No wheezes, rales, or rhonchi  HEART: Irregularly irregular + Tachycardic   ABDOMEN: Soft, non-tender, non-distended; normal bowel sounds,   EXTREMITIES:  2+ peripheral pulses b/l, No clubbing, cyanosis, or edema  NEUROLOGY: A&O x 3, no focal deficits  SKIN: No rashes or lesions    LABS:                        11.1   9.68  )-----------( 277      ( 06 Jan 2024 06:51 )             33.1

## 2024-01-06 NOTE — PROGRESS NOTE ADULT - ASSESSMENT
87 y/o M PMHx HTN, CAD s/p CABG and stent, A-fib( on eliquis), b/l glaucoma , chronic constipation, with recent hosp course for mechanical fall at [facility] N c/o 1 week h/o dry cough, fatigue, and decrease appetite. Per pt's son at bedside, pt saw PCP few days ago for current sxs, and completed e-rxed azithromycin w/o relief. pt also reporting to have b/l scrotal edema, which he reports started during previous hospital course; pt states he noticed scrotal swelling 2 days ago, but cannot recall if initial onset of sxs resolved on its own or if scrotal swelling persisted     Community Acquired Pneumonia   -CXR shows bilateral opacities   -sputum cx noted normal sputum ethan  -Urine Legionella/strep (-)  -c/w cefepime, Vantin on Discharge     Chronic Atrial Fibrillation   HR still elevated, meds were not approved by pharmacy and was not communicated to physician  -Mild Improvement with IVF bolus, Start BB 50mg Q8  for HR >110bpm   -No Beds on Tele , c/w  Eliquis, F/up TSH      L sided hydrocele with cyst  - US results - Complex collection adjacent to the right testicle measuring 2.4 x 2.3 x 1.5 cm.  Cyst adjacent to the left testicle measuring 4.3 x 3.4 x 2.3 cm.  Left-sided hydrocele with approximate volume of 14 mL.  -Urology recs appreciated: o/p F/up      Generalized weakness: PT rehab     Hypertension: c/w - c/w metoprolol, will add   HLD : c/w atorvastatin in House   Glaucoma: c/w Eye drops   BPH: c/w tamsulosin   GERD: c/w  PPI    DVT PPX: c/w home Eliquis  GI PPX: c/w home PPI  Full Code   Dispo: from Home   -Anticipate D/C when HR is  controlled    89 y/o M PMHx HTN, CAD s/p CABG and stent, A-fib( on eliquis), b/l glaucoma , chronic constipation, with recent hosp course for mechanical fall at [facility] N c/o 1 week h/o dry cough, fatigue, and decrease appetite. Per pt's son at bedside, pt saw PCP few days ago for current sxs, and completed e-rxed azithromycin w/o relief. pt also reporting to have b/l scrotal edema, which he reports started during previous hospital course; pt states he noticed scrotal swelling 2 days ago, but cannot recall if initial onset of sxs resolved on its own or if scrotal swelling persisted     Community Acquired Pneumonia   -CXR shows bilateral opacities   -sputum cx noted normal sputum ethan  -Urine Legionella/strep (-)  -c/w cefepime, Vantin on Discharge     Chronic Atrial Fibrillation   HR still elevated, meds were not approved by pharmacy and was not communicated to physician  -Mild Improvement with IVF bolus, Start BB 50mg Q8  for HR >110bpm   -No Beds on Tele , c/w  Eliquis, F/up TSH      L sided hydrocele with cyst  - US results - Complex collection adjacent to the right testicle measuring 2.4 x 2.3 x 1.5 cm.  Cyst adjacent to the left testicle measuring 4.3 x 3.4 x 2.3 cm.  Left-sided hydrocele with approximate volume of 14 mL.  -Urology recs appreciated: o/p F/up      Generalized weakness: PT rehab     Hypertension: c/w - c/w metoprolol, will add   HLD : c/w atorvastatin in House   Glaucoma: c/w Eye drops   BPH: c/w tamsulosin   GERD: c/w  PPI    DVT PPX: c/w home Eliquis  GI PPX: c/w home PPI  Full Code   Dispo: from Home   -Anticipate D/C when HR is  controlled

## 2024-01-07 PROCEDURE — 99232 SBSQ HOSP IP/OBS MODERATE 35: CPT

## 2024-01-07 RX ADMIN — PANTOPRAZOLE SODIUM 40 MILLIGRAM(S): 20 TABLET, DELAYED RELEASE ORAL at 05:07

## 2024-01-07 RX ADMIN — Medication 50 MILLIGRAM(S): at 05:07

## 2024-01-07 RX ADMIN — ATORVASTATIN CALCIUM 80 MILLIGRAM(S): 80 TABLET, FILM COATED ORAL at 21:38

## 2024-01-07 RX ADMIN — APIXABAN 5 MILLIGRAM(S): 2.5 TABLET, FILM COATED ORAL at 19:03

## 2024-01-07 RX ADMIN — CEFEPIME 100 MILLIGRAM(S): 1 INJECTION, POWDER, FOR SOLUTION INTRAMUSCULAR; INTRAVENOUS at 05:08

## 2024-01-07 RX ADMIN — Medication 81 MILLIGRAM(S): at 19:04

## 2024-01-07 RX ADMIN — APIXABAN 5 MILLIGRAM(S): 2.5 TABLET, FILM COATED ORAL at 05:07

## 2024-01-07 RX ADMIN — Medication 50 MILLIGRAM(S): at 19:04

## 2024-01-07 RX ADMIN — TAMSULOSIN HYDROCHLORIDE 0.4 MILLIGRAM(S): 0.4 CAPSULE ORAL at 21:38

## 2024-01-07 RX ADMIN — CHLORHEXIDINE GLUCONATE 1 APPLICATION(S): 213 SOLUTION TOPICAL at 05:08

## 2024-01-07 RX ADMIN — CEFEPIME 100 MILLIGRAM(S): 1 INJECTION, POWDER, FOR SOLUTION INTRAMUSCULAR; INTRAVENOUS at 19:03

## 2024-01-07 NOTE — SWALLOW BEDSIDE ASSESSMENT ADULT - SLP PERTINENT HISTORY OF CURRENT PROBLEM
87 y/o M PMHx HTN, CAD s/p CABG and stent, A-fib(on eliquis), b/l glaucoma, chronic constipation, with recent hosp course for mechanical fall at [facility] N c/o 1 week h/o dry cough, fatigue, and decrease appetite. Per pt's son at bedside, pt saw PCP few days ago for current sxs, and completed e-rxed azithromycin w/o relief. pt also reporting to have b/l scrotal edema, which he reports started during previous hospital course; pt states he noticed scrotal swelling 2 days ago, but cannot recall if initial onset of sxs resolved on its own or if scrotal swelling persisted 87 y/o M PMHx HTN, CAD s/p CABG and stent, A-fib(on eliquis), b/l glaucoma, chronic constipation, with recent hosp course for mechanical fall at [facility] N c/o 1 week h/o dry cough, fatigue, and decrease appetite. Per pt's son at bedside, pt saw PCP few days ago for current sxs, and completed e-rxed azithromycin w/o relief. pt also reporting to have b/l scrotal edema, which he reports started during previous hospital course; pt states he noticed scrotal swelling 2 days ago, but cannot recall if initial onset of sxs resolved on its own or if scrotal swelling persisted

## 2024-01-07 NOTE — PROGRESS NOTE ADULT - SUBJECTIVE AND OBJECTIVE BOX
Patient is a 88y old  Male who presents with a chief complaint of PNA (06 Jan 2024 10:27)        MEDICATIONS  (STANDING):  apixaban 5 milliGRAM(s) Oral every 12 hours  aspirin  chewable 81 milliGRAM(s) Oral daily  atorvastatin 80 milliGRAM(s) Oral at bedtime  cefepime   IVPB 2000 milliGRAM(s) IV Intermittent every 12 hours  chlorhexidine 2% Cloths 1 Application(s) Topical <User Schedule>  metoprolol tartrate 50 milliGRAM(s) Oral every 6 hours  nystatin Ointment 1 Application(s) Topical once  pantoprazole    Tablet 40 milliGRAM(s) Oral before breakfast  tamsulosin 0.4 milliGRAM(s) Oral at bedtime    MEDICATIONS  (PRN):  acetaminophen     Tablet .. 650 milliGRAM(s) Oral every 6 hours PRN Temp greater or equal to 38C (100.4F), Mild Pain (1 - 3)  benzonatate 100 milliGRAM(s) Oral every 8 hours PRN Cough  melatonin 3 milliGRAM(s) Oral at bedtime PRN Insomnia      CAPILLARY BLOOD GLUCOSE    I&O's Sumary    06 Jan 2024 07:01  -  07 Jan 2024 07:00  --------------------------------------------------------  IN: 200 mL / OUT: 0 mL / NET: 200 mL        PHYSICAL EXAM:  Vital Signs Last 24 Hrs  T(C): 36.1 (07 Jan 2024 05:00), Max: 36.1 (06 Jan 2024 14:46)  T(F): 97 (07 Jan 2024 05:00), Max: 97 (07 Jan 2024 05:00)  HR: 107 (07 Jan 2024 05:00) (92 - 120)  BP: 161/99 (07 Jan 2024 05:00) (133/83 - 161/99)  BP(mean): --  RR: 18 (07 Jan 2024 05:00) (18 - 18)  SpO2: 94% (07 Jan 2024 05:00) (94% - 97%)    Parameters below as of 07 Jan 2024 05:00  Patient On (Oxygen Delivery Method): room air      GENERAL: No acute distress, well-developed  HEAD:  Atraumatic, Normocephalic  EYES: Legally blind  conjunctiva and sclera clear  NECK: Supple, no lymphadenopathy, no JVD  CHEST/LUNG: CTAB; No wheezes, rales, or rhonchi  HEART: Irregularly irregular + Tachycardic   ABDOMEN: Soft, non-tender, non-distended; normal bowel sounds,   EXTREMITIES:  2+ peripheral pulses b/l, No clubbing, cyanosis, or edema  NEUROLOGY: A&O x 3, no focal deficits  SKIN: No rashes or lesions    LABS:                        11.1   9.68  )-----------( 277      ( 06 Jan 2024 06:51 )             33.1     01-06    141  |  107  |  17  ----------------------------<  96  4.2   |  25  |  0.7    Ca    9.0      06 Jan 2024 06:51    TPro  5.6<L>  /  Alb  3.3<L>  /  TBili  1.5<H>  /  DBili  x   /  AST  24  /  ALT  20  /  AlkPhos  97  01-06          Urinalysis Basic - ( 06 Jan 2024 06:51 )    Color: x / Appearance: x / SG: x / pH: x  Gluc: 96 mg/dL / Ketone: x  / Bili: x / Urobili: x   Blood: x / Protein: x / Nitrite: x   Leuk Esterase: x / RBC: x / WBC x   Sq Epi: x / Non Sq Epi: x / Bacteria: x

## 2024-01-07 NOTE — PROGRESS NOTE ADULT - ASSESSMENT
87 y/o M PMHx HTN, CAD s/p CABG and stent, A-fib( on eliquis), b/l glaucoma , chronic constipation, with recent hosp course for mechanical fall at [facility] N c/o 1 week h/o dry cough, fatigue, and decrease appetite. Per pt's son at bedside, pt saw PCP few days ago for current sxs, and completed e-rxed azithromycin w/o relief. pt also reporting to have b/l scrotal edema, which he reports started during previous hospital course; pt states he noticed scrotal swelling 2 days ago, but cannot recall if initial onset of sxs resolved on its own or if scrotal swelling persisted     Suspeceted  Aspiration PNA  Family endorsed Choking episode at home with becoming cyanotic   -Nurse also witnessing another episode on 1/6  -CXR shows bilateral opacities , sputum cx noted normal sputum ethan  -Urine Legionella/strep (-) c/w cefepime 7day course ends today    -S/S evaluation today, may need barium swallow     Chronic Atrial Fibrillation   HR improved c/w  BB 50mg Q8  for HR >110bpm   -will get long acting dose per above   -TSH WNL, -c/w  Eliquis,     L sided hydrocele with cyst  - US results - Complex collection adjacent to the right testicle measuring 2.4 x 2.3 x 1.5 cm.  Cyst adjacent to the left testicle measuring 4.3 x 3.4 x 2.3 cm.  Left-sided hydrocele with approximate volume of 14 mL.  -Urology recs appreciated: o/p F/up      Generalized weakness: PT rehab     Hypertension: c/w - c/w metoprolol, will add   HLD : c/w atorvastatin in House   Glaucoma: c/w Eye drops   BPH: c/w tamsulosin   GERD: c/w  PPI    DVT PPX: c/w home Eliquis  GI PPX: c/w home PPI  Full Code   Dispo: from Home   -Anticipate D/C when HR is  controlled and S/P eval    87 y/o M PMHx HTN, CAD s/p CABG and stent, A-fib( on eliquis), b/l glaucoma , chronic constipation, with recent hosp course for mechanical fall at [facility] N c/o 1 week h/o dry cough, fatigue, and decrease appetite. Per pt's son at bedside, pt saw PCP few days ago for current sxs, and completed e-rxed azithromycin w/o relief. pt also reporting to have b/l scrotal edema, which he reports started during previous hospital course; pt states he noticed scrotal swelling 2 days ago, but cannot recall if initial onset of sxs resolved on its own or if scrotal swelling persisted     Suspeceted  Aspiration PNA  Family endorsed Choking episode at home with becoming cyanotic   -Nurse also witnessing another episode on 1/6  -CXR shows bilateral opacities , sputum cx noted normal sputum ethan  -Urine Legionella/strep (-) c/w cefepime 7day course ends today    -S/S evaluation today, may need barium swallow     Chronic Atrial Fibrillation   HR improved c/w  BB 50mg Q8  for HR >110bpm   -will get long acting dose per above   -TSH WNL, -c/w  Eliquis,     L sided hydrocele with cyst  - US results - Complex collection adjacent to the right testicle measuring 2.4 x 2.3 x 1.5 cm.  Cyst adjacent to the left testicle measuring 4.3 x 3.4 x 2.3 cm.  Left-sided hydrocele with approximate volume of 14 mL.  -Urology recs appreciated: o/p F/up      Generalized weakness: PT rehab     Hypertension: c/w - c/w metoprolol, will add   HLD : c/w atorvastatin in House   Glaucoma: c/w Eye drops   BPH: c/w tamsulosin   GERD: c/w  PPI    DVT PPX: c/w home Eliquis  GI PPX: c/w home PPI  Full Code   Dispo: from Home   -Anticipate D/C when HR is  controlled and S/P eval

## 2024-01-08 PROCEDURE — 74230 X-RAY XM SWLNG FUNCJ C+: CPT | Mod: 26

## 2024-01-08 PROCEDURE — 99232 SBSQ HOSP IP/OBS MODERATE 35: CPT

## 2024-01-08 RX ADMIN — Medication 50 MILLIGRAM(S): at 01:02

## 2024-01-08 RX ADMIN — Medication 50 MILLIGRAM(S): at 13:29

## 2024-01-08 RX ADMIN — APIXABAN 5 MILLIGRAM(S): 2.5 TABLET, FILM COATED ORAL at 06:21

## 2024-01-08 RX ADMIN — Medication 50 MILLIGRAM(S): at 17:29

## 2024-01-08 RX ADMIN — ATORVASTATIN CALCIUM 80 MILLIGRAM(S): 80 TABLET, FILM COATED ORAL at 21:30

## 2024-01-08 RX ADMIN — Medication 81 MILLIGRAM(S): at 13:29

## 2024-01-08 RX ADMIN — CHLORHEXIDINE GLUCONATE 1 APPLICATION(S): 213 SOLUTION TOPICAL at 06:21

## 2024-01-08 RX ADMIN — PANTOPRAZOLE SODIUM 40 MILLIGRAM(S): 20 TABLET, DELAYED RELEASE ORAL at 06:21

## 2024-01-08 RX ADMIN — CEFEPIME 100 MILLIGRAM(S): 1 INJECTION, POWDER, FOR SOLUTION INTRAMUSCULAR; INTRAVENOUS at 06:20

## 2024-01-08 RX ADMIN — APIXABAN 5 MILLIGRAM(S): 2.5 TABLET, FILM COATED ORAL at 17:29

## 2024-01-08 RX ADMIN — TAMSULOSIN HYDROCHLORIDE 0.4 MILLIGRAM(S): 0.4 CAPSULE ORAL at 21:30

## 2024-01-08 NOTE — SWALLOW VFSS/MBS ASSESSMENT ADULT - DIAGNOSTIC IMPRESSIONS
Pt presents with mild-moderate oropharyngeal dysphagia characterized generalized weakness resulting in moderate pharyngeal residue after initial swallow and aspiration of thins via straw sips.

## 2024-01-08 NOTE — SWALLOW VFSS/MBS ASSESSMENT ADULT - ROSENBEK'S PENETRATION ASPIRATION SCALE
PAS 8 for thins via straw sips. PAS 4 for small controlled sips of thins with use of multiple swallows./(8) contrast passes glottis, visible subglottic residue remains, absent patient response (aspiration)

## 2024-01-08 NOTE — SWALLOW VFSS/MBS ASSESSMENT ADULT - ORAL PHASE
+escape to floor of mouth with liquids/Reduced anterior - posterior transport/Residue in oral cavity/Uncontrolled bolus / spillover in hypopharynx

## 2024-01-08 NOTE — PROGRESS NOTE ADULT - ASSESSMENT
87 y/o M PMHx HTN, CAD s/p CABG and stent, A-fib( on eliquis), b/l glaucoma , chronic constipation, with recent hosp course for mechanical fall at [facility] N c/o 1 week h/o dry cough, fatigue, and decrease appetite. Per pt's son at bedside, pt saw PCP few days ago for current sxs, and completed e-rxed azithromycin w/o relief. pt also reporting to have b/l scrotal edema, which he reports started during previous hospital course; pt states he noticed scrotal swelling 2 days ago, but cannot recall if initial onset of sxs resolved on its own or if scrotal swelling persisted     Suspeceted  Aspiration PNA  Family endorsed Choking episode at home with becoming cyanotic   -Nurse also witnessing another episode on 1/6  -CXR shows bilateral opacities , sputum cx noted normal sputum ethan  -Urine Legionella/strep (-) c/w cefepime 7day course ends today    -S/S evaluation appreciated, agree with barium swallow for today    Chronic Atrial Fibrillation   HR improved c/w  BB 50mg Q8  for HR >110bpm   -will get long acting dose per above   -TSH WNL, -c/w  Eliquis,     L sided hydrocele with cyst  - US results - Complex collection adjacent to the right testicle measuring 2.4 x 2.3 x 1.5 cm.  Cyst adjacent to the left testicle measuring 4.3 x 3.4 x 2.3 cm.  Left-sided hydrocele with approximate volume of 14 mL.  -Urology recs appreciated: o/p F/up      Generalized weakness: PT rehab     Hypertension: c/w - c/w metoprolol, will add   HLD : c/w atorvastatin in House   Glaucoma: c/w Eye drops   BPH: c/w tamsulosin   GERD: c/w  PPI    DVT PPX: c/w home Eliquis  GI PPX: c/w home PPI  Full Code   Dispo: from Home   -Anticipate D/C in AM 87 y/o M PMHx HTN, CAD s/p CABG and stent, A-fib( on eliquis), b/l glaucoma , chronic constipation, with recent hosp course for mechanical fall at [facility] N c/o 1 week h/o dry cough, fatigue, and decrease appetite. Per pt's son at bedside, pt saw PCP few days ago for current sxs, and completed e-rxed azithromycin w/o relief. pt also reporting to have b/l scrotal edema, which he reports started during previous hospital course; pt states he noticed scrotal swelling 2 days ago, but cannot recall if initial onset of sxs resolved on its own or if scrotal swelling persisted     Suspeceted  Aspiration PNA  Family endorsed Choking episode at home with becoming cyanotic   -Nurse also witnessing another episode on 1/6  -CXR shows bilateral opacities , sputum cx noted normal sputum ethan  -Urine Legionella/strep (-) c/w cefepime 7day course ends today    -S/S evaluation appreciated, agree with barium swallow for today    Chronic Atrial Fibrillation   HR improved c/w  BB 50mg Q8  for HR >110bpm   -will get long acting dose per above   -TSH WNL, -c/w  Eliquis,     L sided hydrocele with cyst  - US results - Complex collection adjacent to the right testicle measuring 2.4 x 2.3 x 1.5 cm.  Cyst adjacent to the left testicle measuring 4.3 x 3.4 x 2.3 cm.  Left-sided hydrocele with approximate volume of 14 mL.  -Urology recs appreciated: o/p F/up      Generalized weakness: PT rehab     Hypertension: c/w - c/w metoprolol, will add   HLD : c/w atorvastatin in House   Glaucoma: c/w Eye drops   BPH: c/w tamsulosin   GERD: c/w  PPI    DVT PPX: c/w home Eliquis  GI PPX: c/w home PPI  Full Code   Dispo: from Home   -Anticipate D/C in AM

## 2024-01-08 NOTE — SWALLOW VFSS/MBS ASSESSMENT ADULT - ADDITIONAL RECOMMENDATIONS
I spoke with pt's son Leonel re: results of evaluation. Leonel endorses understanding that pt is an aspiration risk on any diet, understands recommendations w/ compensatory strategies.

## 2024-01-08 NOTE — PROGRESS NOTE ADULT - SUBJECTIVE AND OBJECTIVE BOX
Kita is a 88y old  Male who presents with a chief complaint of PNA (07 Jan 2024 08:39)        MEDICATIONS  (STANDING):  apixaban 5 milliGRAM(s) Oral every 12 hours  aspirin  chewable 81 milliGRAM(s) Oral daily  atorvastatin 80 milliGRAM(s) Oral at bedtime  chlorhexidine 2% Cloths 1 Application(s) Topical <User Schedule>  metoprolol tartrate 50 milliGRAM(s) Oral every 6 hours  nystatin Ointment 1 Application(s) Topical once  pantoprazole    Tablet 40 milliGRAM(s) Oral before breakfast  tamsulosin 0.4 milliGRAM(s) Oral at bedtime    MEDICATIONS  (PRN):  acetaminophen     Tablet .. 650 milliGRAM(s) Oral every 6 hours PRN Temp greater or equal to 38C (100.4F), Mild Pain (1 - 3)  benzonatate 100 milliGRAM(s) Oral every 8 hours PRN Cough  melatonin 3 milliGRAM(s) Oral at bedtime PRN Insomnia      CAPILLARY BLOOD GLUCOSE  I&O's Summary      PHYSICAL EXAM:  Vital Signs Last 24 Hrs  T(C): 36.2 (08 Jan 2024 05:00), Max: 36.2 (07 Jan 2024 21:27)  T(F): 97.1 (08 Jan 2024 05:00), Max: 97.2 (07 Jan 2024 21:27)  HR: 105 (08 Jan 2024 05:00) (89 - 135)  BP: 140/92 (08 Jan 2024 05:00) (140/92 - 155/93)  BP(mean): --  RR: 18 (08 Jan 2024 05:00) (18 - 18)  SpO2: 97% (08 Jan 2024 05:00) (97% - 97%)    Parameters below as of 08 Jan 2024 05:00  Patient On (Oxygen Delivery Method): room air        GENERAL: No acute distress, well-developed  HEAD:  Atraumatic, Normocephalic  EYES: Legally blind  conjunctiva and sclera clear  NECK: Supple, no lymphadenopathy, no JVD  CHEST/LUNG: CTAB; No wheezes, rales, or rhonchi  HEART: Irregularly irregular + Tachycardic   ABDOMEN: Soft, non-tender, non-distended; normal bowel sounds,   EXTREMITIES:  2+ peripheral pulses b/l, No clubbing, cyanosis, or edema  NEUROLOGY: A&O x 3, no focal deficits  SKIN: No rashes or lesions

## 2024-01-08 NOTE — SWALLOW VFSS/MBS ASSESSMENT ADULT - RECOMMENDED FEEDING/EATING TECHNIQUES
+secondary swallow/allow for swallow between intakes/alternate food with liquid/maintain upright posture during/after eating for 30 mins/no straws/oral hygiene/position upright (90 degrees)/small sips/bites

## 2024-01-08 NOTE — SWALLOW VFSS/MBS ASSESSMENT ADULT - SLP PERTINENT HISTORY OF CURRENT PROBLEM
89 y/o M PMHx HTN, CAD s/p CABG and stent, A-fib(on eliquis), b/l glaucoma, chronic constipation, with recent hosp course for mechanical fall at [facility] N c/o 1 week h/o dry cough, fatigue, and decrease appetite. Per pt's son at bedside, pt saw PCP few days ago for current sxs, and completed e-rxed azithromycin w/o relief. pt also reporting to have b/l scrotal edema, which he reports started during previous hospital course; pt states he noticed scrotal swelling 2 days ago, but cannot recall if initial onset of sxs resolved on its own or if scrotal swelling persisted 87 y/o M PMHx HTN, CAD s/p CABG and stent, A-fib(on eliquis), b/l glaucoma, chronic constipation, with recent hosp course for mechanical fall at [facility] N c/o 1 week h/o dry cough, fatigue, and decrease appetite. Per pt's son at bedside, pt saw PCP few days ago for current sxs, and completed e-rxed azithromycin w/o relief. pt also reporting to have b/l scrotal edema, which he reports started during previous hospital course; pt states he noticed scrotal swelling 2 days ago, but cannot recall if initial onset of sxs resolved on its own or if scrotal swelling persisted

## 2024-01-08 NOTE — SWALLOW VFSS/MBS ASSESSMENT ADULT - UNSUCCESSFUL STRATEGIES TRIALED DURING PROCEDURE
NEPHROLOGY CONSULT    Referring MD: No ref. provider found    Chief Complaint: ESRD    MD and Nurses notes reviewed and accepted.    HPI: Haydee Samaniego is a 63 year old female referred for management of ESRD and associated complications. Pt was admitted with abdominal pain, was found to have perforated viscus and had to go to the OR last night.    REVIEW OF SYSTEMS    Constitutional: Negative for fever, chills, diaphoresis, has activity change, appetite change, fatigue.  HENT: Negative for nosebleeds, sore throat, facial swelling, mouth sores, trouble swallowing, neck pain, neck stiffness and postnasal drip.  Eyes: Negative for pain, discharge and redness.  Respiratory: Negative for cough, chest tightness, shortness of breath and wheezing.  Cardiovascular: Negative for chest pain and palpitations.  Gastrointestinal: Negative for nausea, vomiting, abdominal pain, diarrhea, constipation and abdominal distention.  Genitourinary: Negative for dysuria, urgency, frequency, hematuria, flank pain, decreased urine volume and difficulty urinating.  Musculoskeletal: Negative for myalgias, back pain, joint swelling, arthralgias and gait problem.  Skin: Negative for rash.  Neurological: Negative for dizziness, tremors, speech difficulty, weakness, numbness and headaches.  Hematological: Does not bruise/bleed easily.  Psychiatric/Behavioral: Negative for confusion, sleep disturbance and agitation. The patient is not nervous/anxious.    Past Medical History:   Diagnosis Date   • Anxiety and depression    • Arthritis    • CAD (coronary artery disease)     chest pain, SOB   • Forgetfulness    • HTN (hypertension)    • Hypercholesterolemia    • Leg sore     sores, itching, scratches freq - instructed to contact PMD   • Peptic ulcer    • Wears glasses     reading       Social History     Socioeconomic History   • Marital status: Single     Spouse name: Not on file   • Number of children: Not on file   • Years of education: Not on  file   • Highest education level: Not on file   Occupational History   • Not on file   Tobacco Use   • Smoking status: Every Day     Packs/day: 0.50     Years: 28.00     Pack years: 14.00     Types: Cigarettes   • Smokeless tobacco: Never   Substance and Sexual Activity   • Alcohol use: Yes     Alcohol/week: 4.0 - 6.0 standard drinks     Types: 4 - 6 Cans of beer per week     Comment: ~ 1 week ago, \"shot\"   • Drug use: No   • Sexual activity: Yes   Other Topics Concern   • Not on file   Social History Narrative   • Not on file     Social Determinants of Health     Financial Resource Strain: Low Risk    • Social Determinants: Financial Resource Strain: None   Food Insecurity: No Food Insecurity   • Social Determinants: Food Insecurity: Never   Transportation Needs: Unmet Transportation Needs   • Lack of Transportation (Medical): Yes   • Lack of Transportation (Non-Medical): No   Physical Activity: Not on file   Stress: Not on file   Social Connections: Socially Integrated   • Social Determinants: Social Connections: 5 or more times a week   Intimate Partner Violence: Not At Risk   • Social Determinants: Intimate Partner Violence Past Fear: No   • Social Determinants: Intimate Partner Violence Current Fear: No       Past Surgical History:   Procedure Laterality Date   • Coronary angioplasty with stent placement     x2     2012   • Coronary artery bypass graft  01/01/2001   • Repair perf duod/saeid ulc-wnd/inj  03/30/2023       Family History   Problem Relation Age of Onset   • Heart disease Mother        ALLERGIES:   Allergen Reactions   • Misc Natural Products SWELLING     ONLY arin cheese (not allergic to any other types of cheese. Arin malik is not currently on the menu       Current Facility-Administered Medications   Medication   • sodium chloride 0.9 % flush bag 25 mL   • sodium chloride (PF) 0.9 % injection 2 mL   • sodium chloride (NORMAL SALINE) 0.9 % bolus 500 mL   • cefTRIAXone (ROCEPHIN) syringe 2,000 mg    • metroNIDAZOLE (FLAGYL) tablet 500 mg   • pantoprazole (PROTONIX INJECT) injection 40 mg   • fluconazole (DIFLUCAN) IVPB 200 mg   • sodium chloride 0.9 % flush bag 25 mL   • sodium chloride (PF) 0.9 % injection 2 mL   • ondansetron (ZOFRAN ODT) disintegrating tablet 4 mg    Or   • ondansetron (ZOFRAN) injection 4 mg   • HYDROmorphone (DILAUDID) injection 0.2 mg   • acetaminophen (TYLENOL) suppository 325 mg   • dextrose 5 % / sodium chloride 0.45% infusion   • labetalol (NORMODYNE) injection 10 mg   • sodium citrate anticoagulant 4 % flush 3 mL   • sodium chloride (NORMAL SALINE) 0.9 % bolus 100-200 mL       PHYSICAL EXAM    Constitutional: She  is awake and alert. She appears well-developed and well-nourished.  She is active and cooperative. She does not have a sickly appearance. No distress.  Visit Vitals  /76   Pulse 87   Temp 97.4 °F (36.3 °C) (Oral)   Resp (!) 32   Ht 5' 4\" (1.626 m)   Wt 42.2 kg (93 lb 0.6 oz)   LMP 06/15/2012   SpO2 98%   BMI 15.97 kg/m²     Head: Normocephalic and atraumatic.  Nose: Nose normal.  Mouth/Throat: Oropharynx is clear and moist.  Eyes: Conjunctivae and EOM are normal. Right eye exhibits no discharge, Left eye exhibits no discharge. No scleral icterus.  Neck: Normal range of motion. Neck supple. No JVD present. No tracheal tenderness present. No tracheal deviation present. No thyromegaly present.  Cardiovascular: Normal rate, regular rhythm, normal heart sounds, intact distal pulses and normal pulses. Exam reveals no friction rub.  Pulmonary/Chest: Effort normal and breath sounds normal. No accessory muscle usage. Not tachypneic. No respiratory distress. She  has no wheezes.She  has no rales. She exhibits no tenderness.  Abdominal: Soft. Bowel sounds are hypoactive. She exhibits no distension and no ascites. There is + tenderness. There is no guarding and no CVA tenderness.  Musculoskeletal: Normal range of motion. She exhibits no edema and no tenderness.  Neurological:  She is awake and alert. She displays no atrophy and no tremor. She exhibits normal muscle tone. She displays no seizure activity.  Skin: Skin is warm. No rash noted. She is not diaphoretic. No cyanosis or erythema. No pallor. Nails show no clubbing.    PROTEIN(URINE)   Date Value Ref Range Status   08/27/2016 NEGATIVE NEGATIVE mg/dL Final     PROTEIN, URINALYSIS   Date Value Ref Range Status   01/13/2023 600 (A) Negative mg/dL Final   10/03/2022 300 (A) Negative mg/dL Final     NITRITE   Date Value Ref Range Status   08/27/2016 NEGATIVE NEGATIVE Final     NITRITE, URINALYSIS   Date Value Ref Range Status   01/13/2023 Negative Negative Final   10/03/2022 Negative Negative Final     LEUKOCYTE ESTERASE   Date Value Ref Range Status   08/27/2016 NEGATIVE NEGATIVE Final     LEUKOCYTE ESTERASE, URINALYSIS   Date Value Ref Range Status   01/13/2023 Negative Negative Final   10/03/2022 Large (A) Negative Final     WBC (K/mcL)   Date Value   03/30/2023 24.0 (H)   03/29/2023 16.3 (H)   03/27/2023 8.9     RBC (mil/mcL)   Date Value   03/30/2023 3.66 (L)   03/29/2023 3.36 (L)   03/27/2023 3.66 (L)     HCT (%)   Date Value   03/30/2023 35.3 (L)   03/29/2023 31.4 (L)   03/27/2023 33.5 (L)     HGB (g/dL)   Date Value   03/30/2023 11.2 (L)   03/29/2023 10.4 (L)   03/27/2023 11.0 (L)     PLT (K/mcL)   Date Value   03/30/2023 82 (L)   03/29/2023 93 (L)   03/27/2023 58 (L)     Sodium (mmol/L)   Date Value   03/30/2023 135   03/29/2023 138   03/25/2023 134 (L)     Potassium (mmol/L)   Date Value   03/30/2023 4.1   03/29/2023 3.9   03/27/2023 3.3 (L)     Chloride (mmol/L)   Date Value   03/30/2023 101   03/29/2023 99   03/25/2023 99     Glucose (mg/dL)   Date Value   03/30/2023 140 (H)   03/29/2023 150 (H)   03/25/2023 128 (H)     Calcium (mg/dL)   Date Value   03/30/2023 7.8 (L)   03/29/2023 7.5 (L)   03/25/2023 8.2 (L)     Carbon Dioxide (mmol/L)   Date Value   03/30/2023 17 (L)   03/29/2023 16 (L)   03/25/2023 28     BUN (mg/dL)    Date Value   03/30/2023 17   03/29/2023 16   03/25/2023 6     Creatinine (mg/dL)   Date Value   03/30/2023 2.70 (H)   03/29/2023 2.71 (H)   03/29/2023 3.50 (H)       ASSESSMENT:  ESRD  HTN  Anemia  Secondary Hyperparathyroidism  DJD  Perforated Viscus     PLAN:  Seen on HD, 3 hours, 3K bath, no heparin, 1-2 kg UF.   Continue same medication.  BP is stable on current regimen.  H/H stable, will recheck.  Low PO4 diet once able to take PO.  Check BMP, Mg, PO4, CBC in am.  Surgery following.    Will follow.    Thank you very much for allowing me to participate in the care of your patient.  Please feel free to call me if any questions or concers.    Leslee Penaloza MD.  Nephrology.   unable to follow directions for use of chin tuck/chin tuck

## 2024-01-08 NOTE — SWALLOW VFSS/MBS ASSESSMENT ADULT - RECOMMENDED CONSISTENCY
Soft & bite sized/thins via small controlled single cup sips with use of secondary swallow. Alternate solids/liquids to aid in pharyngeal clearance.

## 2024-01-08 NOTE — SWALLOW BEDSIDE ASSESSMENT ADULT - SLP PERTINENT HISTORY OF CURRENT PROBLEM
89 y/o M PMHx HTN, CAD s/p CABG and stent, A-fib(on eliquis), b/l glaucoma, chronic constipation, with recent hosp course for mechanical fall at [facility] N c/o 1 week h/o dry cough, fatigue, and decrease appetite. Per pt's son at bedside, pt saw PCP few days ago for current sxs, and completed e-rxed azithromycin w/o relief. pt also reporting to have b/l scrotal edema, which he reports started during previous hospital course; pt states he noticed scrotal swelling 2 days ago, but cannot recall if initial onset of sxs resolved on its own or if scrotal swelling persisted 89 y/o M PMHx HTN, CAD s/p CABG and stent, A-fib(on eliquis), b/l glaucoma, chronic constipation, with recent hosp course for mechanical fall at [facility] N c/o 1 week h/o dry cough, fatigue, and decrease appetite. Per pt's son at bedside, pt saw PCP few days ago for current sxs, and completed e-rxed azithromycin w/o relief. pt also reporting to have b/l scrotal edema, which he reports started during previous hospital course; pt states he noticed scrotal swelling 2 days ago, but cannot recall if initial onset of sxs resolved on its own or if scrotal swelling persisted

## 2024-01-09 LAB
SARS-COV-2 RNA SPEC QL NAA+PROBE: SIGNIFICANT CHANGE UP
SARS-COV-2 RNA SPEC QL NAA+PROBE: SIGNIFICANT CHANGE UP

## 2024-01-09 PROCEDURE — 99232 SBSQ HOSP IP/OBS MODERATE 35: CPT

## 2024-01-09 RX ORDER — METOPROLOL TARTRATE 50 MG
50 TABLET ORAL ONCE
Refills: 0 | Status: COMPLETED | OUTPATIENT
Start: 2024-01-09 | End: 2024-01-09

## 2024-01-09 RX ORDER — METOPROLOL TARTRATE 50 MG
150 TABLET ORAL DAILY
Refills: 0 | Status: DISCONTINUED | OUTPATIENT
Start: 2024-01-10 | End: 2024-01-10

## 2024-01-09 RX ADMIN — CHLORHEXIDINE GLUCONATE 1 APPLICATION(S): 213 SOLUTION TOPICAL at 05:23

## 2024-01-09 RX ADMIN — Medication 50 MILLIGRAM(S): at 17:11

## 2024-01-09 RX ADMIN — APIXABAN 5 MILLIGRAM(S): 2.5 TABLET, FILM COATED ORAL at 17:11

## 2024-01-09 RX ADMIN — ATORVASTATIN CALCIUM 80 MILLIGRAM(S): 80 TABLET, FILM COATED ORAL at 21:26

## 2024-01-09 RX ADMIN — APIXABAN 5 MILLIGRAM(S): 2.5 TABLET, FILM COATED ORAL at 05:22

## 2024-01-09 RX ADMIN — Medication 50 MILLIGRAM(S): at 12:11

## 2024-01-09 RX ADMIN — Medication 50 MILLIGRAM(S): at 05:22

## 2024-01-09 RX ADMIN — TAMSULOSIN HYDROCHLORIDE 0.4 MILLIGRAM(S): 0.4 CAPSULE ORAL at 21:26

## 2024-01-09 RX ADMIN — Medication 50 MILLIGRAM(S): at 02:47

## 2024-01-09 RX ADMIN — Medication 81 MILLIGRAM(S): at 12:11

## 2024-01-09 RX ADMIN — PANTOPRAZOLE SODIUM 40 MILLIGRAM(S): 20 TABLET, DELAYED RELEASE ORAL at 05:22

## 2024-01-09 NOTE — PROGRESS NOTE ADULT - ASSESSMENT
89 y/o M PMHx HTN, CAD s/p CABG and stent, A-fib( on Eliquis b/l glaucoma , chronic constipation, with recent hosp course for mechanical fall at [facility] N c/o 1 week h/o dry cough, fatigue, and decrease appetite. Per pt's son at bedside, pt saw PCP few days ago for current sxs, and completed e-rxed azithromycin w/o relief. pt also reporting to have b/l scrotal edema, which he reports started during previous hospital course; pt states he noticed scrotal swelling 2 days ago, but cannot recall if initial onset of sxs resolved on its own or if scrotal swelling persisted     Suspeceted Aspiration PNA  Family endorsed Choking episode at home with becoming cyanotic   -Nurse also witnessing another episode on 1/6  -CXR shows bilateral opacities , sputum cx noted normal sputum ethan  -Urine Legionella/strep (-) completed  7day course of cefepime  -S/S evaluation appreciated, agree with barium swallow  -Continue with recommended diet, S/S spoke with family concerning feeding as well    Chronic Atrial Fibrillation   HR now improving: c/w  BB 50mg Q6  for HR >110bpm  -Will switch to 150mg ER starting tomorrow   ***Patient was well controlled on 75mg*****   Advisable to Monitor for 24hrs on new dose before discharging   -TSH WNL, -c/w  Eliquis,    L sided hydrocele with cyst  - US results - Complex collection adjacent to the right testicle measuring 2.4 x 2.3 x 1.5 cm.  Cyst adjacent to the left testicle measuring 4.3 x 3.4 x 2.3 cm.  Left-sided hydrocele with approximate volume of 14 mL.  -Urology recs appreciated: o/p F/up      Generalized weakness: PT rehab     Hypertension: c/w metoprolol,   HLD : c/w atorvastatin in House   Glaucoma: c/w Eye drops   BPH: c/w tamsulosin   GERD: c/w  PPI    DVT PPX: c/w home Eliquis  GI PPX: c/w home PPI  Full Code   Dispo: from Home   -Anticipate D/C in 24-48hrs  No labs needed   89 y/o M PMHx HTN, CAD s/p CABG and stent, A-fib( on Eliquis b/l glaucoma , chronic constipation, with recent hosp course for mechanical fall at [facility] N c/o 1 week h/o dry cough, fatigue, and decrease appetite. Per pt's son at bedside, pt saw PCP few days ago for current sxs, and completed e-rxed azithromycin w/o relief. pt also reporting to have b/l scrotal edema, which he reports started during previous hospital course; pt states he noticed scrotal swelling 2 days ago, but cannot recall if initial onset of sxs resolved on its own or if scrotal swelling persisted     Suspeceted Aspiration PNA  Family endorsed Choking episode at home with becoming cyanotic   -Nurse also witnessing another episode on 1/6  -CXR shows bilateral opacities , sputum cx noted normal sputum ethan  -Urine Legionella/strep (-) completed  7day course of cefepime  -S/S evaluation appreciated, agree with barium swallow  -Continue with recommended diet, S/S spoke with family concerning feeding as well    Chronic Atrial Fibrillation   HR now improving: c/w  BB 50mg Q6  for HR >110bpm  -Will switch to 150mg ER starting tomorrow   ***Patient was well controlled on 75mg*****   Advisable to Monitor for 24hrs on new dose before discharging   -TSH WNL, -c/w  Eliquis,    L sided hydrocele with cyst  - US results - Complex collection adjacent to the right testicle measuring 2.4 x 2.3 x 1.5 cm.  Cyst adjacent to the left testicle measuring 4.3 x 3.4 x 2.3 cm.  Left-sided hydrocele with approximate volume of 14 mL.  -Urology recs appreciated: o/p F/up      Generalized weakness: PT rehab     Hypertension: c/w metoprolol,   HLD : c/w atorvastatin in House   Glaucoma: c/w Eye drops   BPH: c/w tamsulosin   GERD: c/w  PPI    DVT PPX: c/w home Eliquis  GI PPX: c/w home PPI  Full Code   Dispo: from Home   -Anticipate D/C in 24-48hrs  No labs needed

## 2024-01-09 NOTE — SWALLOW BEDSIDE ASSESSMENT ADULT - NS ASR SWALLOW FINDINGS DISCUS
RN: Amara MELENDEZ messaged via teams/Physician/Nursing/Patient
RN: Yanelis/Physician/Nursing/Patient
RN: Peyton/Physician/Nursing/Patient

## 2024-01-09 NOTE — SWALLOW BEDSIDE ASSESSMENT ADULT - SWALLOW EVAL: RECOMMENDED FEEDING/EATING TECHNIQUES
alternate food with liquid/check mouth frequently for oral residue/pocketing/crush medication (when feasible)/maintain upright posture during/after eating for 30 mins/oral hygiene/position upright (90 degrees)/small sips/bites
alternate food with liquid/check mouth frequently for oral residue/pocketing/crush medication (when feasible)/maintain upright posture during/after eating for 30 mins/oral hygiene/position upright (90 degrees)/small sips/bites
alternate food with liquid/maintain upright posture during/after eating for 30 mins/no straws/oral hygiene/position upright (90 degrees)/small sips/bites

## 2024-01-09 NOTE — SWALLOW BEDSIDE ASSESSMENT ADULT - SWALLOW EVAL: CURRENT DIET
Minced & moist with thin liquids
Minced & moist with thin liquids
Soft & bite sized with thin liquids

## 2024-01-09 NOTE — SWALLOW BEDSIDE ASSESSMENT ADULT - SWALLOW EVAL: DIAGNOSIS
Concern for pharyngeal impairment given reports of choking incidents at home. Delayed coughing with thins today
Toleration of minced & moist, soft & bite sized, with thin liquids w/o overt s/s of penetration/aspiration.
Toleration of soft & bite sized with thin liquids when following safe swallow strategies w/o overt s/s of penetration/aspiration.

## 2024-01-09 NOTE — PROGRESS NOTE ADULT - SUBJECTIVE AND OBJECTIVE BOX
Patient is a 88y old  Male who presents with a chief complaint of PNA (08 Jan 2024 13:19)        MEDICATIONS  (STANDING):  apixaban 5 milliGRAM(s) Oral every 12 hours  aspirin  chewable 81 milliGRAM(s) Oral daily  atorvastatin 80 milliGRAM(s) Oral at bedtime  chlorhexidine 2% Cloths 1 Application(s) Topical <User Schedule>  metoprolol tartrate 50 milliGRAM(s) Oral every 6 hours  nystatin Ointment 1 Application(s) Topical once  pantoprazole    Tablet 40 milliGRAM(s) Oral before breakfast  tamsulosin 0.4 milliGRAM(s) Oral at bedtime    MEDICATIONS  (PRN):  acetaminophen     Tablet .. 650 milliGRAM(s) Oral every 6 hours PRN Temp greater or equal to 38C (100.4F), Mild Pain (1 - 3)  benzonatate 100 milliGRAM(s) Oral every 8 hours PRN Cough  melatonin 3 milliGRAM(s) Oral at bedtime PRN Insomnia      CAPILLARY BLOOD GLUCOSE  I&O's Summary      PHYSICAL EXAM:  Vital Signs Last 24 Hrs  T(C): 36.9 (09 Jan 2024 14:00), Max: 36.9 (09 Jan 2024 14:00)  T(F): 98.4 (09 Jan 2024 14:00), Max: 98.4 (09 Jan 2024 14:00)  HR: 95 (09 Jan 2024 14:00) (95 - 125)  BP: 113/70 (09 Jan 2024 14:00) (111/84 - 126/62)  BP(mean): --  RR: 18 (09 Jan 2024 14:00) (18 - 18)  SpO2: 96% (09 Jan 2024 04:30) (95% - 96%)    Parameters below as of 09 Jan 2024 04:30  Patient On (Oxygen Delivery Method): room air      GENERAL: No acute distress, well-developed  HEAD:  Atraumatic, Normocephalic  EYES: Legally blind  conjunctiva and sclera clear  NECK: Supple, no lymphadenopathy, no JVD  CHEST/LUNG: CTAB; No wheezes, rales, or rhonchi  HEART: Irregularly irregular + Tachycardic   ABDOMEN: Soft, non-tender, non-distended; normal bowel sounds,   EXTREMITIES:  2+ peripheral pulses b/l, No clubbing, cyanosis, or edema  NEUROLOGY: A&O x 3, no focal deficits

## 2024-01-09 NOTE — SWALLOW BEDSIDE ASSESSMENT ADULT - SWALLOW EVAL: FUNCTIONAL LEVEL AT TIME OF EVAL
Pt. able to verbalize safe swallow strategies (alt. bite/sips, multiple swallows, small bites/sips) back to SLP. YANY Maloney reported pt. tolerated his breakfast well.

## 2024-01-09 NOTE — SWALLOW BEDSIDE ASSESSMENT ADULT - SLP PERTINENT HISTORY OF CURRENT PROBLEM
87 y/o M PMHx HTN, CAD s/p CABG and stent, A-fib(on eliquis), b/l glaucoma, chronic constipation, with recent hosp course for mechanical fall at [facility] N c/o 1 week h/o dry cough, fatigue, and decrease appetite. Per pt's son at bedside, pt saw PCP few days ago for current sxs, and completed e-rxed azithromycin w/o relief. pt also reporting to have b/l scrotal edema, which he reports started during previous hospital course; pt states he noticed scrotal swelling 2 days ago, but cannot recall if initial onset of sxs resolved on its own or if scrotal swelling persisted 87 y/o M PMHx HTN, CAD s/p CABG and stent, A-fib(on eliquis), b/l glaucoma, chronic constipation, with recent hosp course for mechanical fall at [facility] N c/o 1 week h/o dry cough, fatigue, and decrease appetite. Per pt's son at bedside, pt saw PCP few days ago for current sxs, and completed e-rxed azithromycin w/o relief. pt also reporting to have b/l scrotal edema, which he reports started during previous hospital course; pt states he noticed scrotal swelling 2 days ago, but cannot recall if initial onset of sxs resolved on its own or if scrotal swelling persisted

## 2024-01-09 NOTE — SWALLOW BEDSIDE ASSESSMENT ADULT - SLP GENERAL OBSERVATIONS
Pt. received in bed awake and alert.  Hearing and visual impairments
Pt. received in bed awake, on RA. Oriented to person, place, time, and event.
Pt. received in bed awake but lethargic. Oriented to person, place, time, and event.

## 2024-01-09 NOTE — SWALLOW BEDSIDE ASSESSMENT ADULT - SWALLOW EVAL: RECOMMENDED DIET
Soft & bite sized with thin liquids only when awake/alert
Soft & bite sized with thin liquids only when awake/alert
soft & bite sized with thin liquids

## 2024-01-10 VITALS
RESPIRATION RATE: 18 BRPM | DIASTOLIC BLOOD PRESSURE: 82 MMHG | OXYGEN SATURATION: 97 % | SYSTOLIC BLOOD PRESSURE: 140 MMHG | HEART RATE: 97 BPM | TEMPERATURE: 99 F

## 2024-01-10 PROCEDURE — 99239 HOSP IP/OBS DSCHRG MGMT >30: CPT

## 2024-01-10 RX ORDER — METOPROLOL TARTRATE 50 MG
3 TABLET ORAL
Qty: 0 | Refills: 0 | DISCHARGE
Start: 2024-01-10

## 2024-01-10 RX ORDER — METOPROLOL TARTRATE 50 MG
1.5 TABLET ORAL
Refills: 0 | DISCHARGE

## 2024-01-10 RX ADMIN — CHLORHEXIDINE GLUCONATE 1 APPLICATION(S): 213 SOLUTION TOPICAL at 05:22

## 2024-01-10 RX ADMIN — Medication 81 MILLIGRAM(S): at 10:47

## 2024-01-10 RX ADMIN — APIXABAN 5 MILLIGRAM(S): 2.5 TABLET, FILM COATED ORAL at 05:20

## 2024-01-10 RX ADMIN — PANTOPRAZOLE SODIUM 40 MILLIGRAM(S): 20 TABLET, DELAYED RELEASE ORAL at 06:26

## 2024-01-10 RX ADMIN — Medication 150 MILLIGRAM(S): at 05:23

## 2024-01-16 DIAGNOSIS — N43.3 HYDROCELE, UNSPECIFIED: ICD-10-CM

## 2024-01-16 DIAGNOSIS — I48.20 CHRONIC ATRIAL FIBRILLATION, UNSPECIFIED: ICD-10-CM

## 2024-01-16 DIAGNOSIS — I25.10 ATHEROSCLEROTIC HEART DISEASE OF NATIVE CORONARY ARTERY WITHOUT ANGINA PECTORIS: ICD-10-CM

## 2024-01-16 DIAGNOSIS — J69.0 PNEUMONITIS DUE TO INHALATION OF FOOD AND VOMIT: ICD-10-CM

## 2024-01-16 DIAGNOSIS — N40.0 BENIGN PROSTATIC HYPERPLASIA WITHOUT LOWER URINARY TRACT SYMPTOMS: ICD-10-CM

## 2024-01-16 DIAGNOSIS — H40.9 UNSPECIFIED GLAUCOMA: ICD-10-CM

## 2024-01-16 DIAGNOSIS — Z79.01 LONG TERM (CURRENT) USE OF ANTICOAGULANTS: ICD-10-CM

## 2024-01-16 DIAGNOSIS — I10 ESSENTIAL (PRIMARY) HYPERTENSION: ICD-10-CM

## 2024-01-16 DIAGNOSIS — K59.09 OTHER CONSTIPATION: ICD-10-CM

## 2024-01-16 DIAGNOSIS — B37.42 CANDIDAL BALANITIS: ICD-10-CM

## 2024-03-05 ENCOUNTER — INPATIENT (INPATIENT)
Facility: HOSPITAL | Age: 89
LOS: 3 days | Discharge: NURSING, REHAB OR LONG-TERM CARE | DRG: 689 | End: 2024-03-09
Attending: HOSPITALIST | Admitting: FAMILY MEDICINE
Payer: COMMERCIAL

## 2024-03-05 VITALS
WEIGHT: 154.98 LBS | RESPIRATION RATE: 18 BRPM | HEART RATE: 101 BPM | OXYGEN SATURATION: 98 % | DIASTOLIC BLOOD PRESSURE: 94 MMHG | TEMPERATURE: 98 F | SYSTOLIC BLOOD PRESSURE: 148 MMHG | HEIGHT: 70 IN

## 2024-03-05 DIAGNOSIS — Z95.1 PRESENCE OF AORTOCORONARY BYPASS GRAFT: Chronic | ICD-10-CM

## 2024-03-05 DIAGNOSIS — N39.0 URINARY TRACT INFECTION, SITE NOT SPECIFIED: ICD-10-CM

## 2024-03-05 LAB
ALBUMIN SERPL ELPH-MCNC: 3.5 G/DL — SIGNIFICANT CHANGE UP (ref 3.5–5.2)
ALP SERPL-CCNC: 55 U/L — SIGNIFICANT CHANGE UP (ref 30–115)
ALT FLD-CCNC: 15 U/L — SIGNIFICANT CHANGE UP (ref 0–41)
AMORPH SED URNS QL MICRO: PRESENT
ANION GAP SERPL CALC-SCNC: 12 MMOL/L — SIGNIFICANT CHANGE UP (ref 7–14)
APPEARANCE UR: ABNORMAL
AST SERPL-CCNC: 23 U/L — SIGNIFICANT CHANGE UP (ref 0–41)
BACTERIA # UR AUTO: ABNORMAL /HPF
BASE EXCESS BLDV CALC-SCNC: 1.3 MMOL/L — SIGNIFICANT CHANGE UP (ref -2–3)
BASOPHILS # BLD AUTO: 0.04 K/UL — SIGNIFICANT CHANGE UP (ref 0–0.2)
BASOPHILS NFR BLD AUTO: 0.6 % — SIGNIFICANT CHANGE UP (ref 0–1)
BILIRUB SERPL-MCNC: 0.8 MG/DL — SIGNIFICANT CHANGE UP (ref 0.2–1.2)
BILIRUB UR-MCNC: NEGATIVE — SIGNIFICANT CHANGE UP
BUN SERPL-MCNC: 13 MG/DL — SIGNIFICANT CHANGE UP (ref 10–20)
CA-I SERPL-SCNC: 1.22 MMOL/L — SIGNIFICANT CHANGE UP (ref 1.15–1.33)
CALCIUM SERPL-MCNC: 9.2 MG/DL — SIGNIFICANT CHANGE UP (ref 8.4–10.5)
CHLORIDE SERPL-SCNC: 107 MMOL/L — SIGNIFICANT CHANGE UP (ref 98–110)
CO2 SERPL-SCNC: 24 MMOL/L — SIGNIFICANT CHANGE UP (ref 17–32)
COLOR SPEC: YELLOW — SIGNIFICANT CHANGE UP
COMMENT - URINE: SIGNIFICANT CHANGE UP
CREAT SERPL-MCNC: 0.8 MG/DL — SIGNIFICANT CHANGE UP (ref 0.7–1.5)
DIFF PNL FLD: ABNORMAL
EGFR: 86 ML/MIN/1.73M2 — SIGNIFICANT CHANGE UP
EOSINOPHIL # BLD AUTO: 0.12 K/UL — SIGNIFICANT CHANGE UP (ref 0–0.7)
EOSINOPHIL NFR BLD AUTO: 1.7 % — SIGNIFICANT CHANGE UP (ref 0–8)
EPI CELLS # UR: PRESENT
GAS PNL BLDV: 139 MMOL/L — SIGNIFICANT CHANGE UP (ref 136–145)
GAS PNL BLDV: SIGNIFICANT CHANGE UP
GLUCOSE SERPL-MCNC: 102 MG/DL — HIGH (ref 70–99)
GLUCOSE UR QL: NEGATIVE MG/DL — SIGNIFICANT CHANGE UP
HCO3 BLDV-SCNC: 26 MMOL/L — SIGNIFICANT CHANGE UP (ref 22–29)
HCT VFR BLD CALC: 28.8 % — LOW (ref 42–52)
HCT VFR BLDA CALC: 30 % — LOW (ref 39–51)
HGB BLD CALC-MCNC: 10.1 G/DL — LOW (ref 12.6–17.4)
HGB BLD-MCNC: 9.2 G/DL — LOW (ref 14–18)
IMM GRANULOCYTES NFR BLD AUTO: 0.4 % — HIGH (ref 0.1–0.3)
KETONES UR-MCNC: NEGATIVE MG/DL — SIGNIFICANT CHANGE UP
LACTATE BLDV-MCNC: 1.7 MMOL/L — SIGNIFICANT CHANGE UP (ref 0.5–2)
LACTATE SERPL-SCNC: 2.3 MMOL/L — HIGH (ref 0.7–2)
LEUKOCYTE ESTERASE UR-ACNC: ABNORMAL
LYMPHOCYTES # BLD AUTO: 1.8 K/UL — SIGNIFICANT CHANGE UP (ref 1.2–3.4)
LYMPHOCYTES # BLD AUTO: 25.9 % — SIGNIFICANT CHANGE UP (ref 20.5–51.1)
MCHC RBC-ENTMCNC: 28.9 PG — SIGNIFICANT CHANGE UP (ref 27–31)
MCHC RBC-ENTMCNC: 31.9 G/DL — LOW (ref 32–37)
MCV RBC AUTO: 90.6 FL — SIGNIFICANT CHANGE UP (ref 80–94)
MONOCYTES # BLD AUTO: 0.72 K/UL — HIGH (ref 0.1–0.6)
MONOCYTES NFR BLD AUTO: 10.3 % — HIGH (ref 1.7–9.3)
NEUTROPHILS # BLD AUTO: 4.25 K/UL — SIGNIFICANT CHANGE UP (ref 1.4–6.5)
NEUTROPHILS NFR BLD AUTO: 61.1 % — SIGNIFICANT CHANGE UP (ref 42.2–75.2)
NITRITE UR-MCNC: POSITIVE
NRBC # BLD: 0 /100 WBCS — SIGNIFICANT CHANGE UP (ref 0–0)
NT-PROBNP SERPL-SCNC: 3700 PG/ML — HIGH (ref 0–300)
PCO2 BLDV: 40 MMHG — LOW (ref 42–55)
PH BLDV: 7.42 — SIGNIFICANT CHANGE UP (ref 7.32–7.43)
PH UR: 8 — SIGNIFICANT CHANGE UP (ref 5–8)
PLATELET # BLD AUTO: 215 K/UL — SIGNIFICANT CHANGE UP (ref 130–400)
PMV BLD: 10.2 FL — SIGNIFICANT CHANGE UP (ref 7.4–10.4)
PO2 BLDV: 28 MMHG — SIGNIFICANT CHANGE UP (ref 25–45)
POTASSIUM BLDV-SCNC: 3.8 MMOL/L — SIGNIFICANT CHANGE UP (ref 3.5–5.1)
POTASSIUM SERPL-MCNC: 3.9 MMOL/L — SIGNIFICANT CHANGE UP (ref 3.5–5)
POTASSIUM SERPL-SCNC: 3.9 MMOL/L — SIGNIFICANT CHANGE UP (ref 3.5–5)
PROT SERPL-MCNC: 6.3 G/DL — SIGNIFICANT CHANGE UP (ref 6–8)
PROT UR-MCNC: 30 MG/DL
RBC # BLD: 3.18 M/UL — LOW (ref 4.7–6.1)
RBC # FLD: 15.1 % — HIGH (ref 11.5–14.5)
RBC CASTS # UR COMP ASSIST: 4 /HPF — SIGNIFICANT CHANGE UP (ref 0–4)
SAO2 % BLDV: 41 % — LOW (ref 67–88)
SODIUM SERPL-SCNC: 143 MMOL/L — SIGNIFICANT CHANGE UP (ref 135–146)
SP GR SPEC: 1.02 — SIGNIFICANT CHANGE UP (ref 1–1.03)
SQUAMOUS # UR AUTO: 15 /HPF — HIGH (ref 0–5)
TROPONIN T, HIGH SENSITIVITY RESULT: 32 NG/L — HIGH (ref 6–21)
UROBILINOGEN FLD QL: 1 MG/DL — SIGNIFICANT CHANGE UP (ref 0.2–1)
WBC # BLD: 6.96 K/UL — SIGNIFICANT CHANGE UP (ref 4.8–10.8)
WBC # FLD AUTO: 6.96 K/UL — SIGNIFICANT CHANGE UP (ref 4.8–10.8)
WBC UR QL: 15 /HPF — HIGH (ref 0–5)

## 2024-03-05 PROCEDURE — 85027 COMPLETE CBC AUTOMATED: CPT

## 2024-03-05 PROCEDURE — 36415 COLL VENOUS BLD VENIPUNCTURE: CPT

## 2024-03-05 PROCEDURE — 97162 PT EVAL MOD COMPLEX 30 MIN: CPT | Mod: GP

## 2024-03-05 PROCEDURE — 93005 ELECTROCARDIOGRAM TRACING: CPT

## 2024-03-05 PROCEDURE — 82330 ASSAY OF CALCIUM: CPT

## 2024-03-05 PROCEDURE — 93306 TTE W/DOPPLER COMPLETE: CPT

## 2024-03-05 PROCEDURE — 85018 HEMOGLOBIN: CPT

## 2024-03-05 PROCEDURE — 99223 1ST HOSP IP/OBS HIGH 75: CPT

## 2024-03-05 PROCEDURE — 80053 COMPREHEN METABOLIC PANEL: CPT

## 2024-03-05 PROCEDURE — 83605 ASSAY OF LACTIC ACID: CPT

## 2024-03-05 PROCEDURE — 85014 HEMATOCRIT: CPT

## 2024-03-05 PROCEDURE — 71250 CT THORAX DX C-: CPT | Mod: 26

## 2024-03-05 PROCEDURE — 84484 ASSAY OF TROPONIN QUANT: CPT

## 2024-03-05 PROCEDURE — 71250 CT THORAX DX C-: CPT | Mod: MC

## 2024-03-05 PROCEDURE — 80048 BASIC METABOLIC PNL TOTAL CA: CPT

## 2024-03-05 PROCEDURE — 87635 SARS-COV-2 COVID-19 AMP PRB: CPT

## 2024-03-05 PROCEDURE — 82803 BLOOD GASES ANY COMBINATION: CPT

## 2024-03-05 PROCEDURE — 84295 ASSAY OF SERUM SODIUM: CPT

## 2024-03-05 PROCEDURE — 97110 THERAPEUTIC EXERCISES: CPT | Mod: GP

## 2024-03-05 PROCEDURE — 74177 CT ABD & PELVIS W/CONTRAST: CPT | Mod: 26,MC

## 2024-03-05 PROCEDURE — 97116 GAIT TRAINING THERAPY: CPT | Mod: GP

## 2024-03-05 PROCEDURE — 93010 ELECTROCARDIOGRAM REPORT: CPT

## 2024-03-05 PROCEDURE — 83880 ASSAY OF NATRIURETIC PEPTIDE: CPT

## 2024-03-05 PROCEDURE — 99285 EMERGENCY DEPT VISIT HI MDM: CPT

## 2024-03-05 PROCEDURE — 84132 ASSAY OF SERUM POTASSIUM: CPT

## 2024-03-05 RX ORDER — DUREZOL 0.5 MG/ML
1 EMULSION OPHTHALMIC
Refills: 0 | DISCHARGE

## 2024-03-05 RX ORDER — APIXABAN 2.5 MG/1
5 TABLET, FILM COATED ORAL EVERY 12 HOURS
Refills: 0 | Status: DISCONTINUED | OUTPATIENT
Start: 2024-03-05 | End: 2024-03-09

## 2024-03-05 RX ORDER — OMEPRAZOLE 10 MG/1
1 CAPSULE, DELAYED RELEASE ORAL
Refills: 0 | DISCHARGE

## 2024-03-05 RX ORDER — ATORVASTATIN CALCIUM 80 MG/1
1 TABLET, FILM COATED ORAL
Qty: 0 | Refills: 0 | DISCHARGE

## 2024-03-05 RX ORDER — VANCOMYCIN HCL 1 G
1000 VIAL (EA) INTRAVENOUS ONCE
Refills: 0 | Status: COMPLETED | OUTPATIENT
Start: 2024-03-05 | End: 2024-03-05

## 2024-03-05 RX ORDER — METOPROLOL TARTRATE 50 MG
150 TABLET ORAL DAILY
Refills: 0 | Status: DISCONTINUED | OUTPATIENT
Start: 2024-03-06 | End: 2024-03-09

## 2024-03-05 RX ORDER — CYCLOPENTOLATE HYDROCHLORIDE 10 MG/ML
1 SOLUTION/ DROPS OPHTHALMIC
Refills: 0 | DISCHARGE

## 2024-03-05 RX ORDER — ACETAMINOPHEN 500 MG
650 TABLET ORAL EVERY 6 HOURS
Refills: 0 | Status: DISCONTINUED | OUTPATIENT
Start: 2024-03-05 | End: 2024-03-09

## 2024-03-05 RX ORDER — PANTOPRAZOLE SODIUM 20 MG/1
40 TABLET, DELAYED RELEASE ORAL
Refills: 0 | Status: DISCONTINUED | OUTPATIENT
Start: 2024-03-05 | End: 2024-03-09

## 2024-03-05 RX ORDER — SODIUM CHLORIDE 9 MG/ML
1000 INJECTION INTRAMUSCULAR; INTRAVENOUS; SUBCUTANEOUS ONCE
Refills: 0 | Status: COMPLETED | OUTPATIENT
Start: 2024-03-05 | End: 2024-03-05

## 2024-03-05 RX ORDER — VANCOMYCIN HCL 1 G
750 VIAL (EA) INTRAVENOUS EVERY 12 HOURS
Refills: 0 | Status: DISCONTINUED | OUTPATIENT
Start: 2024-03-05 | End: 2024-03-06

## 2024-03-05 RX ORDER — FUROSEMIDE 40 MG
20 TABLET ORAL DAILY
Refills: 0 | Status: DISCONTINUED | OUTPATIENT
Start: 2024-03-06 | End: 2024-03-08

## 2024-03-05 RX ORDER — TAMSULOSIN HYDROCHLORIDE 0.4 MG/1
0.4 CAPSULE ORAL AT BEDTIME
Refills: 0 | Status: DISCONTINUED | OUTPATIENT
Start: 2024-03-05 | End: 2024-03-09

## 2024-03-05 RX ORDER — ATORVASTATIN CALCIUM 80 MG/1
80 TABLET, FILM COATED ORAL AT BEDTIME
Refills: 0 | Status: DISCONTINUED | OUTPATIENT
Start: 2024-03-05 | End: 2024-03-09

## 2024-03-05 RX ORDER — ASPIRIN/CALCIUM CARB/MAGNESIUM 324 MG
1 TABLET ORAL
Refills: 0 | DISCHARGE

## 2024-03-05 RX ORDER — ASPIRIN/CALCIUM CARB/MAGNESIUM 324 MG
81 TABLET ORAL DAILY
Refills: 0 | Status: DISCONTINUED | OUTPATIENT
Start: 2024-03-05 | End: 2024-03-09

## 2024-03-05 RX ORDER — AMLODIPINE BESYLATE 2.5 MG/1
1 TABLET ORAL
Refills: 0 | DISCHARGE

## 2024-03-05 RX ORDER — CEFTRIAXONE 500 MG/1
2000 INJECTION, POWDER, FOR SOLUTION INTRAMUSCULAR; INTRAVENOUS ONCE
Refills: 0 | Status: COMPLETED | OUTPATIENT
Start: 2024-03-05 | End: 2024-03-05

## 2024-03-05 RX ORDER — SENNA PLUS 8.6 MG/1
2 TABLET ORAL AT BEDTIME
Refills: 0 | Status: DISCONTINUED | OUTPATIENT
Start: 2024-03-05 | End: 2024-03-09

## 2024-03-05 RX ORDER — EZETIMIBE 10 MG/1
1 TABLET ORAL
Refills: 0 | DISCHARGE

## 2024-03-05 RX ORDER — ONDANSETRON 8 MG/1
4 TABLET, FILM COATED ORAL EVERY 6 HOURS
Refills: 0 | Status: DISCONTINUED | OUTPATIENT
Start: 2024-03-05 | End: 2024-03-09

## 2024-03-05 RX ADMIN — ATORVASTATIN CALCIUM 80 MILLIGRAM(S): 80 TABLET, FILM COATED ORAL at 21:37

## 2024-03-05 RX ADMIN — Medication 250 MILLIGRAM(S): at 18:39

## 2024-03-05 RX ADMIN — CEFTRIAXONE 100 MILLIGRAM(S): 500 INJECTION, POWDER, FOR SOLUTION INTRAMUSCULAR; INTRAVENOUS at 18:39

## 2024-03-05 RX ADMIN — TAMSULOSIN HYDROCHLORIDE 0.4 MILLIGRAM(S): 0.4 CAPSULE ORAL at 21:37

## 2024-03-05 RX ADMIN — SODIUM CHLORIDE 1000 MILLILITER(S): 9 INJECTION INTRAMUSCULAR; INTRAVENOUS; SUBCUTANEOUS at 14:12

## 2024-03-05 NOTE — ED PROVIDER NOTE - PROGRESS NOTE DETAILS
Dr. Janine Lowery, DO: Patient states he knew about the L1 compression fracture which he sustained from a fall within the last year Dr. Janine Lowery, DO: Patient agrees with admission Dr. Janine Lowery, DO: Awaiting CT results

## 2024-03-05 NOTE — H&P ADULT - HISTORY OF PRESENT ILLNESS
88-year-old male, former smoker with past medical history of HTN, HLD, A-fib on Eliquis, CABG, sent to the emergency department by his PMD Dr. Flynn for MRSA UTI.  Patient had a UA and culture done 1 week ago and the culture came back today for antibiotic sensitivity to vancomycin, nitrofurantoin, Bactrim.  Patient has been on Bactrim for 6 days.  Patient never had any dysuria, increased urinary frequency or hematuria, but the patient had episodes of wetting the bed.  Denies fever, chills, abdominal pain. Patient is 88-year-old male, former smoker with past medical history of HTN, HLD, A-fib on Eliquis, S/P CABG, sent to the emergency department by his PMD Dr. Flynn for MRSA UTI.  Patient had a UA and culture done 1 week ago and the culture came back today for antibiotic sensitivity to vancomycin, nitrofurantoin, Bactrim.  Patient has been on Bactrim for 6 days.  Patient never had any dysuria, increased urinary frequency or hematuria, but the patient had episodes of wetting the bed.  Denies fever, chills, abdominal pain. CP. SOB, palpitation, N/V/D, .

## 2024-03-05 NOTE — ED PROVIDER NOTE - CLINICAL SUMMARY MEDICAL DECISION MAKING FREE TEXT BOX
88 year old male sent in by PMD for UTI that is MRSA positive. Pt failed outpatient antibiotic therapy. Urine positive today. Patient slightly tachycardic. Pt admitted for further evaluation and care.

## 2024-03-05 NOTE — ED ADULT TRIAGE NOTE - CHIEF COMPLAINT QUOTE
pt sent in from 's office, pt w known WENCESLAO received call back from his  that cultures are + for MRSA

## 2024-03-05 NOTE — ED ADULT NURSE NOTE - NS ED NURSE PRESS ULCER STAGE 11
[Outside X-rays] : outside x-rays [Ankle] : ankle [Left] : left [Foot] : foot [FreeTextEntry1] : There are mild degenerative changes. There is no acute fracture or dislocation.\par There is soft tissue swelling laterally. Ankle mortise is intact. Well-corticated osseous structure adjacent to the medial malleolus, likely chronic in nature. No acute fracture. Mild calcaneal spurring. [FreeTextEntry2] : There are mild degenerative changes. There is no acute fracture or dislocation. stage II

## 2024-03-05 NOTE — H&P ADULT - ASSESSMENT
88-year-old male, former smoker with past medical history of HTN, HLD, A-fib on Eliquis, CABG, sent to the emergency department by his PMD Dr. Flynn for MRSA UTI.  Patient had a UA and culture done 1 week ago and the culture came back today for antibiotic sensitivity to vancomycin, nitrofurantoin, Bactrim.  Patient has been on Bactrim for 6 days.  Patient never had any dysuria, increased urinary frequency or hematuria, but the patient had episodes of wetting the bed.  Denies fever, chills, abdominal pain.        # MRSA UTI          # Pleural Effusion, Bilat        # A-Fib        # HTN        # HLD        # CABG 88-year-old male, former smoker with past medical history of HTN, HLD, A-fib on Eliquis, CABG, sent to the emergency department by his PMD Dr. Flynn for MRSA UTI.  Patient had a UA and culture done 1 week ago and the culture came back today for antibiotic sensitivity to vancomycin, nitrofurantoin, Bactrim.  Patient has been on Bactrim for 6 days.  Patient never had any dysuria, increased urinary frequency or hematuria, but the patient had episodes of wetting the bed.  Denies fever, chills, abdominal pain.        # MRSA UTI  - obtain Ux CX from PMD office  - ID consult  - Vanco 750mg q12        # Pleural Effusion, Bilat  - BNP  - Trop in am  - EKG in am  - Lasix 20mg IV q24  - 2D Echo       # A-Fib  - c/w home med      # HTN  - VS  - c/w home med      # HLD  - c/w home med       88-year-old male, former smoker with past medical history of HTN, HLD, A-fib on Eliquis, CABG, sent to the emergency department by his PMD Dr. Flynn for MRSA UTI.  Patient had a UA and culture done 1 week ago and the culture came back today for antibiotic sensitivity to vancomycin, nitrofurantoin, Bactrim.  Patient has been on Bactrim for 6 days.  Patient never had any dysuria, increased urinary frequency or hematuria, but the patient had episodes of wetting the bed.  Denies fever, chills, abdominal pain.        # MRSA UTI  - obtain Ux CX from PMD office  - ID consult  - Vanco 750mg q12  follow up repeat culture        # Pleural Effusion, Bilat  -Noticed on the CT scan of the abdm  -EKG shows no acute ischemic changes  - obtain BNP, Trop  - Lasix 20mg IV q24  - 2D Echo   -Serial trop     # A-Fib  - c/w home med      # HTN  - VS  - c/w home med      # HLD  - c/w home med    #Progress Note Handoff  Pending (specify):  as above   Family discussion:  plan of care was discussed with patient and family in details.  all questions were answered.  seems to understand, and in agreement  Disposition:  Home      88-year-old male, former smoker with past medical history of HTN, HLD, A-fib on Eliquis, CABG, sent to the emergency department by his PMD Dr. Flynn for MRSA UTI.  Patient had a UA and culture done 1 week ago and the culture came back today for antibiotic sensitivity to vancomycin, nitrofurantoin, Bactrim.  Patient has been on Bactrim for 6 days.  Patient never had any dysuria, increased urinary frequency or hematuria, but the patient had episodes of wetting the bed.  Denies fever, chills, abdominal pain.        # MRSA UTI  - obtain Ux CX from PMD office  - ID consult  - Vanco 750mg q12  follow up repeat culture        # Pleural Effusion, Bilat  -Noticed on the CT scan of the abdm  -EKG shows no acute ischemic changes  -Chest CT scan   - obtain BNP, Trop  - Lasix 20mg IV q24  - 2D Echo   -Serial trop     # A-Fib  - c/w home med      # HTN  - VS  - c/w home med      # HLD  - c/w home med    #Progress Note Handoff  Pending (specify):  as above   Family discussion:  plan of care was discussed with patient and family in details.  all questions were answered.  seems to understand, and in agreement  Disposition:  Home

## 2024-03-05 NOTE — ED ADULT NURSE NOTE - NSFALLRISKINTERV_ED_ALL_ED

## 2024-03-05 NOTE — ED PROVIDER NOTE - PHYSICAL EXAMINATION
GENERAL: Well-developed; well-nourished; in no acute distress.   SKIN: warm, dry  HEAD: Normocephalic; atraumatic.  EYES: PERRLA, EOMI, no conjunctival erythema  ENT: No nasal discharge; airway clear. MMM  NECK: Supple; non tender.  CARD: S1, S2 normal; no murmurs, gallops, or rubs. Regular rate and rhythm.   RESP: LCTAB; No wheezes, rales, rhonchi, or stridor.  ABD: soft, nontender, nondistended  EXT: Normal ROM.  No LE TTP or edema bilaterally.  NEURO: A/ox3, grossly unremarkable  PSYCH: Cooperative, appropriate.

## 2024-03-05 NOTE — H&P ADULT - NSHPPHYSICALEXAM_GEN_ALL_CORE
Vital Signs Last 24 Hrs  T(C): 36 (05 Mar 2024 18:50), Max: 36.5 (05 Mar 2024 11:23)  T(F): 96.8 (05 Mar 2024 18:50), Max: 97.7 (05 Mar 2024 11:23)  HR: 89 (05 Mar 2024 18:50) (89 - 101)  BP: 156/90 (05 Mar 2024 18:50) (116/69 - 156/90)  BP(mean): --  RR: 18 (05 Mar 2024 18:50) (18 - 18)  SpO2: 98% (05 Mar 2024 18:50) (98% - 98%)    Parameters below as of 05 Mar 2024 11:23  Patient On (Oxygen Delivery Method): room air        PHYSICAL EXAM-  GENERAL: NAD,    HEAD:  Atraumatic, Normocephalic  EYES: EOMI, PERRLA, conjunctiva and sclera clear  NECK: Supple, No JVD, Normal thyroid  NERVOUS SYSTEM:  Alert & Oriented X3, Moving all extremities   CHEST/LUNG: + crackles with good air entry  HEART: Regular rate and rhythm; No murmurs, rubs, or gallops  ABDOMEN: Soft, Nontender, Nondistended; Bowel sounds present  EXTREMITIES  No clubbing, cyanosis, or edema  SKIN: No rashes or lesions

## 2024-03-05 NOTE — ED PROVIDER NOTE - OBJECTIVE STATEMENT
88-year-old male, former smoker with past medical history of HTN, HLD, A-fib on Eliquis, CABG, sent to the emergency department by his PMD Dr. Flynn for MRSA UTI.  Patient had a UA and culture done 1 week ago and the culture came back today for antibiotic sensitivity to vancomycin, nitrofurantoin, Bactrim.  Patient has been on Bactrim for 6 days.  Patient never had any dysuria, increased urinary frequency or hematuria, but the patient had episodes of wetting the bed.  Denies fever, chills, abdominal pain.

## 2024-03-06 ENCOUNTER — RESULT REVIEW (OUTPATIENT)
Age: 89
End: 2024-03-06

## 2024-03-06 LAB
ALBUMIN SERPL ELPH-MCNC: 3.7 G/DL — SIGNIFICANT CHANGE UP (ref 3.5–5.2)
ALP SERPL-CCNC: 62 U/L — SIGNIFICANT CHANGE UP (ref 30–115)
ALT FLD-CCNC: 19 U/L — SIGNIFICANT CHANGE UP (ref 0–41)
ANION GAP SERPL CALC-SCNC: 15 MMOL/L — HIGH (ref 7–14)
AST SERPL-CCNC: 25 U/L — SIGNIFICANT CHANGE UP (ref 0–41)
BILIRUB SERPL-MCNC: 1.1 MG/DL — SIGNIFICANT CHANGE UP (ref 0.2–1.2)
BUN SERPL-MCNC: 12 MG/DL — SIGNIFICANT CHANGE UP (ref 10–20)
CALCIUM SERPL-MCNC: 9.1 MG/DL — SIGNIFICANT CHANGE UP (ref 8.4–10.5)
CHLORIDE SERPL-SCNC: 105 MMOL/L — SIGNIFICANT CHANGE UP (ref 98–110)
CO2 SERPL-SCNC: 21 MMOL/L — SIGNIFICANT CHANGE UP (ref 17–32)
CREAT SERPL-MCNC: 0.7 MG/DL — SIGNIFICANT CHANGE UP (ref 0.7–1.5)
EGFR: 89 ML/MIN/1.73M2 — SIGNIFICANT CHANGE UP
GLUCOSE SERPL-MCNC: 85 MG/DL — SIGNIFICANT CHANGE UP (ref 70–99)
HCT VFR BLD CALC: 30.5 % — LOW (ref 42–52)
HGB BLD-MCNC: 9.7 G/DL — LOW (ref 14–18)
MCHC RBC-ENTMCNC: 28.4 PG — SIGNIFICANT CHANGE UP (ref 27–31)
MCHC RBC-ENTMCNC: 31.8 G/DL — LOW (ref 32–37)
MCV RBC AUTO: 89.2 FL — SIGNIFICANT CHANGE UP (ref 80–94)
NRBC # BLD: 0 /100 WBCS — SIGNIFICANT CHANGE UP (ref 0–0)
NT-PROBNP SERPL-SCNC: 3940 PG/ML — HIGH (ref 0–300)
PLATELET # BLD AUTO: 199 K/UL — SIGNIFICANT CHANGE UP (ref 130–400)
PMV BLD: 9.9 FL — SIGNIFICANT CHANGE UP (ref 7.4–10.4)
POTASSIUM SERPL-MCNC: 3.3 MMOL/L — LOW (ref 3.5–5)
POTASSIUM SERPL-SCNC: 3.3 MMOL/L — LOW (ref 3.5–5)
PROT SERPL-MCNC: 6.3 G/DL — SIGNIFICANT CHANGE UP (ref 6–8)
RBC # BLD: 3.42 M/UL — LOW (ref 4.7–6.1)
RBC # FLD: 15.4 % — HIGH (ref 11.5–14.5)
SODIUM SERPL-SCNC: 141 MMOL/L — SIGNIFICANT CHANGE UP (ref 135–146)
TROPONIN SAMPLING TIME: SIGNIFICANT CHANGE UP
TROPONIN T, HIGH SENSITIVITY RESULT: 26 NG/L — HIGH (ref 6–21)
TROPONIN T, HIGH SENSITIVITY RESULT: 28 NG/L — HIGH (ref 6–21)
TROPONIN T, HIGH SENSITIVITY RESULT: 29 NG/L — HIGH (ref 6–21)
WBC # BLD: 8.04 K/UL — SIGNIFICANT CHANGE UP (ref 4.8–10.8)
WBC # FLD AUTO: 8.04 K/UL — SIGNIFICANT CHANGE UP (ref 4.8–10.8)

## 2024-03-06 PROCEDURE — 99232 SBSQ HOSP IP/OBS MODERATE 35: CPT

## 2024-03-06 PROCEDURE — 93306 TTE W/DOPPLER COMPLETE: CPT | Mod: 26

## 2024-03-06 RX ORDER — SENNA PLUS 8.6 MG/1
1 TABLET ORAL AT BEDTIME
Refills: 0 | Status: DISCONTINUED | OUTPATIENT
Start: 2024-03-06 | End: 2024-03-07

## 2024-03-06 RX ORDER — POLYETHYLENE GLYCOL 3350 17 G/17G
17 POWDER, FOR SOLUTION ORAL
Refills: 0 | Status: DISCONTINUED | OUTPATIENT
Start: 2024-03-06 | End: 2024-03-07

## 2024-03-06 RX ORDER — POTASSIUM CHLORIDE 20 MEQ
20 PACKET (EA) ORAL
Refills: 0 | Status: COMPLETED | OUTPATIENT
Start: 2024-03-06 | End: 2024-03-06

## 2024-03-06 RX ADMIN — Medication 20 MILLIGRAM(S): at 05:59

## 2024-03-06 RX ADMIN — Medication 20 MILLIEQUIVALENT(S): at 15:24

## 2024-03-06 RX ADMIN — APIXABAN 5 MILLIGRAM(S): 2.5 TABLET, FILM COATED ORAL at 17:13

## 2024-03-06 RX ADMIN — SENNA PLUS 1 TABLET(S): 8.6 TABLET ORAL at 21:53

## 2024-03-06 RX ADMIN — Medication 250 MILLIGRAM(S): at 06:42

## 2024-03-06 RX ADMIN — POLYETHYLENE GLYCOL 3350 17 GRAM(S): 17 POWDER, FOR SOLUTION ORAL at 17:13

## 2024-03-06 RX ADMIN — TAMSULOSIN HYDROCHLORIDE 0.4 MILLIGRAM(S): 0.4 CAPSULE ORAL at 21:53

## 2024-03-06 RX ADMIN — APIXABAN 5 MILLIGRAM(S): 2.5 TABLET, FILM COATED ORAL at 05:58

## 2024-03-06 RX ADMIN — Medication 150 MILLIGRAM(S): at 07:25

## 2024-03-06 RX ADMIN — ATORVASTATIN CALCIUM 80 MILLIGRAM(S): 80 TABLET, FILM COATED ORAL at 21:53

## 2024-03-06 RX ADMIN — Medication 1 TABLET(S): at 17:13

## 2024-03-06 RX ADMIN — PANTOPRAZOLE SODIUM 40 MILLIGRAM(S): 20 TABLET, DELAYED RELEASE ORAL at 06:12

## 2024-03-06 RX ADMIN — Medication 20 MILLIEQUIVALENT(S): at 16:21

## 2024-03-06 NOTE — PHYSICAL THERAPY INITIAL EVALUATION ADULT - GAIT DEVIATIONS NOTED, PT EVAL
stooped posture, dec heel strike/pushoff/decreased teresita/decreased step length/decreased weight-shifting ability

## 2024-03-06 NOTE — PHYSICAL THERAPY INITIAL EVALUATION ADULT - GENERAL OBSERVATIONS, REHAB EVAL
13:45-14:10 Chart reviewed. Pt encountered semireclned in bed, may be seen by Physical Therapist as confirmed with Nurse. Patient denied pain and ready to try to walk now; +tele/ heplock RUE.

## 2024-03-06 NOTE — PHYSICAL THERAPY INITIAL EVALUATION ADULT - ADDITIONAL COMMENTS
Per patient, they live in private home with no steps outside and has chairlift inside, was using a rolling walker

## 2024-03-06 NOTE — PATIENT PROFILE ADULT - HAVE YOU BEEN EATING POORLY BECAUSE OF A DECREASED APPETITE?
Anesthetic History   No history of anesthetic complications            Review of Systems / Medical History  Patient summary reviewed, nursing notes reviewed and pertinent labs reviewed    Pulmonary  Within defined limits                 Neuro/Psych   Within defined limits           Cardiovascular  Within defined limits                     GI/Hepatic/Renal     GERD      PUD     Endo/Other    Diabetes: type 2    Arthritis     Other Findings              Physical Exam    Airway  Mallampati: II  TM Distance: > 6 cm  Neck ROM: normal range of motion   Mouth opening: Normal     Cardiovascular  Regular rate and rhythm,  S1 and S2 normal,  no murmur, click, rub, or gallop             Dental  No notable dental hx       Pulmonary  Breath sounds clear to auscultation               Abdominal  GI exam deferred       Other Findings            Anesthetic Plan    ASA: 2  Anesthesia type: spinal and MAC          Induction: Intravenous  Anesthetic plan and risks discussed with: Patient
No (0)

## 2024-03-06 NOTE — CONSULT NOTE ADULT - SUBJECTIVE AND OBJECTIVE BOX
INFECTIOUS DISEASE CONSULT NOTE    Patient is a 88y old  Male who presents with a chief complaint of UTI MRSA (05 Mar 2024 20:25)    HPI:  Patient is 88-year-old male, former smoker with past medical history of HTN, HLD, A-fib on Eliquis, S/P CABG, sent to the emergency department by his PMD Dr. Flynn for MRSA UTI.  Patient had a UA and culture done 1 week ago and the culture came back today for antibiotic sensitivity to vancomycin, nitrofurantoin, Bactrim.  Patient has been on Bactrim for 6 days.  Patient never had any dysuria, increased urinary frequency or hematuria, but the patient had episodes of wetting the bed.  Denies fever, chills, abdominal pain. CP. SOB, palpitation, N/V/D, . (05 Mar 2024 20:25)         Prior hospital charts reviewed [Yes]  Primary team notes reviewed [Yes]  Other consultant notes reviewed [Yes]    REVIEW OF SYSTEMS:      PAST MEDICAL & SURGICAL HISTORY:  Afib      Hypertension      Glaucoma      Hyperlipidemia      S/P CABG (coronary artery bypass graft)          SOCIAL HISTORY:  - Born in _____, migrated [place] in 19XX  - Currently working as / Retired  - Lives with _____; no pets  - No recent travel  - Denies tobacco use  - Denies alcohol use  - Denies illicit drug use  - Currently sexually active, has one male/female sexual partner    FAMILY HISTORY:  FHx: hypertension (Father, Mother)        Allergies:  No Known Allergies      ANTIMICROBIALS:  vancomycin  IVPB 750 every 12 hours      ANTIMICROBIALS (past 90 days):  MEDICATIONS  (STANDING):  cefTRIAXone   IVPB   100 mL/Hr IV Intermittent (03-05-24 @ 18:39)    vancomycin  IVPB   250 mL/Hr IV Intermittent (03-06-24 @ 06:42)    vancomycin  IVPB   250 mL/Hr IV Intermittent (03-05-24 @ 18:39)        OTHER MEDS:   MEDICATIONS  (STANDING):  acetaminophen     Tablet .. 650 every 6 hours PRN  apixaban 5 every 12 hours  aspirin enteric coated 81 daily  atorvastatin 80 at bedtime  furosemide   Injectable 20 daily  metoprolol succinate  daily  ondansetron Injectable 4 every 6 hours PRN  pantoprazole    Tablet 40 before breakfast  senna 2 at bedtime PRN  tamsulosin 0.4 at bedtime      VITALS:  Vital Signs Last 24 Hrs  T(F): 96.6 (03-06-24 @ 06:32), Max: 97.7 (03-05-24 @ 11:23)    Vital Signs Last 24 Hrs  HR: 68 (03-06-24 @ 06:32) (68 - 101)  BP: 138/86 (03-06-24 @ 06:32) (116/69 - 156/90)  RR: 18 (03-06-24 @ 06:32)  SpO2: 94% (03-06-24 @ 06:32) (94% - 98%)  Wt(kg): --    EXAM:    Labs:                        9.7    8.04  )-----------( 199      ( 06 Mar 2024 05:39 )             30.5     03-06    141  |  105  |  12  ----------------------------<  85  3.3<L>   |  21  |  0.7    Ca    9.1      06 Mar 2024 05:39    TPro  6.3  /  Alb  3.7  /  TBili  1.1  /  DBili  x   /  AST  25  /  ALT  19  /  AlkPhos  62  03-06      WBC Trend:  WBC Count: 8.04 (03-06-24 @ 05:39)  WBC Count: 6.96 (03-05-24 @ 12:11)      Auto Neutrophil #: 4.25 K/uL (03-05-24 @ 12:11)  Auto Neutrophil #: 6.53 K/uL (01-06-24 @ 06:51)  Auto Neutrophil #: 6.89 K/uL (01-01-24 @ 12:10)  Auto Neutrophil #: 5.74 K/uL (12-13-23 @ 07:54)  Auto Neutrophil #: 5.70 K/uL (12-12-23 @ 09:00)      Creatine Trend:  Creatinine: 0.7 (03-06)  Creatinine: 0.8 (03-05)      Liver Biochemical Testing Trend:  Alanine Aminotransferase (ALT/SGPT): 19 (03-06)  Alanine Aminotransferase (ALT/SGPT): 15 (03-05)  Alanine Aminotransferase (ALT/SGPT): 20 (01-06)  Alanine Aminotransferase (ALT/SGPT): 42 *H* (01-01)  Alanine Aminotransferase (ALT/SGPT): 15 (12-15)  Aspartate Aminotransferase (AST/SGOT): 25 (03-06-24 @ 05:39)  Aspartate Aminotransferase (AST/SGOT): 23 (03-05-24 @ 12:11)  Aspartate Aminotransferase (AST/SGOT): 24 (01-06-24 @ 06:51)  Aspartate Aminotransferase (AST/SGOT): 45 (01-01-24 @ 12:10)  Aspartate Aminotransferase (AST/SGOT): 26 (12-15-23 @ 06:53)  Bilirubin Total: 1.1 (03-06)  Bilirubin Total: 0.8 (03-05)  Bilirubin Total: 1.5 (01-06)  Bilirubin Total: 0.7 (01-01)  Bilirubin Total: 0.6 (12-15)      Trend LDH      Auto Eosinophil %: 1.7 % (03-05-24 @ 12:11)      Urinalysis Basic - ( 06 Mar 2024 05:39 )    Color: x / Appearance: x / SG: x / pH: x  Gluc: 85 mg/dL / Ketone: x  / Bili: x / Urobili: x   Blood: x / Protein: x / Nitrite: x   Leuk Esterase: x / RBC: x / WBC x   Sq Epi: x / Non Sq Epi: x / Bacteria: x        MICROBIOLOGY:                                  COVID-19 PCR: NotDetec (01-08-24 @ 16:10)                    Troponin T, High Sensitivity Result: 28 (03-06)  Troponin T, High Sensitivity Result: 32 (03-05)    Blood Gas Venous - Lactate: 1.7 (03-05 @ 18:45)  Lactate, Blood: 2.3 (03-05 @ 12:11)        RADIOLOGY:  imaging below personally reviewed   INFECTIOUS DISEASE CONSULT NOTE    Patient is a 88y old  Male who presents with a chief complaint of UTI MRSA (05 Mar 2024 20:25)    HPI:  Patient is 88-year-old male, former smoker with past medical history of HTN, HLD, A-fib on Eliquis, S/P CABG, sent to the emergency department by his PMD Dr. Flynn for MRSA UTI.  Patient had a UA and culture done 1 week ago and the culture came back today for antibiotic sensitivity to vancomycin, nitrofurantoin, Bactrim.  Patient has been on Bactrim for 6 days.  Patient never had any dysuria, increased urinary frequency or hematuria, but the patient had episodes of wetting the bed.  Denies fever, chills, abdominal pain. CP. SOB, palpitation, N/V/D, . (05 Mar 2024 20:25)         Prior hospital charts reviewed [Yes]  Primary team notes reviewed [Yes]  Other consultant notes reviewed [Yes]    REVIEW OF SYSTEMS:  CONSTITUTIONAL: No fever or chills  HEAD: No lesion on scalp  EYES: No visual disturbance  ENT: No sore throat  RESPIRATORY: No cough, no shortness of breath  CARDIOVASCULAR: No chest pain or palpitations  GASTROINTESTINAL: No abdominal or epigastric pain  GENITOURINARY: No dysuria  NEUROLOGICAL: No headache/dizziness  MUSCULOSKELETAL: No joint pain, erythema, or swelling; no back pain  SKIN: No itching, rashes  All other ROS negative except noted above      PAST MEDICAL & SURGICAL HISTORY:  Afib      Hypertension      Glaucoma      Hyperlipidemia      S/P CABG (coronary artery bypass graft)      SOCIAL HISTORY:  - No recent travel  - Denies tobacco use  - Denies alcohol use  - Denies illicit drug use    FAMILY HISTORY:  FHx: hypertension (Father, Mother)        Allergies:  No Known Allergies      ANTIMICROBIALS:  vancomycin  IVPB 750 every 12 hours      ANTIMICROBIALS (past 90 days):  MEDICATIONS  (STANDING):  cefTRIAXone   IVPB   100 mL/Hr IV Intermittent (03-05-24 @ 18:39)    vancomycin  IVPB   250 mL/Hr IV Intermittent (03-06-24 @ 06:42)    vancomycin  IVPB   250 mL/Hr IV Intermittent (03-05-24 @ 18:39)        OTHER MEDS:   MEDICATIONS  (STANDING):  acetaminophen     Tablet .. 650 every 6 hours PRN  apixaban 5 every 12 hours  aspirin enteric coated 81 daily  atorvastatin 80 at bedtime  furosemide   Injectable 20 daily  metoprolol succinate  daily  ondansetron Injectable 4 every 6 hours PRN  pantoprazole    Tablet 40 before breakfast  senna 2 at bedtime PRN  tamsulosin 0.4 at bedtime      VITALS:  Vital Signs Last 24 Hrs  T(F): 96.6 (03-06-24 @ 06:32), Max: 97.7 (03-05-24 @ 11:23)    Vital Signs Last 24 Hrs  HR: 68 (03-06-24 @ 06:32) (68 - 101)  BP: 138/86 (03-06-24 @ 06:32) (116/69 - 156/90)  RR: 18 (03-06-24 @ 06:32)  SpO2: 94% (03-06-24 @ 06:32) (94% - 98%)  Wt(kg): --    EXAM:  GENERAL: NAD, lying in bed  HEAD: No head lesions  EYES: Conjunctiva pink and cornea white  EAR, NOSE, MOUTH, THROAT: Normal external ears and nose, no discharges; moist mucous membranes  NECK: Supple, nontender to palpation; no JVD  RESPIRATORY: Decreased bibasilar lung sounds  CARDIOVASCULAR: S1 S2  GASTROINTESTINAL: Soft, nontender, nondistended; normoactive bowel sounds  EXTREMITIES: No clubbing, cyanosis, or petal edema  NERVOUS SYSTEM: Alert and oriented to person, time, place and situation, speech clear. No focal deficits   MUSCULOSKELETAL: No joint erythema, swelling or pain  SKIN: No rashes or lesions, no superficial thrombophlebitis  PSYCH: Normal affect      Labs:                        9.7    8.04  )-----------( 199      ( 06 Mar 2024 05:39 )             30.5     03-06    141  |  105  |  12  ----------------------------<  85  3.3<L>   |  21  |  0.7    Ca    9.1      06 Mar 2024 05:39    TPro  6.3  /  Alb  3.7  /  TBili  1.1  /  DBili  x   /  AST  25  /  ALT  19  /  AlkPhos  62  03-06      WBC Trend:  WBC Count: 8.04 (03-06-24 @ 05:39)  WBC Count: 6.96 (03-05-24 @ 12:11)      Auto Neutrophil #: 4.25 K/uL (03-05-24 @ 12:11)  Auto Neutrophil #: 6.53 K/uL (01-06-24 @ 06:51)  Auto Neutrophil #: 6.89 K/uL (01-01-24 @ 12:10)  Auto Neutrophil #: 5.74 K/uL (12-13-23 @ 07:54)  Auto Neutrophil #: 5.70 K/uL (12-12-23 @ 09:00)      Creatine Trend:  Creatinine: 0.7 (03-06)  Creatinine: 0.8 (03-05)      Liver Biochemical Testing Trend:  Alanine Aminotransferase (ALT/SGPT): 19 (03-06)  Alanine Aminotransferase (ALT/SGPT): 15 (03-05)  Alanine Aminotransferase (ALT/SGPT): 20 (01-06)  Alanine Aminotransferase (ALT/SGPT): 42 *H* (01-01)  Alanine Aminotransferase (ALT/SGPT): 15 (12-15)  Aspartate Aminotransferase (AST/SGOT): 25 (03-06-24 @ 05:39)  Aspartate Aminotransferase (AST/SGOT): 23 (03-05-24 @ 12:11)  Aspartate Aminotransferase (AST/SGOT): 24 (01-06-24 @ 06:51)  Aspartate Aminotransferase (AST/SGOT): 45 (01-01-24 @ 12:10)  Aspartate Aminotransferase (AST/SGOT): 26 (12-15-23 @ 06:53)  Bilirubin Total: 1.1 (03-06)  Bilirubin Total: 0.8 (03-05)  Bilirubin Total: 1.5 (01-06)  Bilirubin Total: 0.7 (01-01)  Bilirubin Total: 0.6 (12-15)      Trend LDH      Auto Eosinophil %: 1.7 % (03-05-24 @ 12:11)      Urinalysis Basic - ( 06 Mar 2024 05:39 )    Color: x / Appearance: x / SG: x / pH: x  Gluc: 85 mg/dL / Ketone: x  / Bili: x / Urobili: x   Blood: x / Protein: x / Nitrite: x   Leuk Esterase: x / RBC: x / WBC x   Sq Epi: x / Non Sq Epi: x / Bacteria: x        MICROBIOLOGY:      COVID-19 PCR: NotDetec (01-08-24 @ 16:10)    Troponin T, High Sensitivity Result: 28 (03-06)  Troponin T, High Sensitivity Result: 32 (03-05)    Blood Gas Venous - Lactate: 1.7 (03-05 @ 18:45)  Lactate, Blood: 2.3 (03-05 @ 12:11)        RADIOLOGY:  imaging below personally reviewed    < from: CT Chest No Cont (03.05.24 @ 22:23) >  IMPRESSION:  Slight interval increase in moderate size right pleural effusion when   compared to earlier CT of that day.    Stable moderate left pleural effusion with adjacent basilar atelectasis.    Areas of interlobular septal thickening with groundglass opacities most   pronounced within the right lung are noted, possibly related to   underlying CHF    Since September 1, 2022, interval progression of compression deformity   T11 vertebral body.      < end of copied text >    < from: CT Abdomen and Pelvis w/ IV Cont (03.05.24 @ 16:34) >    IMPRESSION:    1.  No evidence of cystitis. Urinary distention and chronic bladder wall   trabeculation likely from outlet obstruction.  2.  Moderate to large rectal stool burden.  3.  New compression deformity of the L1 vertebral body, favored to be   acute/subacute.  4.  Bilateral moderate pleural effusions with cardiomegaly and   interlobular septal thickening. Findings may represent pulmonary edema in   the appropriate clinical setting.  5.  Additional chronic findings as above.      < end of copied text >

## 2024-03-06 NOTE — CONSULT NOTE ADULT - ASSESSMENT
88-year-old male, former smoker with past medical history of HTN, HLD, A-fib on Eliquis, S/P CABG, sent to the emergency department by his PMD Dr. Flynn for MRSA UTI.    ID is consulted for UTI  UCx tested positive in PCP, on Bactrim x 6 days  Afebrile, no leukocytosis  BCx pending  UA with mild pyuria, UCx pending    Antibiotics:  Ceftriaxone 3/5  Vancomycin 3/5 ->      IMPRESSION:      RECOMMENDATIONS:        * THIS IS AN INCOMPLETE NOTE. FINAL RECOMMENDATION IS PENDING *   88-year-old male, former smoker with past medical history of HTN, HLD, A-fib on Eliquis, S/P CABG, sent to the emergency department by his PMD Dr. Flynn for MRSA UTI.    ID is consulted for UTI  UCx tested positive in PCP, on Bactrim x 6 days  UA showed pyuria, UCx grew > 100K MRSA (S vancomycin, Bactrim), no recent salazar placement or instrumentation  Afebrile, no leukocytosis  BCx pending  UA with mild pyuria, UCx pending  CT chest 3/5: Slight interval increase in moderate size right pleural effusion when compared to earlier CT of that day. Stable moderate left pleural effusion with adjacent basilar atelectasis.    Antibiotics:  Ceftriaxone 3/5  Vancomycin 3/5 ->      IMPRESSION:  Asymptomatic bacteruria  Urinary obstruction  Constipation  Bilateral pleural effusions    RECOMMENDATIONS:  - No clinical signs of systemic MRSA infection, no cellulitis, no joint symptoms  - D/C vancomycin, can complete 7 days of PO Bactrim 1 DS q12hrs (until 3/8)  - Follow up BCx and UCx  - Bedside bladder scan to make sure there is no urinary retention  - Diuresis as per primary team  - Bowel regimen  - Offloading and frequent position changes, aspiration precaution  - Trend WBC, fever curve, transaminases, creatinine daily      Zora Dumont D.O.  Attending Physician  Division of Infectious Diseases  [facility]  Please contact me via [employer] Teams

## 2024-03-06 NOTE — PHYSICAL THERAPY INITIAL EVALUATION ADULT - PERTINENT HX OF CURRENT PROBLEM, REHAB EVAL
87 y/o male admitted with diagnosis of UTI, sent to ED by PMD for MRS UTI, has been on Bactrim, found to have bilateral pleural effusion and LI compression deformity favored to be acute/subacute

## 2024-03-06 NOTE — PROGRESS NOTE ADULT - SUBJECTIVE AND OBJECTIVE BOX
NICKY JOSHI  88y  Male      Patient is a 88y old  Male who presents with a chief complaint of UTI MRSA (06 Mar 2024 10:20)      INTERVAL HPI/OVERNIGHT EVENTS:  Pt seen and examined. He denies complaints.        Vital Signs Last 24 Hrs  T(C): 36.3 (06 Mar 2024 11:09), Max: 36.3 (06 Mar 2024 11:09)  T(F): 97.4 (06 Mar 2024 11:09), Max: 97.4 (06 Mar 2024 11:09)  HR: 97 (06 Mar 2024 11:09) (68 - 97)  BP: 129/70 (06 Mar 2024 11:09) (116/69 - 156/90)  BP(mean): --  RR: 16 (06 Mar 2024 11:09) (16 - 18)  SpO2: 94% (06 Mar 2024 11:09) (94% - 98%)    Parameters below as of 06 Mar 2024 11:09  Patient On (Oxygen Delivery Method): room air        PHYSICAL EXAM:  Gen: NAD, resting in bed  HEENT: Normocephalic, atraumatic  Neck: supple, no lymphadenopathy  CV: Regular rate & regular rhythm  Lungs: CTABL no wheeze  Abdomen: Soft, NTND+ BS present  Ext: Warm, well perfused no CCE  Neuro: non focal, awake, CN II-XII intact   Skin: no rash, no erythema  Psych: no SI, HI, Hallucination     Consultant(s) Notes Reviewed:  [x ] YES  [ ] NO  Care Discussed with Consultants/Other Providers [ x] YES  [ ] NO    LABS:                        9.7    8.04  )-----------( 199      ( 06 Mar 2024 05:39 )             30.5     03-06    141  |  105  |  12  ----------------------------<  85  3.3<L>   |  21  |  0.7    Ca    9.1      06 Mar 2024 05:39    TPro  6.3  /  Alb  3.7  /  TBili  1.1  /  DBili  x   /  AST  25  /  ALT  19  /  AlkPhos  62  03-06        ASSESSMENT AND PLAN:  88-year-old male, former smoker with past medical history of HTN, HLD, A-fib on Eliquis, CABG, sent to the emergency department by his PMD Dr. Flynn for MRSA UTI.  Patient had a UA and culture done 1 week ago and the culture came back today for antibiotic sensitivity to vancomycin, nitrofurantoin, Bactrim.  Patient has been on Bactrim for 6 days.  Patient never had any dysuria, increased urinary frequency or hematuria, but the patient had episodes of wetting the bed.  Denies fever, chills, abdominal pain.    # MRSA UTI  - obtain Ux CX from PMD office  - ID consult  - Vanco 750mg q12  follow up repeat culture    # Pleural Effusion, Bilat  -Noticed on the CT scan of the abd and CT chest, likely due to CHF  -EKG shows no acute ischemic changes  - BNP 3940, Trop 32, 28, 26  - Lasix 20mg IV q24  - 2D Echo   -Serial trop     # A-Fib  - c/w home med    # HTN  - VS  - c/w home med    # HLD  - c/w home med     # L1 compression deformity  - CT shows New compression deformity of the L1 vertebral body, favored to be   acute/subacute.  - Will consult PT    # Constipation  - CT shows moderate to large rectal stool burden.  - Will schedule oral bowel regimen    Disposition:  Home

## 2024-03-07 LAB
ANION GAP SERPL CALC-SCNC: 13 MMOL/L — SIGNIFICANT CHANGE UP (ref 7–14)
BUN SERPL-MCNC: 13 MG/DL — SIGNIFICANT CHANGE UP (ref 10–20)
CALCIUM SERPL-MCNC: 9.8 MG/DL — SIGNIFICANT CHANGE UP (ref 8.4–10.5)
CHLORIDE SERPL-SCNC: 104 MMOL/L — SIGNIFICANT CHANGE UP (ref 98–110)
CO2 SERPL-SCNC: 25 MMOL/L — SIGNIFICANT CHANGE UP (ref 17–32)
CREAT SERPL-MCNC: 0.7 MG/DL — SIGNIFICANT CHANGE UP (ref 0.7–1.5)
EGFR: 89 ML/MIN/1.73M2 — SIGNIFICANT CHANGE UP
GLUCOSE SERPL-MCNC: 104 MG/DL — HIGH (ref 70–99)
HCT VFR BLD CALC: 34.3 % — LOW (ref 42–52)
HGB BLD-MCNC: 10.9 G/DL — LOW (ref 14–18)
MCHC RBC-ENTMCNC: 28.3 PG — SIGNIFICANT CHANGE UP (ref 27–31)
MCHC RBC-ENTMCNC: 31.8 G/DL — LOW (ref 32–37)
MCV RBC AUTO: 89.1 FL — SIGNIFICANT CHANGE UP (ref 80–94)
NRBC # BLD: 0 /100 WBCS — SIGNIFICANT CHANGE UP (ref 0–0)
PLATELET # BLD AUTO: 248 K/UL — SIGNIFICANT CHANGE UP (ref 130–400)
PMV BLD: 9.9 FL — SIGNIFICANT CHANGE UP (ref 7.4–10.4)
POTASSIUM SERPL-MCNC: 3.5 MMOL/L — SIGNIFICANT CHANGE UP (ref 3.5–5)
POTASSIUM SERPL-SCNC: 3.5 MMOL/L — SIGNIFICANT CHANGE UP (ref 3.5–5)
RBC # BLD: 3.85 M/UL — LOW (ref 4.7–6.1)
RBC # FLD: 15 % — HIGH (ref 11.5–14.5)
SODIUM SERPL-SCNC: 142 MMOL/L — SIGNIFICANT CHANGE UP (ref 135–146)
WBC # BLD: 7.11 K/UL — SIGNIFICANT CHANGE UP (ref 4.8–10.8)
WBC # FLD AUTO: 7.11 K/UL — SIGNIFICANT CHANGE UP (ref 4.8–10.8)

## 2024-03-07 PROCEDURE — 99232 SBSQ HOSP IP/OBS MODERATE 35: CPT

## 2024-03-07 RX ORDER — LANOLIN ALCOHOL/MO/W.PET/CERES
5 CREAM (GRAM) TOPICAL ONCE
Refills: 0 | Status: DISCONTINUED | OUTPATIENT
Start: 2024-03-07 | End: 2024-03-09

## 2024-03-07 RX ORDER — POLYETHYLENE GLYCOL 3350 17 G/17G
17 POWDER, FOR SOLUTION ORAL
Refills: 0 | Status: DISCONTINUED | OUTPATIENT
Start: 2024-03-07 | End: 2024-03-09

## 2024-03-07 RX ADMIN — POLYETHYLENE GLYCOL 3350 17 GRAM(S): 17 POWDER, FOR SOLUTION ORAL at 19:06

## 2024-03-07 RX ADMIN — ATORVASTATIN CALCIUM 80 MILLIGRAM(S): 80 TABLET, FILM COATED ORAL at 22:17

## 2024-03-07 RX ADMIN — Medication 150 MILLIGRAM(S): at 06:17

## 2024-03-07 RX ADMIN — POLYETHYLENE GLYCOL 3350 17 GRAM(S): 17 POWDER, FOR SOLUTION ORAL at 06:15

## 2024-03-07 RX ADMIN — Medication 1 TABLET(S): at 06:16

## 2024-03-07 RX ADMIN — Medication 81 MILLIGRAM(S): at 12:43

## 2024-03-07 RX ADMIN — APIXABAN 5 MILLIGRAM(S): 2.5 TABLET, FILM COATED ORAL at 19:07

## 2024-03-07 RX ADMIN — PANTOPRAZOLE SODIUM 40 MILLIGRAM(S): 20 TABLET, DELAYED RELEASE ORAL at 06:53

## 2024-03-07 RX ADMIN — Medication 20 MILLIGRAM(S): at 06:15

## 2024-03-07 RX ADMIN — TAMSULOSIN HYDROCHLORIDE 0.4 MILLIGRAM(S): 0.4 CAPSULE ORAL at 22:17

## 2024-03-07 RX ADMIN — POLYETHYLENE GLYCOL 3350 17 GRAM(S): 17 POWDER, FOR SOLUTION ORAL at 12:43

## 2024-03-07 RX ADMIN — APIXABAN 5 MILLIGRAM(S): 2.5 TABLET, FILM COATED ORAL at 06:17

## 2024-03-07 RX ADMIN — Medication 1 TABLET(S): at 19:06

## 2024-03-07 NOTE — PROGRESS NOTE ADULT - SUBJECTIVE AND OBJECTIVE BOX
NICKY JOSHI  88y  Male      Patient is a 88y old  Male who presents with a chief complaint of UTI MRSA (07 Mar 2024 10:46)      INTERVAL HPI/OVERNIGHT EVENTS:  Pt seen and examined. This AM he was confused.       Vital Signs Last 24 Hrs  T(C): 35.9 (07 Mar 2024 14:49), Max: 37.1 (06 Mar 2024 21:02)  T(F): 96.6 (07 Mar 2024 14:49), Max: 98.8 (06 Mar 2024 21:02)  HR: 92 (07 Mar 2024 14:49) (92 - 119)  BP: 115/75 (07 Mar 2024 14:49) (115/75 - 181/95)  BP(mean): --  RR: 18 (07 Mar 2024 14:49) (16 - 18)  SpO2: 96% (07 Mar 2024 14:49) (94% - 97%)    Parameters below as of 07 Mar 2024 09:48  Patient On (Oxygen Delivery Method): room air        PHYSICAL EXAM:  Gen: NAD, resting in bed  HEENT: Normocephalic, atraumatic. +[place]  Neck: supple, no lymphadenopathy  CV: Regular rate & regular rhythm  Lungs: CTABL no wheeze  Abdomen: Soft, NTND+ BS present  Ext: Warm, well perfused no CCE  Neuro: non focal, awake, CN II-XII intact   Skin: no rash, no erythema  Psych: no SI, HI, Hallucination     Consultant(s) Notes Reviewed:  [x ] YES  [ ] NO  Care Discussed with Consultants/Other Providers [ x] YES  [ ] NO    LABS:                        10.9   7.11  )-----------( 248      ( 07 Mar 2024 07:44 )             34.3     03-07    142  |  104  |  13  ----------------------------<  104<H>  3.5   |  25  |  0.7    Ca    9.8      07 Mar 2024 07:44    TPro  6.3  /  Alb  3.7  /  TBili  1.1  /  DBili  x   /  AST  25  /  ALT  19  /  AlkPhos  62  03-06           ASSESSMENT AND PLAN:   88-year-old male, former smoker with past medical history of HTN, HLD, A-fib on Eliquis, CABG, sent to the emergency department by his PMD Dr. Flynn for MRSA UTI.  Patient had a UA and culture done 1 week ago and the culture came back today for antibiotic sensitivity to vancomycin, nitrofurantoin, Bactrim.  Patient has been on Bactrim for 6 days.  Patient never had any dysuria, increased urinary frequency or hematuria, but the patient had episodes of wetting the bed.  Denies fever, chills, abdominal pain.    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  - Will consult PT    # Constipation  - CT shows moderate to large rectal stool burden.  - Will schedule oral bowel regimen    Disposition:  Home vs rehab

## 2024-03-07 NOTE — PROGRESS NOTE ADULT - ASSESSMENT
88-year-old male, former smoker with past medical history of HTN, HLD, A-fib on Eliquis, S/P CABG, sent to the emergency department by his PMD Dr. Flynn for MRSA UTI.    ID is following for UTI  UCx tested positive in PCP, on Bactrim x 6 days  UA showed pyuria, UCx grew > 100K MRSA (S vancomycin, Bactrim), no recent salazar placement or instrumentation  Afebrile, no leukocytosis  BCx NGTD  UA with mild pyuria, UCx 50K to 90K GPC in clusters, 10K to 49K Enterococcus spp.  CT chest 3/5: Slight interval increase in moderate size right pleural effusion when compared to earlier CT of that day. Stable moderate left pleural effusion with adjacent basilar atelectasis.    Antibiotics:  Ceftriaxone 3/5  Vancomycin 3/5 ->      IMPRESSION:  Asymptomatic bacteruria  Urinary obstruction  Constipation  Bilateral pleural effusions    RECOMMENDATIONS:  - No clinical signs of systemic MRSA infection, no cellulitis, no joint symptoms  - Complete 7 days of PO Bactrim 1 DS q12hrs (until 3/8)  - Follow up BCx and UCx  - Bedside bladder scan to make sure there is no urinary retention  - Diuresis as per primary team  - Bowel regimen  - Offloading and frequent position changes, aspiration precaution  - Trend WBC, fever curve, transaminases, creatinine daily      * THIS IS AN INCOMPLETE NOTE. FINAL RECOMMENDATION IS PENDING *   88-year-old male, former smoker with past medical history of HTN, HLD, A-fib on Eliquis, S/P CABG, sent to the emergency department by his PMD Dr. Flynn for MRSA UTI.    ID is following for UTI  UCx tested positive in PCP, on Bactrim x 6 days  UA showed pyuria, UCx grew > 100K MRSA (S vancomycin, Bactrim), no recent salazar placement or instrumentation  Afebrile, no leukocytosis  BCx NGTD  UA with mild pyuria, UCx 50K to 90K GPC in clusters, 10K to 49K Enterococcus spp.  CT chest 3/5: Slight interval increase in moderate size right pleural effusion when compared to earlier CT of that day. Stable moderate left pleural effusion with adjacent basilar atelectasis.    Antibiotics:  Ceftriaxone 3/5  Vancomycin 3/5 ->      IMPRESSION:  Asymptomatic bacteruria  Urinary retention  Constipation  Bilateral pleural effusions    RECOMMENDATIONS:  - No clinical signs of systemic MRSA infection, no cellulitis, no joint symptoms  - Complete 7 days of PO Bactrim 1 DS q12hrs (until 3/8)  - Follow up BCx and UCx  - Bladder scan and straight cath to relieve retention  - Diuresis as per primary team  - Bowel regimen  - Offloading and frequent position changes, aspiration precaution  - Trend WBC, fever curve, transaminases, creatinine daily      Zora Dumont D.O.  Attending Physician  Division of Infectious Diseases  [facility]  Please contact me via [employer] Teams

## 2024-03-07 NOTE — PROGRESS NOTE ADULT - SUBJECTIVE AND OBJECTIVE BOX
INFECTIOUS DISEASE FOLLOW UP NOTE:    Interval History/ROS: Patient is a 88y old  Male who presents with a chief complaint of UTI MRSA (06 Mar 2024 12:39)      Overnight events:    REVIEW OF SYSTEMS:        Prior hospital charts reviewed [Yes]  Primary team notes reviewed [Yes]  Other consultant notes reviewed [Yes]    Allergies:  No Known Allergies      ANTIMICROBIALS:   trimethoprim  160 mG/sulfamethoxazole 800 mG 1 every 12 hours      OTHER MEDS: MEDICATIONS  (STANDING):  acetaminophen     Tablet .. 650 every 6 hours PRN  apixaban 5 every 12 hours  aspirin enteric coated 81 daily  atorvastatin 80 at bedtime  furosemide   Injectable 20 daily  melatonin 5 once  metoprolol succinate  daily  ondansetron Injectable 4 every 6 hours PRN  pantoprazole    Tablet 40 before breakfast  polyethylene glycol 3350 17 two times a day  senna 2 at bedtime PRN  tamsulosin 0.4 at bedtime      Vital Signs Last 24 Hrs  T(F): 97.5 (03-07-24 @ 04:30), Max: 98.8 (03-06-24 @ 21:02)    Vital Signs Last 24 Hrs  HR: 104 (03-07-24 @ 04:30) (97 - 119)  BP: 170/86 (03-07-24 @ 04:30) (129/70 - 181/95)  RR: 16 (03-07-24 @ 04:30)  SpO2: 94% (03-07-24 @ 09:48) (94% - 99%)  Wt(kg): --    EXAM:      Labs:                        10.9   7.11  )-----------( 248      ( 07 Mar 2024 07:44 )             34.3     03-07    142  |  104  |  13  ----------------------------<  104<H>  3.5   |  25  |  0.7    Ca    9.8      07 Mar 2024 07:44    TPro  6.3  /  Alb  3.7  /  TBili  1.1  /  DBili  x   /  AST  25  /  ALT  19  /  AlkPhos  62  03-06      WBC Trend:  WBC Count: 7.11 (03-07-24 @ 07:44)  WBC Count: 8.04 (03-06-24 @ 05:39)  WBC Count: 6.96 (03-05-24 @ 12:11)      Creatine Trend:  Creatinine: 0.7 (03-07)  Creatinine: 0.7 (03-06)  Creatinine: 0.8 (03-05)      Liver Biochemical Testing Trend:  Alanine Aminotransferase (ALT/SGPT): 19 (03-06)  Alanine Aminotransferase (ALT/SGPT): 15 (03-05)  Alanine Aminotransferase (ALT/SGPT): 20 (01-06)  Alanine Aminotransferase (ALT/SGPT): 42 *H* (01-01)  Alanine Aminotransferase (ALT/SGPT): 15 (12-15)  Aspartate Aminotransferase (AST/SGOT): 25 (03-06-24 @ 05:39)  Aspartate Aminotransferase (AST/SGOT): 23 (03-05-24 @ 12:11)  Aspartate Aminotransferase (AST/SGOT): 24 (01-06-24 @ 06:51)  Aspartate Aminotransferase (AST/SGOT): 45 (01-01-24 @ 12:10)  Aspartate Aminotransferase (AST/SGOT): 26 (12-15-23 @ 06:53)  Bilirubin Total: 1.1 (03-06)  Bilirubin Total: 0.8 (03-05)  Bilirubin Total: 1.5 (01-06)  Bilirubin Total: 0.7 (01-01)  Bilirubin Total: 0.6 (12-15)      Trend LDH      Urinalysis Basic - ( 07 Mar 2024 07:44 )    Color: x / Appearance: x / SG: x / pH: x  Gluc: 104 mg/dL / Ketone: x  / Bili: x / Urobili: x   Blood: x / Protein: x / Nitrite: x   Leuk Esterase: x / RBC: x / WBC x   Sq Epi: x / Non Sq Epi: x / Bacteria: x        MICROBIOLOGY:        Culture - Urine (collected 05 Mar 2024 14:00)  Source: Clean Catch Clean Catch (Midstream)  Preliminary Report:    50,000 - 99,000 CFU/mL Gram Positive Cocci in Clusters    10,000 - 49,000 CFU/mL Enterococcus species    Culture - Blood (collected 05 Mar 2024 12:11)  Source: .Blood Blood  Preliminary Report:    No growth at 24 hours    Culture - Urine (collected 02 Jan 2024 07:00)  Source: Clean Catch Clean Catch (Midstream)  Final Report:    <10,000 CFU/mL Normal Urogenital Kaylynn    Culture - Sputum (collected 02 Jan 2024 06:00)  Source: .Sputum Sputum  Final Report:    Normal Respiratory Kaylynn present    Culture - Urine (collected 12 Dec 2023 22:24)  Source: .Urine None  Final Report:    <10,000 CFU/mL Normal Urogenital Kaylynn    Culture - Urine (collected 06 Dec 2023 11:09)  Source: Catheterized Catheterized  Final Report:    No growth      COVID-19 PCR: NotDetec (01-08-24 @ 16:10)      Troponin T, High Sensitivity Result: 29 (03-06)  Troponin T, High Sensitivity Result: 26 (03-06)  Troponin T, High Sensitivity Result: 28 (03-06)  Troponin T, High Sensitivity Result: 32 (03-05)    Blood Gas Venous - Lactate: 1.7 (03-05 @ 18:45)  Lactate, Blood: 2.3 (03-05 @ 12:11)      RADIOLOGY:  imaging below personally reviewed     INFECTIOUS DISEASE FOLLOW UP NOTE:    Interval History/ROS: Patient is a 88y old  Male who presents with a chief complaint of UTI MRSA (06 Mar 2024 12:39)      Overnight events: No overnight event. Reports to be more confused today. Has urinary retention.    REVIEW OF SYSTEMS:  CONSTITUTIONAL: No fever or chills  HEAD: No lesion on scalp  EYES: No visual disturbance  ENT: No sore throat  RESPIRATORY: No cough, no shortness of breath  CARDIOVASCULAR: No chest pain or palpitations  GASTROINTESTINAL: No abdominal or epigastric pain  GENITOURINARY: No dysuria; + urinary retention  NEUROLOGICAL: No headache/dizziness  MUSCULOSKELETAL: No joint pain, erythema, or swelling; no back pain  SKIN: No itching, rashes  All other ROS negative except noted above      Prior hospital charts reviewed [Yes]  Primary team notes reviewed [Yes]  Other consultant notes reviewed [Yes]    Allergies:  No Known Allergies      ANTIMICROBIALS:   trimethoprim  160 mG/sulfamethoxazole 800 mG 1 every 12 hours      OTHER MEDS: MEDICATIONS  (STANDING):  acetaminophen     Tablet .. 650 every 6 hours PRN  apixaban 5 every 12 hours  aspirin enteric coated 81 daily  atorvastatin 80 at bedtime  furosemide   Injectable 20 daily  melatonin 5 once  metoprolol succinate  daily  ondansetron Injectable 4 every 6 hours PRN  pantoprazole    Tablet 40 before breakfast  polyethylene glycol 3350 17 two times a day  senna 2 at bedtime PRN  tamsulosin 0.4 at bedtime      Vital Signs Last 24 Hrs  T(F): 97.5 (03-07-24 @ 04:30), Max: 98.8 (03-06-24 @ 21:02)    Vital Signs Last 24 Hrs  HR: 104 (03-07-24 @ 04:30) (97 - 119)  BP: 170/86 (03-07-24 @ 04:30) (129/70 - 181/95)  RR: 16 (03-07-24 @ 04:30)  SpO2: 94% (03-07-24 @ 09:48) (94% - 99%)  Wt(kg): --    EXAM:  GENERAL: NAD, lying in bed  HEAD: No head lesions  EYES: Conjunctiva pink and cornea white  EAR, NOSE, MOUTH, THROAT: Normal external ears and nose, no discharges; moist mucous membranes  NECK: Supple, nontender to palpation; no JVD  RESPIRATORY: Decreased bibasilar lung sounds  CARDIOVASCULAR: S1 S2  GASTROINTESTINAL: Soft, nontender, nondistended; normoactive bowel sounds  EXTREMITIES: No clubbing, cyanosis, or petal edema  NERVOUS SYSTEM: Awake and alert, not fully oriented, moving his extremities  MUSCULOSKELETAL: No joint erythema, swelling or pain  SKIN: No rashes or lesions, no superficial thrombophlebitis  PSYCH: Normal affect, calm    Labs:                        10.9   7.11  )-----------( 248      ( 07 Mar 2024 07:44 )             34.3     03-07    142  |  104  |  13  ----------------------------<  104<H>  3.5   |  25  |  0.7    Ca    9.8      07 Mar 2024 07:44    TPro  6.3  /  Alb  3.7  /  TBili  1.1  /  DBili  x   /  AST  25  /  ALT  19  /  AlkPhos  62  03-06      WBC Trend:  WBC Count: 7.11 (03-07-24 @ 07:44)  WBC Count: 8.04 (03-06-24 @ 05:39)  WBC Count: 6.96 (03-05-24 @ 12:11)      Creatine Trend:  Creatinine: 0.7 (03-07)  Creatinine: 0.7 (03-06)  Creatinine: 0.8 (03-05)      Liver Biochemical Testing Trend:  Alanine Aminotransferase (ALT/SGPT): 19 (03-06)  Alanine Aminotransferase (ALT/SGPT): 15 (03-05)  Alanine Aminotransferase (ALT/SGPT): 20 (01-06)  Alanine Aminotransferase (ALT/SGPT): 42 *H* (01-01)  Alanine Aminotransferase (ALT/SGPT): 15 (12-15)  Aspartate Aminotransferase (AST/SGOT): 25 (03-06-24 @ 05:39)  Aspartate Aminotransferase (AST/SGOT): 23 (03-05-24 @ 12:11)  Aspartate Aminotransferase (AST/SGOT): 24 (01-06-24 @ 06:51)  Aspartate Aminotransferase (AST/SGOT): 45 (01-01-24 @ 12:10)  Aspartate Aminotransferase (AST/SGOT): 26 (12-15-23 @ 06:53)  Bilirubin Total: 1.1 (03-06)  Bilirubin Total: 0.8 (03-05)  Bilirubin Total: 1.5 (01-06)  Bilirubin Total: 0.7 (01-01)  Bilirubin Total: 0.6 (12-15)      Trend LDH      Urinalysis Basic - ( 07 Mar 2024 07:44 )    Color: x / Appearance: x / SG: x / pH: x  Gluc: 104 mg/dL / Ketone: x  / Bili: x / Urobili: x   Blood: x / Protein: x / Nitrite: x   Leuk Esterase: x / RBC: x / WBC x   Sq Epi: x / Non Sq Epi: x / Bacteria: x        MICROBIOLOGY:        Culture - Urine (collected 05 Mar 2024 14:00)  Source: Clean Catch Clean Catch (Midstream)  Preliminary Report:    50,000 - 99,000 CFU/mL Gram Positive Cocci in Clusters    10,000 - 49,000 CFU/mL Enterococcus species    Culture - Blood (collected 05 Mar 2024 12:11)  Source: .Blood Blood  Preliminary Report:    No growth at 24 hours    Culture - Urine (collected 02 Jan 2024 07:00)  Source: Clean Catch Clean Catch (Midstream)  Final Report:    <10,000 CFU/mL Normal Urogenital Kaylynn    Culture - Sputum (collected 02 Jan 2024 06:00)  Source: .Sputum Sputum  Final Report:    Normal Respiratory Kaylynn present    Culture - Urine (collected 12 Dec 2023 22:24)  Source: .Urine None  Final Report:    <10,000 CFU/mL Normal Urogenital Kaylynn    Culture - Urine (collected 06 Dec 2023 11:09)  Source: Catheterized Catheterized  Final Report:    No growth      COVID-19 PCR: NotDetec (01-08-24 @ 16:10)      Troponin T, High Sensitivity Result: 29 (03-06)  Troponin T, High Sensitivity Result: 26 (03-06)  Troponin T, High Sensitivity Result: 28 (03-06)  Troponin T, High Sensitivity Result: 32 (03-05)    Blood Gas Venous - Lactate: 1.7 (03-05 @ 18:45)  Lactate, Blood: 2.3 (03-05 @ 12:11)      RADIOLOGY:  imaging below personally reviewed    < from: CT Chest No Cont (03.05.24 @ 22:23) >  IMPRESSION:  Slight interval increase in moderate size right pleural effusion when   compared to earlier CT of that day.    Stable moderate left pleural effusion with adjacent basilar atelectasis.    Areas of interlobular septal thickening with groundglass opacities most   pronounced within the right lung are noted, possibly related to   underlying CHF    Since September 1, 2022, interval progression of compression deformity   T11 vertebral body.      < end of copied text >    < from: CT Abdomen and Pelvis w/ IV Cont (03.05.24 @ 16:34) >    IMPRESSION:    1.  No evidence of cystitis. Urinary distention and chronic bladder wall   trabeculation likely from outlet obstruction.  2.  Moderate to large rectal stool burden.  3.  New compression deformity of the L1 vertebral body, favored to be   acute/subacute.  4.  Bilateral moderate pleural effusions with cardiomegaly and   interlobular septal thickening. Findings may represent pulmonary edema in   the appropriate clinical setting.  5.  Additional chronic findings as above.      < end of copied text >

## 2024-03-08 ENCOUNTER — TRANSCRIPTION ENCOUNTER (OUTPATIENT)
Age: 89
End: 2024-03-08

## 2024-03-08 LAB
-  AMPICILLIN/SULBACTAM: SIGNIFICANT CHANGE UP
-  AMPICILLIN: SIGNIFICANT CHANGE UP
-  CEFAZOLIN: SIGNIFICANT CHANGE UP
-  CIPROFLOXACIN: SIGNIFICANT CHANGE UP
-  DAPTOMYCIN: SIGNIFICANT CHANGE UP
-  GENTAMICIN: SIGNIFICANT CHANGE UP
-  LEVOFLOXACIN: SIGNIFICANT CHANGE UP
-  LINEZOLID: SIGNIFICANT CHANGE UP
-  NITROFURANTOIN: SIGNIFICANT CHANGE UP
-  OXACILLIN: SIGNIFICANT CHANGE UP
-  PENICILLIN: SIGNIFICANT CHANGE UP
-  RIFAMPIN: SIGNIFICANT CHANGE UP
-  TETRACYCLINE: SIGNIFICANT CHANGE UP
-  TETRACYCLINE: SIGNIFICANT CHANGE UP
-  TRIMETHOPRIM/SULFAMETHOXAZOLE: SIGNIFICANT CHANGE UP
-  VANCOMYCIN: SIGNIFICANT CHANGE UP
-  VANCOMYCIN: SIGNIFICANT CHANGE UP
CULTURE RESULTS: ABNORMAL
METHOD TYPE: SIGNIFICANT CHANGE UP
METHOD TYPE: SIGNIFICANT CHANGE UP
ORGANISM # SPEC MICROSCOPIC CNT: ABNORMAL
ORGANISM # SPEC MICROSCOPIC CNT: ABNORMAL
ORGANISM # SPEC MICROSCOPIC CNT: SIGNIFICANT CHANGE UP
SARS-COV-2 RNA SPEC QL NAA+PROBE: SIGNIFICANT CHANGE UP
SPECIMEN SOURCE: SIGNIFICANT CHANGE UP

## 2024-03-08 PROCEDURE — 99239 HOSP IP/OBS DSCHRG MGMT >30: CPT

## 2024-03-08 RX ORDER — POLYETHYLENE GLYCOL 3350 17 G/17G
17 POWDER, FOR SOLUTION ORAL
Qty: 0 | Refills: 0 | DISCHARGE
Start: 2024-03-08

## 2024-03-08 RX ORDER — LACTULOSE 10 G/15ML
15 SOLUTION ORAL
Qty: 0 | Refills: 0 | DISCHARGE
Start: 2024-03-08

## 2024-03-08 RX ORDER — LACTULOSE 10 G/15ML
10 SOLUTION ORAL EVERY 8 HOURS
Refills: 0 | Status: DISCONTINUED | OUTPATIENT
Start: 2024-03-08 | End: 2024-03-09

## 2024-03-08 RX ORDER — SENNA PLUS 8.6 MG/1
2 TABLET ORAL
Qty: 0 | Refills: 0 | DISCHARGE
Start: 2024-03-08

## 2024-03-08 RX ADMIN — Medication 10 MILLIGRAM(S): at 15:19

## 2024-03-08 RX ADMIN — TAMSULOSIN HYDROCHLORIDE 0.4 MILLIGRAM(S): 0.4 CAPSULE ORAL at 21:31

## 2024-03-08 RX ADMIN — APIXABAN 5 MILLIGRAM(S): 2.5 TABLET, FILM COATED ORAL at 05:24

## 2024-03-08 RX ADMIN — Medication 81 MILLIGRAM(S): at 11:29

## 2024-03-08 RX ADMIN — POLYETHYLENE GLYCOL 3350 17 GRAM(S): 17 POWDER, FOR SOLUTION ORAL at 19:18

## 2024-03-08 RX ADMIN — LACTULOSE 10 GRAM(S): 10 SOLUTION ORAL at 14:25

## 2024-03-08 RX ADMIN — Medication 20 MILLIGRAM(S): at 05:24

## 2024-03-08 RX ADMIN — Medication 150 MILLIGRAM(S): at 05:24

## 2024-03-08 RX ADMIN — POLYETHYLENE GLYCOL 3350 17 GRAM(S): 17 POWDER, FOR SOLUTION ORAL at 05:25

## 2024-03-08 RX ADMIN — Medication 1 TABLET(S): at 05:25

## 2024-03-08 RX ADMIN — LACTULOSE 10 GRAM(S): 10 SOLUTION ORAL at 21:31

## 2024-03-08 RX ADMIN — ATORVASTATIN CALCIUM 80 MILLIGRAM(S): 80 TABLET, FILM COATED ORAL at 21:32

## 2024-03-08 RX ADMIN — PANTOPRAZOLE SODIUM 40 MILLIGRAM(S): 20 TABLET, DELAYED RELEASE ORAL at 05:25

## 2024-03-08 RX ADMIN — Medication 1 TABLET(S): at 19:18

## 2024-03-08 RX ADMIN — APIXABAN 5 MILLIGRAM(S): 2.5 TABLET, FILM COATED ORAL at 19:18

## 2024-03-08 NOTE — PROGRESS NOTE ADULT - SUBJECTIVE AND OBJECTIVE BOX
NICKY JOSHI  88y  Male      Patient is a 88y old  Male who presents with a chief complaint of UTI MRSA (07 Mar 2024 14:55)      INTERVAL HPI/OVERNIGHT EVENTS:  Pt seen and examined, no complaints. sleeping    Vital Signs Last 24 Hrs  T(C): 36.2 (08 Mar 2024 04:46), Max: 36.2 (08 Mar 2024 04:46)  T(F): 97.1 (08 Mar 2024 04:46), Max: 97.1 (08 Mar 2024 04:46)  HR: 108 (08 Mar 2024 04:46) (86 - 108)  BP: 145/83 (08 Mar 2024 04:46) (115/75 - 145/83)  BP(mean): --  RR: 17 (08 Mar 2024 04:46) (17 - 18)  SpO2: 99% (08 Mar 2024 04:46) (96% - 99%)    Parameters below as of 07 Mar 2024 21:00  Patient On (Oxygen Delivery Method): room air        PHYSICAL EXAM:  Gen: NAD, resting in bed  HEENT: Normocephalic, atraumatic. +Rampart  Neck: supple, no lymphadenopathy  CV: Regular rate & regular rhythm  Lungs: CTABL no wheeze  Abdomen: Soft, NTND+ BS present  Ext: Warm, well perfused no CCE  Neuro: non focal, awake, CN II-XII intact   Skin: no rash, no erythema     LABS:                        10.9   7.11  )-----------( 248      ( 07 Mar 2024 07:44 )             34.3     03-07    142  |  104  |  13  ----------------------------<  104<H>  3.5   |  25  |  0.7    Ca    9.8      07 Mar 2024 07:44     ASSESSMENT AND PLAN:   88-year-old male, former smoker with past medical history of HTN, HLD, A-fib on Eliquis, CABG, sent to the emergency department by his PMD Dr. Flynn for MRSA UTI.  Patient had a UA and culture done 1 week ago and the culture came back today for antibiotic sensitivity to vancomycin, nitrofurantoin, Bactrim.  Patient has been on Bactrim for 6 days.  Patient never had any dysuria, increased urinary frequency or hematuria, but the patient had episodes of wetting the bed.  Denies fever, chills, abdominal pain.    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  - Will consult PT    # Constipation  - CT shows moderate to large rectal stool burden.  - Will schedule bowel regimen    Disposition:    rehab, placement pending per

## 2024-03-08 NOTE — DISCHARGE NOTE PROVIDER - NSDCMRMEDTOKEN_GEN_ALL_CORE_FT
aspirin 81 mg oral capsule: 1 cap(s) orally once a day  atorvastatin 80 mg oral tablet: 1 tab(s) orally once a day (at bedtime)  Eliquis 5 mg oral tablet: 1 tab(s) orally 2 times a day  lactulose 10 g/15 mL oral syrup: 15 milliliter(s) orally once a day as needed for  constipation  Metoprolol Succinate ER 50 mg oral tablet, extended release: 3 tab(s) orally once a day  omeprazole 20 mg oral delayed release tablet: 1 tab(s) orally once a day  polyethylene glycol 3350 oral powder for reconstitution: 17 gram(s) orally 2 times a day as needed for  constipation  senna leaf extract oral tablet: 2 tab(s) orally once a day (at bedtime) As needed Constipation  tamsulosin 0.4 mg oral capsule: 1 cap(s) orally once a day (at bedtime)  Zetia 10 mg oral tablet: 1 tab(s) orally once a day

## 2024-03-08 NOTE — DISCHARGE NOTE PROVIDER - ATTENDING DISCHARGE PHYSICAL EXAMINATION:
Gen: NAD, resting in bed  HEENT: Normocephalic, atraumatic, hard of hearing, blindness  CV: Regular rate & regular rhythm  Lungs: CTABL no wheeze  Abdomen: Soft, NTND+ BS present  Ext: Warm, well perfused  Neuro: non focal, awake, CN II-XII intact   Skin: no visible rash, no erythema  Psych: no SI, HI, Hallucination

## 2024-03-08 NOTE — DISCHARGE NOTE PROVIDER - CARE PROVIDER_API CALL
Elyse Flynn  Internal Medicine  [address] [place] [address]  Phone: [phone]  Fax: [phone]  Follow Up Time:

## 2024-03-08 NOTE — DISCHARGE NOTE PROVIDER - NSDCCPCAREPLAN_GEN_ALL_CORE_FT
PRINCIPAL DISCHARGE DIAGNOSIS  Diagnosis: Complicated UTI (urinary tract infection)  Assessment and Plan of Treatment: Your UCx was positive however this was likely colonization.  You were treated with Bactrim and will be discharged to [facility]

## 2024-03-08 NOTE — DISCHARGE NOTE PROVIDER - HOSPITAL COURSE
88-year-old male, former smoker with past medical history of HTN, HLD, A-fib on Eliquis, CABG, sent to the emergency department by his PMD Dr. Flynn for MRSA UTI.  Patient had a UA and culture done 1 week ago and the culture came back today for antibiotic sensitivity to vancomycin, nitrofurantoin, Bactrim.  Patient has been on Bactrim for 6 days.  Patient never had any dysuria, increased urinary frequency or hematuria, but the patient had episodes of wetting the bed.  Denies fever, chills, abdominal pain.    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, blind, deaf status   - Reorientation, avoid benzos  - s/p Bactrim, had BM prior to discharge    # Pleural Effusion, Bilat  -Noticed on the CT scan of the abd and CT chest, likely due to CHF  -EKG shows no acute ischemic changes  - BNP 3940, Trop 32, 28, 26  - s/p Lasix 20mg IV q24 and improved  - 2D Echo shows LVEF 52%  -Serial trop flat    # A-Fib  - c/w home med    # HTN  - VS  - c/w home med    # HLD  - c/w home med     # L1 compression deformity  - CT shows New compression deformity of the L1 vertebral body, favored to be   acute/subacute.  - PT consulted, will d/c to [facility]    # Constipation  - CT shows moderate to large rectal stool burden.  - c/w bowel regimen  - + BM prior to discharge     88-year-old male, former smoker with past medical history of HTN, HLD, A-fib on Eliquis, CABG, sent to the emergency department by his PMD Dr. Flynn for MRSA UTI.  Patient had a UA and culture done 1 week ago and the culture came back today for antibiotic sensitivity to vancomycin, nitrofurantoin, Bactrim.  Patient has been on Bactrim for 6 days.  Patient never had any dysuria, increased urinary frequency or hematuria, but the patient had episodes of wetting the bed.  Denies fever, chills, abdominal pain.    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, blind, deaf status   - Reorientation, avoid benzos  - s/p Bactrim, had BM prior to discharge    # Acute on chronic HFpEF; Pleural Effusion, Bilat  -Noticed on the CT scan of the abd and CT chest, likely due to CHF  -EKG shows no acute ischemic changes  - BNP 3940, Trop 32, 28, 26  - s/p Lasix 20mg IV q24 and improved  - 2D Echo shows LVEF 52% Mild LVH; Mild TR; Mild AR  -Serial trop flat    # A-Fib  - c/w home med    # HTN  - VS  - c/w home med    # HLD  - c/w home med     # L1 compression deformity  - CT shows New compression deformity of the L1 vertebral body, favored to be   acute/subacute.  - PT consulted, will d/c to [facility]    # Constipation  - CT shows moderate to large rectal stool burden.  - c/w bowel regimen  - + BM prior to discharge

## 2024-03-09 ENCOUNTER — TRANSCRIPTION ENCOUNTER (OUTPATIENT)
Age: 89
End: 2024-03-09

## 2024-03-09 VITALS
HEART RATE: 94 BPM | OXYGEN SATURATION: 96 % | DIASTOLIC BLOOD PRESSURE: 61 MMHG | RESPIRATION RATE: 18 BRPM | SYSTOLIC BLOOD PRESSURE: 102 MMHG | TEMPERATURE: 97 F

## 2024-03-09 RX ADMIN — LACTULOSE 10 GRAM(S): 10 SOLUTION ORAL at 05:34

## 2024-03-09 RX ADMIN — Medication 81 MILLIGRAM(S): at 16:01

## 2024-03-09 RX ADMIN — TAMSULOSIN HYDROCHLORIDE 0.4 MILLIGRAM(S): 0.4 CAPSULE ORAL at 21:28

## 2024-03-09 RX ADMIN — PANTOPRAZOLE SODIUM 40 MILLIGRAM(S): 20 TABLET, DELAYED RELEASE ORAL at 05:37

## 2024-03-09 RX ADMIN — APIXABAN 5 MILLIGRAM(S): 2.5 TABLET, FILM COATED ORAL at 05:36

## 2024-03-09 RX ADMIN — ATORVASTATIN CALCIUM 80 MILLIGRAM(S): 80 TABLET, FILM COATED ORAL at 21:28

## 2024-03-09 RX ADMIN — POLYETHYLENE GLYCOL 3350 17 GRAM(S): 17 POWDER, FOR SOLUTION ORAL at 05:32

## 2024-03-09 RX ADMIN — APIXABAN 5 MILLIGRAM(S): 2.5 TABLET, FILM COATED ORAL at 18:28

## 2024-03-09 RX ADMIN — Medication 150 MILLIGRAM(S): at 05:34

## 2024-03-09 NOTE — DISCHARGE NOTE NURSING/CASE MANAGEMENT/SOCIAL WORK - NSDCPETBCESMAN_GEN_ALL_CORE
If you are a smoker, it is important for your health to stop smoking. Please be aware that second hand smoke is also harmful.
Need for prophylactic measure

## 2024-03-09 NOTE — DISCHARGE NOTE NURSING/CASE MANAGEMENT/SOCIAL WORK - PATIENT PORTAL LINK FT
You can access the FollowMyHealth Patient Portal offered by [facility] by registering at the following website: [email or web address]. By joining [employer]’s FollowMyHealth portal, you will also be able to view your health information using other applications (apps) compatible with our system.

## 2024-03-10 LAB
CULTURE RESULTS: SIGNIFICANT CHANGE UP
SPECIMEN SOURCE: SIGNIFICANT CHANGE UP

## 2024-03-12 NOTE — CDI QUERY NOTE - NSCDIOTHERTXTBX_GEN_ALL_CORE_HH
Clinical documentation and/or evidence in the medical record indicates that this patient has CHF.  In order to ensure accurate coding and accuracy of the clinical record, the documentation in this patient’s medical record requires additional clarification.      Please include more specific documentation as to the type of CHF in your progress note and/or discharge summary.      Please clarify if the CHF can be further clarified as one of the following:     •	Acute HFpEF  •	Chronic HFpEF  •	Other (please specify):        Supporting documentation and/or clinical evidence:     3/6 Consult Note Adult-Infectious Disease Attending: Bilateral pleural effusions    3/6-3/8 PN Adult-Hospitalist Attending: Bilateral pleural effusion – noticed on CT scan of the abd and CT chest, likely d/t CHF; BNP 3940; s/p Lasix 20mg IV Q24; TTE: EF 52%    3/9 Discharge Note Provider: Pleural Effusion, Bilat; -Noticed on the CT scan … likely due to CHF  Labs:   3/5: BNP 3700  3/6: BNP 3940    Radiology:   3/5 CT chest: Patent central tracheobronchial tree. Slight interval increase in moderate size right pleural effusion when compared to earlier CT scan. Stable moderate left pleural effusion with adjacent basilar atelectasis. Areas of interlobular septal thickening with groundglass opacities most pronounced within the right lung are noted, possibly related to underlying CHF    Echo:   3/6 TTE: LV EF 52%; Mild LVH; Mild TR; Mild AR    Orders:   3/6-3/8: Lasix 20mg IV QD  3/6: Metoprolol succinate 150mg PO QD  Discharge Note Provider: Discharge medications: Metoprolol succinate ER 50mg QD      Thank you,  Susan HUTCHINSON, RN, BSN  [phone]

## 2024-03-22 DIAGNOSIS — I48.91 UNSPECIFIED ATRIAL FIBRILLATION: ICD-10-CM

## 2024-03-22 DIAGNOSIS — H54.7 UNSPECIFIED VISUAL LOSS: ICD-10-CM

## 2024-03-22 DIAGNOSIS — Z79.01 LONG TERM (CURRENT) USE OF ANTICOAGULANTS: ICD-10-CM

## 2024-03-22 DIAGNOSIS — N13.9 OBSTRUCTIVE AND REFLUX UROPATHY, UNSPECIFIED: ICD-10-CM

## 2024-03-22 DIAGNOSIS — Z87.891 PERSONAL HISTORY OF NICOTINE DEPENDENCE: ICD-10-CM

## 2024-03-22 DIAGNOSIS — K59.00 CONSTIPATION, UNSPECIFIED: ICD-10-CM

## 2024-03-22 DIAGNOSIS — F05 DELIRIUM DUE TO KNOWN PHYSIOLOGICAL CONDITION: ICD-10-CM

## 2024-03-22 DIAGNOSIS — I11.0 HYPERTENSIVE HEART DISEASE WITH HEART FAILURE: ICD-10-CM

## 2024-03-22 DIAGNOSIS — H91.90 UNSPECIFIED HEARING LOSS, UNSPECIFIED EAR: ICD-10-CM

## 2024-03-22 DIAGNOSIS — E78.5 HYPERLIPIDEMIA, UNSPECIFIED: ICD-10-CM

## 2024-03-22 DIAGNOSIS — Z95.1 PRESENCE OF AORTOCORONARY BYPASS GRAFT: ICD-10-CM

## 2024-03-22 DIAGNOSIS — R33.9 RETENTION OF URINE, UNSPECIFIED: ICD-10-CM

## 2024-03-22 DIAGNOSIS — M43.9 DEFORMING DORSOPATHY, UNSPECIFIED: ICD-10-CM

## 2024-03-22 DIAGNOSIS — B95.62 METHICILLIN RESISTANT STAPHYLOCOCCUS AUREUS INFECTION AS THE CAUSE OF DISEASES CLASSIFIED ELSEWHERE: ICD-10-CM

## 2024-03-22 DIAGNOSIS — I50.33 ACUTE ON CHRONIC DIASTOLIC (CONGESTIVE) HEART FAILURE: ICD-10-CM

## 2024-03-22 DIAGNOSIS — I25.10 ATHEROSCLEROTIC HEART DISEASE OF NATIVE CORONARY ARTERY WITHOUT ANGINA PECTORIS: ICD-10-CM

## 2024-03-22 DIAGNOSIS — N39.0 URINARY TRACT INFECTION, SITE NOT SPECIFIED: ICD-10-CM

## 2024-04-23 ENCOUNTER — EMERGENCY (EMERGENCY)
Facility: HOSPITAL | Age: 89
LOS: 0 days | Discharge: HOME | End: 2024-04-23
Attending: STUDENT IN AN ORGANIZED HEALTH CARE EDUCATION/TRAINING PROGRAM
Payer: COMMERCIAL

## 2024-04-23 VITALS
SYSTOLIC BLOOD PRESSURE: 114 MMHG | WEIGHT: 160.06 LBS | OXYGEN SATURATION: 98 % | TEMPERATURE: 97 F | DIASTOLIC BLOOD PRESSURE: 71 MMHG | RESPIRATION RATE: 20 BRPM | HEART RATE: 96 BPM | HEIGHT: 70 IN

## 2024-04-23 VITALS
SYSTOLIC BLOOD PRESSURE: 112 MMHG | HEART RATE: 82 BPM | OXYGEN SATURATION: 98 % | DIASTOLIC BLOOD PRESSURE: 72 MMHG | RESPIRATION RATE: 20 BRPM

## 2024-04-23 DIAGNOSIS — Z20.822 CONTACT WITH AND (SUSPECTED) EXPOSURE TO COVID-19: ICD-10-CM

## 2024-04-23 DIAGNOSIS — J18.9 PNEUMONIA, UNSPECIFIED ORGANISM: ICD-10-CM

## 2024-04-23 DIAGNOSIS — R05.9 COUGH, UNSPECIFIED: ICD-10-CM

## 2024-04-23 DIAGNOSIS — Z79.82 LONG TERM (CURRENT) USE OF ASPIRIN: ICD-10-CM

## 2024-04-23 DIAGNOSIS — E78.5 HYPERLIPIDEMIA, UNSPECIFIED: ICD-10-CM

## 2024-04-23 DIAGNOSIS — I25.10 ATHEROSCLEROTIC HEART DISEASE OF NATIVE CORONARY ARTERY WITHOUT ANGINA PECTORIS: ICD-10-CM

## 2024-04-23 DIAGNOSIS — Z95.1 PRESENCE OF AORTOCORONARY BYPASS GRAFT: Chronic | ICD-10-CM

## 2024-04-23 DIAGNOSIS — Z79.01 LONG TERM (CURRENT) USE OF ANTICOAGULANTS: ICD-10-CM

## 2024-04-23 LAB
FLUAV AG NPH QL: SIGNIFICANT CHANGE UP
FLUBV AG NPH QL: SIGNIFICANT CHANGE UP
RSV RNA NPH QL NAA+NON-PROBE: SIGNIFICANT CHANGE UP
SARS-COV-2 RNA SPEC QL NAA+PROBE: SIGNIFICANT CHANGE UP

## 2024-04-23 PROCEDURE — 71046 X-RAY EXAM CHEST 2 VIEWS: CPT | Mod: 26

## 2024-04-23 PROCEDURE — 0241U: CPT

## 2024-04-23 PROCEDURE — 99283 EMERGENCY DEPT VISIT LOW MDM: CPT | Mod: 25

## 2024-04-23 PROCEDURE — 71046 X-RAY EXAM CHEST 2 VIEWS: CPT

## 2024-04-23 PROCEDURE — 99284 EMERGENCY DEPT VISIT MOD MDM: CPT

## 2024-04-23 RX ORDER — AMOXICILLIN 250 MG/5ML
1 SUSPENSION, RECONSTITUTED, ORAL (ML) ORAL
Qty: 5 | Refills: 0
Start: 2024-04-23 | End: 2024-04-27

## 2024-04-23 RX ORDER — AZITHROMYCIN 500 MG/1
1 TABLET, FILM COATED ORAL
Qty: 1 | Refills: 0
Start: 2024-04-23 | End: 2024-04-27

## 2024-04-23 NOTE — ED PROVIDER NOTE - PATIENT PORTAL LINK FT
You can access the FollowMyHealth Patient Portal offered by [facility] by registering at the following website: [email or web address]. By joining [employer]’s FollowMyHealth portal, you will also be able to view your health information using other applications (apps) compatible with our system.

## 2024-04-23 NOTE — ED PROVIDER NOTE - PROGRESS NOTE DETAILS
Patient stable, normotensive, afebrile rectally, spoke with son who is bedside given Strict return precautions

## 2024-04-23 NOTE — ED PROVIDER NOTE - CLINICAL SUMMARY MEDICAL DECISION MAKING FREE TEXT BOX
88-year-old presented today for evaluation of low blood pressure.  Patient endorsed that he has been having a cough consistently over the past week.  X-ray indicative of opacity.  Patient started on antibiotics for pneumonia.  Patient's blood pressure is normal in the ED.  Patient discharged to follow-up outpatient.  Return precautions explained to patient.

## 2024-04-23 NOTE — ED ADULT TRIAGE NOTE - NS ED TRIAGE AVPU SCALE
Patient resting quietly to self, respirations are even and unlabored. No sxs of distress or discomfort noted. Purposeful rounds continued Q 15 minutes to ensure the safety of the patient while on the unit. Alert-The patient is alert, awake and responds to voice. The patient is oriented to time, place, and person. The triage nurse is able to obtain subjective information.

## 2024-04-23 NOTE — ED PROVIDER NOTE - OBJECTIVE STATEMENT
Patient is an 88-year-old male history of CAD, hyperlipidemia here for evaluation of low blood pressure, instructed to come to the emergency department by visiting nurse.  Patient was recently admitted to [facility] and discharged 2 weeks ago with visiting nurse who has been monitoring patient's blood pressure and states it has been low despite recent changes in patient's blood pressure medication.  Patient also notes that has been developing a cough over the past week.  Patient denies chest pain, shortness of breath

## 2024-05-03 NOTE — PHYSICAL THERAPY INITIAL EVALUATION ADULT - LEVEL OF INDEPENDENCE: STAIR NEGOTIATION, REHAB EVAL
Patient Transferred to: PACU  Handoff Report Given to: bedside to Pacu RN Not assessed at this time, will perform when needed.

## 2024-05-07 ENCOUNTER — EMERGENCY (EMERGENCY)
Facility: HOSPITAL | Age: 89
LOS: 0 days | Discharge: HOME | End: 2024-05-07
Attending: STUDENT IN AN ORGANIZED HEALTH CARE EDUCATION/TRAINING PROGRAM
Payer: COMMERCIAL

## 2024-05-07 VITALS
HEART RATE: 105 BPM | TEMPERATURE: 98 F | RESPIRATION RATE: 17 BRPM | OXYGEN SATURATION: 100 % | DIASTOLIC BLOOD PRESSURE: 82 MMHG | WEIGHT: 134.92 LBS | HEIGHT: 70 IN | SYSTOLIC BLOOD PRESSURE: 140 MMHG

## 2024-05-07 VITALS — HEART RATE: 99 BPM | RESPIRATION RATE: 18 BRPM | OXYGEN SATURATION: 100 %

## 2024-05-07 DIAGNOSIS — I48.91 UNSPECIFIED ATRIAL FIBRILLATION: ICD-10-CM

## 2024-05-07 DIAGNOSIS — E78.5 HYPERLIPIDEMIA, UNSPECIFIED: ICD-10-CM

## 2024-05-07 DIAGNOSIS — Z04.3 ENCOUNTER FOR EXAMINATION AND OBSERVATION FOLLOWING OTHER ACCIDENT: ICD-10-CM

## 2024-05-07 DIAGNOSIS — S09.90XA UNSPECIFIED INJURY OF HEAD, INITIAL ENCOUNTER: ICD-10-CM

## 2024-05-07 DIAGNOSIS — Z87.891 PERSONAL HISTORY OF NICOTINE DEPENDENCE: ICD-10-CM

## 2024-05-07 DIAGNOSIS — I25.10 ATHEROSCLEROTIC HEART DISEASE OF NATIVE CORONARY ARTERY WITHOUT ANGINA PECTORIS: ICD-10-CM

## 2024-05-07 DIAGNOSIS — I10 ESSENTIAL (PRIMARY) HYPERTENSION: ICD-10-CM

## 2024-05-07 DIAGNOSIS — S00.03XA CONTUSION OF SCALP, INITIAL ENCOUNTER: ICD-10-CM
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
## 2024-05-07 DIAGNOSIS — Z95.1 PRESENCE OF AORTOCORONARY BYPASS GRAFT: ICD-10-CM

## 2024-05-07 DIAGNOSIS — Z79.01 LONG TERM (CURRENT) USE OF ANTICOAGULANTS: ICD-10-CM

## 2024-05-07 DIAGNOSIS — Z95.1 PRESENCE OF AORTOCORONARY BYPASS GRAFT: Chronic | ICD-10-CM

## 2024-05-07 LAB
ALBUMIN SERPL ELPH-MCNC: 3.9 G/DL — SIGNIFICANT CHANGE UP (ref 3.5–5.2)
ALP SERPL-CCNC: 82 U/L — SIGNIFICANT CHANGE UP (ref 30–115)
ALT FLD-CCNC: 13 U/L — SIGNIFICANT CHANGE UP (ref 0–41)
ANION GAP SERPL CALC-SCNC: 11 MMOL/L — SIGNIFICANT CHANGE UP (ref 7–14)
APPEARANCE UR: CLEAR — SIGNIFICANT CHANGE UP
APTT BLD: 35.5 SEC — SIGNIFICANT CHANGE UP (ref 27–39.2)
AST SERPL-CCNC: 30 U/L — SIGNIFICANT CHANGE UP (ref 0–41)
BACTERIA # UR AUTO: NEGATIVE /HPF — SIGNIFICANT CHANGE UP
BASOPHILS # BLD AUTO: 0.03 K/UL — SIGNIFICANT CHANGE UP (ref 0–0.2)
BASOPHILS NFR BLD AUTO: 0.4 % — SIGNIFICANT CHANGE UP (ref 0–1)
BILIRUB SERPL-MCNC: 0.6 MG/DL — SIGNIFICANT CHANGE UP (ref 0.2–1.2)
BILIRUB UR-MCNC: NEGATIVE — SIGNIFICANT CHANGE UP
BUN SERPL-MCNC: 15 MG/DL — SIGNIFICANT CHANGE UP (ref 10–20)
CALCIUM SERPL-MCNC: 9.2 MG/DL — SIGNIFICANT CHANGE UP (ref 8.4–10.5)
CAST: 0 /LPF — SIGNIFICANT CHANGE UP (ref 0–4)
CHLORIDE SERPL-SCNC: 107 MMOL/L — SIGNIFICANT CHANGE UP (ref 98–110)
CK SERPL-CCNC: 105 U/L — SIGNIFICANT CHANGE UP (ref 0–225)
CO2 SERPL-SCNC: 24 MMOL/L — SIGNIFICANT CHANGE UP (ref 17–32)
COLOR SPEC: YELLOW — SIGNIFICANT CHANGE UP
CREAT SERPL-MCNC: 0.8 MG/DL — SIGNIFICANT CHANGE UP (ref 0.7–1.5)
DIFF PNL FLD: NEGATIVE — SIGNIFICANT CHANGE UP
EGFR: 85 ML/MIN/1.73M2 — SIGNIFICANT CHANGE UP
EOSINOPHIL # BLD AUTO: 0.16 K/UL — SIGNIFICANT CHANGE UP (ref 0–0.7)
EOSINOPHIL NFR BLD AUTO: 2.4 % — SIGNIFICANT CHANGE UP (ref 0–8)
ETHANOL SERPL-MCNC: <10 MG/DL — SIGNIFICANT CHANGE UP
GLUCOSE SERPL-MCNC: 92 MG/DL — SIGNIFICANT CHANGE UP (ref 70–99)
GLUCOSE UR QL: NEGATIVE MG/DL — SIGNIFICANT CHANGE UP
HCT VFR BLD CALC: 29.6 % — LOW (ref 42–52)
HGB BLD-MCNC: 8.9 G/DL — LOW (ref 14–18)
IMM GRANULOCYTES NFR BLD AUTO: 0.6 % — HIGH (ref 0.1–0.3)
INR BLD: 1.75 RATIO — HIGH (ref 0.65–1.3)
KETONES UR-MCNC: NEGATIVE MG/DL — SIGNIFICANT CHANGE UP
LACTATE SERPL-SCNC: 1.9 MMOL/L — SIGNIFICANT CHANGE UP (ref 0.7–2)
LEUKOCYTE ESTERASE UR-ACNC: NEGATIVE — SIGNIFICANT CHANGE UP
LIDOCAIN IGE QN: 35 U/L — SIGNIFICANT CHANGE UP (ref 7–60)
LYMPHOCYTES # BLD AUTO: 2.08 K/UL — SIGNIFICANT CHANGE UP (ref 1.2–3.4)
LYMPHOCYTES # BLD AUTO: 30.9 % — SIGNIFICANT CHANGE UP (ref 20.5–51.1)
MCHC RBC-ENTMCNC: 25.9 PG — LOW (ref 27–31)
MCHC RBC-ENTMCNC: 30.1 G/DL — LOW (ref 32–37)
MCV RBC AUTO: 86.3 FL — SIGNIFICANT CHANGE UP (ref 80–94)
MONOCYTES # BLD AUTO: 0.78 K/UL — HIGH (ref 0.1–0.6)
MONOCYTES NFR BLD AUTO: 11.6 % — HIGH (ref 1.7–9.3)
NEUTROPHILS # BLD AUTO: 3.64 K/UL — SIGNIFICANT CHANGE UP (ref 1.4–6.5)
NEUTROPHILS NFR BLD AUTO: 54.1 % — SIGNIFICANT CHANGE UP (ref 42.2–75.2)
NITRITE UR-MCNC: NEGATIVE — SIGNIFICANT CHANGE UP
NRBC # BLD: 0 /100 WBCS — SIGNIFICANT CHANGE UP (ref 0–0)
PH UR: 6.5 — SIGNIFICANT CHANGE UP (ref 5–8)
PLATELET # BLD AUTO: 239 K/UL — SIGNIFICANT CHANGE UP (ref 130–400)
PMV BLD: 10.1 FL — SIGNIFICANT CHANGE UP (ref 7.4–10.4)
POTASSIUM SERPL-MCNC: 4.9 MMOL/L — SIGNIFICANT CHANGE UP (ref 3.5–5)
POTASSIUM SERPL-SCNC: 4.9 MMOL/L — SIGNIFICANT CHANGE UP (ref 3.5–5)
PROT SERPL-MCNC: 6.7 G/DL — SIGNIFICANT CHANGE UP (ref 6–8)
PROT UR-MCNC: 30 MG/DL
PROTHROM AB SERPL-ACNC: 20.1 SEC — HIGH (ref 9.95–12.87)
RBC # BLD: 3.43 M/UL — LOW (ref 4.7–6.1)
RBC # FLD: 17.2 % — HIGH (ref 11.5–14.5)
RBC CASTS # UR COMP ASSIST: 2 /HPF — SIGNIFICANT CHANGE UP (ref 0–4)
SODIUM SERPL-SCNC: 142 MMOL/L — SIGNIFICANT CHANGE UP (ref 135–146)
SP GR SPEC: >1.03 — HIGH (ref 1–1.03)
SQUAMOUS # UR AUTO: 1 /HPF — SIGNIFICANT CHANGE UP (ref 0–5)
TROPONIN T, HIGH SENSITIVITY RESULT: 21 NG/L — SIGNIFICANT CHANGE UP (ref 6–21)
UROBILINOGEN FLD QL: 0.2 MG/DL — SIGNIFICANT CHANGE UP (ref 0.2–1)
WBC # BLD: 6.73 K/UL — SIGNIFICANT CHANGE UP (ref 4.8–10.8)
WBC # FLD AUTO: 6.73 K/UL — SIGNIFICANT CHANGE UP (ref 4.8–10.8)
WBC UR QL: 2 /HPF — SIGNIFICANT CHANGE UP (ref 0–5)

## 2024-05-07 PROCEDURE — 85730 THROMBOPLASTIN TIME PARTIAL: CPT

## 2024-05-07 PROCEDURE — 99285 EMERGENCY DEPT VISIT HI MDM: CPT

## 2024-05-07 PROCEDURE — 72125 CT NECK SPINE W/O DYE: CPT | Mod: 26,MC

## 2024-05-07 PROCEDURE — 82962 GLUCOSE BLOOD TEST: CPT

## 2024-05-07 PROCEDURE — 93005 ELECTROCARDIOGRAM TRACING: CPT

## 2024-05-07 PROCEDURE — 85610 PROTHROMBIN TIME: CPT

## 2024-05-07 PROCEDURE — 85025 COMPLETE CBC W/AUTO DIFF WBC: CPT

## 2024-05-07 PROCEDURE — 70450 CT HEAD/BRAIN W/O DYE: CPT | Mod: 26,MC

## 2024-05-07 PROCEDURE — 36415 COLL VENOUS BLD VENIPUNCTURE: CPT

## 2024-05-07 PROCEDURE — 71260 CT THORAX DX C+: CPT | Mod: MC

## 2024-05-07 PROCEDURE — 71045 X-RAY EXAM CHEST 1 VIEW: CPT

## 2024-05-07 PROCEDURE — 71045 X-RAY EXAM CHEST 1 VIEW: CPT | Mod: 26

## 2024-05-07 PROCEDURE — 82550 ASSAY OF CK (CPK): CPT

## 2024-05-07 PROCEDURE — 99285 EMERGENCY DEPT VISIT HI MDM: CPT | Mod: 25

## 2024-05-07 PROCEDURE — 36000 PLACE NEEDLE IN VEIN: CPT | Mod: XU

## 2024-05-07 PROCEDURE — 72170 X-RAY EXAM OF PELVIS: CPT | Mod: 26

## 2024-05-07 PROCEDURE — 74177 CT ABD & PELVIS W/CONTRAST: CPT | Mod: MC

## 2024-05-07 PROCEDURE — 83690 ASSAY OF LIPASE: CPT

## 2024-05-07 PROCEDURE — 80053 COMPREHEN METABOLIC PANEL: CPT

## 2024-05-07 PROCEDURE — 72125 CT NECK SPINE W/O DYE: CPT | Mod: MC

## 2024-05-07 PROCEDURE — 71260 CT THORAX DX C+: CPT | Mod: 26,MC

## 2024-05-07 PROCEDURE — 81001 URINALYSIS AUTO W/SCOPE: CPT

## 2024-05-07 PROCEDURE — 84484 ASSAY OF TROPONIN QUANT: CPT

## 2024-05-07 PROCEDURE — 74177 CT ABD & PELVIS W/CONTRAST: CPT | Mod: 26,MC

## 2024-05-07 PROCEDURE — 80307 DRUG TEST PRSMV CHEM ANLYZR: CPT

## 2024-05-07 PROCEDURE — 72170 X-RAY EXAM OF PELVIS: CPT

## 2024-05-07 PROCEDURE — 70450 CT HEAD/BRAIN W/O DYE: CPT | Mod: MC

## 2024-05-07 PROCEDURE — 99284 EMERGENCY DEPT VISIT MOD MDM: CPT

## 2024-05-07 PROCEDURE — 83605 ASSAY OF LACTIC ACID: CPT

## 2024-05-07 PROCEDURE — 93010 ELECTROCARDIOGRAM REPORT: CPT

## 2024-05-07 NOTE — CONSULT NOTE ADULT - ASSESSMENT
ASSESSMENT:  89yM w/ PMHx of HTN, HLD, CAD s/p CABG and stent, A-fib (on Eliquis), b/l glaucoma, chronic constipation, former smoker seen as Trauma Alert s/p mechanical fall +HR, -LOC, +AC (Eliquis, last dose this morning), with no complaints at this time. External signs of trauma include superficial abrasion to posterior scalp. Trauma assessment in ED: ABCs intact , GCS 15 , AAOx3, MENDES.       Injuries identified:   -   -   -     PLAN:   - Trauma Labs: (CBC, BMP, Coags, T&S, UA, EtOH level)  Additional studies:  EKG  Utox    Trauma Imaging to include the following:  - CXR, Pelvic Xray  - CT Head,  CT C-spine, CT Chest, CT Abd/Pelvis  - Extremity films: None    Additional consultations:  ******      Disposition pending results of above labs and imaging  Above plan discussed with Trauma attending, Dr. Syed, patient, patient family, and ED team.   --------------------------------------------------------------------------------------  05-07-24 @ 18:26 ASSESSMENT:  89yM w/ PMHx of HTN, HLD, CAD s/p CABG and stent, A-fib (on Eliquis), b/l glaucoma, chronic constipation, former smoker seen as Trauma Alert s/p mechanical fall +HR, -LOC, +AC (Eliquis, last dose this morning), with no complaints at this time. External signs of trauma include superficial abrasion to posterior scalp. Trauma assessment in ED: ABCs intact , GCS 15 , AAOx3, MENDES.       Injuries identified:   - superficial abrasion to posterior scalp.  -   -     PLAN:   - no further traumatic work up indicated  - scans evaluated, negative for traumatic injuries  - dispo as per ED  - if admitted, trauma to follow for TTS 5/8    Above plan discussed with Trauma attending, Dr. Syed, patient, patient family, and ED team.   --------------------------------------------------------------------------------------  05-07-24 @ 18:26 ASSESSMENT:  89yM w/ PMHx of HTN, HLD, CAD s/p CABG and stent, A-fib (on Eliquis), b/l glaucoma, chronic constipation, former smoker seen as Trauma Alert s/p mechanical fall +HR, -LOC, +AC (Eliquis, last dose this morning), with no complaints at this time. External signs of trauma include superficial abrasion to posterior scalp. Trauma assessment in ED: ABCs intact , GCS 15 , AAOx3, MENDES.       Injuries identified:   - superficial abrasion to posterior scalp.    PLAN:   - no further traumatic work up indicated  - scans evaluated, negative for traumatic injuries  - dispo as per ED  - if admitted, trauma to follow for TTS 5/8    Above plan discussed with Trauma attending, Dr. Syed, patient, patient family, and ED team.   --------------------------------------------------------------------------------------  05-07-24 @ 18:26

## 2024-05-07 NOTE — ED PROVIDER NOTE - PATIENT PORTAL LINK FT
You can access the FollowMyHealth Patient Portal offered by [facility] by registering at the following website: [email or web address]. By joining [employer]’s FollowMyHealth portal, you will also be able to view your health information using other applications (apps) compatible with our system.

## 2024-05-07 NOTE — ED PROVIDER NOTE - CARE PROVIDER_API CALL
Elyse Flynn  Internal Medicine  [address] [place] [id number]  Phone: [phone]  Fax: ()-  Follow Up Time: 1-3 Days

## 2024-05-07 NOTE — ED PROVIDER NOTE - OBJECTIVE STATEMENT
88 yo M with PMH HTN, HLD, A-fib on Eliquis, CABG, presents to the ED following fall. Patient uses a walker at baseline and was turning when he lost his balance and fall backwards. Patient hit the back of his head against a door. No LOC or vomiting. Patient denies any fever, headache, chest pain, shortness of breath, nausea, vomiting, or diarrhea.

## 2024-05-07 NOTE — ED PROVIDER NOTE - PHYSICAL EXAMINATION
PHYSICAL EXAM: I have reviewed current vital signs.  GENERAL: NAD, well-nourished; well-developed.  HEAD:  Small abrasion to the occiput.   EYES: Conjunctiva and sclera clear.  ENT: MMM, no erythema/exudates.  NECK: Supple, no JVD.  CHEST/LUNG: Clear to auscultation bilaterally; no wheezes, rales, or rhonchi.  HEART: Regular rate and rhythm, normal S1 and S2; no murmurs, rubs, or gallops.  ABDOMEN: Soft, nontender, nondistended.  EXTREMITIES:  2+ peripheral pulses; no clubbing, cyanosis, or edema.  PSYCH: Cooperative, appropriate, normal mood and affect.  NEUROLOGY: A&O x 3. No focal neurological deficits.   SKIN: Warm and dry.

## 2024-05-07 NOTE — ED PROVIDER NOTE - CLINICAL SUMMARY MEDICAL DECISION MAKING FREE TEXT BOX
Trauma alert for fall on AC, no LOC, mechanical  ambulated afterwards    Independently interpretted by Jimmie Andre DO:  XR interpretation: No cardiopulmonary disease,   EKG interpretation: Afib not in RVR  CT: No ICH    Appropriate medications for patient's presenting complaints were ordered and effects were reassessed. Patient's external records were reviewed    Escalation to admission and/or observation was considered.  Patient feels much better and is comfortable with discharge.  Appropriate follow-up was arranged.

## 2024-05-07 NOTE — ED ADULT TRIAGE NOTE - CHIEF COMPLAINT QUOTE
Pt BIBA from home. tripped and fell with walker. Pt hit head on glass door. denies LOC.  Pt on eliquis. trauma alert called.

## 2024-05-07 NOTE — ED PROVIDER NOTE - NSFOLLOWUPINSTRUCTIONS_ED_ALL_ED_FT
Fall Prevention in the Home    Falls can cause injuries. They can happen to people of all ages. There are many things you can do to make your home safe and to help prevent falls.     WHAT CAN I DO ON THE OUTSIDE OF MY HOME?  Regularly fix the edges of walkways and driveways and fix any cracks.  Remove anything that might make you trip as you walk through a door, such as a raised step or threshold.  Trim any bushes or trees on the path to your home.  Use bright outdoor lighting.  Clear any walking paths of anything that might make someone trip, such as rocks or tools.  Regularly check to see if handrails are loose or broken. Make sure that both sides of any steps have handrails.  Any raised decks and porches should have guardrails on the edges.  Have any leaves, snow, or ice cleared regularly.  Use sand or salt on walking paths during winter.  Clean up any spills in your garage right away. This includes oil or grease spills.    WHAT CAN I DO IN THE BATHROOM?  Use night lights.  Install grab bars by the toilet and in the tub and shower. Do not use towel bars as grab bars.  Use non-skid mats or decals in the tub or shower.  If you need to sit down in the shower, use a plastic, non-slip stool.  Keep the floor dry. Clean up any water that spills on the floor as soon as it happens.  Remove soap buildup in the tub or shower regularly.  Attach bath mats securely with double-sided non-slip rug tape.  Do not have throw rugs and other things on the floor that can make you trip.    WHAT CAN I DO IN THE BEDROOM?  Use night lights.  Make sure that you have a light by your bed that is easy to reach.  Do not use any sheets or blankets that are too big for your bed. They should not hang down onto the floor.  Have a firm chair that has side arms. You can use this for support while you get dressed.  Do not have throw rugs and other things on the floor that can make you trip.    WHAT CAN I DO IN THE KITCHEN?  Clean up any spills right away.  Avoid walking on wet floors.  Keep items that you use a lot in easy-to-reach places.  If you need to reach something above you, use a strong step stool that has a grab bar.  Keep electrical cords out of the way.  Do not use floor polish or wax that makes floors slippery. If you must use wax, use non-skid floor wax.  Do not have throw rugs and other things on the floor that can make you trip.    WHAT CAN I DO WITH MY STAIRS?  Do not leave any items on the stairs.  Make sure that there are handrails on both sides of the stairs and use them. Fix handrails that are broken or loose. Make sure that handrails are as long as the stairways.  Check any carpeting to make sure that it is firmly attached to the stairs. Fix any carpet that is loose or worn.  Avoid having throw rugs at the top or bottom of the stairs. If you do have throw rugs, attach them to the floor with carpet tape.  Make sure that you have a light switch at the top of the stairs and the bottom of the stairs. If you do not have them, ask someone to add them for you.    WHAT ELSE CAN I DO TO HELP PREVENT FALLS?  Wear shoes that:  Do not have high heels.  Have rubber bottoms.  Are comfortable and fit you well.  Are closed at the toe. Do not wear sandals.  If you use a stepladder:  Make sure that it is fully opened. Do not climb a closed stepladder.  Make sure that both sides of the stepladder are locked into place.  Ask someone to hold it for you, if possible.  Clearly dasia and make sure that you can see:  Any grab bars or handrails.  First and last steps.  Where the edge of each step is.  Use tools that help you move around (mobility aids) if they are needed. These include:  Canes.  Walkers.  Scooters.  Crutches.  Turn on the lights when you go into a dark area. Replace any light bulbs as soon as they burn out.  Set up your furniture so you have a clear path. Avoid moving your furniture around.  If any of your floors are uneven, fix them.  If there are any pets around you, be aware of where they are.  Review your medicines with your doctor. Some medicines can make you feel dizzy. This can increase your chance of falling.    Ask your doctor what other things that you can do to help prevent falls.    ADDITIONAL NOTES AND INSTRUCTIONS    Please follow up with your Primary MD in 24-48 hr.  Seek immediate medical care for any new/worsening signs or symptoms.

## 2024-05-07 NOTE — ED ADULT NURSE NOTE - OBJECTIVE STATEMENT
Patient presents to ED in NAD a+ox3 sp mechanical fall with walker, pt hit head on glass door with abrasion to back of head- denies LOC. Pt on Eliquis for afib

## 2024-05-07 NOTE — ED ADULT NURSE NOTE - NSFALLUNIVINTERV_ED_ALL_ED
Bed/Stretcher in lowest position, wheels locked, appropriate side rails in place/Call bell, personal items and telephone in reach/Instruct patient to call for assistance before getting out of bed/chair/stretcher/Non-slip footwear applied when patient is off stretcher/[place] to call system/Physically safe environment - no spills, clutter or unnecessary equipment/Purposeful proactive rounding/Room/bathroom lighting operational, light cord in reach

## 2024-05-07 NOTE — CONSULT NOTE ADULT - SUBJECTIVE AND OBJECTIVE BOX
TRAUMA ACTIVATION LEVEL: ALERT   ACTIVATED BY:  ED  INTUBATED: NO      MECHANISM OF INJURY:   [X] Fall	      GCS: 15 	E: 4	V: 5	M: 6    HPI:    89yM w/ PMHx of HTN, HLD, CAD s/p CABG and stent, A-fib (on Eliquis), b/l glaucoma, chronic constipation, former smoker seen as Trauma Alert s/p mechanical fall +HR, -LOC, +AC (Eliquis, last dose this morning). Patient states that he was using his walker to turn around when he lost his balance, fell and hit the back of his head on a door before falling on his butt. He did not lose consciousness and was able to get up with help. Trauma assessment in ED: ABCs intact , GCS 15 , AAOx3.     PAST MEDICAL & SURGICAL HISTORY:  Afib  Hypertension  Glaucoma  Hyperlipidemia  S/P CABG (coronary artery bypass graft)      Allergies: No Known Allergies      Home Medications:  aspirin 81 mg oral capsule: 1 cap(s) orally once a day (05 Mar 2024 19:19)  atorvastatin 80 mg oral tablet: 1 tab(s) orally once a day (at bedtime) (05 Mar 2024 19:19)  lactulose 10 g/15 mL oral syrup: 15 milliliter(s) orally once a day as needed for  constipation (08 Mar 2024 16:20)  Metoprolol Succinate ER 50 mg oral tablet, extended release: 3 tab(s) orally once a day (05 Mar 2024 19:19)  omeprazole 20 mg oral delayed release tablet: 1 tab(s) orally once a day (05 Mar 2024 19:19)  polyethylene glycol 3350 oral powder for reconstitution: 17 gram(s) orally 2 times a day as needed for  constipation (08 Mar 2024 16:20)  senna leaf extract oral tablet: 2 tab(s) orally once a day (at bedtime) As needed Constipation (08 Mar 2024 16:20)  Zetia 10 mg oral tablet: 1 tab(s) orally once a day (05 Mar 2024 19:19)      ROS: 10-system review is otherwise negative except HPI above.      Primary Survey:    A - airway intact  B - bilateral breath sounds and good chest rise  C - palpable pulses in all extremities  D - GCS 15 on arrival, MENDES  Exposure obtained    Vital Signs Last 24 Hrs  T(C): 36.7 (07 May 2024 17:48), Max: 36.7 (07 May 2024 17:48)  T(F): 98.1 (07 May 2024 17:48), Max: 98.1 (07 May 2024 17:48)  HR: 105 (07 May 2024 17:48) (105 - 105)  BP: 140/82 (07 May 2024 17:48) (140/82 - 140/82)  BP(mean): --  RR: 17 (07 May 2024 17:48) (17 - 17)  SpO2: 100% (07 May 2024 17:48) (100% - 100%)    Parameters below as of 07 May 2024 17:48  Patient On (Oxygen Delivery Method): room air      Secondary Survey:   General: NAD  HEENT: Normocephalic, atraumatic, EOMI, PEERLA. +superficial abrasion to posterior scalp.   Neck: Soft, midline trachea. no c-spine tenderness  Chest: No chest wall tenderness, no subcutaneous emphysema   Cardiac: S1, S2   Respiratory: Bilateral breath sounds, clear and equal bilaterally  Abdomen: Soft, non-distended, non-tender, no rebound, no guarding.  Groin: Normal appearing, pelvis stable   Ext:  Moving b/l upper and lower extremities.   Back: No T/L/S spine tenderness, No palpable runoff/stepoff/deformity  Neuro: Strength 5/5 in all extremities. No focal deficits.     ACCESS / DEVICES:  [x] Peripheral IV    Labs:  CAPILLARY BLOOD GLUCOSE      POCT Blood Glucose.: 84 mg/dL (07 May 2024 18:01)      LFTs:         Coags:        RADIOLOGY & ADDITIONAL STUDIES:      ---------------------------------------------------------------------------------------     TRAUMA ACTIVATION LEVEL: ALERT   ACTIVATED BY:  ED  INTUBATED: NO      MECHANISM OF INJURY:   [X] Fall	      GCS: 15 	E: 4	V: 5	M: 6    HPI:    89yM w/ PMHx of HTN, HLD, CAD s/p CABG and stent, A-fib (on Eliquis), b/l glaucoma, chronic constipation, former smoker seen as Trauma Alert s/p mechanical fall +HR, -LOC, +AC (Eliquis, last dose this morning). Patient uses a walker at baseline and was turning when he lost his balance, fell backwards, and hit the back of his head against a door. He did not lose consciousness and was able to get up with help. Trauma assessment in ED: ABCs intact , GCS 15 , AAOx3.     PAST MEDICAL & SURGICAL HISTORY:  Afib  Hypertension  Glaucoma  Hyperlipidemia  S/P CABG (coronary artery bypass graft)      Allergies: No Known Allergies      Home Medications:  aspirin 81 mg oral capsule: 1 cap(s) orally once a day (05 Mar 2024 19:19)  atorvastatin 80 mg oral tablet: 1 tab(s) orally once a day (at bedtime) (05 Mar 2024 19:19)  lactulose 10 g/15 mL oral syrup: 15 milliliter(s) orally once a day as needed for  constipation (08 Mar 2024 16:20)  Metoprolol Succinate ER 50 mg oral tablet, extended release: 3 tab(s) orally once a day (05 Mar 2024 19:19)  omeprazole 20 mg oral delayed release tablet: 1 tab(s) orally once a day (05 Mar 2024 19:19)  polyethylene glycol 3350 oral powder for reconstitution: 17 gram(s) orally 2 times a day as needed for  constipation (08 Mar 2024 16:20)  senna leaf extract oral tablet: 2 tab(s) orally once a day (at bedtime) As needed Constipation (08 Mar 2024 16:20)  Zetia 10 mg oral tablet: 1 tab(s) orally once a day (05 Mar 2024 19:19)      ROS: 10-system review is otherwise negative except HPI above.      Primary Survey:    A - airway intact  B - bilateral breath sounds and good chest rise  C - palpable pulses in all extremities  D - GCS 15 on arrival, MENDES  Exposure obtained    Vital Signs Last 24 Hrs  T(C): 36.7 (07 May 2024 17:48), Max: 36.7 (07 May 2024 17:48)  T(F): 98.1 (07 May 2024 17:48), Max: 98.1 (07 May 2024 17:48)  HR: 105 (07 May 2024 17:48) (105 - 105)  BP: 140/82 (07 May 2024 17:48) (140/82 - 140/82)  BP(mean): --  RR: 17 (07 May 2024 17:48) (17 - 17)  SpO2: 100% (07 May 2024 17:48) (100% - 100%)    Parameters below as of 07 May 2024 17:48  Patient On (Oxygen Delivery Method): room air      Secondary Survey:   General: NAD  HEENT: Normocephalic, atraumatic, EOMI, PEERLA. +superficial abrasion to posterior scalp.   Neck: Soft, midline trachea. no c-spine tenderness  Chest: No chest wall tenderness, no subcutaneous emphysema   Cardiac: S1, S2   Respiratory: Bilateral breath sounds, clear and equal bilaterally  Abdomen: Soft, non-distended, non-tender, no rebound, no guarding.  Groin: Normal appearing, pelvis stable   Ext:  Moving b/l upper and lower extremities.   Back: No T/L/S spine tenderness, No palpable runoff/stepoff/deformity  Neuro: Strength 5/5 in all extremities. No focal deficits.     ACCESS / DEVICES:  [x] Peripheral IV    Labs:  CAPILLARY BLOOD GLUCOSE      POCT Blood Glucose.: 84 mg/dL (07 May 2024 18:01)      LFTs:         Coags:        RADIOLOGY & ADDITIONAL STUDIES:      ---------------------------------------------------------------------------------------     TRAUMA ACTIVATION LEVEL: ALERT   ACTIVATED BY:  ED  INTUBATED: NO      MECHANISM OF INJURY:   [X] Fall	      GCS: 15 	E: 4	V: 5	M: 6    HPI:    89yM w/ PMHx of HTN, HLD, CAD s/p CABG and stent, A-fib (on Eliquis), b/l glaucoma, chronic constipation, former smoker seen as Trauma Alert s/p mechanical fall +HR, -LOC, +AC (Eliquis, last dose this morning). Patient uses a walker at baseline and was turning when he lost his balance, fell backwards, and hit the back of his head against a door. He did not lose consciousness and was able to get up with help. He lives with his wife who has dementia. Trauma assessment in ED: ABCs intact , GCS 15 , AAOx3.     PAST MEDICAL & SURGICAL HISTORY:  Afib  Hypertension  Glaucoma  Hyperlipidemia  S/P CABG (coronary artery bypass graft)      Allergies: No Known Allergies      Home Medications:  aspirin 81 mg oral capsule: 1 cap(s) orally once a day (05 Mar 2024 19:19)  atorvastatin 80 mg oral tablet: 1 tab(s) orally once a day (at bedtime) (05 Mar 2024 19:19)  lactulose 10 g/15 mL oral syrup: 15 milliliter(s) orally once a day as needed for  constipation (08 Mar 2024 16:20)  Metoprolol Succinate ER 50 mg oral tablet, extended release: 3 tab(s) orally once a day (05 Mar 2024 19:19)  omeprazole 20 mg oral delayed release tablet: 1 tab(s) orally once a day (05 Mar 2024 19:19)  polyethylene glycol 3350 oral powder for reconstitution: 17 gram(s) orally 2 times a day as needed for  constipation (08 Mar 2024 16:20)  senna leaf extract oral tablet: 2 tab(s) orally once a day (at bedtime) As needed Constipation (08 Mar 2024 16:20)  Zetia 10 mg oral tablet: 1 tab(s) orally once a day (05 Mar 2024 19:19)      ROS: 10-system review is otherwise negative except HPI above.      Primary Survey:    A - airway intact  B - bilateral breath sounds and good chest rise  C - palpable pulses in all extremities  D - GCS 15 on arrival, MENDES  Exposure obtained    Vital Signs Last 24 Hrs  T(C): 36.7 (07 May 2024 17:48), Max: 36.7 (07 May 2024 17:48)  T(F): 98.1 (07 May 2024 17:48), Max: 98.1 (07 May 2024 17:48)  HR: 105 (07 May 2024 17:48) (105 - 105)  BP: 140/82 (07 May 2024 17:48) (140/82 - 140/82)  BP(mean): --  RR: 17 (07 May 2024 17:48) (17 - 17)  SpO2: 100% (07 May 2024 17:48) (100% - 100%)    Parameters below as of 07 May 2024 17:48  Patient On (Oxygen Delivery Method): room air      Secondary Survey:   General: NAD  HEENT: Normocephalic, atraumatic, EOMI, PEERLA. +superficial abrasion to posterior scalp.   Neck: Soft, midline trachea. no c-spine tenderness  Chest: No chest wall tenderness, no subcutaneous emphysema   Cardiac: S1, S2   Respiratory: Bilateral breath sounds, clear and equal bilaterally  Abdomen: Soft, non-distended, non-tender, no rebound, no guarding.  Groin: Normal appearing, pelvis stable   Ext:  Moving b/l upper and lower extremities.   Back: No T/L/S spine tenderness, No palpable runoff/stepoff/deformity  Neuro: Strength 5/5 in all extremities. No focal deficits.     ACCESS / DEVICES:  [x] Peripheral IV    Labs:  CAPILLARY BLOOD GLUCOSE      POCT Blood Glucose.: 84 mg/dL (07 May 2024 18:01)      LFTs:         Coags:        RADIOLOGY & ADDITIONAL STUDIES:      ---------------------------------------------------------------------------------------     TRAUMA ACTIVATION LEVEL: ALERT   ACTIVATED BY:  ED  INTUBATED: NO      MECHANISM OF INJURY:   [X] Fall	      GCS: 15 	E: 4	V: 5	M: 6    HPI:    89yM w/ PMHx of HTN, HLD, CAD s/p CABG and stent, A-fib (on Eliquis), b/l glaucoma, chronic constipation, former smoker seen as Trauma Alert s/p mechanical fall +HR, -LOC, +AC (Eliquis, last dose this morning). Patient uses a walker at baseline and was turning when he lost his balance, fell backwards, and hit the back of his head against a door. He did not lose consciousness and was able to get up with help. He lives with his wife who has dementia. Trauma assessment in ED: ABCs intact , GCS 15 , AAOx3.     PAST MEDICAL & SURGICAL HISTORY:  Afib  Hypertension  Glaucoma  Hyperlipidemia  S/P CABG (coronary artery bypass graft)      Allergies: No Known Allergies      Home Medications:  aspirin 81 mg oral capsule: 1 cap(s) orally once a day (05 Mar 2024 19:19)  atorvastatin 80 mg oral tablet: 1 tab(s) orally once a day (at bedtime) (05 Mar 2024 19:19)  lactulose 10 g/15 mL oral syrup: 15 milliliter(s) orally once a day as needed for  constipation (08 Mar 2024 16:20)  Metoprolol Succinate ER 50 mg oral tablet, extended release: 3 tab(s) orally once a day (05 Mar 2024 19:19)  omeprazole 20 mg oral delayed release tablet: 1 tab(s) orally once a day (05 Mar 2024 19:19)  polyethylene glycol 3350 oral powder for reconstitution: 17 gram(s) orally 2 times a day as needed for  constipation (08 Mar 2024 16:20)  senna leaf extract oral tablet: 2 tab(s) orally once a day (at bedtime) As needed Constipation (08 Mar 2024 16:20)  Zetia 10 mg oral tablet: 1 tab(s) orally once a day (05 Mar 2024 19:19)      ROS: 10-system review is otherwise negative except HPI above.      Primary Survey:    A - airway intact  B - bilateral breath sounds and good chest rise  C - palpable pulses in all extremities  D - GCS 15 on arrival, MENDES  Exposure obtained    Vital Signs Last 24 Hrs  T(C): 36.7 (07 May 2024 17:48), Max: 36.7 (07 May 2024 17:48)  T(F): 98.1 (07 May 2024 17:48), Max: 98.1 (07 May 2024 17:48)  HR: 105 (07 May 2024 17:48) (105 - 105)  BP: 140/82 (07 May 2024 17:48) (140/82 - 140/82)  BP(mean): --  RR: 17 (07 May 2024 17:48) (17 - 17)  SpO2: 100% (07 May 2024 17:48) (100% - 100%)    Parameters below as of 07 May 2024 17:48  Patient On (Oxygen Delivery Method): room air      Secondary Survey:   General: NAD  HEENT: Normocephalic, atraumatic, EOMI, PEERLA. +superficial abrasion to posterior scalp.   Neck: Soft, midline trachea. no c-spine tenderness  Chest: No chest wall tenderness, no subcutaneous emphysema   Cardiac: S1, S2   Respiratory: Bilateral breath sounds, clear and equal bilaterally  Abdomen: Soft, non-distended, non-tender, no rebound, no guarding.  Groin: Normal appearing, pelvis stable   Ext:  Moving b/l upper and lower extremities.   Back: No T/L/S spine tenderness, No palpable runoff/stepoff/deformity  Neuro: Strength 5/5 in all extremities. No focal deficits.     ACCESS / DEVICES:  [x] Peripheral IV    Labs:    CBC Full  -  ( 07 May 2024 18:38 )  WBC Count : 6.73 K/uL  RBC Count : 3.43 M/uL  Hemoglobin : 8.9 g/dL  Hematocrit : 29.6 %  Platelet Count - Automated : 239 K/uL  Mean Cell Volume : 86.3 fL  Mean Cell Hemoglobin : 25.9 pg  Mean Cell Hemoglobin Concentration : 30.1 g/dL  Auto Neutrophil # : 3.64 K/uL  Auto Lymphocyte # : 2.08 K/uL  Auto Monocyte # : 0.78 K/uL  Auto Eosinophil # : 0.16 K/uL  Auto Basophil # : 0.03 K/uL  Auto Neutrophil % : 54.1 %  Auto Lymphocyte % : 30.9 %  Auto Monocyte % : 11.6 %  Auto Eosinophil % : 2.4 %  Auto Basophil % : 0.4 %    05-07    142  |  107  |  15  ----------------------------<  92  4.9   |  24  |  0.8    Ca    9.2      07 May 2024 18:38    TPro  6.7  /  Alb  3.9  /  TBili  0.6  /  DBili  x   /  AST  30  /  ALT  13  /  AlkPhos  82  05-07    LIVER FUNCTIONS - ( 07 May 2024 18:38 )  Alb: 3.9 g/dL / Pro: 6.7 g/dL / ALK PHOS: 82 U/L / ALT: 13 U/L / AST: 30 U/L / GGT: x           CAPILLARY BLOOD GLUCOSE      POCT Blood Glucose.: 84 mg/dL (07 May 2024 18:01)    Urinalysis Basic - ( 07 May 2024 20:21 )    Color: Yellow / Appearance: Clear / SG: >1.030 / pH: x  Gluc: x / Ketone: Negative mg/dL  / Bili: Negative / Urobili: 0.2 mg/dL   Blood: x / Protein: 30 mg/dL / Nitrite: Negative   Leuk Esterase: Negative / RBC: 2 /HPF / WBC 2 /HPF   Sq Epi: x / Non Sq Epi: 1 /HPF / Bacteria: Negative /HPF      PT/INR - ( 07 May 2024 18:38 )   PT: 20.10 sec;   INR: 1.75 ratio         PTT - ( 07 May 2024 18:38 )  PTT:35.5 sec  CAPILLARY BLOOD GLUCOSE      POCT Blood Glucose.: 84 mg/dL (07 May 2024 18:01)        RADIOLOGY & ADDITIONAL STUDIES:    < from: CT Head No Cont (05.07.24 @ 18:55) >  IMPRESSION:      In comparison with the prior CT scan of the brain dated 12/12/2023:    No acute intracranial hemorrhage.    Stable examination.    < end of copied text >        < from: CT Chest w/ IV Cont (05.07.24 @ 19:00) >  IMPRESSION:      No evidence of acute traumatic pathology involving chest, abdomen or   pelvis    --- End of Report ---    < end of copied text >      < from: CT Abdomen and Pelvis w/ IV Cont (05.07.24 @ 19:00) >  IMPRESSION:      No evidence of acute traumatic pathology involving chest, abdomen or   pelvis    --- End of Report ---    < end of copied text >       TRAUMA ACTIVATION LEVEL: ALERT   ACTIVATED BY:  ED  INTUBATED: NO      MECHANISM OF INJURY:   [X] Fall	      GCS: 15 	E: 4	V: 5	M: 6    HPI:    89yM w/ PMHx of HTN, HLD, CAD s/p CABG and stent, A-fib (on Eliquis), b/l glaucoma, chronic constipation, former smoker seen as Trauma Alert s/p mechanical fall +HT, -LOC, +AC (Eliquis, last dose this morning). Patient uses a walker at baseline and was turning when he lost his balance, fell backwards, and hit the back of his head against a door. He did not lose consciousness and was able to get up with help. He lives with his wife who has dementia. Trauma assessment in ED: ABCs intact , GCS 15 , AAOx3.     PAST MEDICAL & SURGICAL HISTORY:  Afib  Hypertension  Glaucoma  Hyperlipidemia  S/P CABG (coronary artery bypass graft)      Allergies: No Known Allergies      Home Medications:  aspirin 81 mg oral capsule: 1 cap(s) orally once a day (05 Mar 2024 19:19)  atorvastatin 80 mg oral tablet: 1 tab(s) orally once a day (at bedtime) (05 Mar 2024 19:19)  lactulose 10 g/15 mL oral syrup: 15 milliliter(s) orally once a day as needed for  constipation (08 Mar 2024 16:20)  Metoprolol Succinate ER 50 mg oral tablet, extended release: 3 tab(s) orally once a day (05 Mar 2024 19:19)  omeprazole 20 mg oral delayed release tablet: 1 tab(s) orally once a day (05 Mar 2024 19:19)  polyethylene glycol 3350 oral powder for reconstitution: 17 gram(s) orally 2 times a day as needed for  constipation (08 Mar 2024 16:20)  senna leaf extract oral tablet: 2 tab(s) orally once a day (at bedtime) As needed Constipation (08 Mar 2024 16:20)  Zetia 10 mg oral tablet: 1 tab(s) orally once a day (05 Mar 2024 19:19)      ROS: 10-system review is otherwise negative except HPI above.      Primary Survey:    A - airway intact  B - bilateral breath sounds and good chest rise  C - palpable pulses in all extremities  D - GCS 15 on arrival, MENDES  Exposure obtained    Vital Signs Last 24 Hrs  T(C): 36.7 (07 May 2024 17:48), Max: 36.7 (07 May 2024 17:48)  T(F): 98.1 (07 May 2024 17:48), Max: 98.1 (07 May 2024 17:48)  HR: 105 (07 May 2024 17:48) (105 - 105)  BP: 140/82 (07 May 2024 17:48) (140/82 - 140/82)  BP(mean): --  RR: 17 (07 May 2024 17:48) (17 - 17)  SpO2: 100% (07 May 2024 17:48) (100% - 100%)    Parameters below as of 07 May 2024 17:48  Patient On (Oxygen Delivery Method): room air      Secondary Survey:   General: NAD  HEENT: Normocephalic, EOMI, PEERLA. +superficial abrasion to posterior scalp.   Neck: Soft, midline trachea. no c-spine tenderness  Chest: No chest wall tenderness, no subcutaneous emphysema   Cardiac: S1, S2   Respiratory: Bilateral breath sounds, clear and equal bilaterally  Abdomen: Soft, non-distended, non-tender, no rebound, no guarding.  Groin: Normal appearing, pelvis stable   Ext:  Moving b/l upper and lower extremities.   Back: No T/L/S spine tenderness, No palpable runoff/stepoff/deformity  Neuro: Strength 5/5 in all extremities. No focal deficits.     ACCESS / DEVICES:  [x] Peripheral IV    Labs:    CBC Full  -  ( 07 May 2024 18:38 )  WBC Count : 6.73 K/uL  RBC Count : 3.43 M/uL  Hemoglobin : 8.9 g/dL  Hematocrit : 29.6 %  Platelet Count - Automated : 239 K/uL  Mean Cell Volume : 86.3 fL  Mean Cell Hemoglobin : 25.9 pg  Mean Cell Hemoglobin Concentration : 30.1 g/dL  Auto Neutrophil # : 3.64 K/uL  Auto Lymphocyte # : 2.08 K/uL  Auto Monocyte # : 0.78 K/uL  Auto Eosinophil # : 0.16 K/uL  Auto Basophil # : 0.03 K/uL  Auto Neutrophil % : 54.1 %  Auto Lymphocyte % : 30.9 %  Auto Monocyte % : 11.6 %  Auto Eosinophil % : 2.4 %  Auto Basophil % : 0.4 %    05-07    142  |  107  |  15  ----------------------------<  92  4.9   |  24  |  0.8    Ca    9.2      07 May 2024 18:38    TPro  6.7  /  Alb  3.9  /  TBili  0.6  /  DBili  x   /  AST  30  /  ALT  13  /  AlkPhos  82  05-07    LIVER FUNCTIONS - ( 07 May 2024 18:38 )  Alb: 3.9 g/dL / Pro: 6.7 g/dL / ALK PHOS: 82 U/L / ALT: 13 U/L / AST: 30 U/L / GGT: x           CAPILLARY BLOOD GLUCOSE      POCT Blood Glucose.: 84 mg/dL (07 May 2024 18:01)    Urinalysis Basic - ( 07 May 2024 20:21 )    Color: Yellow / Appearance: Clear / SG: >1.030 / pH: x  Gluc: x / Ketone: Negative mg/dL  / Bili: Negative / Urobili: 0.2 mg/dL   Blood: x / Protein: 30 mg/dL / Nitrite: Negative   Leuk Esterase: Negative / RBC: 2 /HPF / WBC 2 /HPF   Sq Epi: x / Non Sq Epi: 1 /HPF / Bacteria: Negative /HPF      PT/INR - ( 07 May 2024 18:38 )   PT: 20.10 sec;   INR: 1.75 ratio         PTT - ( 07 May 2024 18:38 )  PTT:35.5 sec  CAPILLARY BLOOD GLUCOSE      POCT Blood Glucose.: 84 mg/dL (07 May 2024 18:01)        RADIOLOGY & ADDITIONAL STUDIES:    < from: CT Head No Cont (05.07.24 @ 18:55) >  IMPRESSION:      In comparison with the prior CT scan of the brain dated 12/12/2023:    No acute intracranial hemorrhage.    Stable examination.    < end of copied text >        < from: CT Chest w/ IV Cont (05.07.24 @ 19:00) >  IMPRESSION:      No evidence of acute traumatic pathology involving chest, abdomen or   pelvis    --- End of Report ---    < end of copied text >      < from: CT Abdomen and Pelvis w/ IV Cont (05.07.24 @ 19:00) >  IMPRESSION:      No evidence of acute traumatic pathology involving chest, abdomen or   pelvis    --- End of Report ---    < end of copied text >       TRAUMA ACTIVATION LEVEL: ALERT   ACTIVATED BY:  ED  INTUBATED: NO      MECHANISM OF INJURY:   [X] Fall	      GCS: 15 	E: 4	V: 5	M: 6    HPI:    89yM w/ PMHx of HTN, HLD, CAD s/p CABG and stent, A-fib (on Eliquis), b/l glaucoma, chronic constipation, former smoker seen as Trauma Alert s/p mechanical fall +HT, -LOC, +AC (Eliquis, last dose this morning). Patient uses a walker at baseline and was turning when he lost his balance, fell backwards, and hit the back of his head against a door. He did not lose consciousness and was able to get up with help. He lives with his wife who has dementia. Trauma assessment in ED: ABCs intact , GCS 15 , AAOx3.     PAST MEDICAL & SURGICAL HISTORY:  Afib  Hypertension  Glaucoma  Hyperlipidemia  S/P CABG (coronary artery bypass graft)      Allergies: No Known Allergies      Home Medications:  aspirin 81 mg oral capsule: 1 cap(s) orally once a day (05 Mar 2024 19:19)  atorvastatin 80 mg oral tablet: 1 tab(s) orally once a day (at bedtime) (05 Mar 2024 19:19)  lactulose 10 g/15 mL oral syrup: 15 milliliter(s) orally once a day as needed for  constipation (08 Mar 2024 16:20)  Metoprolol Succinate ER 50 mg oral tablet, extended release: 3 tab(s) orally once a day (05 Mar 2024 19:19)  omeprazole 20 mg oral delayed release tablet: 1 tab(s) orally once a day (05 Mar 2024 19:19)  polyethylene glycol 3350 oral powder for reconstitution: 17 gram(s) orally 2 times a day as needed for  constipation (08 Mar 2024 16:20)  senna leaf extract oral tablet: 2 tab(s) orally once a day (at bedtime) As needed Constipation (08 Mar 2024 16:20)  Zetia 10 mg oral tablet: 1 tab(s) orally once a day (05 Mar 2024 19:19)      ROS: 10-system review is otherwise negative except HPI above.      Primary Survey:    A - airway intact  B - bilateral breath sounds and good chest rise  C - palpable pulses in all extremities  D - GCS 15 on arrival, MENDES  Exposure obtained    Vital Signs Last 24 Hrs  T(C): 36.7 (07 May 2024 17:48), Max: 36.7 (07 May 2024 17:48)  T(F): 98.1 (07 May 2024 17:48), Max: 98.1 (07 May 2024 17:48)  HR: 105 (07 May 2024 17:48) (105 - 105)  BP: 140/82 (07 May 2024 17:48) (140/82 - 140/82)  BP(mean): --  RR: 17 (07 May 2024 17:48) (17 - 17)  SpO2: 100% (07 May 2024 17:48) (100% - 100%)    Parameters below as of 07 May 2024 17:48  Patient On (Oxygen Delivery Method): room air      Secondary Survey:   General: NAD  HEENT: Normocephalic, EOMI, PEERLA. +superficial abrasion to posterior scalp.   Neck: Soft, midline trachea. no c-spine tenderness  Chest: No chest wall tenderness, no subcutaneous emphysema   Cardiac: S1, S2   Respiratory: Bilateral breath sounds, clear and equal bilaterally  Abdomen: Soft, non-distended, non-tender, no rebound, no guarding.  Groin: Normal appearing, pelvis stable   Ext:  Moving b/l upper and lower extremities.   Back: No T/L/S spine tenderness, No palpable runoff/stepoff/deformity  Neuro: Strength 5/5 in all extremities. No focal deficits.     ACCESS / DEVICES:  [x] Peripheral IV    Labs:    CBC Full  -  ( 07 May 2024 18:38 )  WBC Count : 6.73 K/uL  RBC Count : 3.43 M/uL  Hemoglobin : 8.9 g/dL  Hematocrit : 29.6 %  Platelet Count - Automated : 239 K/uL  Mean Cell Volume : 86.3 fL  Mean Cell Hemoglobin : 25.9 pg  Mean Cell Hemoglobin Concentration : 30.1 g/dL  Auto Neutrophil # : 3.64 K/uL  Auto Lymphocyte # : 2.08 K/uL  Auto Monocyte # : 0.78 K/uL  Auto Eosinophil # : 0.16 K/uL  Auto Basophil # : 0.03 K/uL  Auto Neutrophil % : 54.1 %  Auto Lymphocyte % : 30.9 %  Auto Monocyte % : 11.6 %  Auto Eosinophil % : 2.4 %  Auto Basophil % : 0.4 %    05-07    142  |  107  |  15  ----------------------------<  92  4.9   |  24  |  0.8    Ca    9.2      07 May 2024 18:38    TPro  6.7  /  Alb  3.9  /  TBili  0.6  /  DBili  x   /  AST  30  /  ALT  13  /  AlkPhos  82  05-07    LIVER FUNCTIONS - ( 07 May 2024 18:38 )  Alb: 3.9 g/dL / Pro: 6.7 g/dL / ALK PHOS: 82 U/L / ALT: 13 U/L / AST: 30 U/L / GGT: x           CAPILLARY BLOOD GLUCOSE      POCT Blood Glucose.: 84 mg/dL (07 May 2024 18:01)    Urinalysis Basic - ( 07 May 2024 20:21 )    Color: Yellow / Appearance: Clear / SG: >1.030 / pH: x  Gluc: x / Ketone: Negative mg/dL  / Bili: Negative / Urobili: 0.2 mg/dL   Blood: x / Protein: 30 mg/dL / Nitrite: Negative   Leuk Esterase: Negative / RBC: 2 /HPF / WBC 2 /HPF   Sq Epi: x / Non Sq Epi: 1 /HPF / Bacteria: Negative /HPF      PT/INR - ( 07 May 2024 18:38 )   PT: 20.10 sec;   INR: 1.75 ratio         PTT - ( 07 May 2024 18:38 )  PTT:35.5 sec  CAPILLARY BLOOD GLUCOSE      POCT Blood Glucose.: 84 mg/dL (07 May 2024 18:01)        RADIOLOGY & ADDITIONAL STUDIES:    < from: CT Head No Cont (05.07.24 @ 18:55) >  IMPRESSION:      In comparison with the prior CT scan of the brain dated 12/12/2023:    No acute intracranial hemorrhage.    Stable examination.    < end of copied text >        < from: CT Chest w/ IV Cont (05.07.24 @ 19:00) >  IMPRESSION:      No evidence of acute traumatic pathology involving chest, abdomen or   pelvis    --- End of Report ---    < end of copied text >      < from: CT Abdomen and Pelvis w/ IV Cont (05.07.24 @ 19:00) >  IMPRESSION:      No evidence of acute traumatic pathology involving chest, abdomen or   pelvis    --- End of Report ---    < end of copied text >    < from: Xray Pelvis AP only (05.07.24 @ 19:15) >    < from: CT Cervical Spine No Cont (05.07.24 @ 18:55) >    IMPRESSION:    No acute cervical fracture or facet subluxation.    --- End of Report ---    < end of copied text >      < from: Xray Pelvis AP only (05.07.24 @ 19:15) >  Impression:    No acute fracture or malalignment.     Osteoarthritis right hip joint.  Trabeculated bladder filled with contrast. Nonobstructing proximal   collecting system    --- End of Report ---    < end of copied text >   TRAUMA ACTIVATION LEVEL: ALERT   ACTIVATED BY:  ED  INTUBATED: NO    MECHANISM OF INJURY:   [X] Fall	    GCS: 15 	E: 4	V: 5	M: 6    HPI:    89yM w/ PMHx of HTN, HLD, CAD s/p CABG and stent, A-fib (on Eliquis), b/l glaucoma, chronic constipation, former smoker seen as Trauma Alert s/p mechanical fall +HT, -LOC, +AC (Eliquis, last dose this morning). Patient uses a walker at baseline and was turning when he lost his balance, fell backwards, and hit the back of his head against a door. He did not lose consciousness and was able to get up with help. He lives with his wife who has dementia. Trauma assessment in ED: ABCs intact , GCS 15 , AAOx3.     PAST MEDICAL & SURGICAL HISTORY:  Afib  Hypertension  Glaucoma  Hyperlipidemia  S/P CABG (coronary artery bypass graft)    Allergies: No Known Allergies  Home Medications:  aspirin 81 mg oral capsule: 1 cap(s) orally once a day (05 Mar 2024 19:19)  atorvastatin 80 mg oral tablet: 1 tab(s) orally once a day (at bedtime) (05 Mar 2024 19:19)  lactulose 10 g/15 mL oral syrup: 15 milliliter(s) orally once a day as needed for  constipation (08 Mar 2024 16:20)  Metoprolol Succinate ER 50 mg oral tablet, extended release: 3 tab(s) orally once a day (05 Mar 2024 19:19)  omeprazole 20 mg oral delayed release tablet: 1 tab(s) orally once a day (05 Mar 2024 19:19)  polyethylene glycol 3350 oral powder for reconstitution: 17 gram(s) orally 2 times a day as needed for  constipation (08 Mar 2024 16:20)  senna leaf extract oral tablet: 2 tab(s) orally once a day (at bedtime) As needed Constipation (08 Mar 2024 16:20)  Zetia 10 mg oral tablet: 1 tab(s) orally once a day (05 Mar 2024 19:19)      ROS: 10-system review is otherwise negative except HPI above.      Primary Survey:    A - airway intact  B - bilateral breath sounds and good chest rise  C - palpable pulses in all extremities  D - GCS 15 on arrival, MENDES  Exposure obtained    Vital Signs Last 24 Hrs  T(C): 36.7 (07 May 2024 17:48), Max: 36.7 (07 May 2024 17:48)  T(F): 98.1 (07 May 2024 17:48), Max: 98.1 (07 May 2024 17:48)  HR: 105 (07 May 2024 17:48) (105 - 105)  BP: 140/82 (07 May 2024 17:48) (140/82 - 140/82)  BP(mean): --  RR: 17 (07 May 2024 17:48) (17 - 17)  SpO2: 100% (07 May 2024 17:48) (100% - 100%)    Parameters below as of 07 May 2024 17:48  Patient On (Oxygen Delivery Method): room air      Secondary Survey:   General: NAD  HEENT: Normocephalic, EOMI, PEERLA. +superficial abrasion to posterior scalp.   Neck: Soft, midline trachea. no c-spine tenderness  Chest: No chest wall tenderness, no subcutaneous emphysema   Cardiac: S1, S2   Respiratory: Bilateral breath sounds, clear and equal bilaterally  Abdomen: Soft, non-distended, non-tender, no rebound, no guarding.  Groin: Normal appearing, pelvis stable   Ext:  Moving b/l upper and lower extremities.   Back: No T/L/S spine tenderness, No palpable runoff/stepoff/deformity  Neuro: Strength 5/5 in all extremities. No focal deficits.     ACCESS / DEVICES:  [x] Peripheral IV    Labs:    CBC Full  -  ( 07 May 2024 18:38 )  WBC Count : 6.73 K/uL  RBC Count : 3.43 M/uL  Hemoglobin : 8.9 g/dL  Hematocrit : 29.6 %  Platelet Count - Automated : 239 K/uL  Mean Cell Volume : 86.3 fL  Mean Cell Hemoglobin : 25.9 pg  Mean Cell Hemoglobin Concentration : 30.1 g/dL  Auto Neutrophil # : 3.64 K/uL  Auto Lymphocyte # : 2.08 K/uL  Auto Monocyte # : 0.78 K/uL  Auto Eosinophil # : 0.16 K/uL  Auto Basophil # : 0.03 K/uL  Auto Neutrophil % : 54.1 %  Auto Lymphocyte % : 30.9 %  Auto Monocyte % : 11.6 %  Auto Eosinophil % : 2.4 %  Auto Basophil % : 0.4 %    05-07    142  |  107  |  15  ----------------------------<  92  4.9   |  24  |  0.8    Ca    9.2      07 May 2024 18:38    TPro  6.7  /  Alb  3.9  /  TBili  0.6  /  DBili  x   /  AST  30  /  ALT  13  /  AlkPhos  82  05-07    LIVER FUNCTIONS - ( 07 May 2024 18:38 )  Alb: 3.9 g/dL / Pro: 6.7 g/dL / ALK PHOS: 82 U/L / ALT: 13 U/L / AST: 30 U/L / GGT: x           CAPILLARY BLOOD GLUCOSE      POCT Blood Glucose.: 84 mg/dL (07 May 2024 18:01)    Urinalysis Basic - ( 07 May 2024 20:21 )    Color: Yellow / Appearance: Clear / SG: >1.030 / pH: x  Gluc: x / Ketone: Negative mg/dL  / Bili: Negative / Urobili: 0.2 mg/dL   Blood: x / Protein: 30 mg/dL / Nitrite: Negative   Leuk Esterase: Negative / RBC: 2 /HPF / WBC 2 /HPF   Sq Epi: x / Non Sq Epi: 1 /HPF / Bacteria: Negative /HPF      PT/INR - ( 07 May 2024 18:38 )   PT: 20.10 sec;   INR: 1.75 ratio         PTT - ( 07 May 2024 18:38 )  PTT:35.5 sec  CAPILLARY BLOOD GLUCOSE      POCT Blood Glucose.: 84 mg/dL (07 May 2024 18:01)        RADIOLOGY & ADDITIONAL STUDIES:    < from: CT Head No Cont (05.07.24 @ 18:55) >  IMPRESSION:      In comparison with the prior CT scan of the brain dated 12/12/2023:    No acute intracranial hemorrhage.    Stable examination.    < end of copied text >        < from: CT Chest w/ IV Cont (05.07.24 @ 19:00) >  IMPRESSION:      No evidence of acute traumatic pathology involving chest, abdomen or   pelvis    --- End of Report ---    < end of copied text >      < from: CT Abdomen and Pelvis w/ IV Cont (05.07.24 @ 19:00) >  IMPRESSION:      No evidence of acute traumatic pathology involving chest, abdomen or   pelvis    --- End of Report ---    < end of copied text >    < from: Xray Pelvis AP only (05.07.24 @ 19:15) >    < from: CT Cervical Spine No Cont (05.07.24 @ 18:55) >    IMPRESSION:    No acute cervical fracture or facet subluxation.    --- End of Report ---    < end of copied text >      < from: Xray Pelvis AP only (05.07.24 @ 19:15) >  Impression:    No acute fracture or malalignment.     Osteoarthritis right hip joint.  Trabeculated bladder filled with contrast. Nonobstructing proximal   collecting system    --- End of Report ---    < end of copied text >

## 2024-05-07 NOTE — CONSULT NOTE ADULT - ATTENDING COMMENTS
Trauma Attending H&P Attestation    Patient seen and evaluated with the trauma team in the trauma bay upon arrival. All pertinent labs and radiographic imaging reviewed, pending final reports. Outpatient medications reviewed, including the presence of anticoagulants, if applicable. I agree with the resident's note above, including the physical exam findings, assessment and plan as documented with the following adjustments.     Trauma Level: [ ] Code  [x ] Alert  [ ] Consult [ ] Transfer in  Patient is seen at the bedside at 18:01  Activation by:  [ x] ED physician [ x] EMS  Intubated in Field? [ ] Yes [x ] No  Intubated in ED? [ ] Yes [x ] No  Intubated in Trauma [place]? [ ] Yes [x ] No    NICKY JOSHI Patient is a 89y old  Male who presents with a chief complaint of mechanical fall on AC with posterior scalp hematoma with abrasion     Patient presented with GCS [15 ] AAOx3  upon arrival to the trauma bay.  Allergies  No Known Allergies  Intolerances    PAST MEDICAL & SURGICAL HISTORY:  Afib  Hypertension  Glaucoma  Hyperlipidemia  S/P CABG (coronary artery bypass graft)    On AC/Antiplatelets [x ] Yes [ ] No              [ x] NOVACs, [ ] Coumadin, [ ] ASA, [ ] Antiplatelets     Vital Signs Last 24 Hrs  T(C): 36.7 (07 May 2024 17:48), Max: 36.7 (07 May 2024 17:48)  T(F): 98.1 (07 May 2024 17:48), Max: 98.1 (07 May 2024 17:48)  HR: 99 (07 May 2024 22:23) (99 - 109)  BP: 138/86 (07 May 2024 22:01) (138/86 - 140/82)  BP(mean): --  RR: 18 (07 May 2024 22:23) (17 - 18)  SpO2: 100% (07 May 2024 22:23) (100% - 100%)    Parameters below as of 07 May 2024 22:23  Patient On (Oxygen Delivery Method): room air    PE: abrasion occipital scalp with hematoma  Assessment: Fall on AC  PLAN  - supportive care  - GI/DVT prophylaxis  - pain management  - repeat studies as needed  - complete and follow up on trauma work up included but not limites to                          [x ] CXR [x ] PXR [x ] Extremities X-RAYs                          [ x] NCHCT [x ] C-Spine CT [ ] CT Chest [ ] CT Abdomen/Pelvis  [x ] FAST [ ] Other                          [x ] Trauma Labs   [ ] Toxicology                    I independently read and reviewed the above studies   - Follow up Consults  [ ] Neurosurgery [ ] Orthopaedics [ ] Plastics [ ] Fascial/OMFS [ ] Opthalmology   [ ] Urology  [ ] ENT  [ ] Pediatric ICU                                  [ ] SICU/SDU [ ] Burn/Burn ICU  [ ] Medicine [ ] Geriatrics [ ] Cardiology/EP [ ] Hospice/Palliative Care                                      - IV ABx give as indicated [ ] Yes [ x] No  - Tetanus given as indicated [ ] Yes [x ] No  - Seizures prophylaxis  [ ] Yes,  [x ] No  - No further intervention from the trauma stand point     Beatrice Syed MD, FACS  Trauma/ACS/Surgical Critical Care Attending

## 2024-05-13 ENCOUNTER — EMERGENCY (EMERGENCY)
Facility: HOSPITAL | Age: 89
LOS: 0 days | Discharge: HOME | End: 2024-05-13
Attending: EMERGENCY MEDICINE
Payer: COMMERCIAL

## 2024-05-13 VITALS
HEIGHT: 70 IN | RESPIRATION RATE: 20 BRPM | DIASTOLIC BLOOD PRESSURE: 66 MMHG | WEIGHT: 139.99 LBS | SYSTOLIC BLOOD PRESSURE: 114 MMHG | HEART RATE: 148 BPM | TEMPERATURE: 97 F | OXYGEN SATURATION: 93 %

## 2024-05-13 VITALS
RESPIRATION RATE: 20 BRPM | DIASTOLIC BLOOD PRESSURE: 74 MMHG | SYSTOLIC BLOOD PRESSURE: 133 MMHG | OXYGEN SATURATION: 97 % | HEART RATE: 80 BPM

## 2024-05-13 DIAGNOSIS — I48.91 UNSPECIFIED ATRIAL FIBRILLATION: ICD-10-CM

## 2024-05-13 DIAGNOSIS — Z95.1 PRESENCE OF AORTOCORONARY BYPASS GRAFT: Chronic | ICD-10-CM

## 2024-05-13 DIAGNOSIS — D64.9 ANEMIA, UNSPECIFIED: ICD-10-CM

## 2024-05-13 DIAGNOSIS — R32 UNSPECIFIED URINARY INCONTINENCE: ICD-10-CM

## 2024-05-13 DIAGNOSIS — E78.5 HYPERLIPIDEMIA, UNSPECIFIED: ICD-10-CM

## 2024-05-13 DIAGNOSIS — Z95.1 PRESENCE OF AORTOCORONARY BYPASS GRAFT: ICD-10-CM

## 2024-05-13 DIAGNOSIS — I10 ESSENTIAL (PRIMARY) HYPERTENSION: ICD-10-CM

## 2024-05-13 DIAGNOSIS — Z79.01 LONG TERM (CURRENT) USE OF ANTICOAGULANTS: ICD-10-CM

## 2024-05-13 LAB
ALBUMIN SERPL ELPH-MCNC: 3.7 G/DL — SIGNIFICANT CHANGE UP (ref 3.5–5.2)
ALP SERPL-CCNC: 94 U/L — SIGNIFICANT CHANGE UP (ref 30–115)
ALT FLD-CCNC: 9 U/L — SIGNIFICANT CHANGE UP (ref 0–41)
ANION GAP SERPL CALC-SCNC: 11 MMOL/L — SIGNIFICANT CHANGE UP (ref 7–14)
APPEARANCE UR: CLEAR — SIGNIFICANT CHANGE UP
AST SERPL-CCNC: 18 U/L — SIGNIFICANT CHANGE UP (ref 0–41)
BASOPHILS # BLD AUTO: 0.02 K/UL — SIGNIFICANT CHANGE UP (ref 0–0.2)
BASOPHILS NFR BLD AUTO: 0.4 % — SIGNIFICANT CHANGE UP (ref 0–1)
BILIRUB SERPL-MCNC: 0.9 MG/DL — SIGNIFICANT CHANGE UP (ref 0.2–1.2)
BILIRUB UR-MCNC: NEGATIVE — SIGNIFICANT CHANGE UP
BUN SERPL-MCNC: 18 MG/DL — SIGNIFICANT CHANGE UP (ref 10–20)
CALCIUM SERPL-MCNC: 8.9 MG/DL — SIGNIFICANT CHANGE UP (ref 8.4–10.5)
CHLORIDE SERPL-SCNC: 104 MMOL/L — SIGNIFICANT CHANGE UP (ref 98–110)
CO2 SERPL-SCNC: 26 MMOL/L — SIGNIFICANT CHANGE UP (ref 17–32)
COLOR SPEC: YELLOW — SIGNIFICANT CHANGE UP
CREAT SERPL-MCNC: 0.8 MG/DL — SIGNIFICANT CHANGE UP (ref 0.7–1.5)
DIFF PNL FLD: NEGATIVE — SIGNIFICANT CHANGE UP
EGFR: 85 ML/MIN/1.73M2 — SIGNIFICANT CHANGE UP
EOSINOPHIL # BLD AUTO: 0.06 K/UL — SIGNIFICANT CHANGE UP (ref 0–0.7)
EOSINOPHIL NFR BLD AUTO: 1.2 % — SIGNIFICANT CHANGE UP (ref 0–8)
GLUCOSE SERPL-MCNC: 105 MG/DL — HIGH (ref 70–99)
GLUCOSE UR QL: NEGATIVE MG/DL — SIGNIFICANT CHANGE UP
HCT VFR BLD CALC: 28 % — LOW (ref 42–52)
HGB BLD-MCNC: 8.5 G/DL — LOW (ref 14–18)
IMM GRANULOCYTES NFR BLD AUTO: 0.2 % — SIGNIFICANT CHANGE UP (ref 0.1–0.3)
KETONES UR-MCNC: NEGATIVE MG/DL — SIGNIFICANT CHANGE UP
LEUKOCYTE ESTERASE UR-ACNC: NEGATIVE — SIGNIFICANT CHANGE UP
LYMPHOCYTES # BLD AUTO: 1.7 K/UL — SIGNIFICANT CHANGE UP (ref 1.2–3.4)
LYMPHOCYTES # BLD AUTO: 33.7 % — SIGNIFICANT CHANGE UP (ref 20.5–51.1)
MCHC RBC-ENTMCNC: 26 PG — LOW (ref 27–31)
MCHC RBC-ENTMCNC: 30.4 G/DL — LOW (ref 32–37)
MCV RBC AUTO: 85.6 FL — SIGNIFICANT CHANGE UP (ref 80–94)
MONOCYTES # BLD AUTO: 0.5 K/UL — SIGNIFICANT CHANGE UP (ref 0.1–0.6)
MONOCYTES NFR BLD AUTO: 9.9 % — HIGH (ref 1.7–9.3)
NEUTROPHILS # BLD AUTO: 2.76 K/UL — SIGNIFICANT CHANGE UP (ref 1.4–6.5)
NEUTROPHILS NFR BLD AUTO: 54.6 % — SIGNIFICANT CHANGE UP (ref 42.2–75.2)
NITRITE UR-MCNC: NEGATIVE — SIGNIFICANT CHANGE UP
NRBC # BLD: 0 /100 WBCS — SIGNIFICANT CHANGE UP (ref 0–0)
PH UR: 6.5 — SIGNIFICANT CHANGE UP (ref 5–8)
PLATELET # BLD AUTO: 271 K/UL — SIGNIFICANT CHANGE UP (ref 130–400)
PMV BLD: 9.7 FL — SIGNIFICANT CHANGE UP (ref 7.4–10.4)
POTASSIUM SERPL-MCNC: 3.6 MMOL/L — SIGNIFICANT CHANGE UP (ref 3.5–5)
POTASSIUM SERPL-SCNC: 3.6 MMOL/L — SIGNIFICANT CHANGE UP (ref 3.5–5)
PROT SERPL-MCNC: 6.1 G/DL — SIGNIFICANT CHANGE UP (ref 6–8)
PROT UR-MCNC: SIGNIFICANT CHANGE UP MG/DL
RBC # BLD: 3.27 M/UL — LOW (ref 4.7–6.1)
RBC # FLD: 19.5 % — HIGH (ref 11.5–14.5)
SODIUM SERPL-SCNC: 141 MMOL/L — SIGNIFICANT CHANGE UP (ref 135–146)
SP GR SPEC: 1.02 — SIGNIFICANT CHANGE UP (ref 1–1.03)
UROBILINOGEN FLD QL: 1 MG/DL — SIGNIFICANT CHANGE UP (ref 0.2–1)
WBC # BLD: 5.05 K/UL — SIGNIFICANT CHANGE UP (ref 4.8–10.8)
WBC # FLD AUTO: 5.05 K/UL — SIGNIFICANT CHANGE UP (ref 4.8–10.8)

## 2024-05-13 PROCEDURE — 99285 EMERGENCY DEPT VISIT HI MDM: CPT | Mod: 25

## 2024-05-13 PROCEDURE — 93005 ELECTROCARDIOGRAM TRACING: CPT

## 2024-05-13 PROCEDURE — 74177 CT ABD & PELVIS W/CONTRAST: CPT | Mod: MC

## 2024-05-13 PROCEDURE — 70450 CT HEAD/BRAIN W/O DYE: CPT | Mod: MC

## 2024-05-13 PROCEDURE — 36000 PLACE NEEDLE IN VEIN: CPT | Mod: XU

## 2024-05-13 PROCEDURE — 80053 COMPREHEN METABOLIC PANEL: CPT

## 2024-05-13 PROCEDURE — 93010 ELECTROCARDIOGRAM REPORT: CPT

## 2024-05-13 PROCEDURE — 74177 CT ABD & PELVIS W/CONTRAST: CPT | Mod: 26,MC

## 2024-05-13 PROCEDURE — 36415 COLL VENOUS BLD VENIPUNCTURE: CPT

## 2024-05-13 PROCEDURE — 85025 COMPLETE CBC W/AUTO DIFF WBC: CPT

## 2024-05-13 PROCEDURE — 70450 CT HEAD/BRAIN W/O DYE: CPT | Mod: 26,MC

## 2024-05-13 PROCEDURE — 99285 EMERGENCY DEPT VISIT HI MDM: CPT

## 2024-05-13 PROCEDURE — 81003 URINALYSIS AUTO W/O SCOPE: CPT

## 2024-05-13 NOTE — ED ADULT TRIAGE NOTE - NS ED NURSE BANDS TYPE
[facility]; per the pt insurance there is no auth needed for this drug.  Instructions are to send this RX to [facility].  They will determine the copay and cost of the medication.    The RX in the springboard does not reflect # of pills to be dispensed and for how many weeks; the order does reflect the oral Capecitabine is taken concurrently with RT on Mondays through Fridays of every week.     Will forward this to Dr Mcgraw for clarification of the number of weeks for the oral drug and the exact number of doses of the Capecitabine.    Aby Clay RN, OCN      Name band;

## 2024-05-13 NOTE — ED PROVIDER NOTE - CLINICAL SUMMARY MEDICAL DECISION MAKING FREE TEXT BOX
89yM  hx as above  pw  leaking urine, incontinence, . no back pain  numbness waekness of extremities  no fever  chills  back pain  Labs resulted at the time of my review were noted to be within acceptable parameters (baseline anemia), no uti   CT head : No acute intracranial pathology. No evidence of midline shift, mass   	effect or intracranial hemorrhage.  	  	Mild chronic microvascular type changes.   CT Abd Pelvis: Thickening of the inferior aspect of the left gluteus donita, with more   	focal overlying subcutaneous fat stranding (301/111). Question   	myositis/cellulitis.  	  exam with ecchymosis,  no cellulitis  or fluctuance/abscess  Discussed outpt follow  upw tih pmd and urology       	Enlarged prostate gland with underdistended thickened bladder which could   	be due to infection or chronic outlet obstruction

## 2024-05-13 NOTE — ED PROVIDER NOTE - OBJECTIVE STATEMENT
Patient is a 89-year-old male past medical history of HTN, HLD, A-fib on Eliquis, CABG, presenting for evaluation of incontinence for the last 2 days.  Patient notes that has noticed that he is leaking urine but does not feel the sensation to urinate.  He denies any fevers, chills, dysuria, hematuria, chest pain, shortness of breath, abdominal pain, nausea, vomiting.  Patient was seen in the ED 1 week ago for fall.  Patient also denies any numbness or tingling.  Patient is still walking at baseline.

## 2024-05-13 NOTE — ED PROVIDER NOTE - PHYSICAL EXAMINATION
VITAL SIGNS: I have reviewed nursing notes and confirm.  CONSTITUTIONAL: well-appearing, non-toxic, NAD  SKIN: Warm dry, normal skin turgor  HEAD: NCAT  EYES: EOMI, PERRLA, no scleral icterus  ENT: Moist mucous membranes, normal pharynx with no erythema or exudates  NECK: Supple; non tender. Full ROM. No cervical LAD  CARD: RRR, no murmurs, rubs or gallops  RESP: clear to ausculation b/l.  No rales, rhonchi, or wheezing.  ABD: soft, + BS, non-tender, non-distended, no rebound or guarding. No CVA tenderness  EXT: Full ROM, no bony tenderness, no pedal edema, no calf tenderness  NEURO: normal motor. normal sensory. CN II-XII intact.   PSYCH: Cooperative, appropriate.

## 2024-05-13 NOTE — ED PROVIDER NOTE - NSFOLLOWUPINSTRUCTIONS_ED_ALL_ED_FT
Our Emergency Department Referral Coordinators will be reaching out to you in the next 24-48 hours from 9:00am to 5:00pm to schedule a follow up appointment. Please expect a phone call from the hospital in that time frame. If you do not receive a call or if you have any questions or concerns, you can reach them at   [phone].    Urinary incontinence — the loss of bladder control — is a common and often embarrassing problem. The severity ranges from occasionally leaking urine when you cough or sneeze to having an urge to urinate that's so sudden and strong you don't get to a toilet in time.    Though it occurs more often as people get older, urinary incontinence isn't an inevitable consequence of aging. If urinary incontinence affects your daily activities, don't hesitate to see your doctor. For most people, simple lifestyle and dietary changes or medical care can treat symptoms of urinary incontinence.    Products & Services  A Book: [facility] on Healthy Aging  Show more products from [facility]  Symptoms  Many people experience occasional, minor leaks of urine. Others may lose small to moderate amounts of urine more frequently.    Types of urinary incontinence include:    Stress incontinence. Urine leaks when you exert pressure on your bladder by coughing, sneezing, laughing, exercising or lifting something heavy.  Urge incontinence. You have a sudden, intense urge to urinate followed by an involuntary loss of urine. You may need to urinate often, including throughout the night. Urge incontinence may be caused by a minor condition, such as infection, or a more severe condition such as a neurological disorder or diabetes.  Overflow incontinence. You experience frequent or constant dribbling of urine due to a bladder that doesn't empty completely.  Functional incontinence. A physical or mental impairment keeps you from making it to the toilet in time. For example, if you have severe arthritis, you may not be able to unbutton your pants quickly enough.  Mixed incontinence. You experience more than one type of urinary incontinence — most often this refers to a combination of stress incontinence and urge incontinence.  When to see a doctor  You may feel uncomfortable discussing incontinence with your doctor. But if incontinence is frequent or is affecting your quality of life, it's important to seek medical advice because urinary incontinence may:    Cause you to restrict your activities and limit your social interactions  Negatively impact your quality of life  Increase the risk of falls in older adults as they rush to the toilet  Indicate a more serious underlying condition

## 2024-05-13 NOTE — ED ADULT NURSE NOTE - NSFALLUNIVINTERV_ED_ALL_ED
Bed/Stretcher in lowest position, wheels locked, appropriate side rails in place/Call bell, personal items and telephone in reach/Instruct patient to call for assistance before getting out of bed/chair/stretcher/Non-slip footwear applied when patient is off stretcher/[place] to call system/Physically safe environment - no spills, clutter or unnecessary equipment/Purposeful proactive rounding/Room/bathroom lighting operational, light cord in reach

## 2024-05-13 NOTE — ED PROVIDER NOTE - PATIENT PORTAL LINK FT
You can access the FollowMyHealth Patient Portal offered by [facility] by registering at the following website: [email or web address]. By joining [facility]’s FollowMyHealth portal, you will also be able to view your health information using other applications (apps) compatible with our system.

## 2024-06-04 ENCOUNTER — APPOINTMENT (OUTPATIENT)
Dept: UROLOGY | Facility: CLINIC | Age: 89
End: 2024-06-04
Payer: COMMERCIAL

## 2024-06-04 VITALS
WEIGHT: 178 LBS | TEMPERATURE: 97.8 F | HEIGHT: 70 IN | SYSTOLIC BLOOD PRESSURE: 115 MMHG | DIASTOLIC BLOOD PRESSURE: 85 MMHG | BODY MASS INDEX: 25.48 KG/M2 | HEART RATE: 101 BPM

## 2024-06-04 DIAGNOSIS — R35.0 FREQUENCY OF MICTURITION: ICD-10-CM

## 2024-06-04 DIAGNOSIS — R32 UNSPECIFIED URINARY INCONTINENCE: ICD-10-CM

## 2024-06-04 DIAGNOSIS — N13.8 BENIGN PROSTATIC HYPERPLASIA WITH LOWER URINARY TRACT SYMPMS: ICD-10-CM

## 2024-06-04 DIAGNOSIS — N40.1 BENIGN PROSTATIC HYPERPLASIA WITH LOWER URINARY TRACT SYMPMS: ICD-10-CM

## 2024-06-04 PROCEDURE — 99214 OFFICE O/P EST MOD 30 MIN: CPT

## 2024-06-04 PROCEDURE — G2211 COMPLEX E/M VISIT ADD ON: CPT | Mod: NC,1L

## 2024-06-04 NOTE — PHYSICAL EXAM
[General Appearance - In No Acute Distress] : no acute distress [Oriented To Time, Place, And Person] : oriented to person, place, and time [de-identified] : Uses a walker

## 2024-06-04 NOTE — ASSESSMENT
[FreeTextEntry1] : Urinary frequency and urinary incontinence.  Will get urine culture to check for urinary tract infection.  In meantime start tamsulosin 0.4 daily, risk benefits alternatives discussed.  Return to office 6 weeks to reassess.  BPH.  Is a chronic condition requiring longitudinal follow-up.

## 2024-06-04 NOTE — HISTORY OF PRESENT ILLNESS
[FreeTextEntry1] : 89-year-old with bothersome urinary frequency and urinary incontinence in the mornings.  Recently was in hospital 5/13/24 for fall CT scan showed enlarged prostate and bladder was not distended.  WBC 5, creatinine 0.8 and urine analysis negative then.  He did have a Proteus urinary tract infection in March 21, 2024.

## 2024-06-06 ENCOUNTER — LABORATORY RESULT (OUTPATIENT)
Age: 89
End: 2024-06-06

## 2024-06-10 ENCOUNTER — APPOINTMENT (OUTPATIENT)
Dept: CARDIOLOGY | Facility: CLINIC | Age: 89
End: 2024-06-10
Payer: COMMERCIAL

## 2024-06-10 VITALS
HEART RATE: 103 BPM | HEIGHT: 70 IN | SYSTOLIC BLOOD PRESSURE: 90 MMHG | DIASTOLIC BLOOD PRESSURE: 64 MMHG | OXYGEN SATURATION: 98 %

## 2024-06-10 DIAGNOSIS — I25.10 ATHEROSCLEROTIC HEART DISEASE OF NATIVE CORONARY ARTERY W/OUT ANGINA PECTORIS: ICD-10-CM

## 2024-06-10 DIAGNOSIS — I48.91 UNSPECIFIED ATRIAL FIBRILLATION: ICD-10-CM

## 2024-06-10 DIAGNOSIS — E78.00 PURE HYPERCHOLESTEROLEMIA, UNSPECIFIED: ICD-10-CM

## 2024-06-10 DIAGNOSIS — I67.89 OTHER CEREBROVASCULAR DISEASE: ICD-10-CM

## 2024-06-10 DIAGNOSIS — I35.0 NONRHEUMATIC AORTIC (VALVE) STENOSIS: ICD-10-CM

## 2024-06-10 DIAGNOSIS — I10 ESSENTIAL (PRIMARY) HYPERTENSION: ICD-10-CM

## 2024-06-10 DIAGNOSIS — I21.3 ST ELEVATION (STEMI) MYOCARDIAL INFARCTION OF UNSPECIFIED SITE: ICD-10-CM

## 2024-06-10 PROCEDURE — G2211 COMPLEX E/M VISIT ADD ON: CPT | Mod: NC,1L

## 2024-06-10 PROCEDURE — 93000 ELECTROCARDIOGRAM COMPLETE: CPT

## 2024-06-10 PROCEDURE — 99214 OFFICE O/P EST MOD 30 MIN: CPT

## 2024-06-10 RX ORDER — TRIAMTERENE AND HYDROCHLOROTHIAZIDE 25; 37.5 MG/1; MG/1
37.5-25 TABLET ORAL
Refills: 0 | Status: DISCONTINUED | COMMUNITY
End: 2024-06-10

## 2024-06-10 RX ORDER — ESOMEPRAZOLE MAGNESIUM 40 MG/1
40 CAPSULE, DELAYED RELEASE ORAL
Refills: 0 | Status: DISCONTINUED | COMMUNITY
End: 2024-06-10

## 2024-06-10 RX ORDER — ATORVASTATIN CALCIUM 80 MG/1
80 TABLET, FILM COATED ORAL DAILY
Refills: 0 | Status: ACTIVE | COMMUNITY

## 2024-06-10 RX ORDER — DORZOLAMIDE HYDROCHLORIDE 20 MG/ML
2 SOLUTION OPHTHALMIC TWICE DAILY
Refills: 0 | Status: DISCONTINUED | COMMUNITY
End: 2024-06-10

## 2024-06-10 RX ORDER — ASPIRIN 81 MG
81 TABLET, DELAYED RELEASE (ENTERIC COATED) ORAL DAILY
Refills: 0 | Status: ACTIVE | COMMUNITY

## 2024-06-10 RX ORDER — BRIMONIDINE TARTRATE 2 MG/MG
0.2 SOLUTION/ DROPS OPHTHALMIC EVERY 8 HOURS
Refills: 0 | Status: DISCONTINUED | COMMUNITY
End: 2024-06-10

## 2024-06-10 RX ORDER — BRIMONIDINE TARTRATE 1.5 MG/ML
0.15 SOLUTION/ DROPS OPHTHALMIC
Refills: 0 | Status: DISCONTINUED | COMMUNITY
End: 2024-06-10

## 2024-06-10 RX ORDER — APIXABAN 5 MG/1
5 TABLET, FILM COATED ORAL
Refills: 0 | Status: ACTIVE | COMMUNITY

## 2024-06-10 RX ORDER — METHOCARBAMOL 500 MG/1
500 TABLET, FILM COATED ORAL
Qty: 60 | Refills: 0 | Status: DISCONTINUED | COMMUNITY
Start: 2023-09-05 | End: 2024-06-10

## 2024-06-10 RX ORDER — EZETIMIBE 10 MG/1
10 TABLET ORAL DAILY
Refills: 0 | Status: DISCONTINUED | COMMUNITY
End: 2024-06-10

## 2024-06-10 RX ORDER — WARFARIN 5 MG/1
5 TABLET ORAL
Refills: 0 | Status: DISCONTINUED | COMMUNITY
End: 2024-06-10

## 2024-06-10 RX ORDER — METOPROLOL SUCCINATE 50 MG/1
50 TABLET, EXTENDED RELEASE ORAL DAILY
Qty: 45 | Refills: 3 | Status: DISCONTINUED | COMMUNITY
Start: 1900-01-01 | End: 2024-06-10

## 2024-06-10 RX ORDER — TAMSULOSIN HYDROCHLORIDE 0.4 MG/1
0.4 CAPSULE ORAL
Qty: 90 | Refills: 3 | Status: ACTIVE | COMMUNITY
Start: 2024-06-04

## 2024-06-10 RX ORDER — FOSINOPRIL SODIUM 20 MG/1
20 TABLET ORAL DAILY
Refills: 0 | Status: DISCONTINUED | COMMUNITY
End: 2024-06-10

## 2024-06-10 RX ORDER — METOPROLOL TARTRATE 50 MG/1
50 TABLET, FILM COATED ORAL DAILY
Refills: 0 | Status: ACTIVE | COMMUNITY

## 2024-06-10 RX ORDER — COLESEVELAM HYDROCHLORIDE 625 MG/1
625 TABLET, FILM COATED ORAL DAILY
Refills: 0 | Status: DISCONTINUED | COMMUNITY
End: 2024-06-10

## 2024-06-10 RX ORDER — OMEPRAZOLE 20 MG/1
20 CAPSULE, DELAYED RELEASE ORAL
Refills: 0 | Status: ACTIVE | COMMUNITY

## 2024-06-10 NOTE — REASON FOR VISIT
[FreeTextEntry1] : Diagnostic Tests: --------------------- EK/10/24-AF at 95 bpm. Inferior infarct. TWF.  06/15/23-AF. Inferior infarct.  23-AF. Inferior infarct? 23-NSR. Low voltage.  -------------------- Echo:  23-TTE: EF 51%. Mild LVH. Borderline reduced RV systolic function. Severe LAE. TAVO. Mod AS. Mild-mod AI. Mild MAC. Mod MR. Mild-mod TR. PASP 23 mmHg.

## 2024-06-10 NOTE — HISTORY OF PRESENT ILLNESS
[FreeTextEntry1] : Mr. Flores is an 88yo M with PMHx of CAD s/p CABG in 2000, AF on coumadin, HTN, HLD, TIA 2017 who presents for follow up. His PMD is Dr. Elyse Flynn. Patient was hospitalized in September 2022 at [facility] for compression fracture of T 11 vertebrae and a few b/l rib fractures after a fall from a ladder. No acute intracranial changes on CT head.  He is feeling well. No new complaints. He previously saw a cardiologist affiliated with [place] on [facility]. He believes he had a nuclear stress in past year was normal. He still works in payroll.  06/15/23-Patient is feeling well. AF is rate controlled, BP controlled and labs WNL.  12/01/2023: Patient had recent ER visit for bacterial conjunctivitis.   01/03/2024: Patient was found to have HR >100 by VNS RN. Metoprolol dose was increased. 06/10/24-Pt with eye infection, vision loss. Starting to feel better. Switched to Eliquis.

## 2024-06-10 NOTE — ASSESSMENT
[FreeTextEntry1] : CAD s/p CABG: Pt s/p CABG in 2000 and PCI 2001. Feels well, without active CP. -Continue with ASA, atorvastatin, ezetimibe, wellchol, Toprol 50mg PO daily. -Will get records from previous cardiologist.  HTN: BP at goal per ACC/AHA 2018 guidelines -Continue with metoprolol 50mg PO BID.   AF: Rate controlled. On Eliquis.  -Continue with Toprol 50mg PO daily -Switched to Eliquis 5mg PO BID.   HLD: At goal. -Continue with atorvastatin.   Moderate AS: -Monitor TTE annually.  Follow up in 6 months. -Check TTE

## 2024-06-12 LAB — BACTERIA UR CULT: ABNORMAL

## 2024-06-12 RX ORDER — AMOXICILLIN AND CLAVULANATE POTASSIUM 250; 125 MG/1; MG/1
250-125 TABLET, FILM COATED ORAL 3 TIMES DAILY
Qty: 21 | Refills: 2 | Status: ACTIVE | COMMUNITY
Start: 2024-06-12 | End: 1900-01-01

## 2024-07-23 ENCOUNTER — APPOINTMENT (OUTPATIENT)
Dept: UROLOGY | Facility: CLINIC | Age: 89
End: 2024-07-23
Payer: COMMERCIAL

## 2024-07-23 VITALS
SYSTOLIC BLOOD PRESSURE: 90 MMHG | OXYGEN SATURATION: 100 % | TEMPERATURE: 98 F | HEART RATE: 108 BPM | DIASTOLIC BLOOD PRESSURE: 58 MMHG | WEIGHT: 178 LBS | BODY MASS INDEX: 25.48 KG/M2 | HEIGHT: 70 IN | RESPIRATION RATE: 17 BRPM

## 2024-07-23 DIAGNOSIS — R32 UNSPECIFIED URINARY INCONTINENCE: ICD-10-CM

## 2024-07-23 DIAGNOSIS — N13.8 BENIGN PROSTATIC HYPERPLASIA WITH LOWER URINARY TRACT SYMPMS: ICD-10-CM

## 2024-07-23 DIAGNOSIS — N40.1 BENIGN PROSTATIC HYPERPLASIA WITH LOWER URINARY TRACT SYMPMS: ICD-10-CM

## 2024-07-23 PROCEDURE — 99214 OFFICE O/P EST MOD 30 MIN: CPT

## 2024-07-23 PROCEDURE — G2211 COMPLEX E/M VISIT ADD ON: CPT | Mod: NC

## 2024-07-23 RX ORDER — TAMSULOSIN HYDROCHLORIDE 0.4 MG/1
0.4 CAPSULE ORAL
Qty: 180 | Refills: 3 | Status: ACTIVE | COMMUNITY
Start: 2024-07-23 | End: 1900-01-01

## 2024-07-23 NOTE — END OF VISIT
[FreeTextEntry3] : I obtained history, physical, formulated treatment plan which I discussed with the patient agree with above transcription by the physicians assistant

## 2024-07-23 NOTE — ASSESSMENT
[FreeTextEntry1] : Urinary frequency and incontinence.  Episode of incontinence happens same time every morning around 5 am.   Patient intersted in trying condom catheter. Also discussed options of setting alarm for timed voiding.   As for Tamsulosin. patient wants to try increase dose. Aware of risks of orthostatic hypotension and dizziness.  History of BPH.  Chronic condition which will be monitored in the future. Provided patient doing well. Follow up1 year.  Sooner PRN.

## 2024-08-13 ENCOUNTER — TRANSCRIPTION ENCOUNTER (OUTPATIENT)
Age: 89
End: 2024-08-13

## 2024-08-15 ENCOUNTER — NON-APPOINTMENT (OUTPATIENT)
Age: 89
End: 2024-08-15

## 2024-09-24 ENCOUNTER — APPOINTMENT (OUTPATIENT)
Dept: CV DIAGNOSITCS | Facility: HOSPITAL | Age: 89
End: 2024-09-24

## 2024-10-01 ENCOUNTER — OUTPATIENT (OUTPATIENT)
Dept: OUTPATIENT SERVICES | Facility: HOSPITAL | Age: 89
LOS: 1 days | End: 2024-10-01
Payer: COMMERCIAL

## 2024-10-01 ENCOUNTER — APPOINTMENT (OUTPATIENT)
Dept: CV DIAGNOSITCS | Facility: HOSPITAL | Age: 89
End: 2024-10-01
Payer: COMMERCIAL

## 2024-10-01 ENCOUNTER — RESULT REVIEW (OUTPATIENT)
Age: 89
End: 2024-10-01

## 2024-10-01 DIAGNOSIS — Z95.1 PRESENCE OF AORTOCORONARY BYPASS GRAFT: Chronic | ICD-10-CM

## 2024-10-01 DIAGNOSIS — I35.0 NONRHEUMATIC AORTIC (VALVE) STENOSIS: ICD-10-CM

## 2024-10-01 DIAGNOSIS — I25.10 ATHEROSCLEROTIC HEART DISEASE OF NATIVE CORONARY ARTERY WITHOUT ANGINA PECTORIS: ICD-10-CM

## 2024-10-01 PROCEDURE — 93306 TTE W/DOPPLER COMPLETE: CPT

## 2024-10-01 PROCEDURE — 93306 TTE W/DOPPLER COMPLETE: CPT | Mod: 26

## 2024-10-01 NOTE — ED ADULT TRIAGE NOTE - BMI (KG/M2)
[Time Spent: ___ minutes] : I have spent [unfilled] minutes of time on the encounter which excludes teaching and separately reported services. 25.8

## 2024-10-02 DIAGNOSIS — I25.10 ATHEROSCLEROTIC HEART DISEASE OF NATIVE CORONARY ARTERY WITHOUT ANGINA PECTORIS: ICD-10-CM

## 2024-10-02 DIAGNOSIS — I35.0 NONRHEUMATIC AORTIC (VALVE) STENOSIS: ICD-10-CM

## 2024-10-08 ENCOUNTER — APPOINTMENT (OUTPATIENT)
Dept: CARDIOLOGY | Facility: CLINIC | Age: 89
End: 2024-10-08

## 2024-10-21 ENCOUNTER — APPOINTMENT (OUTPATIENT)
Dept: CARDIOLOGY | Facility: CLINIC | Age: 89
End: 2024-10-21
Payer: COMMERCIAL

## 2024-10-21 VITALS
WEIGHT: 180 LBS | HEIGHT: 70 IN | DIASTOLIC BLOOD PRESSURE: 82 MMHG | BODY MASS INDEX: 25.77 KG/M2 | SYSTOLIC BLOOD PRESSURE: 116 MMHG | OXYGEN SATURATION: 98 % | HEART RATE: 105 BPM

## 2024-10-21 DIAGNOSIS — Z79.01 LONG TERM (CURRENT) USE OF ANTICOAGULANTS: ICD-10-CM

## 2024-10-21 DIAGNOSIS — E78.00 PURE HYPERCHOLESTEROLEMIA, UNSPECIFIED: ICD-10-CM

## 2024-10-21 DIAGNOSIS — I48.91 UNSPECIFIED ATRIAL FIBRILLATION: ICD-10-CM

## 2024-10-21 DIAGNOSIS — I35.0 NONRHEUMATIC AORTIC (VALVE) STENOSIS: ICD-10-CM

## 2024-10-21 DIAGNOSIS — I21.3 ST ELEVATION (STEMI) MYOCARDIAL INFARCTION OF UNSPECIFIED SITE: ICD-10-CM

## 2024-10-21 DIAGNOSIS — I25.10 ATHEROSCLEROTIC HEART DISEASE OF NATIVE CORONARY ARTERY W/OUT ANGINA PECTORIS: ICD-10-CM

## 2024-10-21 DIAGNOSIS — I10 ESSENTIAL (PRIMARY) HYPERTENSION: ICD-10-CM

## 2024-10-21 PROCEDURE — 99214 OFFICE O/P EST MOD 30 MIN: CPT

## 2024-10-21 PROCEDURE — G2211 COMPLEX E/M VISIT ADD ON: CPT | Mod: NC

## 2024-11-25 ENCOUNTER — RX RENEWAL (OUTPATIENT)
Age: 89
End: 2024-11-25

## 2024-12-06 ENCOUNTER — NON-APPOINTMENT (OUTPATIENT)
Age: 89
End: 2024-12-06

## 2024-12-06 ENCOUNTER — APPOINTMENT (OUTPATIENT)
Facility: CLINIC | Age: 89
End: 2024-12-06
Payer: COMMERCIAL

## 2024-12-06 PROCEDURE — 99204 OFFICE O/P NEW MOD 45 MIN: CPT

## 2024-12-06 PROCEDURE — 92134 CPTRZ OPH DX IMG PST SGM RTA: CPT

## 2024-12-13 NOTE — PATIENT PROFILE ADULT - MEDICATIONS/VISITS
Chronic, stable. Pt maintains on Symbicort 160-4.5mg daily, Pulmicort 500mcg daily, montelukast 10mg daily, with albuterol PRN. Denies dyspnea, wheezing, coughing. Follow up with allergy per routine for continued monitoring and management.   no

## 2024-12-20 ENCOUNTER — EMERGENCY (EMERGENCY)
Facility: HOSPITAL | Age: 89
LOS: 0 days | Discharge: HOME | End: 2024-12-20
Attending: EMERGENCY MEDICINE
Payer: COMMERCIAL

## 2024-12-20 VITALS
WEIGHT: 154.98 LBS | HEART RATE: 115 BPM | RESPIRATION RATE: 18 BRPM | DIASTOLIC BLOOD PRESSURE: 86 MMHG | SYSTOLIC BLOOD PRESSURE: 142 MMHG | TEMPERATURE: 98 F | OXYGEN SATURATION: 99 % | HEIGHT: 70 IN

## 2024-12-20 DIAGNOSIS — K92.1 MELENA: ICD-10-CM

## 2024-12-20 DIAGNOSIS — I10 ESSENTIAL (PRIMARY) HYPERTENSION: ICD-10-CM

## 2024-12-20 DIAGNOSIS — I25.10 ATHEROSCLEROTIC HEART DISEASE OF NATIVE CORONARY ARTERY WITHOUT ANGINA PECTORIS: ICD-10-CM

## 2024-12-20 DIAGNOSIS — E78.5 HYPERLIPIDEMIA, UNSPECIFIED: ICD-10-CM

## 2024-12-20 DIAGNOSIS — Z86.73 PERSONAL HISTORY OF TRANSIENT ISCHEMIC ATTACK (TIA), AND CEREBRAL INFARCTION WITHOUT RESIDUAL DEFICITS: ICD-10-CM

## 2024-12-20 DIAGNOSIS — Z95.1 PRESENCE OF AORTOCORONARY BYPASS GRAFT: ICD-10-CM

## 2024-12-20 DIAGNOSIS — Z95.1 PRESENCE OF AORTOCORONARY BYPASS GRAFT: Chronic | ICD-10-CM

## 2024-12-20 DIAGNOSIS — K64.4 RESIDUAL HEMORRHOIDAL SKIN TAGS: ICD-10-CM

## 2024-12-20 DIAGNOSIS — I48.91 UNSPECIFIED ATRIAL FIBRILLATION: ICD-10-CM

## 2024-12-20 DIAGNOSIS — Z79.01 LONG TERM (CURRENT) USE OF ANTICOAGULANTS: ICD-10-CM

## 2024-12-20 LAB
ALBUMIN SERPL ELPH-MCNC: 3.8 G/DL — SIGNIFICANT CHANGE UP (ref 3.5–5.2)
ALP SERPL-CCNC: 63 U/L — SIGNIFICANT CHANGE UP (ref 30–115)
ALT FLD-CCNC: 29 U/L — SIGNIFICANT CHANGE UP (ref 0–41)
ANION GAP SERPL CALC-SCNC: 9 MMOL/L — SIGNIFICANT CHANGE UP (ref 7–14)
AST SERPL-CCNC: 33 U/L — SIGNIFICANT CHANGE UP (ref 0–41)
BASOPHILS # BLD AUTO: 0.03 K/UL — SIGNIFICANT CHANGE UP (ref 0–0.2)
BASOPHILS NFR BLD AUTO: 0.5 % — SIGNIFICANT CHANGE UP (ref 0–1)
BILIRUB SERPL-MCNC: 0.4 MG/DL — SIGNIFICANT CHANGE UP (ref 0.2–1.2)
BUN SERPL-MCNC: 15 MG/DL — SIGNIFICANT CHANGE UP (ref 10–20)
CALCIUM SERPL-MCNC: 9.1 MG/DL — SIGNIFICANT CHANGE UP (ref 8.4–10.5)
CHLORIDE SERPL-SCNC: 106 MMOL/L — SIGNIFICANT CHANGE UP (ref 98–110)
CO2 SERPL-SCNC: 25 MMOL/L — SIGNIFICANT CHANGE UP (ref 17–32)
CREAT SERPL-MCNC: 0.7 MG/DL — SIGNIFICANT CHANGE UP (ref 0.7–1.5)
EGFR: 89 ML/MIN/1.73M2 — SIGNIFICANT CHANGE UP
EOSINOPHIL # BLD AUTO: 0.11 K/UL — SIGNIFICANT CHANGE UP (ref 0–0.7)
EOSINOPHIL NFR BLD AUTO: 1.9 % — SIGNIFICANT CHANGE UP (ref 0–8)
GLUCOSE SERPL-MCNC: 71 MG/DL — SIGNIFICANT CHANGE UP (ref 70–99)
HCT VFR BLD CALC: 39.8 % — LOW (ref 42–52)
HGB BLD-MCNC: 13 G/DL — LOW (ref 14–18)
IMM GRANULOCYTES NFR BLD AUTO: 0.2 % — SIGNIFICANT CHANGE UP (ref 0.1–0.3)
LYMPHOCYTES # BLD AUTO: 1.58 K/UL — SIGNIFICANT CHANGE UP (ref 1.2–3.4)
LYMPHOCYTES # BLD AUTO: 28 % — SIGNIFICANT CHANGE UP (ref 20.5–51.1)
MCHC RBC-ENTMCNC: 30.3 PG — SIGNIFICANT CHANGE UP (ref 27–31)
MCHC RBC-ENTMCNC: 32.7 G/DL — SIGNIFICANT CHANGE UP (ref 32–37)
MCV RBC AUTO: 92.8 FL — SIGNIFICANT CHANGE UP (ref 80–94)
MONOCYTES # BLD AUTO: 0.59 K/UL — SIGNIFICANT CHANGE UP (ref 0.1–0.6)
MONOCYTES NFR BLD AUTO: 10.4 % — HIGH (ref 1.7–9.3)
NEUTROPHILS # BLD AUTO: 3.33 K/UL — SIGNIFICANT CHANGE UP (ref 1.4–6.5)
NEUTROPHILS NFR BLD AUTO: 59 % — SIGNIFICANT CHANGE UP (ref 42.2–75.2)
NRBC # BLD: 0 /100 WBCS — SIGNIFICANT CHANGE UP (ref 0–0)
PLATELET # BLD AUTO: 152 K/UL — SIGNIFICANT CHANGE UP (ref 130–400)
PMV BLD: 9.3 FL — SIGNIFICANT CHANGE UP (ref 7.4–10.4)
POTASSIUM SERPL-MCNC: 4.6 MMOL/L — SIGNIFICANT CHANGE UP (ref 3.5–5)
POTASSIUM SERPL-SCNC: 4.6 MMOL/L — SIGNIFICANT CHANGE UP (ref 3.5–5)
PROT SERPL-MCNC: 6.2 G/DL — SIGNIFICANT CHANGE UP (ref 6–8)
RBC # BLD: 4.29 M/UL — LOW (ref 4.7–6.1)
RBC # FLD: 16.6 % — HIGH (ref 11.5–14.5)
SODIUM SERPL-SCNC: 140 MMOL/L — SIGNIFICANT CHANGE UP (ref 135–146)
WBC # BLD: 5.65 K/UL — SIGNIFICANT CHANGE UP (ref 4.8–10.8)
WBC # FLD AUTO: 5.65 K/UL — SIGNIFICANT CHANGE UP (ref 4.8–10.8)

## 2024-12-20 PROCEDURE — 99284 EMERGENCY DEPT VISIT MOD MDM: CPT

## 2024-12-20 PROCEDURE — 85025 COMPLETE CBC W/AUTO DIFF WBC: CPT

## 2024-12-20 PROCEDURE — 99283 EMERGENCY DEPT VISIT LOW MDM: CPT

## 2024-12-20 PROCEDURE — 80053 COMPREHEN METABOLIC PANEL: CPT

## 2024-12-20 PROCEDURE — 36415 COLL VENOUS BLD VENIPUNCTURE: CPT

## 2024-12-20 NOTE — ED PROVIDER NOTE - PHYSICAL EXAMINATION
GENERAL: Well-developed; well-nourished; in no acute distress. AO  EYES: no conjunctival erythema, ocular motions intact and appropriate  ENT: airway clear.  CARD: Regular rate and rhythm.   RESP: LCTAB; No wheezes or crackles  ABD: soft, nontender, and nondistended  Upper EXT: Normal ROM. Rad pulses 2+ symm, sensation intact and symm  Lower EXT: moving b/l LEs, sensation intact and symm  GI: Digital rectal exam was performed with RN in the room, no bright red blood per rectum, no dark stool.  External hemorrhoids visible on exam, nonthrombosed.  Patient endorses no pain from IAIN.

## 2024-12-20 NOTE — ED PROVIDER NOTE - NSFOLLOWUPINSTRUCTIONS_ED_ALL_ED_FT
Please follow up with your primary care physician and any medical specialist(s) if they were recommended. If you've been prescribed medications, please take your medications as prescribed. Return to the emergency department if your symptoms do not resolve and/or worsen.  ------------------------------------------------------------------------------------------------------------------------    Hemorrhoids    WHAT YOU NEED TO KNOW:    What are hemorrhoids? Hemorrhoids are swollen blood vessels inside your rectum (internal hemorrhoids) or on your anus (external hemorrhoids). Sometimes a hemorrhoid may prolapse. This means it extends out of your anus.    What increases my risk for hemorrhoids?    Pregnancy or obesity    Straining or sitting for a long time during bowel movements    Liver disease    Weak muscles around the anus caused by older age, rectal surgery, or anal intercourse    A lack of physical activity    Chronic diarrhea or constipation    A low-fiber diet  What are the signs and symptoms of hemorrhoids?    Pain or itching around your anus or inside your rectum    Swelling or bumps around your anus    Bright red blood in your bowel movement, on the toilet paper, or in the toilet bowl    Tissue bulging out of your anus (prolapsed hemorrhoids)    Incontinence (poor control over urine or bowel movements)  How are hemorrhoids diagnosed? Your healthcare provider will ask about your symptoms, the foods you eat, and your bowel movements. You may need the following:    A digital rectal exam is a test to check for hemorrhoids. Your provider will feel around and inside your anus.    A colonoscopy or sigmoidoscopy is used to examine your intestines and check for any cause of bleeding. The scope (small tube with a light and camera on the end) is inserted through your anus.    An anoscopy is used to see the inside of your anus and examine internal hemorrhoids. A scope is also used for this procedure.  How are hemorrhoids treated? Treatment will depend on your symptoms. You may need any of the following:    Medicines can help decrease pain and swelling, and soften your bowel movement. The medicine may be a pill, pad, cream, or ointment.    Procedures may be used to shrink or remove your hemorrhoid. Examples include rubber-band ligation, sclerotherapy, and infrared coagulation, also called photocoagulation. These procedures may be done in your healthcare provider's office. Ask your healthcare provider for more information about these procedures.    Surgery may be needed to shrink or remove your hemorrhoids.  How can I manage my symptoms or prevent hemorrhoids?    Eat a variety of high-fiber foods. Examples include fruits, vegetables, and whole grains. Fiber is an important part of hemorrhoid treatment. High-fiber foods soften your bowel movements and increase the bulk. This helps you avoid straining. Ask your provider how much fiber you need each day. In general, 25 to 35 grams of fiber a day may be recommended. Over-the-counter fiber supplements may also help.        Do not strain to have a bowel movement. Straining can increase pressure on the tissues in your rectum and anus. When you sit on the toilet, do it for short periods of time if you are able, such as 3 to 5 minutes.    Take a sitz bath. Fill a bathtub with 4 to 6 inches of warm water. You may also use a sitz bath pan that fits inside a toilet bowl. Sit in the sitz bath for 15 minutes, 3 to 5 times a day. The warm water can help decrease pain and swelling.    Keep your anal area clean. Gently wash the area with warm water daily. Soap may irritate the area. After a bowel movement, gently wipe with moist towelettes or wet toilet paper. Dry toilet paper can irritate the area. Bidet toilet seats that spray water can be connected to your toilet. Portable spray bottle bidets can be used to clean your anal area. They can also be attached to a water bottle.    Drink plenty of liquids. Liquids can help prevent constipation and keep your bowel movements soft. Ask how much liquid to drink each day and which liquids are best for you. In general, 2 liters of water a day may be recommended. Avoid caffeine and alcohol.    Exercise as directed. Exercise, such as walking, may help prevent constipation and make it easier to have a bowel movement. Work with your provider to create an exercise plan.   FAMILY WALKING FOR EXERCISE      Avoid heavy lifting. This can cause you to strain and increase your risk for another hemorrhoid.  When should I seek immediate care?    You have severe pain in your rectum or around your anus.    You have severe pain in your abdomen, and you are vomiting.    You have bleeding from your anus that soaks through your underwear.  When should I call my doctor?    You have frequent and painful bowel movements.    Your hemorrhoid looks or feels more swollen than usual.    You do not have a bowel movement for 2 days or more.    You see or feel tissue coming through your anus.    You have blood in your bowel movements.    You have questions or concerns about your condition or care.  CARE AGREEMENT:    You have the right to help plan your care. Learn about your health condition and how it may be treated. Discuss treatment options with your healthcare providers to decide what care you want to receive. You always have the right to refuse treatment.

## 2024-12-20 NOTE — ED PROVIDER NOTE - PATIENT PORTAL LINK FT
You can access the FollowMyHealth Patient Portal offered by [facility] by registering at the following website: [email or web address]. By joining [employer]’s FollowMyHealth portal, you will also be able to view your health information using other applications (apps) compatible with our system.

## 2024-12-20 NOTE — ED PROVIDER NOTE - OBJECTIVE STATEMENT
89-year-old male, past medical history of A-fib on Eliquis, hypertension, hyperlipidemia, CAD, CABG 2000, TIA, comes in for dark bloody stools x 3 days.  Per patient who is alert and oriented, states that his home health aide noticed some blood in his diaper.  Patient called his primary care doctor, was advised to come into the ED for evaluation.  Denies fevers, chills, chest pain, shortness of breath, abdominal pain, diarrhea, rectal pain, urinary symptoms.

## 2024-12-20 NOTE — ED PROVIDER NOTE - ATTENDING CONTRIBUTION TO CARE
Patient on Eliquis history of A-fib dyslipidemia CAD CABG who is presenting for rectal bleeding reported as bright red blood.  It occurred 2 times prior to arrival.  Since this morning has had per patient are normal bowel movement.  Denies any abdominal pain.  Denies any lightheadedness or syncope.  On exam his abdomen is soft nontender nondistended.  The rectal exam shows normal brown stool.  Plan is to obtain labs and observe and reassess.

## 2024-12-20 NOTE — ED PROVIDER NOTE - CLINICAL SUMMARY MEDICAL DECISION MAKING FREE TEXT BOX
Evaluated for rectal bleeding CBC normal reassessment showed no more rectal bleeding rectal exam is normal prior inpatient notes reviewed from August 2024 which showed the patient refused any intervention for prior anemia including GI interventions.  Results discussed with the patient patient feels comfortable being discharged and to return to the ED if symptoms change or worsen.

## 2024-12-30 NOTE — ED PROVIDER NOTE - ADMIT DISPOSITION PRESENT ON ADMISSION SEPSIS
In an effort to ensure that our patients [employer]Well, a Team Member has reviewed your chart and identified an opportunity to provide the best care possible. An attempt was made to discuss or schedule due or overdue Preventive or Chronic Condition care.Care Gaps identified: Wellness Visits.    The Outcome was Contact was not made, no answer/busy. We are attempting to schedule a MWV. If you have any questions or need help with scheduling, contact our Health Outreach Team at [phone].   Type of Appointment needed: Medicare Wellness Visit  Name: KERRY HOWARD    ### Patient Details  YOB: 1954  MRN: [id number]    ### Encounter Details  Arrival Date: N/A  Discharge Date: N/A  Encounter ID: [id number]    ### Related interaction  [facility] [place] Outreach (Annual Well[place] Outreach ([place] ([email or web address])    ### Required Interventions and Feedback     Call Status         Call Status List:     Closed Not Reached (edited by SARY on 12/30/2024 12:08 PM CST)   No

## 2025-01-03 ENCOUNTER — INPATIENT (INPATIENT)
Facility: HOSPITAL | Age: 89
LOS: 1 days | Discharge: HOME | DRG: 178 | End: 2025-01-05
Attending: STUDENT IN AN ORGANIZED HEALTH CARE EDUCATION/TRAINING PROGRAM | Admitting: STUDENT IN AN ORGANIZED HEALTH CARE EDUCATION/TRAINING PROGRAM
Payer: COMMERCIAL

## 2025-01-03 VITALS
RESPIRATION RATE: 18 BRPM | TEMPERATURE: 98 F | HEART RATE: 126 BPM | HEIGHT: 70 IN | WEIGHT: 154.98 LBS | DIASTOLIC BLOOD PRESSURE: 76 MMHG | SYSTOLIC BLOOD PRESSURE: 108 MMHG | OXYGEN SATURATION: 95 %

## 2025-01-03 DIAGNOSIS — Z95.1 PRESENCE OF AORTOCORONARY BYPASS GRAFT: Chronic | ICD-10-CM

## 2025-01-03 DIAGNOSIS — U07.1 COVID-19: ICD-10-CM

## 2025-01-03 LAB
ALBUMIN SERPL ELPH-MCNC: 3.9 G/DL — SIGNIFICANT CHANGE UP (ref 3.5–5.2)
ALP SERPL-CCNC: 59 U/L — SIGNIFICANT CHANGE UP (ref 30–115)
ALT FLD-CCNC: 32 U/L — SIGNIFICANT CHANGE UP (ref 0–41)
ANION GAP SERPL CALC-SCNC: 10 MMOL/L — SIGNIFICANT CHANGE UP (ref 7–14)
APTT BLD: 34.5 SEC — SIGNIFICANT CHANGE UP (ref 27–39.2)
AST SERPL-CCNC: 34 U/L — SIGNIFICANT CHANGE UP (ref 0–41)
BASE EXCESS BLDV CALC-SCNC: 2 MMOL/L — SIGNIFICANT CHANGE UP (ref -2–3)
BASOPHILS # BLD AUTO: 0.03 K/UL — SIGNIFICANT CHANGE UP (ref 0–0.2)
BASOPHILS NFR BLD AUTO: 0.3 % — SIGNIFICANT CHANGE UP (ref 0–1)
BILIRUB SERPL-MCNC: 0.7 MG/DL — SIGNIFICANT CHANGE UP (ref 0.2–1.2)
BUN SERPL-MCNC: 18 MG/DL — SIGNIFICANT CHANGE UP (ref 10–20)
CA-I SERPL-SCNC: 1.17 MMOL/L — SIGNIFICANT CHANGE UP (ref 1.15–1.33)
CALCIUM SERPL-MCNC: 9.4 MG/DL — SIGNIFICANT CHANGE UP (ref 8.4–10.5)
CHLORIDE SERPL-SCNC: 105 MMOL/L — SIGNIFICANT CHANGE UP (ref 98–110)
CO2 SERPL-SCNC: 26 MMOL/L — SIGNIFICANT CHANGE UP (ref 17–32)
CREAT SERPL-MCNC: 0.8 MG/DL — SIGNIFICANT CHANGE UP (ref 0.7–1.5)
EGFR: 85 ML/MIN/1.73M2 — SIGNIFICANT CHANGE UP
EOSINOPHIL # BLD AUTO: 0.02 K/UL — SIGNIFICANT CHANGE UP (ref 0–0.7)
EOSINOPHIL NFR BLD AUTO: 0.2 % — SIGNIFICANT CHANGE UP (ref 0–8)
FLUAV AG NPH QL: SIGNIFICANT CHANGE UP
FLUBV AG NPH QL: SIGNIFICANT CHANGE UP
GAS PNL BLDV: 137 MMOL/L — SIGNIFICANT CHANGE UP (ref 136–145)
GAS PNL BLDV: SIGNIFICANT CHANGE UP
GLUCOSE SERPL-MCNC: 110 MG/DL — HIGH (ref 70–99)
HCO3 BLDV-SCNC: 27 MMOL/L — SIGNIFICANT CHANGE UP (ref 22–29)
HCT VFR BLD CALC: 40.6 % — LOW (ref 42–52)
HCT VFR BLDA CALC: 41 % — SIGNIFICANT CHANGE UP (ref 39–51)
HGB BLD CALC-MCNC: 13.8 G/DL — SIGNIFICANT CHANGE UP (ref 12.6–17.4)
HGB BLD-MCNC: 13.4 G/DL — LOW (ref 14–18)
IMM GRANULOCYTES NFR BLD AUTO: 0.2 % — SIGNIFICANT CHANGE UP (ref 0.1–0.3)
INR BLD: 1.75 RATIO — HIGH (ref 0.65–1.3)
LACTATE BLDV-MCNC: 2.5 MMOL/L — HIGH (ref 0.5–2)
LYMPHOCYTES # BLD AUTO: 0.53 K/UL — LOW (ref 1.2–3.4)
LYMPHOCYTES # BLD AUTO: 6.1 % — LOW (ref 20.5–51.1)
MCHC RBC-ENTMCNC: 30.3 PG — SIGNIFICANT CHANGE UP (ref 27–31)
MCHC RBC-ENTMCNC: 33 G/DL — SIGNIFICANT CHANGE UP (ref 32–37)
MCV RBC AUTO: 91.9 FL — SIGNIFICANT CHANGE UP (ref 80–94)
MONOCYTES # BLD AUTO: 0.42 K/UL — SIGNIFICANT CHANGE UP (ref 0.1–0.6)
MONOCYTES NFR BLD AUTO: 4.8 % — SIGNIFICANT CHANGE UP (ref 1.7–9.3)
NEUTROPHILS # BLD AUTO: 7.66 K/UL — HIGH (ref 1.4–6.5)
NEUTROPHILS NFR BLD AUTO: 88.4 % — HIGH (ref 42.2–75.2)
NRBC # BLD: 0 /100 WBCS — SIGNIFICANT CHANGE UP (ref 0–0)
NT-PROBNP SERPL-SCNC: 1568 PG/ML — HIGH (ref 0–300)
PCO2 BLDV: 44 MMHG — SIGNIFICANT CHANGE UP (ref 42–55)
PH BLDV: 7.4 — SIGNIFICANT CHANGE UP (ref 7.32–7.43)
PLATELET # BLD AUTO: 178 K/UL — SIGNIFICANT CHANGE UP (ref 130–400)
PMV BLD: 9 FL — SIGNIFICANT CHANGE UP (ref 7.4–10.4)
PO2 BLDV: 43 MMHG — SIGNIFICANT CHANGE UP (ref 25–45)
POTASSIUM BLDV-SCNC: 4.9 MMOL/L — SIGNIFICANT CHANGE UP (ref 3.5–5.1)
POTASSIUM SERPL-MCNC: 4.5 MMOL/L — SIGNIFICANT CHANGE UP (ref 3.5–5)
POTASSIUM SERPL-SCNC: 4.5 MMOL/L — SIGNIFICANT CHANGE UP (ref 3.5–5)
PROT SERPL-MCNC: 6.5 G/DL — SIGNIFICANT CHANGE UP (ref 6–8)
PROTHROM AB SERPL-ACNC: 20.9 SEC — HIGH (ref 9.95–12.87)
RBC # BLD: 4.42 M/UL — LOW (ref 4.7–6.1)
RBC # FLD: 15.6 % — HIGH (ref 11.5–14.5)
RSV RNA NPH QL NAA+NON-PROBE: SIGNIFICANT CHANGE UP
SAO2 % BLDV: 69 % — SIGNIFICANT CHANGE UP (ref 67–88)
SARS-COV-2 RNA SPEC QL NAA+PROBE: DETECTED
SODIUM SERPL-SCNC: 141 MMOL/L — SIGNIFICANT CHANGE UP (ref 135–146)
TROPONIN T, HIGH SENSITIVITY RESULT: 20 NG/L — SIGNIFICANT CHANGE UP (ref 6–21)
TROPONIN T, HIGH SENSITIVITY RESULT: 21 NG/L — SIGNIFICANT CHANGE UP (ref 6–21)
WBC # BLD: 8.68 K/UL — SIGNIFICANT CHANGE UP (ref 4.8–10.8)
WBC # FLD AUTO: 8.68 K/UL — SIGNIFICANT CHANGE UP (ref 4.8–10.8)

## 2025-01-03 PROCEDURE — 85610 PROTHROMBIN TIME: CPT

## 2025-01-03 PROCEDURE — 82565 ASSAY OF CREATININE: CPT

## 2025-01-03 PROCEDURE — 99285 EMERGENCY DEPT VISIT HI MDM: CPT

## 2025-01-03 PROCEDURE — 85027 COMPLETE CBC AUTOMATED: CPT

## 2025-01-03 PROCEDURE — 84443 ASSAY THYROID STIM HORMONE: CPT

## 2025-01-03 PROCEDURE — 84145 PROCALCITONIN (PCT): CPT

## 2025-01-03 PROCEDURE — 80053 COMPREHEN METABOLIC PANEL: CPT

## 2025-01-03 PROCEDURE — 83605 ASSAY OF LACTIC ACID: CPT

## 2025-01-03 PROCEDURE — 80076 HEPATIC FUNCTION PANEL: CPT

## 2025-01-03 PROCEDURE — 99223 1ST HOSP IP/OBS HIGH 75: CPT

## 2025-01-03 PROCEDURE — 36415 COLL VENOUS BLD VENIPUNCTURE: CPT

## 2025-01-03 PROCEDURE — 71045 X-RAY EXAM CHEST 1 VIEW: CPT | Mod: 26

## 2025-01-03 PROCEDURE — 97162 PT EVAL MOD COMPLEX 30 MIN: CPT | Mod: GP

## 2025-01-03 RX ORDER — REMDESIVIR 5 MG/ML
200 INJECTION INTRAVENOUS EVERY 24 HOURS
Refills: 0 | Status: COMPLETED | OUTPATIENT
Start: 2025-01-03 | End: 2025-01-03

## 2025-01-03 RX ORDER — ONDANSETRON 4 MG/1
4 TABLET ORAL EVERY 8 HOURS
Refills: 0 | Status: DISCONTINUED | OUTPATIENT
Start: 2025-01-03 | End: 2025-01-05

## 2025-01-03 RX ORDER — PANTOPRAZOLE 40 MG/1
40 TABLET, DELAYED RELEASE ORAL
Refills: 0 | Status: DISCONTINUED | OUTPATIENT
Start: 2025-01-03 | End: 2025-01-05

## 2025-01-03 RX ORDER — AZITHROMYCIN MONOHYDRATE 200 MG/5ML
POWDER, FOR SUSPENSION ORAL
Refills: 0 | Status: DISCONTINUED | OUTPATIENT
Start: 2025-01-03 | End: 2025-01-04

## 2025-01-03 RX ORDER — AZITHROMYCIN MONOHYDRATE 200 MG/5ML
500 POWDER, FOR SUSPENSION ORAL ONCE
Refills: 0 | Status: COMPLETED | OUTPATIENT
Start: 2025-01-03 | End: 2025-01-03

## 2025-01-03 RX ORDER — TAMSULOSIN HYDROCHLORIDE 0.4 MG/1
0.4 CAPSULE ORAL AT BEDTIME
Refills: 0 | Status: DISCONTINUED | OUTPATIENT
Start: 2025-01-03 | End: 2025-01-05

## 2025-01-03 RX ORDER — MAG HYDROX/ALUMINUM HYD/SIMETH 200-200-20
30 SUSPENSION, ORAL (FINAL DOSE FORM) ORAL EVERY 4 HOURS
Refills: 0 | Status: DISCONTINUED | OUTPATIENT
Start: 2025-01-03 | End: 2025-01-05

## 2025-01-03 RX ORDER — METOPROLOL TARTRATE 50 MG
50 TABLET ORAL DAILY
Refills: 0 | Status: DISCONTINUED | OUTPATIENT
Start: 2025-01-03 | End: 2025-01-05

## 2025-01-03 RX ORDER — REMDESIVIR 5 MG/ML
INJECTION INTRAVENOUS
Refills: 0 | Status: DISCONTINUED | OUTPATIENT
Start: 2025-01-03 | End: 2025-01-04

## 2025-01-03 RX ORDER — ATORVASTATIN CALCIUM 40 MG/1
80 TABLET, FILM COATED ORAL AT BEDTIME
Refills: 0 | Status: DISCONTINUED | OUTPATIENT
Start: 2025-01-03 | End: 2025-01-05

## 2025-01-03 RX ORDER — CEFTRIAXONE SODIUM 1 G/1
1000 INJECTION, POWDER, FOR SOLUTION INTRAMUSCULAR; INTRAVENOUS EVERY 24 HOURS
Refills: 0 | Status: DISCONTINUED | OUTPATIENT
Start: 2025-01-04 | End: 2025-01-04

## 2025-01-03 RX ORDER — ACETAMINOPHEN 80 MG/.8ML
975 SOLUTION/ DROPS ORAL ONCE
Refills: 0 | Status: COMPLETED | OUTPATIENT
Start: 2025-01-03 | End: 2025-01-03

## 2025-01-03 RX ORDER — REMDESIVIR 5 MG/ML
100 INJECTION INTRAVENOUS EVERY 24 HOURS
Refills: 0 | Status: DISCONTINUED | OUTPATIENT
Start: 2025-01-04 | End: 2025-01-04

## 2025-01-03 RX ORDER — CEFTRIAXONE SODIUM 1 G/1
INJECTION, POWDER, FOR SOLUTION INTRAMUSCULAR; INTRAVENOUS
Refills: 0 | Status: DISCONTINUED | OUTPATIENT
Start: 2025-01-03 | End: 2025-01-04

## 2025-01-03 RX ORDER — CEFTRIAXONE SODIUM 1 G/1
1000 INJECTION, POWDER, FOR SOLUTION INTRAMUSCULAR; INTRAVENOUS ONCE
Refills: 0 | Status: COMPLETED | OUTPATIENT
Start: 2025-01-03 | End: 2025-01-03

## 2025-01-03 RX ORDER — IPRATROPIUM BROMIDE AND ALBUTEROL SULFATE .5; 2.5 MG/3ML; MG/3ML
3 SOLUTION RESPIRATORY (INHALATION) ONCE
Refills: 0 | Status: COMPLETED | OUTPATIENT
Start: 2025-01-03 | End: 2025-01-03

## 2025-01-03 RX ORDER — SODIUM CHLORIDE 9 MG/ML
500 INJECTION, SOLUTION INTRAMUSCULAR; INTRAVENOUS; SUBCUTANEOUS ONCE
Refills: 0 | Status: COMPLETED | OUTPATIENT
Start: 2025-01-03 | End: 2025-01-03

## 2025-01-03 RX ORDER — INFLUENZA A VIRUS A/WISCONSIN/588/2019 (H1N1) RECOMBINANT HEMAGGLUTININ ANTIGEN, INFLUENZA A VIRUS A/DARWIN/6/2021 (H3N2) RECOMBINANT HEMAGGLUTININ ANTIGEN, INFLUENZA B VIRUS B/AUSTRIA/1359417/2021 RECOMBINANT HEMAGGLUTININ ANTIGEN, AND INFLUENZA B VIRUS B/PHUKET/3073/2013 RECOMBINANT HEMAGGLUTININ ANTIGEN 45; 45; 45; 45 UG/.5ML; UG/.5ML; UG/.5ML; UG/.5ML
0.5 INJECTION INTRAMUSCULAR ONCE
Refills: 0 | Status: DISCONTINUED | OUTPATIENT
Start: 2025-01-03 | End: 2025-01-05

## 2025-01-03 RX ORDER — DEXAMETHASONE SODIUM PHOSPHATE 4 MG/ML
6 VIAL (ML) INJECTION ONCE
Refills: 0 | Status: COMPLETED | OUTPATIENT
Start: 2025-01-03 | End: 2025-01-03

## 2025-01-03 RX ORDER — ASPIRIN 81 MG
81 TABLET, DELAYED RELEASE (ENTERIC COATED) ORAL DAILY
Refills: 0 | Status: DISCONTINUED | OUTPATIENT
Start: 2025-01-03 | End: 2025-01-05

## 2025-01-03 RX ORDER — ACETAMINOPHEN 80 MG/.8ML
650 SOLUTION/ DROPS ORAL EVERY 6 HOURS
Refills: 0 | Status: DISCONTINUED | OUTPATIENT
Start: 2025-01-03 | End: 2025-01-05

## 2025-01-03 RX ORDER — AZITHROMYCIN MONOHYDRATE 200 MG/5ML
500 POWDER, FOR SUSPENSION ORAL EVERY 24 HOURS
Refills: 0 | Status: DISCONTINUED | OUTPATIENT
Start: 2025-01-04 | End: 2025-01-04

## 2025-01-03 RX ORDER — APIXABAN 5 MG/1
5 TABLET, FILM COATED ORAL EVERY 12 HOURS
Refills: 0 | Status: DISCONTINUED | OUTPATIENT
Start: 2025-01-03 | End: 2025-01-05

## 2025-01-03 RX ORDER — BENZONATATE 100 MG
200 CAPSULE ORAL EVERY 8 HOURS
Refills: 0 | Status: DISCONTINUED | OUTPATIENT
Start: 2025-01-03 | End: 2025-01-05

## 2025-01-03 RX ORDER — GINKGO BILOBA 40 MG
3 CAPSULE ORAL AT BEDTIME
Refills: 0 | Status: DISCONTINUED | OUTPATIENT
Start: 2025-01-03 | End: 2025-01-05

## 2025-01-03 RX ADMIN — ACETAMINOPHEN 975 MILLIGRAM(S): 80 SOLUTION/ DROPS ORAL at 12:51

## 2025-01-03 RX ADMIN — CEFTRIAXONE SODIUM 100 MILLIGRAM(S): 1 INJECTION, POWDER, FOR SOLUTION INTRAMUSCULAR; INTRAVENOUS at 18:42

## 2025-01-03 RX ADMIN — SODIUM CHLORIDE 500 MILLILITER(S): 9 INJECTION, SOLUTION INTRAMUSCULAR; INTRAVENOUS; SUBCUTANEOUS at 12:51

## 2025-01-03 RX ADMIN — Medication 6 MILLIGRAM(S): at 14:42

## 2025-01-03 RX ADMIN — AZITHROMYCIN MONOHYDRATE 255 MILLIGRAM(S): 200 POWDER, FOR SUSPENSION ORAL at 18:56

## 2025-01-03 RX ADMIN — REMDESIVIR 200 MILLIGRAM(S): 5 INJECTION INTRAVENOUS at 18:30

## 2025-01-03 RX ADMIN — ATORVASTATIN CALCIUM 80 MILLIGRAM(S): 40 TABLET, FILM COATED ORAL at 21:34

## 2025-01-03 RX ADMIN — TAMSULOSIN HYDROCHLORIDE 0.4 MILLIGRAM(S): 0.4 CAPSULE ORAL at 21:35

## 2025-01-03 RX ADMIN — Medication 200 MILLIGRAM(S): at 21:34

## 2025-01-03 RX ADMIN — IPRATROPIUM BROMIDE AND ALBUTEROL SULFATE 3 MILLILITER(S): .5; 2.5 SOLUTION RESPIRATORY (INHALATION) at 12:52

## 2025-01-03 RX ADMIN — APIXABAN 5 MILLIGRAM(S): 5 TABLET, FILM COATED ORAL at 18:42

## 2025-01-03 NOTE — H&P ADULT - HISTORY OF PRESENT ILLNESS
89 y/o male with hx of CABG x 4 , HTN, a.fib on eliquis presents to the hospital reporting with generalized weakness,  fever, non productive cough and dyspnea on exertion.  88 y/o male with hx of CABG x 4 , HTN, a.fib on eliquis presents to the hospital reporting generalized weakness, fever, and non productive cough  x 1 day. States cough started yesterday, dry and non productive. This morning developed a fever to 101 at home thus prompting him to come to ED. Denies SOB, dyspnea, chest pain, palpiations, lightheadedness, dizziness, abdominal pain, nausea, vomiting or diarrhea. Denies any sick contacts at home or recent travel.

## 2025-01-03 NOTE — H&P ADULT - NS ATTEND AMEND GEN_ALL_CORE FT
pt seen and examined on day of admission  1/3    changes to plan made above as necessary   plan of care discussed with support staff pt seen and examined on day of admission  1/3  changes to plan made above as necessary   plan of care discussed with support staff

## 2025-01-03 NOTE — ED PROVIDER NOTE - ATTENDING APP SHARED VISIT CONTRIBUTION OF CARE
Patient complains of cough, shortness of breath, general weakness.  He is not able to perform activities of daily living.  Patient has a past medical history of atrial fibrillation, aortic stenosis.  Vital signs noted.  Appears frail elderly.  Positive JVD.  Positive rales bilaterally.  Heart regular rate with 2/6 systolic murmur abdomen nontender.  Extremity equal pulses.  Positive pedal edema.  Chest x-ray shows some congestive changes.  WBC is within normal limits.  VBG shows no significant retention.  There is a minimal active elevation in the lactate.  Chemistry shows no significant metabolic derangement.  COVID swab is positive. Patient meets sepsis criteria.  However, in my opinion, it is most likely due to a viral infection not bacterial. Patient will be admitted for supportive care moderately symptomatic COVID in an elderly patient. Patient complains of cough, shortness of breath, general weakness.  He is not able to perform activities of daily living.  Patient has a past medical history of atrial fibrillation, aortic stenosis.  Vital signs noted.  He is not hypoxic.  Appears frail elderly.  Positive JVD.  Positive rales bilaterally.  Heart regular rate with 2/6 systolic murmur abdomen nontender.  Extremity equal pulses.  Positive pedal edema.  Chest x-ray shows some congestive changes.  WBC is within normal limits.  VBG shows no significant retention.  There is a minimal active elevation in the lactate.  Chemistry shows no significant metabolic derangement.  COVID swab is positive. Patient meets sepsis criteria.  However, in my opinion, it is most likely due to a viral infection not bacterial. Patient will be admitted for supportive care moderately symptomatic COVID in an elderly patient.

## 2025-01-03 NOTE — H&P ADULT - NSHPLABSRESULTS_GEN_ALL_CORE
13.4   8.68  )-----------( 178      ( 03 Jan 2025 12:46 )             40.6   01-03    141  |  105  |  18  ----------------------------<  110[H]  4.5   |  26  |  0.8    Ca    9.4      03 Jan 2025 12:46    TPro  6.5  /  Alb  3.9  /  TBili  0.7  /  DBili  x   /  AST  34  /  ALT  32  /  AlkPhos  59  01-03

## 2025-01-03 NOTE — ED PROVIDER NOTE - OBJECTIVE STATEMENT
89 y/o male with hx of CABG x 4 , HTN, a.fib on eliquis presents to the ED with fever, non productive cough x 1 day. no known sick contacts. no abdominal pain or vomiting . no back pain. no rashes. denies any sore throat, ear pain . no dizziness, chest pain , hemoptysis or back pain. patient with exertional dyspnea.

## 2025-01-03 NOTE — ED PROVIDER NOTE - PROGRESS NOTE DETAILS
iGles: previous echo:  2. Normal global left ventricular systolic function.   3. LV Ejection Fraction by Cowan's Method with a biplane EF of 55 %.   4. Increased relative wall thickness with normal mass index consistent with left ventricular concentric remodeling.   5. The mitral in-flow pattern reveals no discernable A-wave, therefore no comment on diastolic function can be made.   6. Normal right ventricular size and function.   7. Severely enlarged left atrium. LA volume Index is 58.6 ml/mï¿½.   8. Right atrial enlargement.   9. Moderate aortic valve stenosis.  10. Mild aortic regurgitation.  11. Mild mitral annular calcification.  12. Mild mitral valve regurgitation.  13. Mild tricuspid regurgitation.  14. Mild pulmonic valve regurgitation.

## 2025-01-03 NOTE — ED ADULT NURSE NOTE - NSFALLHARMRISKINTERV_ED_ALL_ED

## 2025-01-03 NOTE — ED PROVIDER NOTE - PHYSICAL EXAMINATION
generalized: normocephalic , well appearing, comfortable  eyes: PERRLA, EOMI, clear conjunctiva  resp: crackles worse on left , no resp distress, no chest wall tenderness or crepitation  cardiac: tachycardic,  irregular rhythm,  S1S2, no murmurs, no extrasystoles  abdomen: NT/ND, BSx4, no CVA tenderness, no palpable mass  msk: no calf tenderness or swelling  skin: no redness or rashes  neuro: AOx3, gait steady, speech clear, motor and sensory in tact

## 2025-01-03 NOTE — H&P ADULT - NSHPPHYSICALEXAM_GEN_ALL_CORE
VITALS:   T(C): 36.8 (01-03-25 @ 12:07), Max: 36.8 (01-03-25 @ 12:07)  HR: 99 (01-03-25 @ 14:42) (99 - 126)  BP: 127/68 (01-03-25 @ 14:42) (108/76 - 127/68)  RR: 18 (01-03-25 @ 14:42) (18 - 18)  SpO2: 97% (01-03-25 @ 14:42) (95% - 97%)    GENERAL: NAD, lying in bed comfortably  HEAD:  Atraumatic, Normocephalic  EYES: EOMI, PERRLA, conjunctiva and sclera clear  ENT: Moist mucous membranes  NECK: Supple, No JVD  CHEST/LUNG: Clear to auscultation bilaterally; No rales, rhonchi, wheezing, or rubs. Unlabored respirations  HEART: irregular irregular rhythm, tachycardic rate; No murmurs, rubs, or gallops  ABDOMEN: BSx4; Soft, nontender, nondistended  EXTREMITIES:  2+ Peripheral Pulses, brisk capillary refill. No clubbing, cyanosis, or edema  NERVOUS SYSTEM:  A&Ox3, no focal deficits   SKIN: No rashes or lesions

## 2025-01-03 NOTE — H&P ADULT - ASSESSMENT
88 y/o male with hx of CABG x 4 , HTN, a.fib on eliquis presents to the hospital reporting with generalized weakness,  fever, non productive cough and dyspnea on exertion.     # Generalized weakness 2/2 COVID19   # R/O GNR PNA   - no wbc, no documented fever inpatient - reports fever at home 101   - fu CXR official report, has bl opacities   - fu blood clx sent from ed   - RDV , decadron  - Rocephin  / Azithromycin   - check procalcitonin   - ID EVAL (ordered)     # H/O CAD sp CABG -     # H/O Chronic Atrial Fibrillation - rate controlled ; cw eliquis, BB     #    dvt/ gi ppx/diet  dispo: acute  - PT EVAL  89 y/o male with hx of CABG x 4 , HTN, a.fib on eliquis presents to the hospital reporting with generalized weakness,  fever, non productive cough and dyspnea on exertion. Presentation seems most c/w viral illness. Can start Remdesivir given age. Otherwise not hypoxic and hemodynamically stable.     # Generalized weakness 2/2 COVID19   # R/O GNR PNA   - no wbc, no documented fever inpatient - reports fever at home 101   - fu CXR official report, has bl opacities   - fu blood clx sent from ed   - Start Remdesivir  - S/p Decadron in ED, not hypoxic so can likely defer further dosing  - check procalcitonin, start CTX/Azithro if elevated  - ID EVAL (ordered)     # H/O CAD sp CABG - cont ASA, Statin BB    # H/O Chronic Atrial Fibrillation - cont Metop XL 50mg and eliquis    dvt/ gi ppx/diet  dispo: acute  - PT EVAL  88 y/o male with hx of CABG x 4 , HTN, a.fib on eliquis presents to the hospital reporting with generalized weakness,  fever, non productive cough and dyspnea on exertion. Presentation seems most c/w viral illness. Can start Remdesivir given age. Otherwise not hypoxic and hemodynamically stable.     # Generalized weakness and Cough 2/2 COVID19   # R/O GNR PNA   - no wbc, no documented fever inpatient - reports fever at home 101   - fu CXR official report, has bl opacities   - fu blood clx sent from ed   - Start Remdesivir  - S/p Decadron in ED, not hypoxic so can likely defer further dosing  - check procalcitonin, start CTX/Azithro if elevated  - ID EVAL  - monitor fever curve     # H/O CAD sp CABG - cont ASA, Statin BB    # H/O Chronic Atrial Fibrillation - cont Metop XL 50mg and eliquis    dvt/ gi ppx/diet  dispo: acute  - PT EVAL  88 y/o male with hx of CABG x 4 , HTN, a.fib on eliquis presents to the hospital reporting with generalized weakness,  fever, non productive cough and dyspnea on exertion. Presentation seems most c/w viral illness. Can start Remdesivir given age. Otherwise not hypoxic and hemodynamically stable.     # Generalized weakness and Cough 2/2 COVID19   # R/O GNR PNA   - no wbc, no documented fever inpatient - reports fever at home 101   - fu CXR official report, has bl opacities   - fu blood clx sent from ed   - Start Remdesivir  - S/p Decadron in ED, not hypoxic so can likely defer further dosing  - start CTX/Azithro , fu procalcitonin   - ID EVAL  - monitor fever curve     # H/O CAD sp CABG - cont ASA, Statin BB    # H/O Chronic Atrial Fibrillation - cont Metop XL 50mg and eliquis    dvt/ gi ppx/diet  dispo: acute  - PT EVAL

## 2025-01-04 ENCOUNTER — TRANSCRIPTION ENCOUNTER (OUTPATIENT)
Age: 89
End: 2025-01-04

## 2025-01-04 DIAGNOSIS — I48.20 CHRONIC ATRIAL FIBRILLATION, UNSPECIFIED: ICD-10-CM

## 2025-01-04 LAB
ALBUMIN SERPL ELPH-MCNC: 4.1 G/DL — SIGNIFICANT CHANGE UP (ref 3.5–5.2)
ALBUMIN SERPL ELPH-MCNC: 4.1 G/DL — SIGNIFICANT CHANGE UP (ref 3.5–5.2)
ALP SERPL-CCNC: 59 U/L — SIGNIFICANT CHANGE UP (ref 30–115)
ALP SERPL-CCNC: 61 U/L — SIGNIFICANT CHANGE UP (ref 30–115)
ALT FLD-CCNC: 30 U/L — SIGNIFICANT CHANGE UP (ref 0–41)
ALT FLD-CCNC: 31 U/L — SIGNIFICANT CHANGE UP (ref 0–41)
ANION GAP SERPL CALC-SCNC: 13 MMOL/L — SIGNIFICANT CHANGE UP (ref 7–14)
AST SERPL-CCNC: 28 U/L — SIGNIFICANT CHANGE UP (ref 0–41)
AST SERPL-CCNC: 30 U/L — SIGNIFICANT CHANGE UP (ref 0–41)
BILIRUB DIRECT SERPL-MCNC: 0.2 MG/DL — SIGNIFICANT CHANGE UP (ref 0–0.3)
BILIRUB INDIRECT FLD-MCNC: 0.3 MG/DL — SIGNIFICANT CHANGE UP (ref 0.2–1.2)
BILIRUB SERPL-MCNC: 0.5 MG/DL — SIGNIFICANT CHANGE UP (ref 0.2–1.2)
BILIRUB SERPL-MCNC: 0.6 MG/DL — SIGNIFICANT CHANGE UP (ref 0.2–1.2)
BUN SERPL-MCNC: 18 MG/DL — SIGNIFICANT CHANGE UP (ref 10–20)
CALCIUM SERPL-MCNC: 9.8 MG/DL — SIGNIFICANT CHANGE UP (ref 8.4–10.5)
CHLORIDE SERPL-SCNC: 107 MMOL/L — SIGNIFICANT CHANGE UP (ref 98–110)
CO2 SERPL-SCNC: 25 MMOL/L — SIGNIFICANT CHANGE UP (ref 17–32)
CREAT SERPL-MCNC: 0.9 MG/DL — SIGNIFICANT CHANGE UP (ref 0.7–1.5)
CREAT SERPL-MCNC: 0.9 MG/DL — SIGNIFICANT CHANGE UP (ref 0.7–1.5)
EGFR: 82 ML/MIN/1.73M2 — SIGNIFICANT CHANGE UP
EGFR: 82 ML/MIN/1.73M2 — SIGNIFICANT CHANGE UP
GLUCOSE SERPL-MCNC: 130 MG/DL — HIGH (ref 70–99)
HCT VFR BLD CALC: 41.6 % — LOW (ref 42–52)
HGB BLD-MCNC: 13.6 G/DL — LOW (ref 14–18)
INR BLD: 2.31 RATIO — HIGH (ref 0.65–1.3)
LACTATE SERPL-SCNC: 3.1 MMOL/L — HIGH (ref 0.7–2)
MCHC RBC-ENTMCNC: 30.2 PG — SIGNIFICANT CHANGE UP (ref 27–31)
MCHC RBC-ENTMCNC: 32.7 G/DL — SIGNIFICANT CHANGE UP (ref 32–37)
MCV RBC AUTO: 92.2 FL — SIGNIFICANT CHANGE UP (ref 80–94)
NRBC # BLD: 0 /100 WBCS — SIGNIFICANT CHANGE UP (ref 0–0)
PLATELET # BLD AUTO: 171 K/UL — SIGNIFICANT CHANGE UP (ref 130–400)
PMV BLD: 8.8 FL — SIGNIFICANT CHANGE UP (ref 7.4–10.4)
POTASSIUM SERPL-MCNC: 4.2 MMOL/L — SIGNIFICANT CHANGE UP (ref 3.5–5)
POTASSIUM SERPL-SCNC: 4.2 MMOL/L — SIGNIFICANT CHANGE UP (ref 3.5–5)
PROCALCITONIN SERPL-MCNC: 0.05 NG/ML — SIGNIFICANT CHANGE UP (ref 0.02–0.1)
PROT SERPL-MCNC: 6.5 G/DL — SIGNIFICANT CHANGE UP (ref 6–8)
PROT SERPL-MCNC: 6.8 G/DL — SIGNIFICANT CHANGE UP (ref 6–8)
PROTHROM AB SERPL-ACNC: 27.7 SEC — HIGH (ref 9.95–12.87)
RBC # BLD: 4.51 M/UL — LOW (ref 4.7–6.1)
RBC # FLD: 15.9 % — HIGH (ref 11.5–14.5)
SODIUM SERPL-SCNC: 145 MMOL/L — SIGNIFICANT CHANGE UP (ref 135–146)
TSH SERPL-MCNC: 0.8 UIU/ML — SIGNIFICANT CHANGE UP (ref 0.27–4.2)
WBC # BLD: 7.45 K/UL — SIGNIFICANT CHANGE UP (ref 4.8–10.8)
WBC # FLD AUTO: 7.45 K/UL — SIGNIFICANT CHANGE UP (ref 4.8–10.8)

## 2025-01-04 PROCEDURE — 99232 SBSQ HOSP IP/OBS MODERATE 35: CPT

## 2025-01-04 PROCEDURE — 99239 HOSP IP/OBS DSCHRG MGMT >30: CPT

## 2025-01-04 RX ORDER — ACETAMINOPHEN 80 MG/.8ML
2 SOLUTION/ DROPS ORAL
Qty: 18 | Refills: 0
Start: 2025-01-04 | End: 2025-01-06

## 2025-01-04 RX ORDER — PREDNISONE 5 MG
2 TABLET ORAL
Qty: 10 | Refills: 0
Start: 2025-01-04 | End: 2025-01-08

## 2025-01-04 RX ORDER — BENZONATATE 100 MG
2 CAPSULE ORAL
Qty: 42 | Refills: 0
Start: 2025-01-04 | End: 2025-01-10

## 2025-01-04 RX ADMIN — APIXABAN 5 MILLIGRAM(S): 5 TABLET, FILM COATED ORAL at 21:30

## 2025-01-04 RX ADMIN — Medication 50 MILLIGRAM(S): at 06:03

## 2025-01-04 RX ADMIN — Medication 200 MILLIGRAM(S): at 06:04

## 2025-01-04 RX ADMIN — Medication 200 MILLIGRAM(S): at 21:29

## 2025-01-04 RX ADMIN — ACETAMINOPHEN 975 MILLIGRAM(S): 80 SOLUTION/ DROPS ORAL at 12:41

## 2025-01-04 RX ADMIN — TAMSULOSIN HYDROCHLORIDE 0.4 MILLIGRAM(S): 0.4 CAPSULE ORAL at 21:29

## 2025-01-04 RX ADMIN — APIXABAN 5 MILLIGRAM(S): 5 TABLET, FILM COATED ORAL at 06:04

## 2025-01-04 RX ADMIN — PANTOPRAZOLE 40 MILLIGRAM(S): 40 TABLET, DELAYED RELEASE ORAL at 06:03

## 2025-01-04 RX ADMIN — Medication 81 MILLIGRAM(S): at 12:05

## 2025-01-04 RX ADMIN — ATORVASTATIN CALCIUM 80 MILLIGRAM(S): 40 TABLET, FILM COATED ORAL at 21:29

## 2025-01-04 NOTE — PHYSICAL THERAPY INITIAL EVALUATION ADULT - GENERAL OBSERVATIONS, REHAB EVAL
8:35 -9:00 Chart reviewed. Order received.  Patient is ok to be  seen for PT,confirmed with RN. pt encountered  Semi linda in bed denies pain, and agrees to participate in session, +  Heplock , + Tele / Pulse ox  ,NAD.

## 2025-01-04 NOTE — DISCHARGE NOTE PROVIDER - HOSPITAL COURSE
88 y/o male with hx of CABG x 4 , HTN, a.fib on eliquis presents to the hospital reporting with generalized weakness,  fever, non productive cough and dyspnea on exertion. Presentation seems most c/w viral illness. Can start Remdesivir given age. Otherwise not hypoxic and hemodynamically stable.     # Generalized weakness and Cough 2/2 COVID19   - no wbc, no documented fever inpatient - reports fever at home 101   - CXR - no opacity , + atelectasis   - procalcitonin 0.05   - blood culture pending   - discussed with ID Dr. Goetz - d/c abx and remdesivir l prednisone for 5 days   - anti tussives    # H/O CAD sp CABG - cont ASA, Statin BB    # H/O Chronic Atrial Fibrillation - cont Metop XL 50mg and eliquis    attending attestation:  patient seen and examined on day of discharge  labs and vitals reviewed  patient has no complaints  plan of care discussed with patient/family in agreement and understanding

## 2025-01-04 NOTE — DISCHARGE NOTE PROVIDER - NSDCMRMEDTOKEN_GEN_ALL_CORE_FT
acetaminophen 325 mg oral tablet: 2 tab(s) orally every 8 hours as needed for Temp greater or equal to 38C (100.4F), Mild Pain (1 - 3)  aspirin 81 mg oral capsule: 1 cap(s) orally once a day  atorvastatin 80 mg oral tablet: 1 tab(s) orally once a day (at bedtime)  benzonatate 100 mg oral capsule: 2 cap(s) orally every 8 hours as needed for  cough  Eliquis 5 mg oral tablet: 1 tab(s) orally 2 times a day  Metoprolol Succinate ER 50 mg oral tablet, extended release: 1 tab(s) orally once a day  omeprazole 20 mg oral delayed release tablet: 1 tab(s) orally once a day  polyethylene glycol 3350 oral powder for reconstitution: 17 gram(s) orally 2 times a day as needed for  constipation  predniSONE 20 mg oral tablet: 2 tab(s) orally once a day  tamsulosin 0.4 mg oral capsule: 1 cap(s) orally once a day (at bedtime)

## 2025-01-04 NOTE — CONSULT NOTE ADULT - SUBJECTIVE AND OBJECTIVE BOX
NICKY JOSHI  89y, Male  Allergies    No Known Allergies    Intolerances        LOS  1d    HPI  HPI:  88 y/o male with hx of CABG x 4 , HTN, a.fib on eliquis presents to the hospital reporting generalized weakness, fever, and non productive cough  x 1 day. States cough started yesterday, dry and non productive. This morning developed a fever to 101 at home thus prompting him to come to ED. Denies SOB, dyspnea, chest pain, palpiations, lightheadedness, dizziness, abdominal pain, nausea, vomiting or diarrhea. Denies any sick contacts at home or recent travel.   (03 Jan 2025 16:22)      INFECTIOUS DISEASE HISTORY:  ID consulted for antimicrobial recommendations.     Prior hospital charts reviewed [Yes]  Primary team notes reviewed [Yes]  Other consultant notes reviewed [Yes]    REVIEW OF SYSTEMS:  CONSTITUTIONAL: No fever or chills  HEENT: No sore throat  RESPIRATORY: No cough, no shortness of breath  CARDIOVASCULAR: No chest pain or palpitations  GASTROINTESTINAL: No abdominal or epigastric pain  GENITOURINARY: No dysuria  NEUROLOGICAL: No headache/dizziness  MSK: No joint pain, erythema, or swelling; no back pain  SKIN: No itching, rashes  All other ROS negative except noted above    PAST MEDICAL & SURGICAL HISTORY:  Afib      Hypertension      Glaucoma      Hyperlipidemia      S/P CABG (coronary artery bypass graft)    SOCIAL HISTORY:  - No recent travel  FAMILY HISTORY:  FHx: hypertension (Father, Mother)    ANTIMICROBIALS:      ANTIMICROBIALS (past 90 days):  MEDICATIONS  (STANDING):    azithromycin  IVPB   255 mL/Hr IV Intermittent (01-03-25 @ 18:56)    cefTRIAXone   IVPB   100 mL/Hr IV Intermittent (01-03-25 @ 18:42)    remdesivir  IVPB   200 mL/Hr IV Intermittent (01-03-25 @ 18:30)        OTHER MEDS:   MEDICATIONS  (STANDING):  acetaminophen     Tablet .. 650 every 6 hours PRN  aluminum hydroxide/magnesium hydroxide/simethicone Suspension 30 every 4 hours PRN  apixaban 5 every 12 hours  aspirin enteric coated 81 daily  atorvastatin 80 at bedtime  benzonatate 200 every 8 hours  influenza  Vaccine (HIGH DOSE) 0.5 once  melatonin 3 at bedtime PRN  metoprolol succinate ER 50 daily  ondansetron Injectable 4 every 8 hours PRN  pantoprazole    Tablet 40 before breakfast  tamsulosin 0.4 at bedtime      VITALS:  Vital Signs Last 24 Hrs  T(F): 97.4 (01-04-25 @ 11:55), Max: 98.8 (01-03-25 @ 21:06)    Vital Signs Last 24 Hrs  HR: 88 (01-04-25 @ 11:55) (69 - 95)  BP: 124/76 (01-04-25 @ 11:55) (102/60 - 124/84)  RR: 18 (01-04-25 @ 11:55)  SpO2: 99% (01-04-25 @ 11:55) (96% - 99%)  Wt(kg): --    EXAM:  GENERAL: NAD, frail looking.   HEAD: No head lesions  NECK: Supple  CHEST/LUNG: Shallow breath sounds. Congested.   HEART: S1 S2  ABDOMEN: Soft, nontender  EXTREMITIES: No clubbing, cyanosis, or petal edema  NERVOUS SYSTEM: Alert and oriented to person, very hard of hearing.   MSK: No joint erythema, swelling or pain  SKIN: No rashes or lesions, no superficial thrombophlebitis    Labs:                        13.6   7.45  )-----------( 171      ( 04 Jan 2025 09:51 )             41.6     01-04    145  |  107  |  18  ----------------------------<  130[H]  4.2   |  25  |  0.9    Ca    9.8      04 Jan 2025 09:51    TPro  6.8  /  Alb  4.1  /  TBili  0.6  /  DBili  0.2  /  AST  30  /  ALT  31  /  AlkPhos  61  01-04      WBC Trend:  WBC Count: 7.45 (01-04-25 @ 09:51)  WBC Count: 8.68 (01-03-25 @ 12:46)      Auto Neutrophil #: 7.66 K/uL (01-03-25 @ 12:46)  Auto Neutrophil #: 3.33 K/uL (12-20-24 @ 14:00)  Auto Neutrophil #: 2.14 K/uL (08-13-24 @ 06:20)  Auto Neutrophil #: 3.23 K/uL (08-12-24 @ 11:00)  Auto Neutrophil #: 2.76 K/uL (05-13-24 @ 17:35)      Creatine Trend:  Creatinine: 0.9 (01-04)  Creatinine: 0.9 (01-04)  Creatinine: 0.8 (01-03)      Liver Biochemical Testing Trend:  Alanine Aminotransferase (ALT/SGPT): 31 (01-04)  Alanine Aminotransferase (ALT/SGPT): 30 (01-04)  Alanine Aminotransferase (ALT/SGPT): 32 (01-03)  Alanine Aminotransferase (ALT/SGPT): 29 (12-20)  Alanine Aminotransferase (ALT/SGPT): 13 (08-13)  Aspartate Aminotransferase (AST/SGOT): 30 (01-04-25 @ 09:51)  Aspartate Aminotransferase (AST/SGOT): 28 (01-04-25 @ 09:51)  Aspartate Aminotransferase (AST/SGOT): 34 (01-03-25 @ 12:46)  Aspartate Aminotransferase (AST/SGOT): 33 (12-20-24 @ 14:00)  Aspartate Aminotransferase (AST/SGOT): 18 (08-13-24 @ 06:20)  Bilirubin Total: 0.6 (01-04)  Bilirubin Direct: 0.2 (01-04)  Bilirubin Total: 0.5 (01-04)  Bilirubin Total: 0.7 (01-03)  Bilirubin Total: 0.4 (12-20)      Trend LDH      Auto Eosinophil %: 0.2 % (01-03-25 @ 12:46)      Urinalysis Basic - ( 04 Jan 2025 09:51 )    Color: x / Appearance: x / SG: x / pH: x  Gluc: 130 mg/dL / Ketone: x  / Bili: x / Urobili: x   Blood: x / Protein: x / Nitrite: x   Leuk Esterase: x / RBC: x / WBC x   Sq Epi: x / Non Sq Epi: x / Bacteria: x        MICROBIOLOGY:    Male    Procalcitonin: 0.05 (01-03)      Troponin T, High Sensitivity Result: 20 (01-03)  Troponin T, High Sensitivity Result: 21 (01-03)    Lactate, Blood: 3.1 (01-04 @ 09:51)  Blood Gas Venous - Lactate: 2.5 (01-03 @ 13:09)        INFLAMMATORY MARKERS      RADIOLOGY & ADDITIONAL TESTS:  I have personally reviewed the imagings.  CXR      CT    < from: Xray Chest 1 View-PORTABLE IMMEDIATE (Xray Chest 1 View-PORTABLE IMMEDIATE .) (01.03.25 @ 12:47) >    CardiacStable.tinum/hilum: Heart size within normal limits, thoracic   aortic calcification, status post median sternotomy, CABG..    Lung parenchyma/Pleura: Left basilar focal atelectasis, elevation of the  left hemidiaphragm    SkeStable.oft tissues: Stable.      IMPRESSION:    Left basilar focal atelectasis, elevation of the left hemidiaphragm.    --- End of Report ---    < end of copied text >  < from: CT Abdomen and Pelvis w/ IV Cont (05.13.24 @ 19:57) >  Since 5/7/2024,    Thickening of the inferior aspect of the left gluteus donita, with more   focal overlying subcutaneous fat stranding (301/111). Question   myositis/cellulitis.    Enlarged prostate gland with underdistended thickened bladder which could   be due to infection or chronic outlet obstruction    < end of copied text >  < from: TTE Echo Complete w/o Contrast w/ Doppler (10.01.24 @ 10:24) >  Summary:   1. Patient is in atrial fibrillation during study.   2. Normal global left ventricular systolic function.   3. LV Ejection Fraction by Cowan's Method with a biplane EF of 55 %.   4. Increased relative wall thickness with normal mass index consistent   with left ventricular concentric remodeling.   5. The mitral in-flow pattern reveals no discernable A-wave, therefore   no comment on diastolic function can be made.   6. Normal right ventricular size and function.   7. Severely enlarged left atrium. LA volume Index is 58.6 ml/m².   8. Right atrial enlargement.   9. Moderate aortic valve stenosis.  10. Mild aortic regurgitation.  11. Mild mitral annular calcification.  12. Mild mitral valve regurgitation.  13. Mild tricuspid regurgitation.  14. Mild pulmonic valve regurgitation.  15. Dilatation of the aortic root and ascending aorta, measuring 4.0 cm   and 4.2 cm, respectively.  16. Normal pulmonary artery pressure.  17. There is no evidence of pericardial effusion.    < end of copied text >      CARDIOLOGY TESTING  12 Lead ECG:   Ventricular Rate 109 BPM    Atrial Rate 111 BPM    QRS Duration 106 ms    Q-T Interval 328 ms    QTC Calculation(Bazett) 441 ms    P Axis 235 degrees    R Axis 19 degrees    T Axis 241 degrees    Diagnosis Line    Unusual P axis, possible ectopic atrial tachycardia with undetermined rhythm  irregularity  Possible Inferior infarct , age undetermined  Abnormal ECG    Confirmed by Ramon German ([id number]) on 1/3/2025 4:00:33 PM (01-03-25 @ 12:20)

## 2025-01-04 NOTE — PHYSICAL THERAPY INITIAL EVALUATION ADULT - PERTINENT HX OF CURRENT PROBLEM, REHAB EVAL
89 y/o male with hx of CABG x 4 , HTN, a.fib on eliquis presents to the hospital reporting generalized weakness, fever, and non productive cough  x 1 day. States cough started yesterday, dry and non productive. This morning developed a fever to 101 at home thus prompting him to come to ED. Denies SOB, dyspnea, chest pain, palpiations, lightheadedness, dizziness, abdominal pain, nausea, vomiting or diarrhea. Denies any sick contacts at home or recent travel.

## 2025-01-04 NOTE — DISCHARGE NOTE PROVIDER - ATTENDING DISCHARGE PHYSICAL EXAMINATION:
PHYSICAL EXAM:  GENERAL: NAD, lying in bed comfortably  HEAD:  Atraumatic, Normocephalic  EYES: EOMI, PERRLA, conjunctiva and sclera clear  ENT: Moist mucous membranes  NECK: Supple, No JVD  CHEST/LUNG: Clear to auscultation bilaterally; No rales, rhonchi, wheezing, or rubs. Unlabored respirations  HEART: Regular rate and rhythm; No murmurs, rubs, or gallops  ABDOMEN: Bowel sounds present; Soft, Nontender, Nondistended. No hepatomegally  EXTREMITIES:  2+ Peripheral Pulses, brisk capillary refill. No clubbing, cyanosis, or edema  NERVOUS SYSTEM:  Alert & Oriented X3, speech clear. No deficits

## 2025-01-04 NOTE — PROGRESS NOTE ADULT - SUBJECTIVE AND OBJECTIVE BOX
SUBJECTIVE:    Patient is a 89y old Male who presents with a chief complaint of   Currently admitted to medicine with the primary diagnosis of COVID       Today is hospital day 2d. This morning he is resting comfortably in bed and reports no new issues or overnight events.     ROS:   CONSTITUTIONAL: No weakness, fevers or chills   EYES/ENT: No visual changes; No vertigo or throat pain   NECK: No pain or stiffness   RESPIRATORY: No cough, wheezing, hemoptysis; No shortness of breath   CARDIOVASCULAR: No chest pain or palpitations   GASTROINTESTINAL: No abdominal or epigastric pain. No nausea, vomiting, or hematemesis; No diarrhea or constipation. No melena or hematochezia.  GENITOURINARY: No dysuria, frequency or hematuria  NEUROLOGICAL: No numbness or weakness  SKIN: No itching, rashes      PAST MEDICAL & SURGICAL HISTORY  Afib    Hypertension    Glaucoma    Hyperlipidemia    S/P CABG (coronary artery bypass graft)      SOCIAL HISTORY:    ALLERGIES:  No Known Allergies    MEDICATIONS:  STANDING MEDICATIONS  apixaban 5 milliGRAM(s) Oral every 12 hours  aspirin enteric coated 81 milliGRAM(s) Oral daily  atorvastatin 80 milliGRAM(s) Oral at bedtime  benzonatate 200 milliGRAM(s) Oral every 8 hours  influenza  Vaccine (HIGH DOSE) 0.5 milliLiter(s) IntraMuscular once  metoprolol succinate ER 50 milliGRAM(s) Oral daily  pantoprazole    Tablet 40 milliGRAM(s) Oral before breakfast  tamsulosin 0.4 milliGRAM(s) Oral at bedtime    PRN MEDICATIONS  acetaminophen     Tablet .. 650 milliGRAM(s) Oral every 6 hours PRN  aluminum hydroxide/magnesium hydroxide/simethicone Suspension 30 milliLiter(s) Oral every 4 hours PRN  melatonin 3 milliGRAM(s) Oral at bedtime PRN  ondansetron Injectable 4 milliGRAM(s) IV Push every 8 hours PRN    VITALS:   T(F): 97.5  HR: 84  BP: 123/79  RR: 18  SpO2: 99%    LABS:  Negative for smoking/alcohol/drug use.                         13.6   7.45  )-----------( 171      ( 04 Jan 2025 09:51 )             41.6     01-04    145  |  107  |  18  ----------------------------<  130[H]  4.2   |  25  |  0.9    Ca    9.8      04 Jan 2025 09:51    TPro  6.8  /  Alb  4.1  /  TBili  0.6  /  DBili  0.2  /  AST  30  /  ALT  31  /  AlkPhos  61  01-04    PT/INR - ( 04 Jan 2025 09:51 )   PT: 27.70 sec;   INR: 2.31 ratio         PTT - ( 03 Jan 2025 12:46 )  PTT:34.5 sec  Urinalysis Basic - ( 04 Jan 2025 09:51 )    Color: x / Appearance: x / SG: x / pH: x  Gluc: 130 mg/dL / Ketone: x  / Bili: x / Urobili: x   Blood: x / Protein: x / Nitrite: x   Leuk Esterase: x / RBC: x / WBC x   Sq Epi: x / Non Sq Epi: x / Bacteria: x        Lactate, Blood: 3.1 mmol/L *H* (01-04-25 @ 09:51)      Culture - Blood (collected 03 Jan 2025 12:38)  Source: .Blood BLOOD  Preliminary Report (04 Jan 2025 22:01):    No growth at 24 hours    Culture - Blood (collected 03 Jan 2025 12:38)  Source: .Blood BLOOD  Preliminary Report (04 Jan 2025 22:01):    No growth at 24 hours          RADIOLOGY:    PHYSICAL EXAM:  GEN: No acute distress  HEENT: normocephalic, atraumatic, aniceteric  LUNGS: bl breath sounds   HEART: S1/S2 present. RRR, no murmurs  ABD: Soft, non-tender, non-distended. Bowel sounds present  EXT: no edema   NEURO: AAOX3, normal affect      ASSESSMENT AND PLAN:    88 y/o male with hx of CABG x 4 , HTN, a.fib on eliquis presents to the hospital reporting with generalized weakness,  fever, non productive cough and dyspnea on exertion. Presentation seems most c/w viral illness. Can start Remdesivir given age. Otherwise not hypoxic and hemodynamically stable.     # Generalized weakness and Cough 2/2 COVID19   - no wbc, no documented fever inpatient - reports fever at home 101   - CXR - no opacity , + atelectasis   - procalcitonin 0.05   - blood culture pending   - discussed with ID Dr. Goetz - d/c abx and remdesivir l prednisone for 5 days   - anti tussives    # H/O CAD sp CABG - cont ASA, Statin BB    # H/O Chronic Atrial Fibrillation - cont Metop XL 50mg and eliquis    family cannot take patient home tonight - will pick him up in the morning

## 2025-01-04 NOTE — DISCHARGE NOTE NURSING/CASE MANAGEMENT/SOCIAL WORK - PATIENT PORTAL LINK FT
You can access the FollowMyHealth Patient Portal offered by [facility] by registering at the following website: [email or web address]. By joining [email or web address]’s FollowMyHealth portal, you will also be able to view your health information using other applications (apps) compatible with our system.

## 2025-01-04 NOTE — DISCHARGE NOTE PROVIDER - CARE PROVIDER_API CALL
Elyse Flynn  Internal Medicine  [address] [place] [address]  Phone: [phone]  Fax: ([phone]  Follow Up Time:

## 2025-01-04 NOTE — PHYSICAL THERAPY INITIAL EVALUATION ADULT - ADDITIONAL COMMENTS
Per patient, they live in private home with no steps outside and has chairlift inside, was using a rolling walker W/all functional activity , asper pt he was independent W/ all functional activity

## 2025-01-04 NOTE — DISCHARGE NOTE PROVIDER - NSDCCPCAREPLAN_GEN_ALL_CORE_FT
PRINCIPAL DISCHARGE DIAGNOSIS  Diagnosis: COVID  Assessment and Plan of Treatment: take cough medicine as needed   prednisone for 5 days   isolaiton at home for 10 days from first symptom onset   follow up with primary care doctor for further surveillance   medication were sent to [facility] on [address] [facility] , Phone [phone]      SECONDARY DISCHARGE DIAGNOSES  Diagnosis: Generalized weakness  Assessment and Plan of Treatment: you were seen by physical therapy recommended rehab vs home pt - you and family opted for home physical therapy and declined rehab

## 2025-01-04 NOTE — CONSULT NOTE ADULT - ASSESSMENT
ASSESSMENT  This is a 88 y/o male with hx of CABG x 4 , HTN, a.fib on eliquis presents to the hospital reporting generalized weakness, fever, and non productive cough.     IMPRESSION  #Covid infection- Mild. On Room air.  #Generalized weakness.   #Less likely superimposed bacterial pneumonia. CXR negative. 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    RECOMMENDATIONS  -Follow up with blood cultures.  -Remdesivir 200mg X1 and then 100 mg daily for 2 days. (Can D/C earlier if ready for discharge)   -Supportive care if getting discharged with cough medication or PO prednisone for a short course is okay.  -Isolation precautions discussed. Lives with wife.   -Off loading to prevent pressure sores and preventive measures to avoid aspiration.  -Recall ID as needed.     If any questions, please send a message or call on [employer] Teams  Please continue to update ID with any pertinent new laboratory or radiographic findings.    Nitesh Goetz M.D  Infectious Diseases Attending/   [employer] at [facility] [place]

## 2025-01-04 NOTE — DISCHARGE NOTE PROVIDER - PROVIDER RX CONTACT NUMBER
[phone] Detail Level: Detailed General Sunscreen Counseling: I recommended a broad spectrum sunscreen with a SPF of 30 or higher.  I explained that SPF 30 sunscreens block approximately 97 percent of the sun's harmful rays.  Sunscreens should be applied at least 15 minutes prior to expected sun exposure and then every 2 hours after that as long as sun exposure continues. If swimming or exercising sunscreen should be reapplied every 45 minutes to an hour after getting wet or sweating.  One ounce, or the equivalent of a shot glass full of sunscreen, is adequate to protect the skin not covered by a bathing suit. I also recommended a lip balm with a sunscreen as well. Sun protective clothing can be used in lieu of sunscreen but must be worn the entire time you are exposed to the sun's rays.

## 2025-01-04 NOTE — DISCHARGE NOTE NURSING/CASE MANAGEMENT/SOCIAL WORK - FINANCIAL ASSISTANCE
[facility] provides services at a reduced cost to those who are determined to be eligible through [facility]’s financial assistance program. Information regarding [facility]’s financial assistance program can be found by going to [email or web address] or by calling [phone].

## 2025-01-04 NOTE — DISCHARGE NOTE NURSING/CASE MANAGEMENT/SOCIAL WORK - NSDCPEFALRISK_GEN_ALL_CORE
For information on Fall & Injury Prevention, visit: [email or web address] OR  [email or web address] OR  https://www.cdc.gov/steadi/patient.html

## 2025-01-05 VITALS — DIASTOLIC BLOOD PRESSURE: 79 MMHG | TEMPERATURE: 98 F | HEART RATE: 84 BPM | SYSTOLIC BLOOD PRESSURE: 123 MMHG

## 2025-01-05 LAB
ALBUMIN SERPL ELPH-MCNC: 3.8 G/DL — SIGNIFICANT CHANGE UP (ref 3.5–5.2)
ALP SERPL-CCNC: 54 U/L — SIGNIFICANT CHANGE UP (ref 30–115)
ALT FLD-CCNC: 28 U/L — SIGNIFICANT CHANGE UP (ref 0–41)
AST SERPL-CCNC: 34 U/L — SIGNIFICANT CHANGE UP (ref 0–41)
BILIRUB DIRECT SERPL-MCNC: 0.2 MG/DL — SIGNIFICANT CHANGE UP (ref 0–0.3)
BILIRUB INDIRECT FLD-MCNC: 0.9 MG/DL — SIGNIFICANT CHANGE UP (ref 0.2–1.2)
BILIRUB SERPL-MCNC: 1.1 MG/DL — SIGNIFICANT CHANGE UP (ref 0.2–1.2)
CREAT SERPL-MCNC: 0.7 MG/DL — SIGNIFICANT CHANGE UP (ref 0.7–1.5)
EGFR: 89 ML/MIN/1.73M2 — SIGNIFICANT CHANGE UP
INR BLD: 2.03 RATIO — HIGH (ref 0.65–1.3)
PROT SERPL-MCNC: 6.2 G/DL — SIGNIFICANT CHANGE UP (ref 6–8)
PROTHROM AB SERPL-ACNC: 24.3 SEC — HIGH (ref 9.95–12.87)

## 2025-01-05 PROCEDURE — 99239 HOSP IP/OBS DSCHRG MGMT >30: CPT

## 2025-01-05 RX ADMIN — Medication 81 MILLIGRAM(S): at 12:31

## 2025-01-05 RX ADMIN — APIXABAN 5 MILLIGRAM(S): 5 TABLET, FILM COATED ORAL at 05:27

## 2025-01-05 RX ADMIN — PANTOPRAZOLE 40 MILLIGRAM(S): 40 TABLET, DELAYED RELEASE ORAL at 07:26

## 2025-01-05 RX ADMIN — Medication 50 MILLIGRAM(S): at 05:25

## 2025-01-05 RX ADMIN — Medication 200 MILLIGRAM(S): at 05:25

## 2025-01-08 LAB
CULTURE RESULTS: SIGNIFICANT CHANGE UP
CULTURE RESULTS: SIGNIFICANT CHANGE UP
SPECIMEN SOURCE: SIGNIFICANT CHANGE UP
SPECIMEN SOURCE: SIGNIFICANT CHANGE UP

## 2025-01-09 DIAGNOSIS — Z79.82 LONG TERM (CURRENT) USE OF ASPIRIN: ICD-10-CM

## 2025-01-09 DIAGNOSIS — U07.1 COVID-19: ICD-10-CM

## 2025-01-09 DIAGNOSIS — Z95.1 PRESENCE OF AORTOCORONARY BYPASS GRAFT: ICD-10-CM

## 2025-01-09 DIAGNOSIS — N40.0 BENIGN PROSTATIC HYPERPLASIA WITHOUT LOWER URINARY TRACT SYMPTOMS: ICD-10-CM

## 2025-01-09 DIAGNOSIS — J98.11 ATELECTASIS: ICD-10-CM

## 2025-01-09 DIAGNOSIS — I10 ESSENTIAL (PRIMARY) HYPERTENSION: ICD-10-CM

## 2025-01-09 DIAGNOSIS — Z79.01 LONG TERM (CURRENT) USE OF ANTICOAGULANTS: ICD-10-CM

## 2025-01-09 DIAGNOSIS — R05.9 COUGH, UNSPECIFIED: ICD-10-CM

## 2025-01-09 DIAGNOSIS — H40.9 UNSPECIFIED GLAUCOMA: ICD-10-CM

## 2025-01-09 DIAGNOSIS — I25.10 ATHEROSCLEROTIC HEART DISEASE OF NATIVE CORONARY ARTERY WITHOUT ANGINA PECTORIS: ICD-10-CM

## 2025-01-09 DIAGNOSIS — E78.5 HYPERLIPIDEMIA, UNSPECIFIED: ICD-10-CM

## 2025-01-09 DIAGNOSIS — D84.9 IMMUNODEFICIENCY, UNSPECIFIED: ICD-10-CM

## 2025-01-09 DIAGNOSIS — Z82.49 FAMILY HISTORY OF ISCHEMIC HEART DISEASE AND OTHER DISEASES OF THE CIRCULATORY SYSTEM: ICD-10-CM

## 2025-01-09 DIAGNOSIS — R53.1 WEAKNESS: ICD-10-CM

## 2025-01-09 DIAGNOSIS — I48.20 CHRONIC ATRIAL FIBRILLATION, UNSPECIFIED: ICD-10-CM

## 2025-01-23 NOTE — ED PROVIDER NOTE - ABNORMAL RHYTHM
atrial fibrillation
This was a shared visit with the VLADIMIR. I reviewed and verified the documentation.

## 2025-02-24 ENCOUNTER — RX RENEWAL (OUTPATIENT)
Age: 89
End: 2025-02-24

## 2025-04-23 NOTE — DISCHARGE NOTE PROVIDER - EXTENDED VTE YES NO FOR MLM ASPIRIN
-Admitted to inpatient status  Presented with leg swelling and on admit had elevated white blood cell count and subsequently fever.  Procal negative.  -UA suggestive of possible UTI but without urinary symptoms.    -Left knee with acute on chronic pain but not significantly tender and with good range of motion.    -Earlier in stay noted URI symptoms and reports of scant hemoptysis and cough.    -CT Chest with contrast 4/17 showed a large (8.1 x 4.5 cm) subcarinal mass, possible lymphadenopathy.  Additional mediastinal, hilar, and left axillary lymphadenopathy noted. Mild/moderate sized pleural effusions with bibasilar atelectasis.  Moderate chronic interstitial/ fibrotic lung changes.  -Blood cultures negative.  Urine cultures with no predominant organism  -Pulmonology and general surgery consulted and input appreciated.    -Pulm attempted thoracentesis 4/18 but not able to obtain fluid.  -Taken by surgery to OR on 4/20 for excisional LN biopsy.  Await pathology.    -Discussed next steps with pulmonology if path is non-diagnostic.  Initially thought EBUS with transbronchial needle biopsy - however per Dr. Schwartz who would perform that as outpatient, EGD with EUS likely best next step.  Will discuss with [facility] at [facility] to determine how best to arrange if needed.  -WBC continues to rise, but she is afebrile without evidence of infection at this time.  Noted ongoing workup by pulmonology - flow cytometry, SPEP, free light chains, immunoglobulins, chet, rf, ccp   ,

## 2025-04-29 ENCOUNTER — APPOINTMENT (OUTPATIENT)
Dept: CARDIOLOGY | Facility: CLINIC | Age: 89
End: 2025-04-29
Payer: COMMERCIAL

## 2025-04-29 VITALS
HEIGHT: 70 IN | HEART RATE: 86 BPM | OXYGEN SATURATION: 98 % | SYSTOLIC BLOOD PRESSURE: 120 MMHG | DIASTOLIC BLOOD PRESSURE: 72 MMHG

## 2025-04-29 DIAGNOSIS — E78.00 PURE HYPERCHOLESTEROLEMIA, UNSPECIFIED: ICD-10-CM

## 2025-04-29 DIAGNOSIS — Z79.01 ENCOUNTER FOR THERAPEUTIC DRUG LVL MONITORING: ICD-10-CM

## 2025-04-29 DIAGNOSIS — I48.91 UNSPECIFIED ATRIAL FIBRILLATION: ICD-10-CM

## 2025-04-29 DIAGNOSIS — I21.3 ST ELEVATION (STEMI) MYOCARDIAL INFARCTION OF UNSPECIFIED SITE: ICD-10-CM

## 2025-04-29 DIAGNOSIS — I25.10 ATHEROSCLEROTIC HEART DISEASE OF NATIVE CORONARY ARTERY W/OUT ANGINA PECTORIS: ICD-10-CM

## 2025-04-29 DIAGNOSIS — I10 ESSENTIAL (PRIMARY) HYPERTENSION: ICD-10-CM

## 2025-04-29 DIAGNOSIS — Z51.81 ENCOUNTER FOR THERAPEUTIC DRUG LVL MONITORING: ICD-10-CM

## 2025-04-29 DIAGNOSIS — I35.0 NONRHEUMATIC AORTIC (VALVE) STENOSIS: ICD-10-CM

## 2025-04-29 PROCEDURE — 99214 OFFICE O/P EST MOD 30 MIN: CPT

## 2025-04-29 PROCEDURE — G2211 COMPLEX E/M VISIT ADD ON: CPT | Mod: NC

## 2025-05-09 ENCOUNTER — NON-APPOINTMENT (OUTPATIENT)
Age: 89
End: 2025-05-09

## 2025-05-09 ENCOUNTER — APPOINTMENT (OUTPATIENT)
Facility: CLINIC | Age: 89
End: 2025-05-09

## 2025-05-09 PROCEDURE — 99214 OFFICE O/P EST MOD 30 MIN: CPT

## 2025-05-09 PROCEDURE — 92202 OPSCPY EXTND ON/MAC DRAW: CPT

## 2025-05-09 PROCEDURE — 92134 CPTRZ OPH DX IMG PST SGM RTA: CPT

## 2025-05-27 ENCOUNTER — RX RENEWAL (OUTPATIENT)
Age: 89
End: 2025-05-27

## 2025-06-08 ENCOUNTER — NON-APPOINTMENT (OUTPATIENT)
Age: 89
End: 2025-06-08

## 2025-06-19 ENCOUNTER — INPATIENT (INPATIENT)
Facility: HOSPITAL | Age: 89
LOS: 1 days | Discharge: HOME | DRG: 871 | End: 2025-06-21
Attending: STUDENT IN AN ORGANIZED HEALTH CARE EDUCATION/TRAINING PROGRAM | Admitting: INTERNAL MEDICINE
Payer: COMMERCIAL

## 2025-06-19 VITALS
SYSTOLIC BLOOD PRESSURE: 154 MMHG | RESPIRATION RATE: 18 BRPM | DIASTOLIC BLOOD PRESSURE: 75 MMHG | WEIGHT: 162.92 LBS | TEMPERATURE: 101 F | OXYGEN SATURATION: 97 % | HEART RATE: 160 BPM

## 2025-06-19 DIAGNOSIS — A41.9 SEPSIS, UNSPECIFIED ORGANISM: ICD-10-CM

## 2025-06-19 DIAGNOSIS — Z95.1 PRESENCE OF AORTOCORONARY BYPASS GRAFT: Chronic | ICD-10-CM

## 2025-06-19 LAB
ALBUMIN SERPL ELPH-MCNC: 4 G/DL — SIGNIFICANT CHANGE UP (ref 3.5–5.2)
ALP SERPL-CCNC: 60 U/L — SIGNIFICANT CHANGE UP (ref 30–115)
ALT FLD-CCNC: 26 U/L — SIGNIFICANT CHANGE UP (ref 0–41)
ANION GAP SERPL CALC-SCNC: 15 MMOL/L — HIGH (ref 7–14)
APPEARANCE UR: CLEAR — SIGNIFICANT CHANGE UP
APTT BLD: 33.5 SEC — SIGNIFICANT CHANGE UP (ref 27–39.2)
AST SERPL-CCNC: 30 U/L — SIGNIFICANT CHANGE UP (ref 0–41)
BACTERIA # UR AUTO: ABNORMAL /HPF
BASE EXCESS BLDV CALC-SCNC: 0.2 MMOL/L — SIGNIFICANT CHANGE UP (ref -2–3)
BASE EXCESS BLDV CALC-SCNC: 2.3 MMOL/L — SIGNIFICANT CHANGE UP (ref -2–3)
BASOPHILS # BLD AUTO: 0.03 K/UL — SIGNIFICANT CHANGE UP (ref 0–0.2)
BASOPHILS NFR BLD AUTO: 0.3 % — SIGNIFICANT CHANGE UP (ref 0–2)
BILIRUB SERPL-MCNC: 1 MG/DL — SIGNIFICANT CHANGE UP (ref 0.2–1.2)
BILIRUB UR-MCNC: NEGATIVE — SIGNIFICANT CHANGE UP
BUN SERPL-MCNC: 19 MG/DL — SIGNIFICANT CHANGE UP (ref 10–20)
CA-I SERPL-SCNC: 1.19 MMOL/L — SIGNIFICANT CHANGE UP (ref 1.15–1.33)
CA-I SERPL-SCNC: 1.19 MMOL/L — SIGNIFICANT CHANGE UP (ref 1.15–1.33)
CALCIUM SERPL-MCNC: 9.4 MG/DL — SIGNIFICANT CHANGE UP (ref 8.4–10.5)
CHLORIDE SERPL-SCNC: 106 MMOL/L — SIGNIFICANT CHANGE UP (ref 98–110)
CO2 SERPL-SCNC: 21 MMOL/L — SIGNIFICANT CHANGE UP (ref 17–32)
COLOR SPEC: YELLOW — SIGNIFICANT CHANGE UP
CREAT SERPL-MCNC: 0.8 MG/DL — SIGNIFICANT CHANGE UP (ref 0.7–1.5)
DIFF PNL FLD: NEGATIVE — SIGNIFICANT CHANGE UP
EGFR: 85 ML/MIN/1.73M2 — SIGNIFICANT CHANGE UP
EGFR: 85 ML/MIN/1.73M2 — SIGNIFICANT CHANGE UP
EOSINOPHIL # BLD AUTO: 0.05 K/UL — SIGNIFICANT CHANGE UP (ref 0–0.5)
EOSINOPHIL NFR BLD AUTO: 0.5 % — SIGNIFICANT CHANGE UP (ref 0–6)
EPI CELLS # UR: PRESENT
FLUAV AG NPH QL: SIGNIFICANT CHANGE UP
FLUBV AG NPH QL: SIGNIFICANT CHANGE UP
GAS PNL BLDV: 134 MMOL/L — LOW (ref 136–145)
GAS PNL BLDV: 137 MMOL/L — SIGNIFICANT CHANGE UP (ref 136–145)
GAS PNL BLDV: SIGNIFICANT CHANGE UP
GAS PNL BLDV: SIGNIFICANT CHANGE UP
GLUCOSE SERPL-MCNC: 89 MG/DL — SIGNIFICANT CHANGE UP (ref 70–99)
GLUCOSE UR QL: NEGATIVE MG/DL — SIGNIFICANT CHANGE UP
HCO3 BLDV-SCNC: 23 MMOL/L — SIGNIFICANT CHANGE UP (ref 22–29)
HCO3 BLDV-SCNC: 25 MMOL/L — SIGNIFICANT CHANGE UP (ref 22–29)
HCT VFR BLD CALC: 38.6 % — LOW (ref 39–50)
HCT VFR BLDA CALC: 37 % — LOW (ref 39–51)
HCT VFR BLDA CALC: 43 % — SIGNIFICANT CHANGE UP (ref 39–51)
HGB BLD CALC-MCNC: 12.2 G/DL — LOW (ref 12.6–17.4)
HGB BLD CALC-MCNC: 14.4 G/DL — SIGNIFICANT CHANGE UP (ref 12.6–17.4)
HGB BLD-MCNC: 12.8 G/DL — LOW (ref 13–17)
IMM GRANULOCYTES # BLD AUTO: 0.03 K/UL — SIGNIFICANT CHANGE UP (ref 0–0.07)
IMM GRANULOCYTES NFR BLD AUTO: 0.3 % — SIGNIFICANT CHANGE UP (ref 0–0.9)
INR BLD: 1.75 RATIO — HIGH (ref 0.65–1.3)
KETONES UR QL: NEGATIVE MG/DL — SIGNIFICANT CHANGE UP
LACTATE BLDV-MCNC: 2 MMOL/L — SIGNIFICANT CHANGE UP (ref 0.5–2)
LACTATE BLDV-MCNC: 2.6 MMOL/L — HIGH (ref 0.5–2)
LACTATE SERPL-SCNC: 2.6 MMOL/L — HIGH (ref 0.7–2)
LACTATE SERPL-SCNC: 2.6 MMOL/L — HIGH (ref 0.7–2)
LEUKOCYTE ESTERASE UR-ACNC: ABNORMAL
LYMPHOCYTES # BLD AUTO: 0.82 K/UL — LOW (ref 1–3.3)
LYMPHOCYTES NFR BLD AUTO: 7.9 % — LOW (ref 13–44)
MCHC RBC-ENTMCNC: 29.4 PG — SIGNIFICANT CHANGE UP (ref 27–34)
MCHC RBC-ENTMCNC: 33.2 G/DL — SIGNIFICANT CHANGE UP (ref 32–36)
MCV RBC AUTO: 88.5 FL — SIGNIFICANT CHANGE UP (ref 80–100)
MONOCYTES # BLD AUTO: 0.4 K/UL — SIGNIFICANT CHANGE UP (ref 0–0.9)
MONOCYTES NFR BLD AUTO: 3.8 % — SIGNIFICANT CHANGE UP (ref 2–14)
MUCOUS THREADS # UR AUTO: PRESENT
NEUTROPHILS # BLD AUTO: 9.09 K/UL — HIGH (ref 1.8–7.4)
NEUTROPHILS NFR BLD AUTO: 87.2 % — HIGH (ref 43–77)
NITRITE UR-MCNC: NEGATIVE — SIGNIFICANT CHANGE UP
NRBC # BLD AUTO: 0 K/UL — SIGNIFICANT CHANGE UP (ref 0–0)
NRBC # FLD: 0 K/UL — SIGNIFICANT CHANGE UP (ref 0–0)
NRBC BLD AUTO-RTO: 0 /100 WBCS — SIGNIFICANT CHANGE UP (ref 0–0)
PCO2 BLDV: 32 MMHG — LOW (ref 42–55)
PCO2 BLDV: 33 MMHG — LOW (ref 42–55)
PH BLDV: 7.47 — HIGH (ref 7.32–7.43)
PH BLDV: 7.49 — HIGH (ref 7.32–7.43)
PH UR: 6 — SIGNIFICANT CHANGE UP (ref 5–8)
PLATELET # BLD AUTO: 218 K/UL — SIGNIFICANT CHANGE UP (ref 150–400)
PMV BLD: 9.7 FL — SIGNIFICANT CHANGE UP (ref 7–13)
PO2 BLDV: 46 MMHG — HIGH (ref 25–45)
PO2 BLDV: 92 MMHG — HIGH (ref 25–45)
POTASSIUM BLDV-SCNC: 3.8 MMOL/L — SIGNIFICANT CHANGE UP (ref 3.5–5.1)
POTASSIUM BLDV-SCNC: 3.9 MMOL/L — SIGNIFICANT CHANGE UP (ref 3.5–5.1)
POTASSIUM SERPL-MCNC: 4 MMOL/L — SIGNIFICANT CHANGE UP (ref 3.5–5)
POTASSIUM SERPL-SCNC: 4 MMOL/L — SIGNIFICANT CHANGE UP (ref 3.5–5)
PROT SERPL-MCNC: 6.4 G/DL — SIGNIFICANT CHANGE UP (ref 6–8)
PROT UR-MCNC: SIGNIFICANT CHANGE UP MG/DL
PROTHROM AB SERPL-ACNC: 20.9 SEC — HIGH (ref 9.95–12.87)
RBC # BLD: 4.36 M/UL — SIGNIFICANT CHANGE UP (ref 4.2–5.8)
RBC # FLD: 14.6 % — HIGH (ref 10.3–14.5)
RBC CASTS # UR COMP ASSIST: 4 /HPF — SIGNIFICANT CHANGE UP (ref 0–4)
RSV RNA NPH QL NAA+NON-PROBE: SIGNIFICANT CHANGE UP
SAO2 % BLDV: 80.1 % — SIGNIFICANT CHANGE UP (ref 67–88)
SAO2 % BLDV: 98.8 % — HIGH (ref 67–88)
SARS-COV-2 RNA SPEC QL NAA+PROBE: SIGNIFICANT CHANGE UP
SODIUM SERPL-SCNC: 142 MMOL/L — SIGNIFICANT CHANGE UP (ref 135–146)
SOURCE RESPIRATORY: SIGNIFICANT CHANGE UP
SP GR SPEC: 1.02 — SIGNIFICANT CHANGE UP (ref 1–1.03)
SQUAMOUS # UR AUTO: 2 /HPF — SIGNIFICANT CHANGE UP (ref 0–5)
TROPONIN T, HIGH SENSITIVITY RESULT: 18 NG/L — SIGNIFICANT CHANGE UP (ref 6–21)
UROBILINOGEN FLD QL: 0.2 MG/DL — SIGNIFICANT CHANGE UP (ref 0.2–1)
WBC # BLD: 10.42 K/UL — SIGNIFICANT CHANGE UP (ref 3.8–10.5)
WBC # FLD AUTO: 10.42 K/UL — SIGNIFICANT CHANGE UP (ref 3.8–10.5)
WBC UR QL: 4 /HPF — SIGNIFICANT CHANGE UP (ref 0–5)

## 2025-06-19 PROCEDURE — 87640 STAPH A DNA AMP PROBE: CPT

## 2025-06-19 PROCEDURE — 83735 ASSAY OF MAGNESIUM: CPT

## 2025-06-19 PROCEDURE — 99285 EMERGENCY DEPT VISIT HI MDM: CPT

## 2025-06-19 PROCEDURE — 80048 BASIC METABOLIC PNL TOTAL CA: CPT

## 2025-06-19 PROCEDURE — 86803 HEPATITIS C AB TEST: CPT

## 2025-06-19 PROCEDURE — 71250 CT THORAX DX C-: CPT

## 2025-06-19 PROCEDURE — 36415 COLL VENOUS BLD VENIPUNCTURE: CPT

## 2025-06-19 PROCEDURE — 85025 COMPLETE CBC W/AUTO DIFF WBC: CPT

## 2025-06-19 PROCEDURE — 87641 MR-STAPH DNA AMP PROBE: CPT

## 2025-06-19 PROCEDURE — 85027 COMPLETE CBC AUTOMATED: CPT

## 2025-06-19 PROCEDURE — 87086 URINE CULTURE/COLONY COUNT: CPT

## 2025-06-19 PROCEDURE — 70450 CT HEAD/BRAIN W/O DYE: CPT | Mod: 26

## 2025-06-19 PROCEDURE — 83605 ASSAY OF LACTIC ACID: CPT

## 2025-06-19 PROCEDURE — 87389 HIV-1 AG W/HIV-1&-2 AB AG IA: CPT

## 2025-06-19 PROCEDURE — 74176 CT ABD & PELVIS W/O CONTRAST: CPT

## 2025-06-19 PROCEDURE — 71045 X-RAY EXAM CHEST 1 VIEW: CPT | Mod: 26

## 2025-06-19 PROCEDURE — 99222 1ST HOSP IP/OBS MODERATE 55: CPT

## 2025-06-19 RX ORDER — ONDANSETRON HCL/PF 4 MG/2 ML
4 VIAL (ML) INJECTION EVERY 8 HOURS
Refills: 0 | Status: DISCONTINUED | OUTPATIENT
Start: 2025-06-19 | End: 2025-06-21

## 2025-06-19 RX ORDER — BENZONATATE 100 MG
100 CAPSULE ORAL EVERY 8 HOURS
Refills: 0 | Status: DISCONTINUED | OUTPATIENT
Start: 2025-06-19 | End: 2025-06-21

## 2025-06-19 RX ORDER — ASPIRIN 325 MG
81 TABLET ORAL DAILY
Refills: 0 | Status: DISCONTINUED | OUTPATIENT
Start: 2025-06-19 | End: 2025-06-21

## 2025-06-19 RX ORDER — APIXABAN 2.5 MG/1
5 TABLET, FILM COATED ORAL EVERY 12 HOURS
Refills: 0 | Status: DISCONTINUED | OUTPATIENT
Start: 2025-06-19 | End: 2025-06-21

## 2025-06-19 RX ORDER — TAMSULOSIN HYDROCHLORIDE 0.4 MG/1
0.4 CAPSULE ORAL AT BEDTIME
Refills: 0 | Status: DISCONTINUED | OUTPATIENT
Start: 2025-06-19 | End: 2025-06-21

## 2025-06-19 RX ORDER — ACETAMINOPHEN 500 MG/5ML
650 LIQUID (ML) ORAL EVERY 6 HOURS
Refills: 0 | Status: DISCONTINUED | OUTPATIENT
Start: 2025-06-19 | End: 2025-06-21

## 2025-06-19 RX ORDER — ATORVASTATIN CALCIUM 80 MG/1
80 TABLET, FILM COATED ORAL AT BEDTIME
Refills: 0 | Status: DISCONTINUED | OUTPATIENT
Start: 2025-06-19 | End: 2025-06-21

## 2025-06-19 RX ORDER — METOPROLOL SUCCINATE 50 MG/1
50 TABLET, EXTENDED RELEASE ORAL DAILY
Refills: 0 | Status: DISCONTINUED | OUTPATIENT
Start: 2025-06-19 | End: 2025-06-21

## 2025-06-19 RX ORDER — MAGNESIUM, ALUMINUM HYDROXIDE 200-200 MG
30 TABLET,CHEWABLE ORAL EVERY 4 HOURS
Refills: 0 | Status: DISCONTINUED | OUTPATIENT
Start: 2025-06-19 | End: 2025-06-21

## 2025-06-19 RX ORDER — SODIUM CHLORIDE 9 G/1000ML
500 INJECTION, SOLUTION INTRAVENOUS ONCE
Refills: 0 | Status: COMPLETED | OUTPATIENT
Start: 2025-06-19 | End: 2025-06-19

## 2025-06-19 RX ORDER — MELATONIN 5 MG
5 TABLET ORAL AT BEDTIME
Refills: 0 | Status: DISCONTINUED | OUTPATIENT
Start: 2025-06-19 | End: 2025-06-21

## 2025-06-19 RX ORDER — CEFEPIME 2 G/20ML
2000 INJECTION, POWDER, FOR SOLUTION INTRAVENOUS ONCE
Refills: 0 | Status: COMPLETED | OUTPATIENT
Start: 2025-06-19 | End: 2025-06-19

## 2025-06-19 RX ADMIN — Medication 75 MILLILITER(S): at 21:15

## 2025-06-19 RX ADMIN — ATORVASTATIN CALCIUM 80 MILLIGRAM(S): 80 TABLET, FILM COATED ORAL at 21:15

## 2025-06-19 RX ADMIN — SODIUM CHLORIDE 500 MILLILITER(S): 9 INJECTION, SOLUTION INTRAVENOUS at 14:49

## 2025-06-19 RX ADMIN — Medication 500 MILLILITER(S): at 17:10

## 2025-06-19 RX ADMIN — CEFEPIME 100 MILLIGRAM(S): 2 INJECTION, POWDER, FOR SOLUTION INTRAVENOUS at 16:45

## 2025-06-19 RX ADMIN — Medication 500 MILLILITER(S): at 19:15

## 2025-06-19 RX ADMIN — Medication 500 MILLILITER(S): at 18:59

## 2025-06-19 RX ADMIN — TAMSULOSIN HYDROCHLORIDE 0.4 MILLIGRAM(S): 0.4 CAPSULE ORAL at 21:15

## 2025-06-19 RX ADMIN — Medication 500 MILLILITER(S): at 16:32

## 2025-06-19 RX ADMIN — APIXABAN 5 MILLIGRAM(S): 2.5 TABLET, FILM COATED ORAL at 21:21

## 2025-06-20 LAB
ANION GAP SERPL CALC-SCNC: 12 MMOL/L — SIGNIFICANT CHANGE UP (ref 7–14)
BUN SERPL-MCNC: 16 MG/DL — SIGNIFICANT CHANGE UP (ref 10–20)
CALCIUM SERPL-MCNC: 8.9 MG/DL — SIGNIFICANT CHANGE UP (ref 8.4–10.5)
CHLORIDE SERPL-SCNC: 108 MMOL/L — SIGNIFICANT CHANGE UP (ref 98–110)
CO2 SERPL-SCNC: 22 MMOL/L — SIGNIFICANT CHANGE UP (ref 17–32)
CREAT SERPL-MCNC: 0.7 MG/DL — SIGNIFICANT CHANGE UP (ref 0.7–1.5)
EGFR: 88 ML/MIN/1.73M2 — SIGNIFICANT CHANGE UP
EGFR: 88 ML/MIN/1.73M2 — SIGNIFICANT CHANGE UP
GLUCOSE SERPL-MCNC: 94 MG/DL — SIGNIFICANT CHANGE UP (ref 70–99)
HCT VFR BLD CALC: 36.8 % — LOW (ref 39–50)
HGB BLD-MCNC: 12.1 G/DL — LOW (ref 13–17)
LACTATE SERPL-SCNC: 1.8 MMOL/L — SIGNIFICANT CHANGE UP (ref 0.7–2)
MCHC RBC-ENTMCNC: 29.7 PG — SIGNIFICANT CHANGE UP (ref 27–34)
MCHC RBC-ENTMCNC: 32.9 G/DL — SIGNIFICANT CHANGE UP (ref 32–36)
MCV RBC AUTO: 90.2 FL — SIGNIFICANT CHANGE UP (ref 80–100)
MRSA PCR RESULT.: POSITIVE
NRBC # BLD AUTO: 0 K/UL — SIGNIFICANT CHANGE UP (ref 0–0)
NRBC # FLD: 0 K/UL — SIGNIFICANT CHANGE UP (ref 0–0)
NRBC BLD AUTO-RTO: 0 /100 WBCS — SIGNIFICANT CHANGE UP (ref 0–0)
PLATELET # BLD AUTO: 187 K/UL — SIGNIFICANT CHANGE UP (ref 150–400)
PMV BLD: 10 FL — SIGNIFICANT CHANGE UP (ref 7–13)
POTASSIUM SERPL-MCNC: 4.1 MMOL/L — SIGNIFICANT CHANGE UP (ref 3.5–5)
POTASSIUM SERPL-SCNC: 4.1 MMOL/L — SIGNIFICANT CHANGE UP (ref 3.5–5)
RBC # BLD: 4.08 M/UL — LOW (ref 4.2–5.8)
RBC # FLD: 14.9 % — HIGH (ref 10.3–14.5)
SODIUM SERPL-SCNC: 142 MMOL/L — SIGNIFICANT CHANGE UP (ref 135–146)
WBC # BLD: 11.81 K/UL — HIGH (ref 3.8–10.5)
WBC # FLD AUTO: 11.81 K/UL — HIGH (ref 3.8–10.5)

## 2025-06-20 PROCEDURE — 74176 CT ABD & PELVIS W/O CONTRAST: CPT | Mod: 26

## 2025-06-20 PROCEDURE — 99232 SBSQ HOSP IP/OBS MODERATE 35: CPT

## 2025-06-20 PROCEDURE — 71250 CT THORAX DX C-: CPT | Mod: 26

## 2025-06-20 RX ORDER — VANCOMYCIN HCL IN 5 % DEXTROSE 1.5G/250ML
1250 PLASTIC BAG, INJECTION (ML) INTRAVENOUS ONCE
Refills: 0 | Status: COMPLETED | OUTPATIENT
Start: 2025-06-20 | End: 2025-06-20

## 2025-06-20 RX ORDER — MEROPENEM 1 G/30ML
1000 INJECTION INTRAVENOUS EVERY 8 HOURS
Refills: 0 | Status: DISCONTINUED | OUTPATIENT
Start: 2025-06-20 | End: 2025-06-21

## 2025-06-20 RX ADMIN — TAMSULOSIN HYDROCHLORIDE 0.4 MILLIGRAM(S): 0.4 CAPSULE ORAL at 21:05

## 2025-06-20 RX ADMIN — APIXABAN 5 MILLIGRAM(S): 2.5 TABLET, FILM COATED ORAL at 21:05

## 2025-06-20 RX ADMIN — APIXABAN 5 MILLIGRAM(S): 2.5 TABLET, FILM COATED ORAL at 09:35

## 2025-06-20 RX ADMIN — Medication 1 APPLICATION(S): at 05:19

## 2025-06-20 RX ADMIN — Medication 166.67 MILLIGRAM(S): at 17:38

## 2025-06-20 RX ADMIN — MEROPENEM 100 MILLIGRAM(S): 1 INJECTION INTRAVENOUS at 21:06

## 2025-06-20 RX ADMIN — Medication 40 MILLIGRAM(S): at 05:19

## 2025-06-20 RX ADMIN — Medication 81 MILLIGRAM(S): at 12:21

## 2025-06-20 RX ADMIN — METOPROLOL SUCCINATE 50 MILLIGRAM(S): 50 TABLET, EXTENDED RELEASE ORAL at 05:19

## 2025-06-20 RX ADMIN — ATORVASTATIN CALCIUM 80 MILLIGRAM(S): 80 TABLET, FILM COATED ORAL at 21:06

## 2025-06-21 ENCOUNTER — TRANSCRIPTION ENCOUNTER (OUTPATIENT)
Age: 89
End: 2025-06-21

## 2025-06-21 VITALS
RESPIRATION RATE: 18 BRPM | TEMPERATURE: 98 F | DIASTOLIC BLOOD PRESSURE: 77 MMHG | SYSTOLIC BLOOD PRESSURE: 120 MMHG | HEART RATE: 86 BPM | OXYGEN SATURATION: 99 %

## 2025-06-21 LAB
ANION GAP SERPL CALC-SCNC: 13 MMOL/L — SIGNIFICANT CHANGE UP (ref 7–14)
ANION GAP SERPL CALC-SCNC: 13 MMOL/L — SIGNIFICANT CHANGE UP (ref 7–14)
BASOPHILS # BLD AUTO: 0.02 K/UL — SIGNIFICANT CHANGE UP (ref 0–0.2)
BASOPHILS # BLD AUTO: 0.02 K/UL — SIGNIFICANT CHANGE UP (ref 0–0.2)
BASOPHILS NFR BLD AUTO: 0.2 % — SIGNIFICANT CHANGE UP (ref 0–2)
BASOPHILS NFR BLD AUTO: 0.3 % — SIGNIFICANT CHANGE UP (ref 0–2)
BUN SERPL-MCNC: 16 MG/DL — SIGNIFICANT CHANGE UP (ref 10–20)
BUN SERPL-MCNC: 18 MG/DL — SIGNIFICANT CHANGE UP (ref 10–20)
CALCIUM SERPL-MCNC: 9.1 MG/DL — SIGNIFICANT CHANGE UP (ref 8.4–10.5)
CALCIUM SERPL-MCNC: 9.2 MG/DL — SIGNIFICANT CHANGE UP (ref 8.4–10.5)
CHLORIDE SERPL-SCNC: 105 MMOL/L — SIGNIFICANT CHANGE UP (ref 98–110)
CHLORIDE SERPL-SCNC: 106 MMOL/L — SIGNIFICANT CHANGE UP (ref 98–110)
CO2 SERPL-SCNC: 24 MMOL/L — SIGNIFICANT CHANGE UP (ref 17–32)
CO2 SERPL-SCNC: 24 MMOL/L — SIGNIFICANT CHANGE UP (ref 17–32)
CREAT SERPL-MCNC: 0.8 MG/DL — SIGNIFICANT CHANGE UP (ref 0.7–1.5)
CREAT SERPL-MCNC: 0.8 MG/DL — SIGNIFICANT CHANGE UP (ref 0.7–1.5)
EGFR: 85 ML/MIN/1.73M2 — SIGNIFICANT CHANGE UP
EOSINOPHIL # BLD AUTO: 0.09 K/UL — SIGNIFICANT CHANGE UP (ref 0–0.5)
EOSINOPHIL # BLD AUTO: 0.09 K/UL — SIGNIFICANT CHANGE UP (ref 0–0.5)
EOSINOPHIL NFR BLD AUTO: 1 % — SIGNIFICANT CHANGE UP (ref 0–6)
EOSINOPHIL NFR BLD AUTO: 1.2 % — SIGNIFICANT CHANGE UP (ref 0–6)
GLUCOSE SERPL-MCNC: 121 MG/DL — HIGH (ref 70–99)
GLUCOSE SERPL-MCNC: 94 MG/DL — SIGNIFICANT CHANGE UP (ref 70–99)
HCT VFR BLD CALC: 36 % — LOW (ref 39–50)
HCT VFR BLD CALC: 36.3 % — LOW (ref 39–50)
HCV AB S/CO SERPL IA: 0.05 COI — SIGNIFICANT CHANGE UP
HCV AB SERPL-IMP: SIGNIFICANT CHANGE UP
HGB BLD-MCNC: 11.5 G/DL — LOW (ref 13–17)
HGB BLD-MCNC: 11.8 G/DL — LOW (ref 13–17)
IMM GRANULOCYTES # BLD AUTO: 0.02 K/UL — SIGNIFICANT CHANGE UP (ref 0–0.07)
IMM GRANULOCYTES # BLD AUTO: 0.04 K/UL — SIGNIFICANT CHANGE UP (ref 0–0.07)
IMM GRANULOCYTES NFR BLD AUTO: 0.3 % — SIGNIFICANT CHANGE UP (ref 0–0.9)
IMM GRANULOCYTES NFR BLD AUTO: 0.5 % — SIGNIFICANT CHANGE UP (ref 0–0.9)
LYMPHOCYTES # BLD AUTO: 1.29 K/UL — SIGNIFICANT CHANGE UP (ref 1–3.3)
LYMPHOCYTES # BLD AUTO: 1.36 K/UL — SIGNIFICANT CHANGE UP (ref 1–3.3)
LYMPHOCYTES NFR BLD AUTO: 14.9 % — SIGNIFICANT CHANGE UP (ref 13–44)
LYMPHOCYTES NFR BLD AUTO: 18.6 % — SIGNIFICANT CHANGE UP (ref 13–44)
MAGNESIUM SERPL-MCNC: 1.8 MG/DL — SIGNIFICANT CHANGE UP (ref 1.8–2.4)
MCHC RBC-ENTMCNC: 29 PG — SIGNIFICANT CHANGE UP (ref 27–34)
MCHC RBC-ENTMCNC: 29.4 PG — SIGNIFICANT CHANGE UP (ref 27–34)
MCHC RBC-ENTMCNC: 31.9 G/DL — LOW (ref 32–36)
MCHC RBC-ENTMCNC: 32.5 G/DL — SIGNIFICANT CHANGE UP (ref 32–36)
MCV RBC AUTO: 90.3 FL — SIGNIFICANT CHANGE UP (ref 80–100)
MCV RBC AUTO: 90.7 FL — SIGNIFICANT CHANGE UP (ref 80–100)
MONOCYTES # BLD AUTO: 0.46 K/UL — SIGNIFICANT CHANGE UP (ref 0–0.9)
MONOCYTES # BLD AUTO: 0.79 K/UL — SIGNIFICANT CHANGE UP (ref 0–0.9)
MONOCYTES NFR BLD AUTO: 10.8 % — SIGNIFICANT CHANGE UP (ref 2–14)
MONOCYTES NFR BLD AUTO: 5.3 % — SIGNIFICANT CHANGE UP (ref 2–14)
NEUTROPHILS # BLD AUTO: 5.02 K/UL — SIGNIFICANT CHANGE UP (ref 1.8–7.4)
NEUTROPHILS # BLD AUTO: 6.76 K/UL — SIGNIFICANT CHANGE UP (ref 1.8–7.4)
NEUTROPHILS NFR BLD AUTO: 68.8 % — SIGNIFICANT CHANGE UP (ref 43–77)
NEUTROPHILS NFR BLD AUTO: 78.1 % — HIGH (ref 43–77)
NRBC # BLD AUTO: 0 K/UL — SIGNIFICANT CHANGE UP (ref 0–0)
NRBC # BLD AUTO: 0 K/UL — SIGNIFICANT CHANGE UP (ref 0–0)
NRBC # FLD: 0 K/UL — SIGNIFICANT CHANGE UP (ref 0–0)
NRBC # FLD: 0 K/UL — SIGNIFICANT CHANGE UP (ref 0–0)
NRBC BLD AUTO-RTO: 0 /100 WBCS — SIGNIFICANT CHANGE UP (ref 0–0)
NRBC BLD AUTO-RTO: 0 /100 WBCS — SIGNIFICANT CHANGE UP (ref 0–0)
PLATELET # BLD AUTO: 194 K/UL — SIGNIFICANT CHANGE UP (ref 150–400)
PLATELET # BLD AUTO: 202 K/UL — SIGNIFICANT CHANGE UP (ref 150–400)
PMV BLD: 10.1 FL — SIGNIFICANT CHANGE UP (ref 7–13)
PMV BLD: 9.7 FL — SIGNIFICANT CHANGE UP (ref 7–13)
POTASSIUM SERPL-MCNC: 3.5 MMOL/L — SIGNIFICANT CHANGE UP (ref 3.5–5)
POTASSIUM SERPL-MCNC: 3.5 MMOL/L — SIGNIFICANT CHANGE UP (ref 3.5–5)
POTASSIUM SERPL-SCNC: 3.5 MMOL/L — SIGNIFICANT CHANGE UP (ref 3.5–5)
POTASSIUM SERPL-SCNC: 3.5 MMOL/L — SIGNIFICANT CHANGE UP (ref 3.5–5)
RBC # BLD: 3.97 M/UL — LOW (ref 4.2–5.8)
RBC # BLD: 4.02 M/UL — LOW (ref 4.2–5.8)
RBC # FLD: 14.8 % — HIGH (ref 10.3–14.5)
RBC # FLD: 14.8 % — HIGH (ref 10.3–14.5)
SODIUM SERPL-SCNC: 142 MMOL/L — SIGNIFICANT CHANGE UP (ref 135–146)
SODIUM SERPL-SCNC: 143 MMOL/L — SIGNIFICANT CHANGE UP (ref 135–146)
WBC # BLD: 7.3 K/UL — SIGNIFICANT CHANGE UP (ref 3.8–10.5)
WBC # BLD: 8.66 K/UL — SIGNIFICANT CHANGE UP (ref 3.8–10.5)
WBC # FLD AUTO: 7.3 K/UL — SIGNIFICANT CHANGE UP (ref 3.8–10.5)
WBC # FLD AUTO: 8.66 K/UL — SIGNIFICANT CHANGE UP (ref 3.8–10.5)

## 2025-06-21 PROCEDURE — 99239 HOSP IP/OBS DSCHRG MGMT >30: CPT

## 2025-06-21 RX ORDER — MUPIROCIN CALCIUM 20 MG/G
1 CREAM TOPICAL
Refills: 0 | Status: DISCONTINUED | OUTPATIENT
Start: 2025-06-21 | End: 2025-06-21

## 2025-06-21 RX ORDER — DOXYCYCLINE HYCLATE 100 MG
1 TABLET ORAL
Qty: 14 | Refills: 0
Start: 2025-06-21 | End: 2025-06-27

## 2025-06-21 RX ORDER — CEFPODOXIME PROXETIL 200 MG/1
200 TABLET, FILM COATED ORAL EVERY 12 HOURS
Refills: 0 | Status: DISCONTINUED | OUTPATIENT
Start: 2025-06-21 | End: 2025-06-21

## 2025-06-21 RX ORDER — DEXTROMETHORPHAN HBR, GUAIFENESIN 200 MG/10ML
1 LIQUID ORAL
Qty: 14 | Refills: 0
Start: 2025-06-21 | End: 2025-06-27

## 2025-06-21 RX ORDER — DEXTROMETHORPHAN HBR, GUAIFENESIN 200 MG/10ML
600 LIQUID ORAL EVERY 12 HOURS
Refills: 0 | Status: DISCONTINUED | OUTPATIENT
Start: 2025-06-21 | End: 2025-06-21

## 2025-06-21 RX ORDER — DOXYCYCLINE HYCLATE 100 MG
100 TABLET ORAL EVERY 12 HOURS
Refills: 0 | Status: DISCONTINUED | OUTPATIENT
Start: 2025-06-21 | End: 2025-06-21

## 2025-06-21 RX ORDER — CEFPODOXIME PROXETIL 200 MG/1
1 TABLET, FILM COATED ORAL
Qty: 10 | Refills: 0
Start: 2025-06-21 | End: 2025-06-25

## 2025-06-21 RX ORDER — MUPIROCIN CALCIUM 20 MG/G
1 CREAM TOPICAL
Qty: 1 | Refills: 0
Start: 2025-06-21 | End: 2025-06-27

## 2025-06-21 RX ADMIN — Medication 40 MILLIGRAM(S): at 05:41

## 2025-06-21 RX ADMIN — DEXTROMETHORPHAN HBR, GUAIFENESIN 600 MILLIGRAM(S): 200 LIQUID ORAL at 17:39

## 2025-06-21 RX ADMIN — CEFPODOXIME PROXETIL 200 MILLIGRAM(S): 200 TABLET, FILM COATED ORAL at 17:43

## 2025-06-21 RX ADMIN — Medication 81 MILLIGRAM(S): at 11:51

## 2025-06-21 RX ADMIN — MUPIROCIN CALCIUM 1 APPLICATION(S): 20 CREAM TOPICAL at 17:39

## 2025-06-21 RX ADMIN — APIXABAN 5 MILLIGRAM(S): 2.5 TABLET, FILM COATED ORAL at 08:30

## 2025-06-21 RX ADMIN — Medication 1 APPLICATION(S): at 05:41

## 2025-06-21 RX ADMIN — MEROPENEM 100 MILLIGRAM(S): 1 INJECTION INTRAVENOUS at 05:41

## 2025-06-21 RX ADMIN — METOPROLOL SUCCINATE 50 MILLIGRAM(S): 50 TABLET, EXTENDED RELEASE ORAL at 05:41

## 2025-06-21 RX ADMIN — Medication 100 MILLIGRAM(S): at 17:41

## 2025-06-21 RX ADMIN — Medication 100 MILLIGRAM(S): at 05:49

## 2025-06-22 LAB — HIV 1+2 AB+HIV1 P24 AG SERPL QL IA: SIGNIFICANT CHANGE UP

## 2025-06-23 LAB
CULTURE RESULTS: NO GROWTH — SIGNIFICANT CHANGE UP
SPECIMEN SOURCE: SIGNIFICANT CHANGE UP

## 2025-06-30 DIAGNOSIS — I48.0 PAROXYSMAL ATRIAL FIBRILLATION: ICD-10-CM

## 2025-06-30 DIAGNOSIS — H54.7 UNSPECIFIED VISUAL LOSS: ICD-10-CM

## 2025-06-30 DIAGNOSIS — R50.9 FEVER, UNSPECIFIED: ICD-10-CM

## 2025-06-30 DIAGNOSIS — J15.9 UNSPECIFIED BACTERIAL PNEUMONIA: ICD-10-CM

## 2025-06-30 DIAGNOSIS — R53.83 OTHER FATIGUE: ICD-10-CM

## 2025-06-30 DIAGNOSIS — Z95.1 PRESENCE OF AORTOCORONARY BYPASS GRAFT: ICD-10-CM

## 2025-06-30 DIAGNOSIS — Z79.82 LONG TERM (CURRENT) USE OF ASPIRIN: ICD-10-CM

## 2025-06-30 DIAGNOSIS — N18.30 CHRONIC KIDNEY DISEASE, STAGE 3 UNSPECIFIED: ICD-10-CM

## 2025-06-30 DIAGNOSIS — Z16.12 EXTENDED SPECTRUM BETA LACTAMASE (ESBL) RESISTANCE: ICD-10-CM

## 2025-06-30 DIAGNOSIS — Z82.49 FAMILY HISTORY OF ISCHEMIC HEART DISEASE AND OTHER DISEASES OF THE CIRCULATORY SYSTEM: ICD-10-CM

## 2025-06-30 DIAGNOSIS — K21.9 GASTRO-ESOPHAGEAL REFLUX DISEASE WITHOUT ESOPHAGITIS: ICD-10-CM

## 2025-06-30 DIAGNOSIS — A41.9 SEPSIS, UNSPECIFIED ORGANISM: ICD-10-CM

## 2025-06-30 DIAGNOSIS — H40.9 UNSPECIFIED GLAUCOMA: ICD-10-CM

## 2025-06-30 DIAGNOSIS — Z79.52 LONG TERM (CURRENT) USE OF SYSTEMIC STEROIDS: ICD-10-CM

## 2025-06-30 DIAGNOSIS — A49.02 METHICILLIN RESISTANT STAPHYLOCOCCUS AUREUS INFECTION, UNSPECIFIED SITE: ICD-10-CM

## 2025-06-30 DIAGNOSIS — Z91.81 HISTORY OF FALLING: ICD-10-CM

## 2025-06-30 DIAGNOSIS — I25.10 ATHEROSCLEROTIC HEART DISEASE OF NATIVE CORONARY ARTERY WITHOUT ANGINA PECTORIS: ICD-10-CM

## 2025-06-30 DIAGNOSIS — I12.9 HYPERTENSIVE CHRONIC KIDNEY DISEASE WITH STAGE 1 THROUGH STAGE 4 CHRONIC KIDNEY DISEASE, OR UNSPECIFIED CHRONIC KIDNEY DISEASE: ICD-10-CM

## 2025-06-30 DIAGNOSIS — N40.0 BENIGN PROSTATIC HYPERPLASIA WITHOUT LOWER URINARY TRACT SYMPTOMS: ICD-10-CM

## 2025-06-30 DIAGNOSIS — Z79.01 LONG TERM (CURRENT) USE OF ANTICOAGULANTS: ICD-10-CM

## 2025-06-30 DIAGNOSIS — E78.00 PURE HYPERCHOLESTEROLEMIA, UNSPECIFIED: ICD-10-CM

## 2025-06-30 DIAGNOSIS — J98.11 ATELECTASIS: ICD-10-CM

## 2025-07-15 ENCOUNTER — EMERGENCY (EMERGENCY)
Facility: HOSPITAL | Age: 89
LOS: 0 days | Discharge: HOME | End: 2025-07-15
Attending: EMERGENCY MEDICINE
Payer: MEDICARE

## 2025-07-15 VITALS
RESPIRATION RATE: 20 BRPM | TEMPERATURE: 100 F | SYSTOLIC BLOOD PRESSURE: 144 MMHG | WEIGHT: 164.91 LBS | HEIGHT: 70 IN | DIASTOLIC BLOOD PRESSURE: 67 MMHG | OXYGEN SATURATION: 95 % | HEART RATE: 115 BPM

## 2025-07-15 VITALS
SYSTOLIC BLOOD PRESSURE: 133 MMHG | DIASTOLIC BLOOD PRESSURE: 79 MMHG | OXYGEN SATURATION: 95 % | RESPIRATION RATE: 17 BRPM | HEART RATE: 88 BPM

## 2025-07-15 DIAGNOSIS — R05.1 ACUTE COUGH: ICD-10-CM

## 2025-07-15 DIAGNOSIS — Z79.01 LONG TERM (CURRENT) USE OF ANTICOAGULANTS: ICD-10-CM

## 2025-07-15 DIAGNOSIS — Z95.1 PRESENCE OF AORTOCORONARY BYPASS GRAFT: Chronic | ICD-10-CM

## 2025-07-15 DIAGNOSIS — R00.0 TACHYCARDIA, UNSPECIFIED: ICD-10-CM

## 2025-07-15 DIAGNOSIS — E78.5 HYPERLIPIDEMIA, UNSPECIFIED: ICD-10-CM

## 2025-07-15 DIAGNOSIS — I48.91 UNSPECIFIED ATRIAL FIBRILLATION: ICD-10-CM

## 2025-07-15 DIAGNOSIS — R50.9 FEVER, UNSPECIFIED: ICD-10-CM

## 2025-07-15 DIAGNOSIS — J18.9 PNEUMONIA, UNSPECIFIED ORGANISM: ICD-10-CM

## 2025-07-15 DIAGNOSIS — I10 ESSENTIAL (PRIMARY) HYPERTENSION: ICD-10-CM

## 2025-07-15 LAB
ALBUMIN SERPL ELPH-MCNC: 4.1 G/DL — SIGNIFICANT CHANGE UP (ref 3.5–5.2)
ALP SERPL-CCNC: 68 U/L — SIGNIFICANT CHANGE UP (ref 30–115)
ALT FLD-CCNC: 23 U/L — SIGNIFICANT CHANGE UP (ref 0–41)
ANION GAP SERPL CALC-SCNC: 15 MMOL/L — HIGH (ref 7–14)
APPEARANCE UR: ABNORMAL
APTT BLD: 34 SEC — SIGNIFICANT CHANGE UP (ref 27–39.2)
AST SERPL-CCNC: 34 U/L — SIGNIFICANT CHANGE UP (ref 0–41)
BACTERIA # UR AUTO: ABNORMAL /HPF
BASE EXCESS BLDV CALC-SCNC: 1.6 MMOL/L — SIGNIFICANT CHANGE UP (ref -2–3)
BASOPHILS # BLD AUTO: 0.02 K/UL — SIGNIFICANT CHANGE UP (ref 0–0.2)
BASOPHILS NFR BLD AUTO: 0.4 % — SIGNIFICANT CHANGE UP (ref 0–2)
BILIRUB SERPL-MCNC: 0.7 MG/DL — SIGNIFICANT CHANGE UP (ref 0.2–1.2)
BILIRUB UR-MCNC: NEGATIVE — SIGNIFICANT CHANGE UP
BUN SERPL-MCNC: 16 MG/DL — SIGNIFICANT CHANGE UP (ref 10–20)
CA-I SERPL-SCNC: 1.12 MMOL/L — LOW (ref 1.15–1.33)
CALCIUM SERPL-MCNC: 9.5 MG/DL — SIGNIFICANT CHANGE UP (ref 8.4–10.5)
CHLORIDE SERPL-SCNC: 103 MMOL/L — SIGNIFICANT CHANGE UP (ref 98–110)
CO2 SERPL-SCNC: 24 MMOL/L — SIGNIFICANT CHANGE UP (ref 17–32)
COLOR SPEC: YELLOW — SIGNIFICANT CHANGE UP
CREAT SERPL-MCNC: 0.8 MG/DL — SIGNIFICANT CHANGE UP (ref 0.7–1.5)
DIFF PNL FLD: NEGATIVE — SIGNIFICANT CHANGE UP
EGFR: 85 ML/MIN/1.73M2 — SIGNIFICANT CHANGE UP
EGFR: 85 ML/MIN/1.73M2 — SIGNIFICANT CHANGE UP
EOSINOPHIL # BLD AUTO: 0.02 K/UL — SIGNIFICANT CHANGE UP (ref 0–0.5)
EOSINOPHIL NFR BLD AUTO: 0.4 % — SIGNIFICANT CHANGE UP (ref 0–6)
EPI CELLS # UR: PRESENT
FLUAV AG NPH QL: SIGNIFICANT CHANGE UP
FLUBV AG NPH QL: SIGNIFICANT CHANGE UP
GAS PNL BLDV: 135 MMOL/L — LOW (ref 136–145)
GAS PNL BLDV: SIGNIFICANT CHANGE UP
GLUCOSE SERPL-MCNC: 99 MG/DL — SIGNIFICANT CHANGE UP (ref 70–99)
GLUCOSE UR QL: NEGATIVE MG/DL — SIGNIFICANT CHANGE UP
HCO3 BLDV-SCNC: 26 MMOL/L — SIGNIFICANT CHANGE UP (ref 22–29)
HCT VFR BLD CALC: 39.4 % — SIGNIFICANT CHANGE UP (ref 39–50)
HCT VFR BLDA CALC: 32 % — LOW (ref 39–51)
HGB BLD CALC-MCNC: 10.7 G/DL — LOW (ref 12.6–17.4)
HGB BLD-MCNC: 12.4 G/DL — LOW (ref 13–17)
IMM GRANULOCYTES # BLD AUTO: 0.01 K/UL — SIGNIFICANT CHANGE UP (ref 0–0.07)
IMM GRANULOCYTES NFR BLD AUTO: 0.2 % — SIGNIFICANT CHANGE UP (ref 0–0.9)
INR BLD: 1.87 RATIO — HIGH (ref 0.65–1.3)
KETONES UR QL: NEGATIVE MG/DL — SIGNIFICANT CHANGE UP
LACTATE BLDV-MCNC: 1.6 MMOL/L — SIGNIFICANT CHANGE UP (ref 0.5–2)
LACTATE SERPL-SCNC: 1.6 MMOL/L — SIGNIFICANT CHANGE UP (ref 0.7–2)
LACTATE SERPL-SCNC: 2.1 MMOL/L — HIGH (ref 0.7–2)
LEUKOCYTE ESTERASE UR-ACNC: NEGATIVE — SIGNIFICANT CHANGE UP
LYMPHOCYTES # BLD AUTO: 0.87 K/UL — LOW (ref 1–3.3)
LYMPHOCYTES NFR BLD AUTO: 18.6 % — SIGNIFICANT CHANGE UP (ref 13–44)
MAGNESIUM SERPL-MCNC: 1.7 MG/DL — LOW (ref 1.8–2.4)
MCHC RBC-ENTMCNC: 28.5 PG — SIGNIFICANT CHANGE UP (ref 27–34)
MCHC RBC-ENTMCNC: 31.5 G/DL — LOW (ref 32–36)
MCV RBC AUTO: 90.6 FL — SIGNIFICANT CHANGE UP (ref 80–100)
MONOCYTES # BLD AUTO: 0.53 K/UL — SIGNIFICANT CHANGE UP (ref 0–0.9)
MONOCYTES NFR BLD AUTO: 11.3 % — SIGNIFICANT CHANGE UP (ref 2–14)
MUCOUS THREADS # UR AUTO: PRESENT
NEUTROPHILS # BLD AUTO: 3.24 K/UL — SIGNIFICANT CHANGE UP (ref 1.8–7.4)
NEUTROPHILS NFR BLD AUTO: 69.1 % — SIGNIFICANT CHANGE UP (ref 43–77)
NITRITE UR-MCNC: NEGATIVE — SIGNIFICANT CHANGE UP
NRBC # BLD AUTO: 0 K/UL — SIGNIFICANT CHANGE UP (ref 0–0)
NRBC # FLD: 0 K/UL — SIGNIFICANT CHANGE UP (ref 0–0)
NRBC BLD AUTO-RTO: 0 /100 WBCS — SIGNIFICANT CHANGE UP (ref 0–0)
NT-PROBNP SERPL-SCNC: 873 PG/ML — HIGH (ref 0–300)
PCO2 BLDV: 38 MMHG — LOW (ref 42–55)
PH BLDV: 7.44 — HIGH (ref 7.32–7.43)
PH UR: 5.5 — SIGNIFICANT CHANGE UP (ref 5–8)
PLATELET # BLD AUTO: 220 K/UL — SIGNIFICANT CHANGE UP (ref 150–400)
PMV BLD: 9.9 FL — SIGNIFICANT CHANGE UP (ref 7–13)
PO2 BLDV: 43 MMHG — SIGNIFICANT CHANGE UP (ref 25–45)
POTASSIUM BLDV-SCNC: 4 MMOL/L — SIGNIFICANT CHANGE UP (ref 3.5–5.1)
POTASSIUM SERPL-MCNC: 4.3 MMOL/L — SIGNIFICANT CHANGE UP (ref 3.5–5)
POTASSIUM SERPL-SCNC: 4.3 MMOL/L — SIGNIFICANT CHANGE UP (ref 3.5–5)
PROT SERPL-MCNC: 7.2 G/DL — SIGNIFICANT CHANGE UP (ref 6–8)
PROT UR-MCNC: SIGNIFICANT CHANGE UP MG/DL
PROTHROM AB SERPL-ACNC: 22.4 SEC — HIGH (ref 9.95–12.87)
RBC # BLD: 4.35 M/UL — SIGNIFICANT CHANGE UP (ref 4.2–5.8)
RBC # FLD: 15.5 % — HIGH (ref 10.3–14.5)
RBC CASTS # UR COMP ASSIST: 4 /HPF — SIGNIFICANT CHANGE UP (ref 0–4)
RSV RNA NPH QL NAA+NON-PROBE: SIGNIFICANT CHANGE UP
SAO2 % BLDV: 72.3 % — SIGNIFICANT CHANGE UP (ref 67–88)
SARS-COV-2 RNA SPEC QL NAA+PROBE: SIGNIFICANT CHANGE UP
SODIUM SERPL-SCNC: 142 MMOL/L — SIGNIFICANT CHANGE UP (ref 135–146)
SOURCE RESPIRATORY: SIGNIFICANT CHANGE UP
SP GR SPEC: 1.02 — SIGNIFICANT CHANGE UP (ref 1–1.03)
SQUAMOUS # UR AUTO: 4 /HPF — SIGNIFICANT CHANGE UP (ref 0–5)
TROPONIN T, HIGH SENSITIVITY RESULT: 20 NG/L — SIGNIFICANT CHANGE UP (ref 6–21)
UROBILINOGEN FLD QL: 0.2 MG/DL — SIGNIFICANT CHANGE UP (ref 0.2–1)
WBC # BLD: 4.69 K/UL — SIGNIFICANT CHANGE UP (ref 3.8–10.5)
WBC # FLD AUTO: 4.69 K/UL — SIGNIFICANT CHANGE UP (ref 3.8–10.5)
WBC UR QL: 3 /HPF — SIGNIFICANT CHANGE UP (ref 0–5)

## 2025-07-15 PROCEDURE — 84295 ASSAY OF SERUM SODIUM: CPT

## 2025-07-15 PROCEDURE — 96365 THER/PROPH/DIAG IV INF INIT: CPT

## 2025-07-15 PROCEDURE — 82330 ASSAY OF CALCIUM: CPT

## 2025-07-15 PROCEDURE — 81001 URINALYSIS AUTO W/SCOPE: CPT

## 2025-07-15 PROCEDURE — 83605 ASSAY OF LACTIC ACID: CPT

## 2025-07-15 PROCEDURE — 83880 ASSAY OF NATRIURETIC PEPTIDE: CPT

## 2025-07-15 PROCEDURE — 96361 HYDRATE IV INFUSION ADD-ON: CPT

## 2025-07-15 PROCEDURE — 83735 ASSAY OF MAGNESIUM: CPT

## 2025-07-15 PROCEDURE — 99285 EMERGENCY DEPT VISIT HI MDM: CPT | Mod: 25

## 2025-07-15 PROCEDURE — 87637 SARSCOV2&INF A&B&RSV AMP PRB: CPT

## 2025-07-15 PROCEDURE — 82803 BLOOD GASES ANY COMBINATION: CPT

## 2025-07-15 PROCEDURE — 36415 COLL VENOUS BLD VENIPUNCTURE: CPT

## 2025-07-15 PROCEDURE — 99285 EMERGENCY DEPT VISIT HI MDM: CPT | Mod: FS

## 2025-07-15 PROCEDURE — 87086 URINE CULTURE/COLONY COUNT: CPT

## 2025-07-15 PROCEDURE — 71045 X-RAY EXAM CHEST 1 VIEW: CPT

## 2025-07-15 PROCEDURE — 87040 BLOOD CULTURE FOR BACTERIA: CPT | Mod: 91

## 2025-07-15 PROCEDURE — 84484 ASSAY OF TROPONIN QUANT: CPT

## 2025-07-15 PROCEDURE — 87077 CULTURE AEROBIC IDENTIFY: CPT

## 2025-07-15 PROCEDURE — 96368 THER/DIAG CONCURRENT INF: CPT

## 2025-07-15 PROCEDURE — 84132 ASSAY OF SERUM POTASSIUM: CPT

## 2025-07-15 PROCEDURE — 96375 TX/PRO/DX INJ NEW DRUG ADDON: CPT

## 2025-07-15 PROCEDURE — 80053 COMPREHEN METABOLIC PANEL: CPT

## 2025-07-15 PROCEDURE — 85018 HEMOGLOBIN: CPT

## 2025-07-15 PROCEDURE — 87186 SC STD MICRODIL/AGAR DIL: CPT

## 2025-07-15 PROCEDURE — 85014 HEMATOCRIT: CPT

## 2025-07-15 PROCEDURE — 85610 PROTHROMBIN TIME: CPT

## 2025-07-15 PROCEDURE — 85025 COMPLETE CBC W/AUTO DIFF WBC: CPT

## 2025-07-15 PROCEDURE — 71045 X-RAY EXAM CHEST 1 VIEW: CPT | Mod: 26

## 2025-07-15 PROCEDURE — 85730 THROMBOPLASTIN TIME PARTIAL: CPT

## 2025-07-15 PROCEDURE — 93010 ELECTROCARDIOGRAM REPORT: CPT

## 2025-07-15 PROCEDURE — 93005 ELECTROCARDIOGRAM TRACING: CPT

## 2025-07-15 RX ORDER — SODIUM CHLORIDE 9 G/1000ML
1000 INJECTION, SOLUTION INTRAVENOUS ONCE
Refills: 0 | Status: COMPLETED | OUTPATIENT
Start: 2025-07-15 | End: 2025-07-15

## 2025-07-15 RX ORDER — ACETAMINOPHEN 500 MG/5ML
975 LIQUID (ML) ORAL ONCE
Refills: 0 | Status: COMPLETED | OUTPATIENT
Start: 2025-07-15 | End: 2025-07-15

## 2025-07-15 RX ORDER — LEVOFLOXACIN 25 MG/ML
1 SOLUTION ORAL
Qty: 6 | Refills: 0
Start: 2025-07-15 | End: 2025-07-20

## 2025-07-15 RX ORDER — CEFEPIME 2 G/20ML
2000 INJECTION, POWDER, FOR SOLUTION INTRAVENOUS ONCE
Refills: 0 | Status: COMPLETED | OUTPATIENT
Start: 2025-07-15 | End: 2025-07-15

## 2025-07-15 RX ORDER — MAGNESIUM SULFATE 500 MG/ML
2 SYRINGE (ML) INJECTION ONCE
Refills: 0 | Status: COMPLETED | OUTPATIENT
Start: 2025-07-15 | End: 2025-07-15

## 2025-07-15 RX ADMIN — Medication 2 GRAM(S): at 15:53

## 2025-07-15 RX ADMIN — Medication 975 MILLIGRAM(S): at 15:28

## 2025-07-15 RX ADMIN — CEFEPIME 2000 MILLIGRAM(S): 2 INJECTION, POWDER, FOR SOLUTION INTRAVENOUS at 15:53

## 2025-07-15 RX ADMIN — Medication 25 GRAM(S): at 14:55

## 2025-07-15 RX ADMIN — SODIUM CHLORIDE 1000 MILLILITER(S): 9 INJECTION, SOLUTION INTRAVENOUS at 14:56

## 2025-07-15 RX ADMIN — Medication 975 MILLIGRAM(S): at 13:48

## 2025-07-15 RX ADMIN — SODIUM CHLORIDE 1000 MILLILITER(S): 9 INJECTION, SOLUTION INTRAVENOUS at 13:48

## 2025-07-15 RX ADMIN — CEFEPIME 100 MILLIGRAM(S): 2 INJECTION, POWDER, FOR SOLUTION INTRAVENOUS at 15:28

## 2025-07-15 NOTE — ED PROVIDER NOTE - PATIENT PORTAL LINK FT
You can access the FollowMyHealth Patient Portal offered by [facility] by registering at the following website: [email or web address]. By joining [employer]’s FollowMyHealth portal, you will also be able to view your health information using other applications (apps) compatible with our system.

## 2025-07-15 NOTE — ED PROVIDER NOTE - CARE PROVIDER_API CALL
Elyse Flynn  Internal Medicine  [address] [place] [id number]  Phone: [phone]  Fax: [phone]  Follow Up Time: 1-3 Days

## 2025-07-15 NOTE — ED PROVIDER NOTE - OBJECTIVE STATEMENT
89-year-old male with past medical history of HTN, HLD, A-fib on Eliquis and blindness presents to the ED for evaluation of cough and fever since yesterday.  Patient went to see his PMD today, was advised to go to the ED for further evaluation.  He denies other complaints. Pt denies chills, nausea, vomiting, abdominal pain, diarrhea, headache, dizziness, weakness, chest pain, SOB, back pain, LOC, trauma, urinary symptoms, calf pain/swelling.

## 2025-07-15 NOTE — ED PROVIDER NOTE - EKG #1 DATE/TIME
Fall with Harm Risk
No, the patient is not being discharged from [facility]
15-Jul-2025 13:14

## 2025-07-15 NOTE — ED PROVIDER NOTE - ATTENDING APP SHARED VISIT CONTRIBUTION OF CARE
I personally evaluated patient. I agree with the findings and plan with all documentation on chart except as documented  in my note.    89-year-old past medical history hypertension, dyslipidemia, A-fib on Eliquis, blindness presents to the emergency department for evaluation of low-grade fever and a cough.  Symptoms started yesterday and patient was sent to the ED today by his primary care doctor.  Patient is eating and drinking normally.  Patient denies any abdominal pain, urinary symptoms, back pain, flank pain.  Patient denies any ear pain, sinus pain, neck pain, neck stiffness, headache.  On exam, vital signs reviewed.  Patient well-appearing.  Patient has low-grade temperature and O2 sat 95%.  Heart rate is normal.  IV placed, labs sent, culture sent, chest x-ray performed, nasal swab sent.  Patient given fluids and presumptively had pneumonia and was given IV Levaquin.  Initial lactate was 2.1 and this was repeated and improved to 1.6.  Patient is on Eliquis.  Chest x-ray shows bilateral interstitial opacities concerning for possible pneumonia.  Urine negative for infection.  VBG shows normal pH and CO2.  ED workup reviewed and discussed with patient and family.  Shared decision making utilized and plan made for outpatient management.    Full DC instructions discussed and patient knows when to seek immediate medical attention.  Patient has proper follow up.  All results discussed and patient aware they may require further work up.  Proper follow up ensured. Limitations of ED work up discussed.  Medications administered and prescribed/OTC home meds discussed.  All questions and concerns from patient or family addressed. Understanding of instructions verbalized.

## 2025-07-15 NOTE — ED PROVIDER NOTE - DIFFERENTIAL DIAGNOSIS
Differential Diagnosis The differential diagnosis for patients clinical presentation includes but is not limited to:  Pneumonia, UTI, meningitis, viral syndrome, cellulitis, HEENT infection, encephalitis

## 2025-07-15 NOTE — ED PROVIDER NOTE - PATIENT'S PREFERRED PRONOUN
MEDICAL HISTORY:      71 year old  male, noted to have AFIB at [facility] during an annual "Reclast infusion. Ventricular rate was in 130's. Sent to the ER and AFIB confirmed. Patient was asymptomatic and has no h/o cardiac arrhythmia.     ESRD (Polycystic kidney disease)  HD [id number]  Kidney transplant 2007 ([facility])  CKD GFR 60's-70's  Cardiomyopathy, LVH w normal LVEF  HTN  Gout  HLD  DM-2 since KTP ([place])  Aneurysm ascending aorta ([place])  Osteoporosis ([place])  Prostate cancer s/p prostatectomy   Sikhism     Negative chemical stress   Echocardiogram LVH with normal systolic function  18 Echocardiogram ([facility]) LVH with nl wall motion, LVEF 55-60, nl LA, aortic root dilation  Baseline Scr  0.9-1.1 mg/dl.     Meds at home: vit D3 2000 Iu daily, Prograf 2 mcg AM, 1 mcg PM (9 AM, 9PM), Prednisone 5 mg/d, amlodipine 10 mg/d, lisinopril 10 mg/d, carvedilol 12.5 mg bid, lipitor 10 mg/d, januvia 50 mg/d, metformin 1500 mg/d at dinner, glimepiride 4 mg/d with breakfast.     INTERVAL HISTORY:  Carvedilol substituted for amlodipine.   Heparinized.   VSS with 's, HR 90's AFIB  Scr 1.07 mg/dl.   Echocardiogram showed LVH with normal LVEF and an normal LA.     SUMMARY COMMENTS:     Renal function remains stable. No change in kidney transplant medications indicated.     Plan today is for KIMMIE followed by an EP intervention if there are no clots in the heart chambers.     Current AFIB ventricular rate is improved with diltiazem substituted for amlodipine.     No amiodarone at present (amiodarone-tacrilimus interaction).     Discussed with the cardiac team.           ---------------------------------------------------------------------------------------------------------------------------------------------------------------------------    SUBJECTIVE & OBJECTIVE DATA DOCUMENTATION:     REVIEW OF SYSTEMS SCREENED ORGAN SYSTEMS:  Constitutional: fever, chills, anorexia or fatigue  Pulmonary: difficulty breathing, wheezing, cough  Cardiovascular: exertional dyspnea, chest pain, palpitations, dizziness or leg swelling  Gastrointestinal: abdominal or epigastric pain, nausea, vomiting or hematemesis, diarrhea, melena or bright red blood per rectum.  Genitourinary: dysuria, urgency, frequency, flank pain, difficulty voiding  Skin: No pruritis, rashes or lesions  Neurology: memory loss, dysarthria, extremity weakness, neuropathic pain, headache, visual changes  Dialysis access if present : pain, bleeding, swelling     ROS POSITIVE FINDINGS: none    PAST MEDICAL & SURGICAL HISTORY:  DVT (deep venous thrombosis)  Kidney transplant recipient  Prostate cancer  HTN (hypertension)  HLD (hyperlipidemia)  DM2 (diabetes mellitus, type 2)  Polycystic kidney disease  H/O radical prostatectomy      PHYSICAL EXAM:  T(C): 36.9 (18 @ 09:50), Max: 37.3 (18 @ 14:05)  HR: 96 (18 @ 09:06)  BP: 112/69 (18 @ 09:06) (93/76 - 125/78)  RR: 14 (18 @ 09:06)  SpO2: 98% (18 @ 09:06)  Wt(kg): --  I&O's Summary    2018 07  -  2018 07:00  --------------------------------------------------------  IN: 568 mL / OUT: 1675 mL / NET: -1107 mL    2018 07:  -  2018 10:28  --------------------------------------------------------  IN: 0 mL / OUT: 400 mL / NET: -400 mL      Weight 82.5 ( @ 23:49)    APPEARANCE: in bed, NAD  HEAD & NECK: normocephalic, no stiff neck, no masses, oral mucosa moist, no scleral icteris  RESPIRATORY: no accessory muscle use, no labored breathing, no dullness, no rales, crackles, no rhonchi, no wheeze  CARDIOVASCULAR: no JVD, IRRR, S1S2, + gallop,  no murmur, no rub.  ABDOMEN: soft, no tenderness, no masses, no hepatomegally, no splenomegally  : no bladder distension  LYMPHATIC: no lymphadenopathy (neck, axilla, groin)  EXTREMITIES & MUSCULOSKELETAL: no cyanosis, no clubbing, no tenderness, strength  L=R  EDEMA:   PULSES: upper extremity pulses present, lower extremity pulses present   SKIN: no rash, no stasis, no induration  NEUROLOGIC & PSYCHIATRIC:  alert, interactive, oriented to time and place, responds to stimuli, no asterixis  DIALYSIS ACCESS: LFA AVF ligated  KIDNEY TRANSPLANT: RLQ non-tender      MEDICATIONS  (STANDING):  apixaban 5 milliGRAM(s) Oral every 12 hours  atorvastatin 20 milliGRAM(s) Oral at bedtime  carvedilol 12.5 milliGRAM(s) Oral every 12 hours  dextrose 5%. 1000 milliLiter(s) (50 mL/Hr) IV Continuous <Continuous>  dextrose 50% Injectable 12.5 Gram(s) IV Push once  dextrose 50% Injectable 25 Gram(s) IV Push once  dextrose 50% Injectable 25 Gram(s) IV Push once  diltiazem    Tablet 30 milliGRAM(s) Oral every 6 hours  insulin lispro (HumaLOG) corrective regimen sliding scale   SubCutaneous Before meals and at bedtime  predniSONE   Tablet 5 milliGRAM(s) Oral daily  tacrolimus 1 milliGRAM(s) Oral at bedtime  tacrolimus 2 milliGRAM(s) Oral daily    MEDICATIONS  (PRN):  dextrose Gel 1 Dose(s) Oral once PRN Blood Glucose LESS THAN 70 milliGRAM(s)/deciliter  glucagon  Injectable 1 milliGRAM(s) IntraMuscular once PRN Glucose LESS THAN 70 milligrams/deciliter      DATA:  140    |  102    |  14     ----------------------------<  179<H>  Ca:8.8   (2018 06:33)  3.6     |  24     |  1.07       eGFR if Non : 70  eGFR if : 81    TPro  7.7    /  Alb  4.3    /  TBili  0.8    /  DBili  x      /  AST  17     /  ALT  16     /  AlkPhos  35<L>  2018 13:12    SCr 1.07 [2018 06:33]  SCr 0.99 [2018 07:35]  SCr 1.00 [2018 13:12]                          12.9<L>  8.2   )-----------( 185      ( 2018 06:33 )             39.3     Phos:-- M.0 mg/dL PTH:-- Uric acid:-- Serum Osm:--  Ferritin:-- Iron:-- TIBC:-- Tsat:--  B12:-- TSH:-- (2018 06:33) Him/He

## 2025-07-15 NOTE — ED PROVIDER NOTE - PHYSICAL EXAMINATION
VITAL SIGNS: I have reviewed nursing notes and confirm.  CONSTITUTIONAL: 89 yo M laying on stretcher; in no acute distress.  SKIN: Skin exam is warm and dry, no acute rash.  HEAD: Normocephalic; atraumatic.  EYES: Conjunctiva and sclera clear.  ENT: No nasal discharge; airway clear.   CARD: S1, S2 normal; no murmurs, gallops, or rubs. Tachycardic, regular.   RESP: No wheezes, rales. Speaking in full sentences. (+) bilateral rhonchi  ABD: Normal bowel sounds; soft; non-distended; non-tender; No rebound or guarding. No CVA tenderness.  EXT: Normal ROM. No clubbing, cyanosis or edema. No calf TTP or swelling.   NEURO: Alert, oriented. Grossly unremarkable. No focal deficits.

## 2025-07-15 NOTE — ED PROVIDER NOTE - PROGRESS NOTE DETAILS
Patient reports improvement in symptoms.  Discussed results and provided copy to PMH and and his family at bedside.  Offered admission for further treatment, patient prefers to trial p.o. antibiotics at home.  Strict return precautions discussed, christian LAZARO

## 2025-07-15 NOTE — ED PROVIDER NOTE - CLINICAL SUMMARY MEDICAL DECISION MAKING FREE TEXT BOX
89-year-old past medical history hypertension, dyslipidemia, A-fib on Eliquis, blindness presents to the emergency department for evaluation of low-grade fever and a cough.  Symptoms started yesterday and patient was sent to the ED today by his primary care doctor.  Patient is eating and drinking normally.  Patient denies any abdominal pain, urinary symptoms, back pain, flank pain.  Patient denies any ear pain, sinus pain, neck pain, neck stiffness, headache.  On exam, vital signs reviewed.  Patient well-appearing.  Patient has low-grade temperature and O2 sat 95%.  Heart rate is normal.  IV placed, labs sent, culture sent, chest x-ray performed, nasal swab sent.  Patient given fluids and presumptively had pneumonia and was given IV Levaquin.  Initial lactate was 2.1 and this was repeated and improved to 1.6.  Patient is on Eliquis.  Chest x-ray shows bilateral interstitial opacities concerning for possible pneumonia.  Urine negative for infection.  VBG shows normal pH and CO2.  ED workup reviewed and discussed with patient and family.  Shared decision making utilized and plan made for outpatient management.    Full DC instructions discussed and patient knows when to seek immediate medical attention.  Patient has proper follow up.  All results discussed and patient aware they may require further work up.  Proper follow up ensured. Limitations of ED work up discussed.  Medications administered and prescribed/OTC home meds discussed.  All questions and concerns from patient or family addressed. Understanding of instructions verbalized.

## 2025-07-17 LAB
-  AMPICILLIN/SULBACTAM: SIGNIFICANT CHANGE UP
-  AMPICILLIN: SIGNIFICANT CHANGE UP
-  AZTREONAM: SIGNIFICANT CHANGE UP
-  CEFAZOLIN: SIGNIFICANT CHANGE UP
-  CEFEPIME: SIGNIFICANT CHANGE UP
-  CEFTRIAXONE: SIGNIFICANT CHANGE UP
-  CEFUROXIME: SIGNIFICANT CHANGE UP
-  CIPROFLOXACIN: SIGNIFICANT CHANGE UP
-  ERTAPENEM: SIGNIFICANT CHANGE UP
-  GENTAMICIN: SIGNIFICANT CHANGE UP
-  LEVOFLOXACIN: SIGNIFICANT CHANGE UP
-  MEROPENEM: SIGNIFICANT CHANGE UP
-  NITROFURANTOIN: SIGNIFICANT CHANGE UP
-  PIPERACILLIN/TAZOBACTAM: SIGNIFICANT CHANGE UP
-  TOBRAMYCIN: SIGNIFICANT CHANGE UP
-  TRIMETHOPRIM/SULFAMETHOXAZOLE: SIGNIFICANT CHANGE UP
CULTURE RESULTS: ABNORMAL
METHOD TYPE: SIGNIFICANT CHANGE UP
ORGANISM # SPEC MICROSCOPIC CNT: ABNORMAL
ORGANISM # SPEC MICROSCOPIC CNT: SIGNIFICANT CHANGE UP
SPECIMEN SOURCE: SIGNIFICANT CHANGE UP

## 2025-08-29 ENCOUNTER — RX RENEWAL (OUTPATIENT)
Age: 89
End: 2025-08-29